# Patient Record
Sex: MALE | Race: WHITE | NOT HISPANIC OR LATINO | Employment: OTHER | URBAN - METROPOLITAN AREA
[De-identification: names, ages, dates, MRNs, and addresses within clinical notes are randomized per-mention and may not be internally consistent; named-entity substitution may affect disease eponyms.]

---

## 2018-04-09 LAB
ABSOL LYMPHOCYTES (HISTORICAL): 1.3 K/UL (ref 0.5–4)
ALBUMIN SERPL BCP-MCNC: 4 G/DL (ref 3–5.2)
ALP SERPL-CCNC: 100 U/L (ref 43–122)
ALT SERPL W P-5'-P-CCNC: 26 U/L (ref 9–52)
ANION GAP SERPL CALCULATED.3IONS-SCNC: 10 MMOL/L (ref 5–14)
AST SERPL W P-5'-P-CCNC: 28 U/L (ref 17–59)
BASOPHILS # BLD AUTO: 0 K/UL (ref 0–0.1)
BASOPHILS # BLD AUTO: 1 % (ref 0–1)
BILIRUB SERPL-MCNC: 1 MG/DL
BUN SERPL-MCNC: 21 MG/DL (ref 5–25)
CALCIUM SERPL-MCNC: 9.1 MG/DL (ref 8.4–10.2)
CHLORIDE SERPL-SCNC: 108 MEQ/L (ref 97–108)
CO2 SERPL-SCNC: 24 MMOL/L (ref 22–30)
CREATINE, SERUM (HISTORICAL): 0.96 MG/DL (ref 0.7–1.5)
DEPRECATED RDW RBC AUTO: 13.5 %
EGFR (HISTORICAL): >60 ML/MIN/1.73 M2
EOSINOPHIL # BLD AUTO: 0.1 K/UL (ref 0–0.4)
EOSINOPHIL NFR BLD AUTO: 1 % (ref 0–6)
GLUCOSE SERPL-MCNC: 99 MG/DL (ref 70–99)
HCT VFR BLD AUTO: 41.4 % (ref 41–53)
HGB BLD-MCNC: 14 G/DL (ref 13.5–17.5)
LYMPHOCYTES NFR BLD AUTO: 15 % (ref 25–45)
MAGNESIUM SERPL-MCNC: 1.9 MG/DL (ref 1.6–2.3)
MCH RBC QN AUTO: 31.9 PG (ref 26–34)
MCHC RBC AUTO-ENTMCNC: 33.8 % (ref 31–36)
MCV RBC AUTO: 95 FL (ref 80–100)
MONOCYTES # BLD AUTO: 0.5 K/UL (ref 0.2–0.9)
MONOCYTES NFR BLD AUTO: 6 % (ref 1–10)
NEUTROPHILS ABS COUNT (HISTORICAL): 6.7 K/UL (ref 1.8–7.8)
NEUTS SEG NFR BLD AUTO: 77 % (ref 45–65)
PLATELET # BLD AUTO: 212 K/MCL (ref 150–450)
POTASSIUM SERPL-SCNC: 4.3 MEQ/L (ref 3.6–5)
RBC # BLD AUTO: 4.38 M/MCL (ref 4.5–5.9)
SODIUM SERPL-SCNC: 142 MEQ/L (ref 137–147)
TOTAL PROTEIN (HISTORICAL): 6.5 G/DL (ref 5.9–8.4)
WBC # BLD AUTO: 8.8 K/MCL (ref 4.5–11)

## 2018-04-30 LAB
ABSOL LYMPHOCYTES (HISTORICAL): 1.4 K/UL (ref 0.5–4)
ALBUMIN SERPL BCP-MCNC: 4 G/DL (ref 3–5.2)
ALP SERPL-CCNC: 91 U/L (ref 43–122)
ALT SERPL W P-5'-P-CCNC: 28 U/L (ref 9–52)
ANION GAP SERPL CALCULATED.3IONS-SCNC: 9 MMOL/L (ref 5–14)
AST SERPL W P-5'-P-CCNC: 20 U/L (ref 17–59)
BASOPHILS # BLD AUTO: 0 K/UL (ref 0–0.1)
BASOPHILS # BLD AUTO: 1 % (ref 0–1)
BILIRUB SERPL-MCNC: 0.7 MG/DL
BUN SERPL-MCNC: 16 MG/DL (ref 5–25)
CALCIUM SERPL-MCNC: 9.1 MG/DL (ref 8.4–10.2)
CHLORIDE SERPL-SCNC: 106 MEQ/L (ref 97–108)
CO2 SERPL-SCNC: 26 MMOL/L (ref 22–30)
CREATINE, SERUM (HISTORICAL): 0.87 MG/DL (ref 0.7–1.5)
DEPRECATED RDW RBC AUTO: 13.9 %
EGFR (HISTORICAL): >60 ML/MIN/1.73 M2
EOSINOPHIL # BLD AUTO: 0.1 K/UL (ref 0–0.4)
EOSINOPHIL NFR BLD AUTO: 2 % (ref 0–6)
GLUCOSE SERPL-MCNC: 148 MG/DL (ref 70–99)
HCT VFR BLD AUTO: 40.6 % (ref 41–53)
HGB BLD-MCNC: 13.7 G/DL (ref 13.5–17.5)
LYMPHOCYTES NFR BLD AUTO: 19 % (ref 25–45)
MAGNESIUM SERPL-MCNC: 1.9 MG/DL (ref 1.6–2.3)
MCH RBC QN AUTO: 31.7 PG (ref 26–34)
MCHC RBC AUTO-ENTMCNC: 33.7 % (ref 31–36)
MCV RBC AUTO: 94 FL (ref 80–100)
MONOCYTES # BLD AUTO: 0.3 K/UL (ref 0.2–0.9)
MONOCYTES NFR BLD AUTO: 4 % (ref 1–10)
NEUTROPHILS ABS COUNT (HISTORICAL): 5.8 K/UL (ref 1.8–7.8)
NEUTS SEG NFR BLD AUTO: 74 % (ref 45–65)
PLATELET # BLD AUTO: 214 K/MCL (ref 150–450)
POTASSIUM SERPL-SCNC: 4.1 MEQ/L (ref 3.6–5)
RBC # BLD AUTO: 4.31 M/MCL (ref 4.5–5.9)
SODIUM SERPL-SCNC: 141 MEQ/L (ref 137–147)
TOTAL PROTEIN (HISTORICAL): 6.5 G/DL (ref 5.9–8.4)
WBC # BLD AUTO: 7.8 K/MCL (ref 4.5–11)

## 2018-05-21 LAB
ABSOL LYMPHOCYTES (HISTORICAL): 1.3 K/UL (ref 0.5–4)
ALBUMIN SERPL BCP-MCNC: 3.8 G/DL (ref 3–5.2)
ALP SERPL-CCNC: 88 U/L (ref 43–122)
ALT SERPL W P-5'-P-CCNC: 20 U/L (ref 9–52)
ANION GAP SERPL CALCULATED.3IONS-SCNC: 10 MMOL/L (ref 5–14)
AST SERPL W P-5'-P-CCNC: 18 U/L (ref 17–59)
BASOPHILS # BLD AUTO: 0 K/UL (ref 0–0.1)
BASOPHILS # BLD AUTO: 1 % (ref 0–1)
BILIRUB SERPL-MCNC: 0.4 MG/DL
BUN SERPL-MCNC: 18 MG/DL (ref 5–25)
CALCIUM SERPL-MCNC: 9.3 MG/DL (ref 8.4–10.2)
CHLORIDE SERPL-SCNC: 109 MEQ/L (ref 97–108)
CO2 SERPL-SCNC: 23 MMOL/L (ref 22–30)
CREATINE, SERUM (HISTORICAL): 0.85 MG/DL (ref 0.7–1.5)
DEPRECATED RDW RBC AUTO: 14 %
EGFR (HISTORICAL): >60 ML/MIN/1.73 M2
EOSINOPHIL # BLD AUTO: 0.1 K/UL (ref 0–0.4)
EOSINOPHIL NFR BLD AUTO: 2 % (ref 0–6)
GLUCOSE SERPL-MCNC: 106 MG/DL (ref 70–99)
HCT VFR BLD AUTO: 41.1 % (ref 41–53)
HGB BLD-MCNC: 13.8 G/DL (ref 13.5–17.5)
LYMPHOCYTES NFR BLD AUTO: 18 % (ref 25–45)
MAGNESIUM SERPL-MCNC: 1.9 MG/DL (ref 1.6–2.3)
MCH RBC QN AUTO: 32.3 PG (ref 26–34)
MCHC RBC AUTO-ENTMCNC: 33.6 % (ref 31–36)
MCV RBC AUTO: 96 FL (ref 80–100)
MONOCYTES # BLD AUTO: 0.4 K/UL (ref 0.2–0.9)
MONOCYTES NFR BLD AUTO: 5 % (ref 1–10)
NEUTROPHILS ABS COUNT (HISTORICAL): 5.6 K/UL (ref 1.8–7.8)
NEUTS SEG NFR BLD AUTO: 74 % (ref 45–65)
PLATELET # BLD AUTO: 205 K/MCL (ref 150–450)
POTASSIUM SERPL-SCNC: 4.3 MEQ/L (ref 3.6–5)
RBC # BLD AUTO: 4.28 M/MCL (ref 4.5–5.9)
SODIUM SERPL-SCNC: 141 MEQ/L (ref 137–147)
TOTAL PROTEIN (HISTORICAL): 6.2 G/DL (ref 5.9–8.4)
WBC # BLD AUTO: 7.6 K/MCL (ref 4.5–11)

## 2018-06-11 LAB
ABSOL LYMPHOCYTES (HISTORICAL): 1.3 K/UL (ref 0.5–4)
ALBUMIN SERPL BCP-MCNC: 3.7 G/DL (ref 3–5.2)
ALP SERPL-CCNC: 114 U/L (ref 43–122)
ALT SERPL W P-5'-P-CCNC: 17 U/L (ref 9–52)
ANION GAP SERPL CALCULATED.3IONS-SCNC: 11 MMOL/L (ref 5–14)
AST SERPL W P-5'-P-CCNC: 18 U/L (ref 17–59)
BASOPHILS # BLD AUTO: 0 K/UL (ref 0–0.1)
BASOPHILS # BLD AUTO: 1 % (ref 0–1)
BILIRUB SERPL-MCNC: 0.9 MG/DL
BUN SERPL-MCNC: 16 MG/DL (ref 5–25)
CALCIUM SERPL-MCNC: 9.1 MG/DL (ref 8.4–10.2)
CHLORIDE SERPL-SCNC: 105 MEQ/L (ref 97–108)
CO2 SERPL-SCNC: 23 MMOL/L (ref 22–30)
CREATINE, SERUM (HISTORICAL): 0.87 MG/DL (ref 0.7–1.5)
DEPRECATED RDW RBC AUTO: 13.8 %
EGFR (HISTORICAL): >60 ML/MIN/1.73 M2
EOSINOPHIL # BLD AUTO: 0.2 K/UL (ref 0–0.4)
EOSINOPHIL NFR BLD AUTO: 2 % (ref 0–6)
GLUCOSE SERPL-MCNC: 163 MG/DL (ref 70–99)
HCT VFR BLD AUTO: 39.8 % (ref 41–53)
HGB BLD-MCNC: 13.6 G/DL (ref 13.5–17.5)
LYMPHOCYTES NFR BLD AUTO: 14 % (ref 25–45)
MAGNESIUM SERPL-MCNC: 2 MG/DL (ref 1.6–2.3)
MCH RBC QN AUTO: 32.5 PG (ref 26–34)
MCHC RBC AUTO-ENTMCNC: 34.1 % (ref 31–36)
MCV RBC AUTO: 95 FL (ref 80–100)
MONOCYTES # BLD AUTO: 0.4 K/UL (ref 0.2–0.9)
MONOCYTES NFR BLD AUTO: 4 % (ref 1–10)
NEUTROPHILS ABS COUNT (HISTORICAL): 7.6 K/UL (ref 1.8–7.8)
NEUTS SEG NFR BLD AUTO: 79 % (ref 45–65)
PLATELET # BLD AUTO: 336 K/MCL (ref 150–450)
POTASSIUM SERPL-SCNC: 4.5 MEQ/L (ref 3.6–5)
RBC # BLD AUTO: 4.17 M/MCL (ref 4.5–5.9)
SODIUM SERPL-SCNC: 140 MEQ/L (ref 137–147)
TOTAL PROTEIN (HISTORICAL): 6.8 G/DL (ref 5.9–8.4)
WBC # BLD AUTO: 9.6 K/MCL (ref 4.5–11)

## 2018-07-09 ENCOUNTER — OFFICE VISIT (OUTPATIENT)
Dept: SURGERY | Facility: CLINIC | Age: 68
End: 2018-07-09
Payer: MEDICARE

## 2018-07-09 VITALS
WEIGHT: 162 LBS | DIASTOLIC BLOOD PRESSURE: 78 MMHG | HEART RATE: 92 BPM | SYSTOLIC BLOOD PRESSURE: 118 MMHG | BODY MASS INDEX: 23.19 KG/M2 | HEIGHT: 70 IN | TEMPERATURE: 97.5 F | RESPIRATION RATE: 18 BRPM

## 2018-07-09 DIAGNOSIS — K40.20 NON-RECURRENT BILATERAL INGUINAL HERNIA WITHOUT OBSTRUCTION OR GANGRENE: Primary | ICD-10-CM

## 2018-07-09 DIAGNOSIS — Z12.11 SCREENING FOR COLON CANCER: ICD-10-CM

## 2018-07-09 PROCEDURE — 99203 OFFICE O/P NEW LOW 30 MIN: CPT | Performed by: SURGERY

## 2018-07-09 RX ORDER — DEXAMETHASONE 4 MG/1
TABLET ORAL
Refills: 11 | COMMUNITY
Start: 2018-04-26 | End: 2018-07-25 | Stop reason: CLARIF

## 2018-07-09 RX ORDER — PANTOPRAZOLE SODIUM 40 MG/1
40 TABLET, DELAYED RELEASE ORAL
Refills: 1 | COMMUNITY
Start: 2018-05-16 | End: 2019-01-24 | Stop reason: ALTCHOICE

## 2018-07-09 RX ORDER — PANTOPRAZOLE SODIUM 40 MG/1
TABLET, DELAYED RELEASE ORAL EVERY 24 HOURS
COMMUNITY
Start: 2015-05-28 | End: 2018-07-09 | Stop reason: SDUPTHER

## 2018-07-09 RX ORDER — FOLIC ACID 1 MG/1
1 TABLET ORAL DAILY
COMMUNITY
Start: 2015-05-27 | End: 2020-01-24

## 2018-07-09 RX ORDER — MULTIVIT-MIN/IRON/FOLIC ACID/K 18-600-40
500 CAPSULE ORAL DAILY
COMMUNITY
Start: 2015-04-27

## 2018-07-09 NOTE — LETTER
July 9, 2018     [facility] [address]    Patient: Jeffrey Friedman   YOB: 1950   Date of Visit: 7/9/2018       Dear Dr Eligio More:    Thank you for referring Jerry Grubbs to me for evaluation  Below are my notes for this consultation  If you have questions, please do not hesitate to call me  I look forward to following your patient along with you  Sincerely,        Everardo Dawkins MD        CC: No Recipients  Holden Woods PA-C  7/9/2018 11:17 AM  Sign at close encounter  Assessment/Plan:   Jeffrey Friedman is a 79 y o male who is here for: Bilateral Inguinal Hernia    Patient with bilateral bulges of his groin with intermittent pain for several years  Patient with lung cancer and renal cell cancer who underwent chemotherapy and radiation    Patient's last colonoscopy was greater than 10 years ago    Plan: laparoscopic robotic assisted repair of Bilateral inguinal hernias, easily reducible  Schedule colonoscopy    Preoperative Clearance: None and Medical      - Patient has been instructed to avoid herbs or non-directed vitamins the week prior to surgery to ensure no drug interactions with perioperative surgical and anesthetic medications  - Patient should continue beta-blocker medication up through and including the day of surgery but hold any other hypertensive medications, including diuretics, unless instructed by PCP or anesthesia  - Patient should continue his statin medication up through and including the day of surgery  - Patient has been instructed to avoid aspirin containing medications or non-steroidal anti-inflammatory drugs for SEVEN days preceding surgery    ______________________________________________________  CC:  Chief Complaint   Patient presents with    Hernia    Patient Education     Given to patient     HPI:  Jeffrey Friedman is a 79 y o male who was referred for evaluation of Hernia and Patient Education (Given to patient)    Currently complaining of initial     bilateral inguinal hernia worse with lifting, standing,   no nausea and no vomiting,     Duration of pain: Intermittent  and over 1 year    regular bowel movement  Denies abdominal pain, change in bowel habits or blood in stool    Prior abdominal surgery: None    Father with history of colon cancer      ROS:  General ROS: negative  negative for - chills, fatigue, fever or night sweats, weight loss  Respiratory ROS: no cough, shortness of breath, or wheezing  Cardiovascular ROS: no chest pain or dyspnea on exertion  Genito-Urinary ROS: no dysuria, trouble voiding, or hematuria  Musculoskeletal ROS: negative for - gait disturbance, joint pain or muscle pain  Neurological ROS: no TIA or stroke symptoms  GI ROS: see HPI  Skin ROS: no new rashes or lesions   Lymphatic ROS: no new adenopathy noted by pt  GYN ROS: see HPI, no new GYN history or bleeding noted  Psy ROS: no new mental or behavioral disturbances         There is no problem list on file for this patient  Allergies:  Patient has no known allergies        Current Outpatient Prescriptions:     Ascorbic Acid (VITAMIN C) 500 MG CAPS, every 24 hours, Disp: , Rfl:     folic acid (FOLVITE) 1 mg tablet, every 24 hours, Disp: , Rfl:     Multiple Vitamins-Minerals (MULTIVITAMIN ADULT PO), every 24 hours, Disp: , Rfl:     pantoprazole (PROTONIX) 40 mg tablet, Take 40 mg by mouth daily before breakfast Take 30 minutes before, Disp: , Rfl: 1    POLYETHYLENE GLYCOL 3350 PO, every 24 hours, Disp: , Rfl:     dexamethasone (DECADRON) 4 mg tablet, TAKE 1 TABLET AS DIRECTED TWICE A DAY THE DAY BEFORE, DAY OF AND DAY AFTER CHEMOTHERAPY, Disp: , Rfl: 11    Past Medical History:   Diagnosis Date    Dysphagia 02/16/2017    Hemip[facility] [facility] 04/27/2015    Hydrocele 11/30/2010    Portacath in place     Primary malignant neoplasm of lung (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([facility] ) 05/26/2015       History reviewed  No pertinent surgical history  Family History   Problem Relation Age of Onset    Cancer Mother     Cancer Father     Colon cancer Father     Cancer Brother         reports that he has been smoking  He has never used smokeless tobacco  He reports that he drinks alcohol  He reports that he does not use drugs  PHYSICAL EXAM  General Appearance:    Alert, cooperative, no distress   Head:    Normocephalic without obvious abnormality   Eyes:    PERRL, conjunctiva/corneas clear, EOM's intact        Neck:   Supple, no adenopathy, no JVD   Back:     Symmetric, no spinal or CVA tenderness   Lungs:     Clear to auscultation bilaterally, no wheezing or rhonchi   Heart:    Regular abdomen is soft without significant tenderness, masses, organomegaly or guarding rate and rhythm, S1 and S2 normal, no murmur   Abdomen: Bowel sounds are normal     bilateral inguinal hernia   Extremities:   Extremities normal  No clubbing, cyanosis or edema   Psych:   Normal Affect, AOx3  Neurologic:  Skin:   CNII-XII intact  Strength symmetric, speech intact    Warm, dry, intact, no visible rashes or lesions             Informed consent for procedure was personally discussed, reviewed, and signed by Dr Dany Yanes  Discussion by Dr Dany Yanes was carried out regarding risks, benefits, and alternatives with the patient  Risks include but are not limited to:  bleeding, infection, and delayed wound healing or an open wound, pulmonary embolus, leaks from bowel or bile ducts or other viscus, transfusions, death  Discussed in further detail the more common complications and their rates of occurrence   was used if necessary  Patient expressed understanding of the issues discussed and wished/consented to proceed  All questions were answered by Dr Dany Yanes  Discussion performed between patient and the provider signing below       Signature:   Keren Smith MD    Date: 7/9/2018 Time: 10:07 AM     Some portions of this record may have been generated with voice recognition software  There may be translation, syntax,  or grammatical errors  Occasional wrong word or "sound-a-like" substitutions may have occurred due to the inherent limitations of the voice recognition software  Read the chart carefully and recognize, using context, where substitutions may have occurred  If you have any questions, please contact the dictating provider for clarification or correction, as needed  This encounter has been coded by a non-certified coder

## 2018-07-09 NOTE — PROGRESS NOTES
Assessment/Plan:   Tiffanie Gurrola is a 79 y o male who is here for: Bilateral Inguinal Hernia    Patient with bilateral bulges of his groin with intermittent pain for several years  Patient with lung cancer and renal cell cancer who underwent chemotherapy and radiation    Patient's last colonoscopy was greater than 10 years ago    Plan: laparoscopic robotic assisted repair of Bilateral inguinal hernias, easily reducible  Schedule colonoscopy    Preoperative Clearance: None and Medical      - Patient has been instructed to avoid herbs or non-directed vitamins the week prior to surgery to ensure no drug interactions with perioperative surgical and anesthetic medications  - Patient should continue beta-blocker medication up through and including the day of surgery but hold any other hypertensive medications, including diuretics, unless instructed by PCP or anesthesia  - Patient should continue his statin medication up through and including the day of surgery  - Patient has been instructed to avoid aspirin containing medications or non-steroidal anti-inflammatory drugs for SEVEN days preceding surgery  ______________________________________________________  CC:  Chief Complaint   Patient presents with    Hernia    Patient Education     Given to patient     HPI:  Tiffanie Gurrola is a 79 y o male who was referred for evaluation of Hernia and Patient Education (Given to patient)    Currently complaining of initial     bilateral inguinal hernia worse with lifting, standing,   no nausea and no vomiting,     Duration of pain: Intermittent  and over 1 year    regular bowel movement     Denies abdominal pain, change in bowel habits or blood in stool    Prior abdominal surgery: None    Father with history of colon cancer      ROS:  General ROS: negative  negative for - chills, fatigue, fever or night sweats, weight loss  Respiratory ROS: no cough, shortness of breath, or wheezing  Cardiovascular ROS: no chest pain or dyspnea on exertion  Genito-Urinary ROS: no dysuria, trouble voiding, or hematuria  Musculoskeletal ROS: negative for - gait disturbance, joint pain or muscle pain  Neurological ROS: no TIA or stroke symptoms  GI ROS: see HPI  Skin ROS: no new rashes or lesions   Lymphatic ROS: no new adenopathy noted by pt  GYN ROS: see HPI, no new GYN history or bleeding noted  Psy ROS: no new mental or behavioral disturbances         There is no problem list on file for this patient  Allergies:  Patient has no known allergies  Current Outpatient Prescriptions:     Ascorbic Acid (VITAMIN C) 500 MG CAPS, every 24 hours, Disp: , Rfl:     folic acid (FOLVITE) 1 mg tablet, every 24 hours, Disp: , Rfl:     Multiple Vitamins-Minerals (MULTIVITAMIN ADULT PO), every 24 hours, Disp: , Rfl:     pantoprazole (PROTONIX) 40 mg tablet, Take 40 mg by mouth daily before breakfast Take 30 minutes before, Disp: , Rfl: 1    POLYETHYLENE GLYCOL 3350 PO, every 24 hours, Disp: , Rfl:     dexamethasone (DECADRON) 4 mg tablet, TAKE 1 TABLET AS DIRECTED TWICE A DAY THE DAY BEFORE, DAY OF AND DAY AFTER CHEMOTHERAPY, Disp: , Rfl: 11    Past Medical History:   Diagnosis Date    Dysphagia 02/16/2017    [facility]aresis ([facility] ) 04/27/2015    Hydrocele 11/30/2010    Portacath in place     Primary malignant neoplasm of lung ([facility] ) 05/26/2015    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([facility] ) 05/26/2015       History reviewed  No pertinent surgical history  Family History   Problem Relation Age of Onset    Cancer Mother     Cancer Father     Colon cancer Father     Cancer Brother         reports that he has been smoking  He has never used smokeless tobacco  He reports that he drinks alcohol  He reports that he does not use drugs      PHYSICAL EXAM  General Appearance:    Alert, cooperative, no distress   Head:    Normocephalic without obvious abnormality   Eyes:    PERRL, conjunctiva/corneas clear, EOM's intact Neck:   Supple, no adenopathy, no JVD   Back:     Symmetric, no spinal or CVA tenderness   Lungs:     Clear to auscultation bilaterally, no wheezing or rhonchi   Heart:    Regular abdomen is soft without significant tenderness, masses, organomegaly or guarding rate and rhythm, S1 and S2 normal, no murmur   Abdomen: Bowel sounds are normal     bilateral inguinal hernia   Extremities:   Extremities normal  No clubbing, cyanosis or edema   Psych:   Normal Affect, AOx3  Neurologic:  Skin:   CNII-XII intact  Strength symmetric, speech intact    Warm, dry, intact, no visible rashes or lesions             Informed consent for procedure was personally discussed, reviewed, and signed by Dr Emma Martinez  Discussion by Dr Emma Martinez was carried out regarding risks, benefits, and alternatives with the patient  Risks include but are not limited to:  bleeding, infection, and delayed wound healing or an open wound, pulmonary embolus, leaks from bowel or bile ducts or other viscus, transfusions, death  Discussed in further detail the more common complications and their rates of occurrence   was used if necessary  Patient expressed understanding of the issues discussed and wished/consented to proceed  All questions were answered by Dr Emma Martinez  Discussion performed between patient and the provider signing below  Signature:   Ann Manrique MD    Date: 7/9/2018 Time: 10:07 AM     Some portions of this record may have been generated with voice recognition software  There may be translation, syntax,  or grammatical errors  Occasional wrong word or "sound-a-like" substitutions may have occurred due to the inherent limitations of the voice recognition software  Read the chart carefully and recognize, using context, where substitutions may have occurred  If you have any questions, please contact the dictating provider for clarification or correction, as needed   This encounter has been coded by a non-certified

## 2018-07-11 ENCOUNTER — DOCUMENTATION (OUTPATIENT)
Dept: FAMILY MEDICINE CLINIC | Facility: CLINIC | Age: 68
End: 2018-07-11

## 2018-07-11 ENCOUNTER — TELEPHONE (OUTPATIENT)
Dept: FAMILY MEDICINE CLINIC | Facility: CLINIC | Age: 68
End: 2018-07-11

## 2018-07-11 ENCOUNTER — TELEPHONE (OUTPATIENT)
Dept: SURGERY | Facility: CLINIC | Age: 68
End: 2018-07-11

## 2018-07-11 PROBLEM — K40.20 BILATERAL INGUINAL HERNIA WITHOUT OBSTRUCTION OR GANGRENE: Status: ACTIVE | Noted: 2018-07-11

## 2018-07-11 PROBLEM — Z80.0 FAMILY HISTORY OF COLON CANCER: Status: ACTIVE | Noted: 2018-07-11

## 2018-07-11 NOTE — TELEPHONE ENCOUNTER
Patient is schedule for surgery (Bilateral Inguinal Hernia Repairs) on 08/08/2018  Dr Suzi Cole is requesting for medical clearance before proceeding with surgery  Will Dr Gela Cook clear patient from last office visit or does the patient need to make an appointment?   Thank you

## 2018-07-12 NOTE — TELEPHONE ENCOUNTER
Pt is scheduled for 07/25/18  He is having bloodwork 07/20/18  Pt scheduled for surgery 08/08/18 at [address] [place] [address]

## 2018-07-13 ENCOUNTER — TELEPHONE (OUTPATIENT)
Dept: FAMILY MEDICINE CLINIC | Facility: CLINIC | Age: 68
End: 2018-07-13

## 2018-07-13 NOTE — TELEPHONE ENCOUNTER
----- Message from Dennie Pound sent at 7/12/2018 10:59 AM EDT -----  Regarding: Hernia Approval  Contact: [phone]  I am requesting a call, I received a call regarding approval for my hernia procedure and I am unsure what that means?

## 2018-07-19 ENCOUNTER — ANESTHESIA EVENT (OUTPATIENT)
Dept: PERIOP | Facility: HOSPITAL | Age: 68
End: 2018-07-19
Payer: MEDICARE

## 2018-07-20 NOTE — ANESTHESIA PREPROCEDURE EVALUATION
Review of Systems/Medical History  Patient summary reviewed  Chart reviewed      Cardiovascular   Pulmonary  Smoker (1/2 PPD) cigarette smoker  , Tobacco cessation counseling given ,   Comment: Lung Cancer  Spinal cord and brain mets     GI/Hepatic  Dysphagia (Hx of),     Comment: Patient states dysphagia has resolved     Negative  ROS        Endo/Other     GYN       Hematology  Negative hematology ROS     Comment: Last chemoTx 5/2018 Musculoskeletal  Negative musculoskeletal ROS        Neurology    Motor deficit , left hand/finger weakness and left lower extremity weakness,   Comment: Radiation for BT Psychology   Negative psychology ROS              Physical Exam    Airway    Mallampati score: II  TM Distance: >3 FB  Neck ROM: full     Dental   lower dentures and upper dentures,     Cardiovascular  Rhythm: regular, Rate: normal, Cardiovascular exam normal    Pulmonary  Decreased breath sounds,     Other Findings        Anesthesia Plan  ASA Score- 4     Anesthesia Type- general with ASA Monitors  Additional Monitors:   Airway Plan: ETT  Plan Factors- Patient instructed to abstain from smoking on day of procedure       Induction- intravenous  Postoperative Plan- Plan for postoperative opioid use  Informed Consent- Anesthetic plan and risks discussed with patient

## 2018-07-20 NOTE — PRE-PROCEDURE INSTRUCTIONS
Pre-Surgery Instructions:   Medication Instructions    Ascorbic Acid (VITAMIN C) 500 MG CAPS Patient was instructed by Physician and understands   folic acid (FOLVITE) 1 mg tablet Patient was instructed by Physician and understands   Multiple Vitamins-Minerals (MULTIVITAMIN ADULT PO) Patient was instructed by Physician and understands   pantoprazole (PROTONIX) 40 mg tablet Patient was instructed by Physician and understands  Pt instructed by Dr Eve Bernstein to take pantoprazole with a sip of water the morning of surgery  Pt given/reviewed [facility] preop instructions and chlorhexadine soap   Pt to hold asa/NSAIDS/vitamins/herbal supplements one week before surgery or as per Dr Jennyfer Zambrano

## 2018-07-25 ENCOUNTER — CONSULT (OUTPATIENT)
Dept: FAMILY MEDICINE CLINIC | Facility: CLINIC | Age: 68
End: 2018-07-25
Payer: MEDICARE

## 2018-07-25 VITALS
HEART RATE: 80 BPM | RESPIRATION RATE: 18 BRPM | WEIGHT: 163.2 LBS | BODY MASS INDEX: 22.85 KG/M2 | HEIGHT: 71 IN | SYSTOLIC BLOOD PRESSURE: 110 MMHG | DIASTOLIC BLOOD PRESSURE: 68 MMHG

## 2018-07-25 DIAGNOSIS — C79.31 SECONDARY MALIGNANT NEOPLASM OF BRAIN AND SPINAL CORD (HCC): ICD-10-CM

## 2018-07-25 DIAGNOSIS — C34.90 PRIMARY MALIGNANT NEOPLASM OF LUNG, UNSPECIFIED LATERALITY (HCC): ICD-10-CM

## 2018-07-25 DIAGNOSIS — Z80.0 FAMILY HISTORY OF COLON CANCER: Primary | ICD-10-CM

## 2018-07-25 DIAGNOSIS — C79.49 SECONDARY MALIGNANT NEOPLASM OF BRAIN AND SPINAL CORD (HCC): ICD-10-CM

## 2018-07-25 PROCEDURE — 99214 OFFICE O/P EST MOD 30 MIN: CPT | Performed by: FAMILY MEDICINE

## 2018-07-25 NOTE — PROGRESS NOTES
[facility] PRACTICE PRE-OPERATIVE EVALUATION  [facility] PHYSICIAN [facility] - [facility]  [facility]    NAME: Racheal Christensen  AGE: 79 y o  SEX: male  : 1950     DATE: 2018    Family Practice Pre-Operative Evaluation      Chief Complaint: Pre-operative Evaluation     Surgery: Bilat hernia repair: groin   Anticipated Date of Surgery: Aug 8, 2018     History of Present Illness:     Patient has no prior history of bleeding issues or blood clots  Chronic conditions, medications and allergies were reviewed  There is no currently active infectious process  Assessment of Cardiac Risk:  · No unstable or severe angina or MI in the last 6 weeks or history of stent placement in the last year   · No decompensated heart failure (e g  New onset heart failure, NYHA functional class IV heart failure, or worsening existing heart failure) in past 3 mos  · No severe heart valve disease including aortic stenosis or symptomatic mitral stenosis     Exercise Capacity:  · Able to walk 4 blocks without symptoms  · Able to walk 2 flights without symptoms    NO Prior Anesthesia Reactions       HAS HAD 3 DAYS OF HIGH DOSE STEROIDS EVERY 3 WKS FOR 3 YRS DURING CHEMO PROGRAM; LAST DOSE : MID   WILL DISCUSS WITH ONCOLOGIST IF WE NEED TASHIA-OPERATIVE COVERAGE   P A T  If done reviewed  Review of Systems:     Review of Systems   Constitutional: Negative for activity change and appetite change  HENT: Negative for trouble swallowing  Eyes: Negative for visual disturbance  Respiratory: Negative for cough and shortness of breath  Cardiovascular: Negative for chest pain, palpitations and leg swelling  Gastrointestinal: Negative for abdominal pain and blood in stool  Endocrine: Negative for polyuria  Genitourinary: Negative for difficulty urinating and hematuria  Skin: Negative for rash  Neurological: Negative for dizziness     Psychiatric/Behavioral: Negative for behavioral problems         Current Problem List:     Patient Active Problem List   Diagnosis    Family history of colon cancer    Bilateral inguinal hernia without obstruction or gangrene    Primary malignant neoplasm of lung ([facility] [facility]    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)       Allergies:     No Known Allergies    Current Medications:       Current Outpatient Prescriptions:     Ascorbic Acid (VITAMIN C) 500 MG CAPS, daily  , Disp: , Rfl:     folic acid (FOLVITE) 1 mg tablet, 1 mg daily  , Disp: , Rfl:     Multiple Vitamins-Minerals (MULTIVITAMIN ADULT PO), daily  , Disp: , Rfl:     pantoprazole (PROTONIX) 40 mg tablet, Take 40 mg by mouth daily before breakfast Take 30 minutes before, Disp: , Rfl: 1    Past Medical History:       Past Medical History:   Diagnosis Date    Balance problems     and" coordination issues/best to walk slow"    Dysphagia 02/16/2017    "not right now but in the past"    Environmental and seasonal allergies     Exercise involving walking     "approx 1 mile 3x/week"    Eye injury     right,  "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"    Full dentures     Hemiparesis ([facility] ) 04/27/2015    left weakness,"due to tumor on brain"    History of brain tumor     "had radiation for it"    History of cancer chemotherapy     last tx  5/22/18    History of GI bleed 2015    History of kidney cancer     left    History of kidney stones     History of radiation therapy     History of transfusion     5 units of blood 2015    Hydrocele 11/30/2010    Inguinal hernia, bilateral     Joint stiffness of multiple sites     Portacath in place     right chest    Primary malignant neoplasm of lung ([facility] [address] ) 05/26/2015    Risk for falls     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 05/26/2015    Skin cancer     Wears glasses         Past Surgical History:   Procedure Laterality Date    COLONOSCOPY      ESOPHAGOGASTRODUODENOSCOPY      HYDROCELE EXCISION / REPAIR Left     KIDNEY STONE SURGERY      PORTACATH PLACEMENT      SKIN BIOPSY      SKIN CANCER EXCISION      17 surgeries, basal cell    TONSILLECTOMY          Family History   Problem Relation Age of Onset    Cancer Mother     Cancer Father     Colon cancer Father     Cancer Brother         Social History     Social History    Marital status:      Spouse name: N/A    Number of children: N/A    Years of education: N/A     Occupational History    Not on file  Social History Main Topics    Smoking status: Current Every Day Smoker     Packs/day: 0 50     Years: 48 00     Types: Cigarettes    Smokeless tobacco: Current User    Alcohol use Yes      Comment: "3x per year"    Drug use: No    Sexual activity: Not on file     Other Topics Concern    Not on file     Social History Narrative    No narrative on file        Physical Exam:     /68 (BP Location: Left arm, Patient Position: Sitting, Cuff Size: Adult)   Pulse 80   Resp 18   Ht 5' 10 87" (1 8 m)   Wt 74 kg (163 lb 3 2 oz)   BMI 22 85 kg/m²     Physical Exam   Constitutional: He appears well-developed and well-nourished  HENT:   Head: Normocephalic and atraumatic  Eyes: Conjunctivae are normal    Neck: Neck supple  No thyromegaly present  Cardiovascular: Normal rate, regular rhythm, normal heart sounds and intact distal pulses  No murmur heard  Pulmonary/Chest: Effort normal and breath sounds normal  No respiratory distress  Musculoskeletal: He exhibits no edema  Lymphadenopathy:     He has no cervical adenopathy  Skin: Skin is warm and dry  Psychiatric: He has a normal mood and affect  His behavior is normal      HAD CMP/CBC DONE IN June AND ALL OK     Operative site has been examined and clear of skin infection and inflammation  Assessment & Recommendations:     Patient is cleared for surgery as detailed above       Surgical Procedure risk category: LOW    Patient specific operative risk categegory: MODERATE

## 2018-07-25 NOTE — PATIENT INSTRUCTIONS
IF YOU HAVE NEVER HAD A TDAP SHOT (TETANUS/DIPHTHERIA/PERTUSSIS)  TALK TO YOUR PHARMACIST ABOUT GETTING ONE : GOOD FOR 10 YRS:ESPECIALLY IMPORTATN IF YOU HAVE YOUNG CHILDREN OR GRANDCHILDREN    GET A YEARLY FLU SHOT IN THE FALL : IF OVER 65 GET "HIGH DOSE"     ASK PHARMACIST ABOUT "SHINGRIX" THE NEW SHINGLES VACCINE IF OVER AGE 50

## 2018-08-03 ENCOUNTER — HOSPITAL ENCOUNTER (OUTPATIENT)
Dept: INFUSION CENTER | Facility: HOSPITAL | Age: 68
Discharge: HOME | End: 2018-08-03
Payer: MEDICARE

## 2018-08-03 PROCEDURE — 96523 IRRIG DRUG DELIVERY DEVICE: CPT

## 2018-08-03 RX ADMIN — Medication 300 UNITS: at 11:29

## 2018-08-03 NOTE — PROGRESS NOTES
Port flushed per protocol  Patient states no labs needed today    AVS provided and confirmed next appt

## 2018-08-06 ENCOUNTER — ANESTHESIA EVENT (OUTPATIENT)
Dept: GASTROENTEROLOGY | Facility: HOSPITAL | Age: 68
End: 2018-08-06
Payer: MEDICARE

## 2018-08-07 ENCOUNTER — ANESTHESIA (OUTPATIENT)
Dept: GASTROENTEROLOGY | Facility: HOSPITAL | Age: 68
End: 2018-08-07
Payer: MEDICARE

## 2018-08-07 ENCOUNTER — HOSPITAL ENCOUNTER (OUTPATIENT)
Facility: HOSPITAL | Age: 68
Setting detail: OUTPATIENT SURGERY
Discharge: HOME | End: 2018-08-07
Attending: SURGERY | Admitting: SURGERY
Payer: MEDICARE

## 2018-08-07 VITALS
SYSTOLIC BLOOD PRESSURE: 112 MMHG | HEART RATE: 79 BPM | WEIGHT: 163.14 LBS | DIASTOLIC BLOOD PRESSURE: 63 MMHG | OXYGEN SATURATION: 100 % | TEMPERATURE: 98.2 F | HEIGHT: 70 IN | BODY MASS INDEX: 23.36 KG/M2 | RESPIRATION RATE: 18 BRPM

## 2018-08-07 DIAGNOSIS — Z80.0 FAMILY HISTORY OF COLON CANCER: ICD-10-CM

## 2018-08-07 PROCEDURE — 45385 COLONOSCOPY W/LESION REMOVAL: CPT | Performed by: SURGERY

## 2018-08-07 PROCEDURE — 45384 COLONOSCOPY W/LESION REMOVAL: CPT | Performed by: SURGERY

## 2018-08-07 PROCEDURE — 88305 TISSUE EXAM BY PATHOLOGIST: CPT | Performed by: PATHOLOGY

## 2018-08-07 RX ORDER — PROPOFOL 10 MG/ML
INJECTION, EMULSION INTRAVENOUS AS NEEDED
Status: DISCONTINUED | OUTPATIENT
Start: 2018-08-07 | End: 2018-08-07 | Stop reason: SURG

## 2018-08-07 RX ORDER — SODIUM CHLORIDE 9 MG/ML
100 INJECTION, SOLUTION INTRAVENOUS CONTINUOUS
Status: DISCONTINUED | OUTPATIENT
Start: 2018-08-07 | End: 2018-08-07 | Stop reason: HOSPADM

## 2018-08-07 RX ADMIN — PROPOFOL 50 MG: 10 INJECTION, EMULSION INTRAVENOUS at 10:38

## 2018-08-07 RX ADMIN — PROPOFOL 50 MG: 10 INJECTION, EMULSION INTRAVENOUS at 10:46

## 2018-08-07 RX ADMIN — PROPOFOL 50 MG: 10 INJECTION, EMULSION INTRAVENOUS at 10:50

## 2018-08-07 RX ADMIN — SODIUM CHLORIDE 100 ML/HR: 0.9 INJECTION, SOLUTION INTRAVENOUS at 10:06

## 2018-08-07 RX ADMIN — PROPOFOL 100 MG: 10 INJECTION, EMULSION INTRAVENOUS at 10:26

## 2018-08-07 RX ADMIN — PROPOFOL 50 MG: 10 INJECTION, EMULSION INTRAVENOUS at 10:35

## 2018-08-07 RX ADMIN — PROPOFOL 50 MG: 10 INJECTION, EMULSION INTRAVENOUS at 10:54

## 2018-08-07 RX ADMIN — PROPOFOL 50 MG: 10 INJECTION, EMULSION INTRAVENOUS at 10:42

## 2018-08-07 NOTE — OP NOTE
COLONOSCOPY  Postoperative Note  PATIENT NAME: Javi Hernandez  : 1950  MRN: [id number]  [facility] RO[facility] 01    Surgery Date: 2018      Pre operative diagnosis:   Family history of colon cancer [Z80 0]    Operative Indications:  Due for Colonoscopy    Previous Colonoscopy and  Location of polyps if any:   none          Consent:  The risks, benefits, and alternatives to the surgery were discussed with the patient and with the family prior to surgery if available, personally by Dr Jessee Calderon  If the consent was obtained by the physician assistant or other representative, the consent was reviewed once again personally by the operating physician  Common complications particular for this procedure as well as unusual complications were discussed, including but not limited to:  bleeding, wound infection, prolonged wound healing, open wounds, reoperation, leak from the bowel or viscus, leak from the bile duct or injury to adjacent or other organs or blood vessels in the abdomen, and possible surgery or reoperation  A  was used if necessary  The patient expressed understanding of the issues discussed and wished and consented to the procedure to proceed  All questions were answered  Dr Jessee Calderon personally discussed the informed consent with this patient  Post-Operative Diagnosis and Findings:     Diverticulosis and Hemorrhoids     Multiple polyp(s) seen in Right Colon, Left Colon, Sigmoid Colon and Rectum      Procedure:    Procedure(s):  COLONOSCOPY with Hot snare polypectomy and Hot forcep polypectomy      Surgeon(s) and Role:     * Reji Foley MD - Primary    I was present for the entire procedure       Specimens:  ID Type Source Tests Collected by Time Destination   1 : 4mm  polyp right colon Tissue Large Intestine, Right/Ascending Colon TISSUE EXAM Reji Foley MD 2018 1034    2 : 1 cm Hepatic fx polyp Tissue Colon TISSUE EXAM Reji Foley MD 2018 1039    3 : 1 cm polyp @45 CM Tissue Large Intestine, Left/Descending Colon TISSUE EXAM Ayse Garner MD 2018 1046    4 : 7mm polyp @ 35CM Tissue Large Intestine, Sigmoid Colon TISSUE EXAM Ayse Garner MD 2018 1053    5 : 8mm polyp @ 20cm Tissue Large Intestine, Sigmoid Colon TISSUE EXAM Ayse Garner MD 2018 1054    6 : 6mm polyp, 4mm polyp @15cm Tissue Colon TISSUE EXAM Ayse Garner MD 2018 1056    7 : 3mm polyp, 6mm polyp Rectal polyps Tissue Colon TISSUE EXAM Ayse Garner MD 2018 1058    8 : 7mm anal rectal polyp Tissue Colon TISSUE EXAM Ayse Garner MD 2018 1059        Estimated Blood Loss:   Minimal    Anesthesia Type:   Choice     Procedure: The patient was seen in the Holding Room  The risks, benefits, complications, treatment options, and expected outcomes were discussed with the patient  The possibilities of reaction to medication, pulmonary aspiration, perforation of viscus, bleeding, recurrent infection, the need for additional procedures, failure to diagnose a condition, missed polyps, a complication requiring transfusion or operation were discussed with the patient  The patient concurred with the proposed plan, giving informed consent  The patient was taken to Endoscopy Suite, identified as Natalie Jama  Staff confirmed patient name, , and procedure  A Time Out was held and the above information confirmed  A visual and digital exam was carried out of the anus and anal canal   Findings were normal unless specified below  The colonoscope was passed per anus and traversed to the cecum  The cecum was clearly visualized in the right lower quadrant by light reflex and palpation  The scope was withdrawn  There were mucosal lesion(s) and/or polyp(s) seen in the colon  These polyps were located at: ascending colon, hepatic flexure, descending colon, sigmoid colon and rectum  The polyp(s) were addressed using polypectomy technique described above       There were diverticula scattered throughout the sigmoid colon and grade 1 and 2 hemorrhoids  A photograph was taken  The scope was retroflexed  There were no anorectal lesions other than the hemorrhoids  The scope was removed  The patient tolerated the procedure well  Withdrawal time was greater than 10 minutes  Prep was good  at a 4/5  Follow-up endoscopy: Follow-up colonoscopy timing is difficult to predict without pathology and is not an exact science  Patients are told to follow up earlier than recommended if they have any symptoms or GI changes  However it is required that we predict possible endoscopy follow-up at the time of this procedure  Follow-up endoscopy is estimated to be recommended in:   <3 years for:  >10 polyps that clinically appear tubular adenomas      Some portions of this record may have been generated with voice recognition software  There may be translation, syntax,  or grammatical errors  Occasional wrong word or "sound-a-like" substitutions may have occurred due to the inherent limitations of the voice recognition software  Read the chart carefully and recognize, using context, where substations may have occurred  If you have any questions, please contact the dictating provider for clarification or correction, as needed         Complications: None    Condition: Stable to PACU    SIGNATURE: Suzie Alejandro MD   DATE: August 7, 2018   TIME: 11:02 AM

## 2018-08-07 NOTE — PROGRESS NOTES
D/C instructions given and pt verbalizes understanding   OOB to ambulate, gait steady denies dizziness

## 2018-08-07 NOTE — H&P (VIEW-ONLY)
Assessment/Plan:   Koko Valle is a 79 y o male who is here for: Bilateral Inguinal Hernia    Patient with bilateral bulges of his groin with intermittent pain for several years  Patient with lung cancer and renal cell cancer who underwent chemotherapy and radiation    Patient's last colonoscopy was greater than 10 years ago    Plan: laparoscopic robotic assisted repair of Bilateral inguinal hernias, easily reducible  Schedule colonoscopy    Preoperative Clearance: None and Medical      - Patient has been instructed to avoid herbs or non-directed vitamins the week prior to surgery to ensure no drug interactions with perioperative surgical and anesthetic medications  - Patient should continue beta-blocker medication up through and including the day of surgery but hold any other hypertensive medications, including diuretics, unless instructed by PCP or anesthesia  - Patient should continue his statin medication up through and including the day of surgery  - Patient has been instructed to avoid aspirin containing medications or non-steroidal anti-inflammatory drugs for SEVEN days preceding surgery  ______________________________________________________  CC:  Chief Complaint   Patient presents with    Hernia    Patient Education     Given to patient     HPI:  Koko Valle is a 79 y o male who was referred for evaluation of Hernia and Patient Education (Given to patient)    Currently complaining of initial     bilateral inguinal hernia worse with lifting, standing,   no nausea and no vomiting,     Duration of pain: Intermittent  and over 1 year    regular bowel movement     Denies abdominal pain, change in bowel habits or blood in stool    Prior abdominal surgery: None    Father with history of colon cancer      ROS:  General ROS: negative  negative for - chills, fatigue, fever or night sweats, weight loss  Respiratory ROS: no cough, shortness of breath, or wheezing  Cardiovascular ROS: no chest pain or dyspnea on exertion  Genito-Urinary ROS: no dysuria, trouble voiding, or hematuria  Musculoskeletal ROS: negative for - gait disturbance, joint pain or muscle pain  Neurological ROS: no TIA or stroke symptoms  GI ROS: see HPI  Skin ROS: no new rashes or lesions   Lymphatic ROS: no new adenopathy noted by pt  GYN ROS: see HPI, no new GYN history or bleeding noted  Psy ROS: no new mental or behavioral disturbances         There is no problem list on file for this patient  Allergies:  Patient has no known allergies  Current Outpatient Prescriptions:     Ascorbic Acid (VITAMIN C) 500 MG CAPS, every 24 hours, Disp: , Rfl:     folic acid (FOLVITE) 1 mg tablet, every 24 hours, Disp: , Rfl:     Multiple Vitamins-Minerals (MULTIVITAMIN ADULT PO), every 24 hours, Disp: , Rfl:     pantoprazole (PROTONIX) 40 mg tablet, Take 40 mg by mouth daily before breakfast Take 30 minutes before, Disp: , Rfl: 1    POLYETHYLENE GLYCOL 3350 PO, every 24 hours, Disp: , Rfl:     dexamethasone (DECADRON) 4 mg tablet, TAKE 1 TABLET AS DIRECTED TWICE A DAY THE DAY BEFORE, DAY OF AND DAY AFTER CHEMOTHERAPY, Disp: , Rfl: 11    Past Medical History:   Diagnosis Date    Dysphagia 02/16/2017    Hemiparesis ([facility] ) 04/27/2015    Hydrocele 11/30/2010    Portacath in place     Primary malignant neoplasm of lung ([facility] ) 05/26/2015    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([facility] ) 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/2015       History reviewed  No pertinent surgical history  Family History   Problem Relation Age of Onset    Cancer Mother     Cancer Father     Colon cancer Father     Cancer Brother         reports that he has been smoking  He has never used smokeless tobacco  He reports that he drinks alcohol  He reports that he does not use drugs      PHYSICAL EXAM  General Appearance:    Alert, cooperative, no distress   Head:    Normocephalic without obvious abnormality   Eyes:    PERRL, conjunctiva/corneas clear, EOM's intact Neck:   Supple, no adenopathy, no JVD   Back:     Symmetric, no spinal or CVA tenderness   Lungs:     Clear to auscultation bilaterally, no wheezing or rhonchi   Heart:    Regular abdomen is soft without significant tenderness, masses, organomegaly or guarding rate and rhythm, S1 and S2 normal, no murmur   Abdomen: Bowel sounds are normal     bilateral inguinal hernia   Extremities:   Extremities normal  No clubbing, cyanosis or edema   Psych:   Normal Affect, AOx3  Neurologic:  Skin:   CNII-XII intact  Strength symmetric, speech intact    Warm, dry, intact, no visible rashes or lesions             Informed consent for procedure was personally discussed, reviewed, and signed by Dr Peace Rodriguez  Discussion by Dr Peace Rodriguez was carried out regarding risks, benefits, and alternatives with the patient  Risks include but are not limited to:  bleeding, infection, and delayed wound healing or an open wound, pulmonary embolus, leaks from bowel or bile ducts or other viscus, transfusions, death  Discussed in further detail the more common complications and their rates of occurrence   was used if necessary  Patient expressed understanding of the issues discussed and wished/consented to proceed  All questions were answered by Dr Peace Rodriguez  Discussion performed between patient and the provider signing below  Signature:   Madina Guallpa MD    Date: 7/9/2018 Time: 10:07 AM     Some portions of this record may have been generated with voice recognition software  There may be translation, syntax,  or grammatical errors  Occasional wrong word or "sound-a-like" substitutions may have occurred due to the inherent limitations of the voice recognition software  Read the chart carefully and recognize, using context, where substitutions may have occurred  If you have any questions, please contact the dictating provider for clarification or correction, as needed   This encounter has been coded by a non-certified

## 2018-08-07 NOTE — DISCHARGE INSTRUCTIONS
Elisa Cedillo  Endoscopy Post-Operative Instructions  Dr Ty Freed MD, FACS    Procedure: Colonoscopy    Findings:  Diverticulosis, Hemorrhoids and Colon Polyp(s)    Follow-Up: You will need a repeat Endoscopy in (generally)2- 3 years  For close follow  up due to number of polyps    Will await final pathology report for final determination of number of years until your follow up endoscopy, if you had polyps on this exam   Different types of polyps require different lengths of follow up surveillance  Please call our office or your primary doctor's office if you have any questions, once the report is returned  You should have an endoscopy sooner than recommended if you have any symptoms of bleeding or change in stools or other concerns  You will receive a call from our office with your results, in addition to the the preliminary results you received today  You will usually receive a follow-up letter from our office in 1-2 weeks  Call the office if you do not hear from us  You are welcome to also schedule an office visit if desired to discuss the results further  It is your responsibility to contact our office for results in 1- 2 weeks if you do not hear from us  If a follow up endoscopy is needed, you are responsible for arranging that follow up appointment at the appropriate time  The office may or may not issue a reminder at that future time  Please take responsibility for your own follow up healthcare  Diet: Eat a light snack first, and then resume your previous diet  Discharge Medications:  See after visit summary for reconciled discharge medications provided to patient and family        Current Facility-Administered Medications:     sodium chloride 0 9 % infusion, 100 mL/hr, Intravenous, Continuous, Rony Barba, DO, Last Rate: 100 mL/hr at 08/07/18 1006, 100 mL/hr at 08/07/18 1006  Do not take aspirin or blood thinning medications for 2 days following procedure  Tylenol is okay  You may have been given a new medication  Please take this (usually an anti-ulcer -type medication) and see your primary care doctor for refills and follow up medications if needed       After the test: Notify physician for arm swelling or pain at the intravenous site  You may have some abdominal discomfort following the procedure  Belching or passing flatus will help relieve it  Following upper endoscopy, you may experience a temporary sore throat  Saline gargles or lozenges can be taken to relieve sore throat Following lower endoscopy, minor rectal bleeding is not uncommon      Activity: Do not drive a car, operate machinery, or sign legal documents for 24 hours after your procedure  Normal activity may be resumed on the day following the procedure      Call the office at [phone] for any of the following: Severe abdominal pain, significant rectal bleeding, chills, or fever above 100°, new onset of persistent cough or persistent vomiting      [address], [address] [facility]  Phone: [phone]                        Colorectal Polyps   WHAT YOU NEED TO KNOW:   Colorectal polyps are small growths of tissue in the lining of the colon and rectum  Most polyps are hyperplastic polyps and are usually benign (noncancerous)  Certain types of polyps, called adenomatous polyps, may turn into cancer  DISCHARGE INSTRUCTIONS:   Follow up with your healthcare provider or gastroenterologist as directed: You may need to return for more tests, such as another colonoscopy  Write down your questions so you remember to ask them during your visits  Reduce your risk for colorectal polyps:   · Eat a variety of healthy foods:  Healthy foods include fruit, vegetables, whole-grain breads, low-fat dairy products, beans, lean meat, and fish  Ask if you need to be on a special diet      · Maintain a healthy weight:  Ask your healthcare provider if you need to lose weight and how much you need to lose  Ask for help with a weight loss program     · Exercise:  Begin to exercise slowly and do more as you get stronger  Talk with your healthcare provider before you start an exercise program      · Limit alcohol:  Your risk for polyps increases the more you drink  · Do not smoke: If you smoke, it is never too late to quit  Ask for information about how to stop  For support and more information:   · Abram Arreola ([facility])  [address] , [place] [address]  Phone: [phone]  Web Address: www digestive  niddk nih gov  Contact your healthcare provider or gastroenterologist if:   · You have a fever  · You have chills, a cough, or feel weak and achy  · You have abdominal pain that does not go away or gets worse after you take medicine  · Your abdomen is swollen  · You are losing weight without trying  · You have questions or concerns about your condition or care  Seek care immediately or call 911 if:   · You have sudden shortness of breath  · You have a fast heart rate, fast breathing, or are too dizzy to stand up  · You have severe abdominal pain  · You see blood in your bowel movement  © 2017 [address] Information is for End User's use only and may not be sold, redistributed or otherwise used for commercial purposes  All illustrations and images included in CareNotes® are the copyrighted property of A D A M , Inc  or [employer]  The above information is an  only  It is not intended as medical advice for individual conditions or treatments  Talk to your doctor, nurse or pharmacist before following any medical regimen to see if it is safe and effective for you  Colonoscopy   WHAT YOU NEED TO KNOW:   A colonoscopy is a procedure to examine the inside of your colon (intestine) with a scope   Polyps or tissue growths may have been removed during your colonoscopy  It is normal to feel bloated and to have some abdominal discomfort  You should be passing gas  If you have hemorrhoids or you had polyps removed, you may have a small amount of bleeding  DISCHARGE INSTRUCTIONS:   Seek care immediately if:   · You have a large amount of bright red blood in your bowel movements  · Your abdomen is hard and firm and you have severe pain  · You have sudden trouble breathing  Contact your healthcare provider if:   · You develop a rash or hives  · You have a fever within 24 hours of your procedure       · You have not had a bowel movement for 3 days after your procedure  · You have questions or concerns about your condition or care  Activity:   · Do not lift, strain, or run  for 3 days after your procedure  · Rest after your procedure  You have been given medicine to relax you  Do not  drive or make important decisions until the day after your procedure  Return to your normal activity as directed  · Relieve gas and discomfort from bloating  by lying on your right side with a heating pad on your abdomen  You may need to take short walks to help the gas move out  Eat small meals until bloating is relieved  If you had polyps removed: For 7 days after your procedure:  · Do not  take aspirin  · Do not  go on long car rides  Follow up with your healthcare provider as directed:  Write down your questions so you remember to ask them during your visits  © 2017 [address] is for End User's use only and may not be sold, redistributed or otherwise used for commercial purposes  All illustrations and images included in CareNotes® are the copyrighted property of A D A M , Inc  or [employer]  The above information is an  only  It is not intended as medical advice for individual conditions or treatments   Talk to your doctor, nurse or pharmacist before following any medical regimen to see if it is safe and effective for you  Diverticulosis   WHAT YOU NEED TO KNOW:   Diverticulosis is a condition that causes small pockets called diverticula to form in your intestine  These pockets make it difficult for bowel movements to pass through your digestive system  DISCHARGE INSTRUCTIONS:   Seek care immediately if:   · You have severe pain on the left side of your lower abdomen  · Your bowel movements are bright or dark red  Contact your healthcare provider if:   · You have a fever and chills  · You feel dizzy or lightheaded  · You have nausea, or you are vomiting  · You have a change in your bowel movements  · You have questions or concerns about your condition or care  Medicines:   · Medicines  to soften your bowel movements may be given  You may also need medicines to treat symptoms such as bloating and pain  · Take your medicine as directed  Contact your healthcare provider if you think your medicine is not helping or if you have side effects  Tell him or her if you are allergic to any medicine  Keep a list of the medicines, vitamins, and herbs you take  Include the amounts, and when and why you take them  Bring the list or the pill bottles to follow-up visits  Carry your medicine list with you in case of an emergency  Self-care: The goal of treatment is to manage any symptoms you have and prevent other problems such as diverticulitis  Diverticulitis is swelling or infection of the diverticula  Your healthcare provider may recommend any of the following:  · Eat a variety of high-fiber foods  High-fiber foods help you have regular bowel movements  High-fiber foods include cooked beans, fruits, vegetables, and some cereals  Most adults need 25 to 35 grams of fiber each day  Your healthcare provider may recommend that you have more  Ask your healthcare provider how much fiber you need  Increase fiber slowly   You may have abdominal discomfort, bloating, and gas if you add fiber to your diet too quickly  You may need to take a fiber supplement if you are not getting enough fiber from food  · Drink liquids as directed  You may need to drink 2 to 3 liters (8 to 12 cups) of liquids every day  Ask your healthcare provider how much liquid to drink each day and which liquids are best for you  · Apply heat  on your abdomen for 20 to 30 minutes every 2 hours for as many days as directed  Heat helps decrease pain and muscle spasms  Help prevent diverticulitis or other symptoms: The following may help decrease your risk for diverticulitis or symptoms, such as bleeding  Talk to your provider about these or other things you can do to prevent problems that may occur with diverticulosis  · Exercise regularly  Ask your healthcare provider about the best exercise plan for you  Exercise can help you have regular bowel movements  Get 30 minutes of exercise on most days of the week  · Maintain a healthy weight  Ask your healthcare provider how much you should weigh  Ask him or her to help you create a weight loss plan if you are overweight  · Do not smoke  Nicotine and other chemicals in cigarettes increase your risk for diverticulitis  Ask your healthcare provider for information if you currently smoke and need help to quit  E-cigarettes or smokeless tobacco still contain nicotine  Talk to your healthcare provider before you use these products  · Ask your healthcare provider if it is safe to take NSAIDs  NSAIDs may increase your risk of diverticulitis  Follow up with your healthcare provider as directed:  Write down your questions so you remember to ask them during your visits  © 2017 [address] Information is for End User's use only and may not be sold, redistributed or otherwise used for commercial purposes  All illustrations and images included in CareNotes® are the copyrighted property of [employer]  or Josh Collins    The above information is an  only  It is not intended as medical advice for individual conditions or treatments  Talk to your doctor, nurse or pharmacist before following any medical regimen to see if it is safe and effective for you  Diverticulosis Diet   WHAT YOU NEED TO KNOW:   What is a diverticulosis diet? A diverticulosis diet includes high-fiber foods  High-fiber foods help you have regular bowel movements  Extra fiber may decrease your risk of forming new diverticula (small pockets) in your intestine  A high-fiber diet may also help prevent diverticulitis  Diverticulitis is a painful condition that occurs when diverticula become inflamed or infected  You do not need to avoid nuts, seeds, corn, or popcorn while you are on a diverticulosis diet  How much fiber do I need? You may need 25 to 35 grams of fiber each day  Ask your dietitian or healthcare provider how much fiber you should have  Increase your intake of fiber slowly  When you eat more fiber, you may have gas and feel bloated  You may need to take a fiber supplement if you do not get enough fiber from food  Drink plenty of liquids as you increase the fiber in your diet  Your dietitian or healthcare provider may recommend 8 eight-ounce cups or more each day  Ask which liquids are best for you  Which foods are high in fiber?    · Foods with at least 4 grams of fiber per serving:      ¨ ? to ½ cup of high-fiber cereal (check the nutrition label on the box)    ¨ ½ cup of blackberries or raspberries    ¨ 4 dried prunes    ¨ 1 cooked artichoke    ¨ ½ cup of cooked legumes, such as lentils, or red, kidney, and vela beans    · Foods with 1 to 3 grams of fiber per serving:      ¨ 1 slice of whole-wheat, pumpernickel, or rye bread    ¨ 4 whole-wheat crackers    ¨ ½ cup of cereal with 1 to 3 grams of fiber per serving (check the nutrition label on the box)    ¨ 1 piece of fruit, such as an apple, banana, pear, kiwi, or orange    ¨ 3 dates    ¨ ½ cup of canned apricots, fruit cocktail, peaches, or pears    ¨ ½ cup of raw or cooked vegetables, such as carrots, cauliflower, cabbage, spinach, squash, or corn  When should I contact my healthcare provider? · You have questions about a high-fiber diet  · You have a change in your bowel movements  · You have an upset stomach  · You have a fever  · You have pain in your lower abdomen on the left side  · You have questions about your condition or care  CARE AGREEMENT:   You have the right to help plan your care  Learn about your health condition and how it may be treated  Discuss treatment options with your caregivers to decide what care you want to receive  You always have the right to refuse treatment  The above information is an  only  It is not intended as medical advice for individual conditions or treatments  Talk to your doctor, nurse or pharmacist before following any medical regimen to see if it is safe and effective for you  © 2017 [address] Information is for End User's use only and may not be sold, redistributed or otherwise used for commercial purposes  All illustrations and images included in CareNotes® are the copyrighted property of [employer]  or [employer]  Hemorrhoids   WHAT YOU NEED TO KNOW:   Hemorrhoids are swollen blood vessels inside your rectum (internal hemorrhoids) or on your anus (external hemorrhoids)  Sometimes a hemorrhoid may prolapse  This means it extends out of your anus  DISCHARGE INSTRUCTIONS:   Seek care immediately if:   · You have severe pain in your rectum or around your anus  · You have severe pain in your abdomen and you are vomiting  · You have bleeding from your anus that soaks through your underwear  Contact your healthcare provider if:   · You have frequent and painful bowel movements      · Your hemorrhoid looks or feels more swollen than usual      · You do not have a bowel movement for 2 days or more  · You see or feel tissue coming through your anus  · You have questions or concerns about your condition or care  Medicines: You may  need any of the following:  · Medicine  may be given to decrease pain, swelling, and itching  The medicine may come as a pad, cream, or ointment  · Stool softeners  help treat or prevent constipation  · NSAIDs , such as ibuprofen, help decrease swelling, pain, and fever  NSAIDs can cause stomach bleeding or kidney problems in certain people  If you take blood thinner medicine, always ask your healthcare provider if NSAIDs are safe for you  Always read the medicine label and follow directions  · Take your medicine as directed  Contact your healthcare provider if you think your medicine is not helping or if you have side effects  Tell him or her if you are allergic to any medicine  Keep a list of the medicines, vitamins, and herbs you take  Include the amounts, and when and why you take them  Bring the list or the pill bottles to follow-up visits  Carry your medicine list with you in case of an emergency  Manage your symptoms:   · Apply ice on your anus for 15 to 20 minutes every hour or as directed  Use an ice pack, or put crushed ice in a plastic bag  Cover it with a towel before you apply it to your anus  Ice helps prevent tissue damage and decreases swelling and pain  · Take a sitz bath  Fill a bathtub with 4 to 6 inches of warm water  You may also use a sitz bath pan that fits inside a toilet bowl  Sit in the sitz bath for 15 minutes  Do this 3 times a day, and after each bowel movement  The warm water can help decrease pain and swelling  · Keep your anal area clean  Gently wash the area with warm water daily  Soap may irritate the area  After a bowel movement, wipe with moist towelettes or wet toilet paper  Dry toilet paper can irritate the area  Prevent hemorrhoids:   · Do not strain to have a bowel movement  Do not sit on the toilet too long  These actions can increase pressure on the tissues in your rectum and anus  · Drink plenty of liquids  Liquids can help prevent constipation  Ask how much liquid to drink each day and which liquids are best for you  · Eat a variety of high-fiber foods  Examples include fruits, vegetables, and whole grains  Ask your healthcare provider how much fiber you need each day  You may need to take a fiber supplement  · Exercise as directed  Exercise, such as walking, may make it easier to have a bowel movement  Ask your healthcare provider to help you create an exercise plan  · Do not have anal sex  Anal sex can weaken the skin around your rectum and anus  · Avoid heavy lifting  This can cause straining and increase your risk for another hemorrhoid  Follow up with your healthcare provider as directed:  Write down your questions so you remember to ask them during your visits  © 2017 [facility] Information is for End User's use only and may not be sold, redistributed or otherwise used for commercial purposes  All illustrations and images included in CareNotes® are the copyrighted property of [employer]  or Josh [employer]  The above information is an  only  It is not intended as medical advice for individual conditions or treatments  Talk to your doctor, nurse or pharmacist before following any medical regimen to see if it is safe and effective for you

## 2018-08-07 NOTE — ANESTHESIA PREPROCEDURE EVALUATION
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     GI/Hepatic    GI bleeding , history of, Bowel prep       Kidney stones, Genitourinary malignancy renal cancer,        Endo/Other  Negative endo/other ROS      GYN       Hematology  Negative hematology ROS      Musculoskeletal  Negative musculoskeletal ROS        Neurology    Motor deficit , Left hemiplegia,    Psychology   Negative psychology ROS              Physical Exam    Airway    Mallampati score: II  TM Distance: >3 FB  Neck ROM: full     Dental   lower dentures and upper dentures,     Cardiovascular  Comment: Negative ROS, Rhythm: regular, Rate: normal, Cardiovascular exam normal    Pulmonary  Decreased breath sounds,     Other Findings        Anesthesia Plan  ASA Score- 3     Anesthesia Type- IV sedation with anesthesia with ASA Monitors  Additional Monitors:   Airway Plan:         Plan Factors-Patient not instructed to abstain from smoking on day of procedure  Patient smoked on day of surgery  Induction- intravenous  Postoperative Plan-     Informed Consent- Anesthetic plan and risks discussed with patient  I personally reviewed this patient with the CRNA  Discussed and agreed on the Anesthesia Plan with the CRNA  Leslie Cantrell

## 2018-08-07 NOTE — DISCHARGE SUMMARY
Discharge Summary - Natalie Jama 79 y o  male MRN: [id number]    Unit/Bed#: [facility] [place] Encounter: [id number]      Pre-Operative Diagnosis: Pre-Op Diagnosis Codes:     * Family history of colon cancer [Z80 0]    Post-Operative Diagnosis: Post-Op Diagnosis Codes:     * Family history of colon cancer [Z80 0]     * Adenomatous polyps [D36 9]     * Diverticulosis [K57 90]    Procedures Performed:  Procedure(s):  COLONOSCOPY    Surgeon: Ayse Garner MD    See H & P for full details of admission and Operative Note for full details of operations performed  Patient was seen and examined prior to discharge  Provisions for Follow-Up Care:  See After Visit Summary for information related to follow-up care and home orders  Disposition: Home, in stable condition  Planned Readmission: No    Discharge Medications:  See after visit summary for reconciled discharge medications provided to patient and family  Post Operative instructions: Reviewed with patient and/or family  Some portions of this record may have been generated with voice recognition software  There may be translation, syntax,  or grammatical errors  Occasional wrong word or "sound-a-like" substitutions may have occurred due to the inherent limitations of the voice recognition software  Read the chart carefully and recognize, using context, where substitutions may have occurred  If you have any questions, please contact the dictating provider for clarification or correction, as needed  This encounter has been coded by non certified Coder      Signature:   Ayse Garner MD  Date: 8/7/2018 Time: 11:01 AM

## 2018-08-08 ENCOUNTER — TELEPHONE (OUTPATIENT)
Dept: SURGERY | Facility: CLINIC | Age: 68
End: 2018-08-08

## 2018-08-08 ENCOUNTER — HOSPITAL ENCOUNTER (OUTPATIENT)
Facility: HOSPITAL | Age: 68
Setting detail: OUTPATIENT SURGERY
Discharge: HOME | End: 2018-08-08
Attending: SURGERY | Admitting: SURGERY
Payer: MEDICARE

## 2018-08-08 ENCOUNTER — ANESTHESIA (OUTPATIENT)
Dept: PERIOP | Facility: HOSPITAL | Age: 68
End: 2018-08-08
Payer: MEDICARE

## 2018-08-08 VITALS
TEMPERATURE: 98.8 F | DIASTOLIC BLOOD PRESSURE: 71 MMHG | OXYGEN SATURATION: 94 % | HEIGHT: 70 IN | RESPIRATION RATE: 16 BRPM | BODY MASS INDEX: 23.48 KG/M2 | SYSTOLIC BLOOD PRESSURE: 115 MMHG | WEIGHT: 164 LBS | HEART RATE: 77 BPM

## 2018-08-08 DIAGNOSIS — K40.20 NON-RECURRENT BILATERAL INGUINAL HERNIA WITHOUT OBSTRUCTION OR GANGRENE: Primary | ICD-10-CM

## 2018-08-08 PROCEDURE — 49650 LAP ING HERNIA REPAIR INIT: CPT | Performed by: SURGERY

## 2018-08-08 PROCEDURE — 49650 LAP ING HERNIA REPAIR INIT: CPT | Performed by: PHYSICIAN ASSISTANT

## 2018-08-08 PROCEDURE — C1781 MESH (IMPLANTABLE): HCPCS | Performed by: SURGERY

## 2018-08-08 DEVICE — LAPAROSCOPIC SELF-FIXATING MESH, LEFT AND RIGHT ANATOMICAL
Type: IMPLANTABLE DEVICE | Site: INGUINAL | Status: FUNCTIONAL
Brand: PROGRIP

## 2018-08-08 RX ORDER — MORPHINE SULFATE 10 MG/ML
2 INJECTION, SOLUTION INTRAMUSCULAR; INTRAVENOUS EVERY 2 HOUR PRN
Status: DISCONTINUED | OUTPATIENT
Start: 2018-08-08 | End: 2018-08-08 | Stop reason: HOSPADM

## 2018-08-08 RX ORDER — HYDROCODONE BITARTRATE AND ACETAMINOPHEN 5; 325 MG/1; MG/1
1 TABLET ORAL EVERY 4 HOURS PRN
Status: DISCONTINUED | OUTPATIENT
Start: 2018-08-08 | End: 2018-08-08 | Stop reason: HOSPADM

## 2018-08-08 RX ORDER — LIDOCAINE HYDROCHLORIDE 10 MG/ML
INJECTION, SOLUTION INFILTRATION; PERINEURAL AS NEEDED
Status: DISCONTINUED | OUTPATIENT
Start: 2018-08-08 | End: 2018-08-08 | Stop reason: SURG

## 2018-08-08 RX ORDER — VECURONIUM BROMIDE 1 MG/ML
INJECTION, POWDER, LYOPHILIZED, FOR SOLUTION INTRAVENOUS AS NEEDED
Status: DISCONTINUED | OUTPATIENT
Start: 2018-08-08 | End: 2018-08-08 | Stop reason: SURG

## 2018-08-08 RX ORDER — SODIUM CHLORIDE 9 MG/ML
125 INJECTION, SOLUTION INTRAVENOUS CONTINUOUS
Status: DISCONTINUED | OUTPATIENT
Start: 2018-08-08 | End: 2018-08-08 | Stop reason: HOSPADM

## 2018-08-08 RX ORDER — ONDANSETRON 2 MG/ML
4 INJECTION INTRAMUSCULAR; INTRAVENOUS ONCE AS NEEDED
Status: DISCONTINUED | OUTPATIENT
Start: 2018-08-08 | End: 2018-08-08 | Stop reason: HOSPADM

## 2018-08-08 RX ORDER — ACETAMINOPHEN 325 MG/1
650 TABLET ORAL EVERY 6 HOURS PRN
Status: DISCONTINUED | OUTPATIENT
Start: 2018-08-08 | End: 2018-08-08 | Stop reason: HOSPADM

## 2018-08-08 RX ORDER — HYDROCODONE BITARTRATE AND ACETAMINOPHEN 5; 325 MG/1; MG/1
1-2 TABLET ORAL EVERY 4 HOURS PRN
Qty: 30 TABLET | Refills: 0 | Status: SHIPPED | OUTPATIENT
Start: 2018-08-08 | End: 2019-01-25

## 2018-08-08 RX ORDER — ONDANSETRON 2 MG/ML
4 INJECTION INTRAMUSCULAR; INTRAVENOUS EVERY 4 HOURS PRN
Status: DISCONTINUED | OUTPATIENT
Start: 2018-08-08 | End: 2018-08-08 | Stop reason: HOSPADM

## 2018-08-08 RX ORDER — GLYCOPYRROLATE 0.2 MG/ML
INJECTION INTRAMUSCULAR; INTRAVENOUS AS NEEDED
Status: DISCONTINUED | OUTPATIENT
Start: 2018-08-08 | End: 2018-08-08 | Stop reason: SURG

## 2018-08-08 RX ORDER — ONDANSETRON 2 MG/ML
INJECTION INTRAMUSCULAR; INTRAVENOUS AS NEEDED
Status: DISCONTINUED | OUTPATIENT
Start: 2018-08-08 | End: 2018-08-08 | Stop reason: SURG

## 2018-08-08 RX ORDER — PROPOFOL 10 MG/ML
INJECTION, EMULSION INTRAVENOUS AS NEEDED
Status: DISCONTINUED | OUTPATIENT
Start: 2018-08-08 | End: 2018-08-08 | Stop reason: SURG

## 2018-08-08 RX ORDER — FENTANYL CITRATE/PF 50 MCG/ML
50 SYRINGE (ML) INJECTION
Status: DISCONTINUED | OUTPATIENT
Start: 2018-08-08 | End: 2018-08-08 | Stop reason: HOSPADM

## 2018-08-08 RX ORDER — BUPIVACAINE HYDROCHLORIDE AND EPINEPHRINE 2.5; 5 MG/ML; UG/ML
INJECTION, SOLUTION EPIDURAL; INFILTRATION; INTRACAUDAL; PERINEURAL AS NEEDED
Status: DISCONTINUED | OUTPATIENT
Start: 2018-08-08 | End: 2018-08-08 | Stop reason: HOSPADM

## 2018-08-08 RX ORDER — HYDROMORPHONE HYDROCHLORIDE 2 MG/ML
INJECTION, SOLUTION INTRAMUSCULAR; INTRAVENOUS; SUBCUTANEOUS AS NEEDED
Status: DISCONTINUED | OUTPATIENT
Start: 2018-08-08 | End: 2018-08-08 | Stop reason: SURG

## 2018-08-08 RX ORDER — ONDANSETRON 4 MG/1
4 TABLET, ORALLY DISINTEGRATING ORAL EVERY 4 HOURS PRN
Status: DISCONTINUED | OUTPATIENT
Start: 2018-08-08 | End: 2018-08-08 | Stop reason: HOSPADM

## 2018-08-08 RX ORDER — MIDAZOLAM HYDROCHLORIDE 1 MG/ML
INJECTION INTRAMUSCULAR; INTRAVENOUS AS NEEDED
Status: DISCONTINUED | OUTPATIENT
Start: 2018-08-08 | End: 2018-08-08 | Stop reason: SURG

## 2018-08-08 RX ORDER — DEXTROSE AND SODIUM CHLORIDE 5; .45 G/100ML; G/100ML
80 INJECTION, SOLUTION INTRAVENOUS CONTINUOUS
Status: DISCONTINUED | OUTPATIENT
Start: 2018-08-08 | End: 2018-08-08 | Stop reason: HOSPADM

## 2018-08-08 RX ORDER — FENTANYL CITRATE 50 UG/ML
INJECTION, SOLUTION INTRAMUSCULAR; INTRAVENOUS AS NEEDED
Status: DISCONTINUED | OUTPATIENT
Start: 2018-08-08 | End: 2018-08-08 | Stop reason: SURG

## 2018-08-08 RX ADMIN — VECURONIUM BROMIDE 3 MG: 1 INJECTION, POWDER, LYOPHILIZED, FOR SOLUTION INTRAVENOUS at 11:52

## 2018-08-08 RX ADMIN — LIDOCAINE HYDROCHLORIDE 60 MG: 10 INJECTION, SOLUTION INFILTRATION; PERINEURAL at 10:29

## 2018-08-08 RX ADMIN — GLYCOPYRROLATE 0.4 MG: 0.2 INJECTION, SOLUTION INTRAMUSCULAR; INTRAVENOUS at 13:47

## 2018-08-08 RX ADMIN — NEOSTIGMINE METHYLSULFATE 3 MG: 1 INJECTION, SOLUTION INTRAMUSCULAR; INTRAVENOUS; SUBCUTANEOUS at 13:47

## 2018-08-08 RX ADMIN — PROPOFOL 150 MG: 10 INJECTION, EMULSION INTRAVENOUS at 10:29

## 2018-08-08 RX ADMIN — SODIUM CHLORIDE 125 ML/HR: 0.9 INJECTION, SOLUTION INTRAVENOUS at 08:53

## 2018-08-08 RX ADMIN — CEFAZOLIN SODIUM 1000 MG: 1 SOLUTION INTRAVENOUS at 10:45

## 2018-08-08 RX ADMIN — VECURONIUM BROMIDE 7 MG: 1 INJECTION, POWDER, LYOPHILIZED, FOR SOLUTION INTRAVENOUS at 10:29

## 2018-08-08 RX ADMIN — FENTANYL CITRATE 50 MCG: 50 INJECTION, SOLUTION INTRAMUSCULAR; INTRAVENOUS at 10:29

## 2018-08-08 RX ADMIN — HYDROCODONE BITARTRATE AND ACETAMINOPHEN 1 TABLET: 5; 325 TABLET ORAL at 17:17

## 2018-08-08 RX ADMIN — MIDAZOLAM 2 MG: 1 INJECTION INTRAMUSCULAR; INTRAVENOUS at 10:23

## 2018-08-08 RX ADMIN — HYDROMORPHONE HYDROCHLORIDE 0.5 MG: 2 INJECTION, SOLUTION INTRAMUSCULAR; INTRAVENOUS; SUBCUTANEOUS at 13:34

## 2018-08-08 RX ADMIN — FENTANYL CITRATE 50 MCG: 50 INJECTION, SOLUTION INTRAMUSCULAR; INTRAVENOUS at 12:08

## 2018-08-08 RX ADMIN — VECURONIUM BROMIDE 3 MG: 1 INJECTION, POWDER, LYOPHILIZED, FOR SOLUTION INTRAVENOUS at 12:50

## 2018-08-08 RX ADMIN — FENTANYL CITRATE 50 MCG: 50 INJECTION, SOLUTION INTRAMUSCULAR; INTRAVENOUS at 13:31

## 2018-08-08 RX ADMIN — DEXAMETHASONE SODIUM PHOSPHATE 4 MG: 10 INJECTION INTRAMUSCULAR; INTRAVENOUS at 10:29

## 2018-08-08 RX ADMIN — VECURONIUM BROMIDE 3 MG: 1 INJECTION, POWDER, LYOPHILIZED, FOR SOLUTION INTRAVENOUS at 10:56

## 2018-08-08 RX ADMIN — HYDROMORPHONE HYDROCHLORIDE 0.5 MG: 2 INJECTION, SOLUTION INTRAMUSCULAR; INTRAVENOUS; SUBCUTANEOUS at 10:55

## 2018-08-08 RX ADMIN — FENTANYL CITRATE 50 MCG: 50 INJECTION, SOLUTION INTRAMUSCULAR; INTRAVENOUS at 10:49

## 2018-08-08 RX ADMIN — SODIUM CHLORIDE: 0.9 INJECTION, SOLUTION INTRAVENOUS at 10:51

## 2018-08-08 RX ADMIN — SODIUM CHLORIDE: 0.9 INJECTION, SOLUTION INTRAVENOUS at 13:34

## 2018-08-08 RX ADMIN — ONDANSETRON HYDROCHLORIDE 4 MG: 2 INJECTION, SOLUTION INTRAVENOUS at 10:29

## 2018-08-08 NOTE — OP NOTE
REPAIR HERNIA INGUINAL LAPAROSCOPIC W/ ROBOTICS W/ MESH  Postoperative Note  PATIENT NAME: Ankur Marks  : 1950  MRN: [id number]  AL OR ROOM 06    Surgery Date: 2018    Pre operative diagnosis:   Bilateral inguinal hernia without obstruction or gangrene, recurrence not specified [K40 20]    Operative Indications:  Symptomatic bilateral Inguinal Hernia    Consent:  The risks, benefits, and alternatives to the surgery were discussed with the patient and with the family prior to surgery if present, personally by Dr Reji Lopez  If the consent was obtained by the physician assistant or other representative, the consent was reviewed once again personally by the operating physician  Common complications particular for this procedure as well as unusual complications were discussed, including but not limited to:  bleeding, wound infection, prolonged wound healing, open wounds, reoperation, leak from the bowel or viscus, leak from the bile duct or injury to adjacent or other organs or blood vessels in the abdomen  If the surgery was laparoscopic, it was discussed that possible open surgery could also occur during that same surgery and is always an option in laparoscopic surgery and/or possible reoperation  A  was used if necessary  The patient expressed understanding of the issues discussed and wished and consented to the procedure to proceed  All questions were answered  Dr Reji Lopez personally discussed the informed consent with this patient  Operative Findings:  Bilateral very large defects  Direct and indirect on left,    Indirect only on right, both very large defects       Post Op diagnosis: and findings  Post-Op Diagnosis Codes:     * Bilateral inguinal hernia without obstruction or gangrene, recurrence not specified [K40 20]    Procedure:   Procedure(s):  REPAIR HERNIA INGUINAL LAPAROSCOPIC W/ ROBOTICS W/ MESH        Surgeon: Aurea Salcedo MD      I was present for the entire procedure      Surgeon(s) and Role:     * Sulaiman Garcia MD - Primary     * Michele Esparza PA-C - Assisting    Assistants: Angel Garcia, no qualified resident available  PA was medically necessary for the surgical safety of the case, including suturing, retraction, hemostasis  Procedure Details   The patient was seen again in the Holding Room  The risks, benefits, complications, treatment options, and expected outcomes were discussed with the patient  The possibilities of reaction to medication, pulmonary aspiration, perforation of viscus, bleeding, postoperative short or long term nerve entrapment causing pain,recurrent infection, the need for additional procedures, and development of a complication requiring transfusion or further operation were discussed with the patient and/or family  There was concurrence with the proposed plan, and informed consent was obtained  The site of surgery was properly noted/marked  The patient was taken to the Operating Room, identified as Joceline Diaz and the procedure verified as hernia repair  A Time Out was held and the above information confirmed  The patient was prepped and draped in a sterile fashion  A timeout was again performed  Local anesthesia was used in the incision  An supra umbilical incision was made  Dissection carried out to the fascia which was grasped with Kocher's and elevated  The abdomen was entered under direct vision  Two additional 8 mm robotic trocars were placed lateral to rectus muscle approximately at the level of the umbilicus  At this point the patient was placed into Trendelenburg position and noted to have bilateral inguinal hernia   The robotic arms were docked and the robot docked  The camera inserted  The peritoneum was incised from the medial umbilical fold out laterally past the internal ring on the left  Using blunt dissectors, the superior flap was dissected   Next, the direct space was mobilized by exposing Brian's ligament all the way along its length to the pubic tubercle  If a direct hernia defect was seen this was dissected and reduced  If there was indirect hernia sac this was carefully mobilized off the cord structures with care to avoid injury to the gonadal vessels or spermatic cord  Extensive dissection was carried out as these were 2 very large defects the patient had a large indirect and a direct defect  This took at least 3 times longer than the average dissection  The remainder of the inferior flap was created  At this point Pro  mesh was selected and placed into the preperitoneal space  The mesh was secured inferiorly at Brian's  Medially at the pubic tubercle, and laterally at the anterior abdominal wall  Of note no clips were placed   The peritoneum was closed using  a running Bard suture        The peritoneum was incised from the medial umbilical fold out laterally past the internal ring on the right  Using dissectors, the superior flap was dissected  Next the direct space was mobilized by exposing Brian's ligament all the way along its length to the pubic tubercle  If a direct hernia defect was seen this was dissected and reduced  If there was indirect hernia sac this was carefully mobilized off the cord structures with care to avoid injury to the gonadal vessels or spermatic cord  The remainder of the inferior flap was created  Again this was a lengthy T dissection carried at least twice as long as usual dissection due to the extent of the defect  At this point Pro  mesh was selected and placed into the preperitoneal space  The mesh was secured inferiorly at Brian's  Medially at the pubic tubercle, and laterally at the anterior abdominal wall  Of note no clips were placedt  The peritoneum was closed using the running Bard suture  The umbilical trocor site was closed with an additional 0-PDS    The pneumoperitoneum was released, after undocking the robot and opening the ports   The ports were then all removed  The wounds were closed in multiple layers using 3-0 Vicryl sutures and the skin closed using a 4-0 Monocryl subcuticular stitch  The wound was dressed  The patient was anatomically correct at the end of the procedure  The patient tolerated the procedure in good condition  Instrument, sponge, and needle counts were correct prior to closure and at the conclusion of the case  The case took approximately 3 times longer than the usual bilateral inguinal hernia repair due to the large defects that had been chronically inflamed  Signature:   Reji Foley MD  Date: 8/8/2018 Time: 2:08 PM         This text is generated with voice recognition software  There may be translation, syntax,  or grammatical errors  If you have any questions, please contact the dictating provider

## 2018-08-08 NOTE — DISCHARGE INSTRUCTIONS
[facility] Instructions  Dr Estella Shook MD, FACS    1  General: You will feel pulling sensations around the wound or funny aches and pains around the incisions  This is normal  Even minor surgery is a change in your body and this is your bodys way of reaction to it  If you have had abdominal surgery, it may help to support the incision with a small pillow or blanket for comfort when moving or coughing  2  Wound care: Make sure to remove the bandage in about 24 hours, unless instructed otherwise  You usually don't have to redress the wound after 24-48 hours, unless for comfort  Keep the incision clean and dry  Let air get to it  If this Steri-Strips fall off, just keep the wound clean  3  Water: You may shower over the wound, unless there are drain tubes left in place  Do not bathe or use a pool or hot tub until cleared by the physician  You may shower right over the staples or Steri-Strips and packing dry when you are done  4  Activity: You may go up and down stairs, walk as much as you are comfortable, but walk at least 3 times each day  If you have had abdominal surgery, do not lift anything heavier than 15 pounds for at least 2-4 weeks, unless cleared by the doctor  5  Diet: You may resume a regular diet  If you had a same-day surgery or overnight stay surgery, you may wish to eat lightly for a few days: soups, crackers, and sandwiches  You may resume a regular diet when ready  6  Medications: Resume all of your previous medications, unless told otherwise by the doctor  Avoid aspirin or ibuprofen (Advil, Motrin, etc ) products for 2-3 days after the date of surgery  You may, at that time, began to take them again  Tylenol is always fine, unless you are taking any narcotic pain medication containing Tylenol (such as Percocet, Darvocet, Vicodin, or anything containing acetaminophen)  Do not take Tylenol if you're taking these medications   You do not need to take the narcotic pain medications unless you are having significant pain and discomfort  7  Driving: You will need someone to drive you home on the day of surgery  Do not drive or make any important decisions while on narcotic pain medication or 24 hours and after anesthesia or sedation for surgery  Generally, you may drive when your off all narcotic pain medications  8  Upset Stomach: You may take Maalox, Tums, or similar items for an upset stomach  If your narcotic pain medication causes an upset stomach, do not take it on an empty stomach  Try taking it with at least some crackers or toast      9  Constipation: Patients often experienced constipation after surgery  You may take over-the-counter medication for this, such as Metamucil, Senokot, Dulcolax, milk of magnesia, etc  You may take a suppository unless you have had anorectal surgery such as a procedure on your hemorrhoids  If you experience significant nausea or vomiting after abdominal surgery, call the office before trying any of these medications  10  Call the office: If you are experiencing any of the following, fevers above 101 5°, significant nausea or vomiting, if the wound develops drainage and/or is excessive redness around the wound, or if you have significant diarrhea or other worsening symptoms  11  Pain: You may be given a prescription for pain  This will be given to the hospital, the day of surgery  12  Sexual Activity: You may resume sexual activity when you feel ready and comfortable and your incision is sealed and healed without apparent infection risk  13  Urination: If you haven't urinated in 6 hours, go directly to the ER for evaluation for urinary retention       [address]  [place], [address]   Phone: [phone]

## 2018-08-08 NOTE — ANESTHESIA POSTPROCEDURE EVALUATION
Post-Op Assessment Note      CV Status:  Stable    Mental Status:  Alert and awake    Hydration Status:  Euvolemic    PONV Controlled:  Controlled    Airway Patency:  Patent    Post Op Vitals Reviewed:  Yes              BP      Temp      Pulse 76 (08/08/18 1505)   Resp 12 (08/08/18 1505)    SpO2 97 % (08/08/18 1505)

## 2018-08-08 NOTE — TELEPHONE ENCOUNTER
S/P colono = 8/7/18    Call deferred due to patient is scheduled for [facility] today, 8/8/18    Will contact patient tomorrow

## 2018-08-08 NOTE — H&P
I reexamine the patient there is no change in his examination for bilateral inguinal hernia, left greater than right  He has had no previous surgery although he has had lung cancer and renal cell cancer he has only had chemo and cryoablation and radiation therapy  Informed consent for procedure was personally discussed, reviewed, and signed by Dr Yvonne Garnica  Discussion by Dr Yvonne Garnica was carried out regarding risks, benefits, and alternatives with the patient  Risks include but are not limited to:  bleeding, infection, and delayed wound healing or an open wound, pulmonary embolus, leaks from bowel or bile ducts or other viscus, transfusions, death  Discussed in further detail the more common complications and their rates of occurrence   was used if necessary  Patient expressed understanding of the issues discussed and wished/consented to proceed  All questions were answered by Dr Yvonne Garnica  Discussion performed between patient and the provider signing below  Signature:   Johnny Crane MD  Date: 8/8/2018 Time: 10:08 AM             Assessment/Plan:   Justin Allison is a 79 y o male who is here for: Bilateral Inguinal Hernia     Patient with bilateral bulges of his groin with intermittent pain for several years  He is highly symptomatic from the left side      Patient with lung cancer and renal cell cancer who underwent chemotherapy and radiation     Patient's last colonoscopy was greater than 10 years ago     Plan: laparoscopic robotic assisted repair of Bilateral inguinal hernias, easily reducible  Schedule colonoscopy     Preoperative Clearance: None and Medical        - Patient has been instructed to avoid herbs or non-directed vitamins the week prior to surgery to ensure no drug interactions with perioperative surgical and anesthetic medications    - Patient should continue beta-blocker medication up through and including the day of surgery but hold any other hypertensive medications, including diuretics, unless instructed by PCP or anesthesia  - Patient should continue his statin medication up through and including the day of surgery  - Patient has been instructed to avoid aspirin containing medications or non-steroidal anti-inflammatory drugs for SEVEN days preceding surgery  ______________________________________________________  CC:       Chief Complaint   Patient presents with    Hernia    Patient Education       Given to patient      HPI:  Ruby Goyal is a 79 y o male who was referred for evaluation of Hernia and Patient Education (Given to patient)          Currently complaining of initial      bilateral inguinal hernia worse with lifting, standing,   no nausea and no vomiting,      Duration of pain: Intermittent  and over 1 year     regular bowel movement  Denies abdominal pain, change in bowel habits or blood in stool     Prior abdominal surgery: None     Father with history of colon cancer        ROS:  General ROS: negative  negative for - chills, fatigue, fever or night sweats, weight loss  Respiratory ROS: no cough, shortness of breath, or wheezing  Cardiovascular ROS: no chest pain or dyspnea on exertion  Genito-Urinary ROS: no dysuria, trouble voiding, or hematuria  Musculoskeletal ROS: negative for - gait disturbance, joint pain or muscle pain  Neurological ROS: no TIA or stroke symptoms  GI ROS: see HPI  Skin ROS: no new rashes or lesions   Lymphatic ROS: no new adenopathy noted by pt     GYN ROS: see HPI, no new GYN history or bleeding noted  Psy ROS: no new mental or behavioral disturbances            There is no problem list on file for this patient         Allergies:  Patient has no known allergies         Current Outpatient Prescriptions:     Ascorbic Acid (VITAMIN C) 500 MG CAPS, every 24 hours, Disp: , Rfl:     folic acid (FOLVITE) 1 mg tablet, every 24 hours, Disp: , Rfl:     Multiple Vitamins-Minerals (MULTIVITAMIN ADULT PO), every 24 hours, Disp: , Rfl:     pantoprazole (PROTONIX) 40 mg tablet, Take 40 mg by mouth daily before breakfast Take 30 minutes before, Disp: , Rfl: 1    POLYETHYLENE GLYCOL 3350 PO, every 24 hours, Disp: , Rfl:     dexamethasone (DECADRON) 4 mg tablet, TAKE 1 TABLET AS DIRECTED TWICE A DAY THE DAY BEFORE, DAY OF AND DAY AFTER CHEMOTHERAPY, Disp: , Rfl: 11     Medical History        Past Medical History:   Diagnosis Date    Dysphagia 02/16/2017    Hemiparesis ([place] [address] ) 04/27/2015    Hydrocele 11/30/2010    Portacath in place      Primary malignant neoplasm of lung ([place] [address] ) 05/26/2015    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            Surgical History   History reviewed  No pertinent surgical history               Family History   Problem Relation Age of Onset    Cancer Mother      Cancer Father      Colon cancer Father      Cancer Brother            reports that he has been smoking  He has never used smokeless tobacco  He reports that he drinks alcohol  He reports that he does not use drugs      PHYSICAL EXAM  General Appearance:    Alert, cooperative, no distress   Head:    Normocephalic without obvious abnormality   Eyes:    PERRL, conjunctiva/corneas clear, EOM's intact        Neck:   Supple, no adenopathy, no JVD   Back:     Symmetric, no spinal or CVA tenderness   Lungs:     Clear to auscultation bilaterally, no wheezing or rhonchi   Heart:    Regular abdomen is soft without significant tenderness, masses, organomegaly or guarding rate and rhythm, S1 and S2 normal, no murmur   Abdomen: Bowel sounds are normal     bilateral inguinal hernia   Extremities:   Extremities normal  No clubbing, cyanosis or edema   Psych:   Normal Affect, AOx3  Neurologic:  Skin:   CNII-XII intact  Strength symmetric, speech intact    Warm, dry, intact, no visible rashes or lesions                 Informed consent for procedure was personally discussed, reviewed, and signed by Dr All Bell by Dr Honey Davenport was carried out regarding risks, benefits, and alternatives with the patient  Risks include but are not limited to:  bleeding, infection, and delayed wound healing or an open wound, pulmonary embolus, leaks from bowel or bile ducts or other viscus, transfusions, death  Discussed in further detail the more common complications and their rates of occurrence   was used if necessary  Patient expressed understanding of the issues discussed and wished/consented to proceed  All questions were answered by Dr Honey Davenport      Discussion performed between patient and the provider signing below

## 2018-08-08 NOTE — DISCHARGE SUMMARY
Discharge Summary - Amish Almonte 79 y o  male MRN: [id number]    Unit/Bed#: OR [place] Encounter: [id number]      Pre-Operative Diagnosis: Pre-Op Diagnosis Codes:     * Bilateral inguinal hernia without obstruction or gangrene, recurrence not specified [K40 20]    Post-Operative Diagnosis: Post-Op Diagnosis Codes:     * Bilateral inguinal hernia without obstruction or gangrene, recurrence not specified [K40 20]    Procedures Performed:  Procedure(s):  REPAIR HERNIA INGUINAL LAPAROSCOPIC W/ ROBOTICS W/ MESH    Surgeon: Yvette Pettit MD    See H & P for full details of admission and Operative Note for full details of operations performed  Patient was seen and examined prior to discharge  Provisions for Follow-Up Care:  See After Visit Summary for information related to follow-up care and home orders  Disposition: Home, in stable condition  Planned Readmission: No    Discharge Medications:  See after visit summary for reconciled discharge medications provided to patient and family  Post Operative instructions: Reviewed with patient and/or family  Some portions of this record may have been generated with voice recognition software  There may be translation, syntax,  or grammatical errors  Occasional wrong word or "sound-a-like" substitutions may have occurred due to the inherent limitations of the voice recognition software  Read the chart carefully and recognize, using context, where substitutions may have occurred  If you have any questions, please contact the dictating provider for clarification or correction, as needed  This encounter has been coded by non certified Coder      Signature:   Yvette ePttit MD  Date: 8/8/2018 Time: 2:10 PM

## 2018-08-09 NOTE — PROGRESS NOTES
Please call patient with results  Patient has at least 1 or more tubular adenomas on this path report  These are benign lesions but may have some predisposition to growth and development into malignancy  In general, 3, 5 or 7 year follow-up recommended  Please see my notes for details

## 2018-08-09 NOTE — TELEPHONE ENCOUNTER
S/P Colono = 8/7/18    S/P Bi-Lateral Inguinal Hernia Repair lap w/robotics = 8/8/18    Called and left message for patient to return call to office  POPV scheduled for 8/21/18      Path pending for colono    No path for Ing Hernia

## 2018-08-13 NOTE — TELEPHONE ENCOUNTER
Called and spoke to patient  Pathology from colono has been finalized and verified  Being addressed in result note  Patient states that he feeling better than he was  He is ambulating, eating normal diet  He did have a slight period of constipation, which he has taken care of  He states no F/V/N/C  Confirmed POPV for 8/21/18  No path from hernia repair  Patient will call the office with any questions/concerns/changes

## 2018-08-20 RX ORDER — LORAZEPAM 0.5 MG/1
0.5 TABLET ORAL
COMMUNITY
End: 2018-11-14

## 2018-08-20 RX ORDER — PANTOPRAZOLE SODIUM 40 MG/1
40 TABLET, DELAYED RELEASE ORAL
COMMUNITY
End: 2018-11-14

## 2018-08-20 RX ORDER — DEXAMETHASONE 4 MG/1
4 TABLET ORAL
COMMUNITY
End: 2018-11-14

## 2018-08-21 ENCOUNTER — OFFICE VISIT (OUTPATIENT)
Dept: SURGERY | Facility: CLINIC | Age: 68
End: 2018-08-21

## 2018-08-21 VITALS
HEIGHT: 70 IN | DIASTOLIC BLOOD PRESSURE: 70 MMHG | BODY MASS INDEX: 22.62 KG/M2 | SYSTOLIC BLOOD PRESSURE: 118 MMHG | HEART RATE: 93 BPM | TEMPERATURE: 97.8 F | WEIGHT: 158 LBS | RESPIRATION RATE: 12 BRPM

## 2018-08-21 DIAGNOSIS — K40.20 NON-RECURRENT BILATERAL INGUINAL HERNIA WITHOUT OBSTRUCTION OR GANGRENE: Primary | ICD-10-CM

## 2018-08-21 PROCEDURE — 99024 POSTOP FOLLOW-UP VISIT: CPT | Performed by: PHYSICIAN ASSISTANT

## 2018-08-21 NOTE — PROGRESS NOTES
Miles Flores 1950 male MRN: [id number]     Follow-up Visit      SUBJECTIVE    CC: Post-op (S/P bilateral inguinal hernia repair 8/8)      HPI:  Miles Flores is a 79 y o  male who presented for a follow-up  He had bilateral inguinal hernia repair, robot assisted with mesh on 8/8/18  He denies pain, fever, chills, n/v   Pt had a colonoscopy done day before surgery and had multiple polyps removed, pathology results consistent with adenomatous polyps in some of them  HPI    Review of Systems   Constitutional: Negative for activity change, appetite change, chills and fever  Respiratory: Negative for cough and shortness of breath  Cardiovascular: Negative for chest pain and palpitations  Gastrointestinal: Negative for abdominal distention, abdominal pain, blood in stool, constipation, diarrhea, nausea and vomiting  Genitourinary: Negative for difficulty urinating  Musculoskeletal: Negative for arthralgias         Historical Information     The patient history was reviewed as follows:    Past Medical History:   Diagnosis Date    Balance problems     and" coordination issues/best to walk slow"    Dysphagia 02/16/2017    "not right now but in the past"    Environmental and seasonal allergies     Exercise involving walking     "approx 1 mile 3x/week"    Eye injury     right,  "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"    Full dentures     Hemiparesis ([facility] [address] ) 04/27/2015    left weakness,"due to tumor on brain"    History of brain tumor     "had radiation for it"    History of cancer chemotherapy     last tx  5/22/18    History of GI bleed 2015    History of kidney cancer     left    History of kidney stones     History of radiation therapy     History of transfusion     5 units of blood 2015    Hydrocele 11/30/2010    Inguinal hernia, bilateral     Joint stiffness of multiple sites     Paresthesia 05/01/2015    Portacath in place     right chest    Primary malignant neoplasm of lung ([facility] [address] ) 05/26/2015    Renal carcinoma, left ([facility] [address] ) 10/17/2016    Risk for falls     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([facility] [address] ) 05/26/2015    Skin cancer     Syncopal episodes 09/01/2015    2    Wears glasses      Past Surgical History:   Procedure Laterality Date    COLONOSCOPY      DENTAL SURGERY  2011    DENTAL EXTACTIONS     ESOPHAGOGASTRODUODENOSCOPY      HYDROCELE EXCISION / REPAIR Left 01/01/2011    KIDNEY STONE SURGERY      KIDNEY SURGERY      cancer of kidney/cryo of cancer    PORTACATH PLACEMENT      DC COLONOSCOPY FLX DX W/COLLJ SPEC WHEN PFRMD N/A 8/7/2018    Procedure: COLONOSCOPY with polypectomy ;  Surgeon: Quinton Ricardo MD;  Location: [facility]; Service: General    DC LAP, RECURRENT INCISIONAL HERNIA REPAIR,INCARCERATED Bilateral 8/8/2018    Procedure: REPAIR HERNIA INGUINAL LAPAROSCOPIC W/ ROBOTICS W/ MESH;  Surgeon: Quinton Ricardo MD;  Location: [facility];  Service: General    SKIN BIOPSY      SKIN CANCER EXCISION      17 surgeries, basal cell    TONSILLECTOMY       Family History   Problem Relation Age of Onset    Cancer Mother         EYE    Cancer Father     Colon cancer Father     Cancer Brother       Social History   History   Alcohol Use    Yes     Comment: "3x per year"     History   Drug Use No     History   Smoking Status    Current Every Day Smoker    Packs/day: 0 50    Years: 48 00    Types: Cigarettes   Smokeless Tobacco    Never Used       Medications:   Meds/Allergies     Current Outpatient Prescriptions:     Ascorbic Acid (VITAMIN C) 500 MG CAPS, daily  , Disp: , Rfl:     folic acid (FOLVITE) 1 mg tablet, 1 mg daily  , Disp: , Rfl:     pantoprazole (PROTONIX) 40 mg tablet, Take 40 mg by mouth daily before breakfast Take 30 minutes before, Disp: , Rfl: 1    Cyanocobalamin 1000 MCG/ML KIT, Inject as directed every 30 (thirty) days   Unknown dose, Disp: , Rfl:     dexamethasone (DECADRON) 4 mg tablet, Take 4 mg by mouth, Disp: , Rfl:    HYDROcodone-acetaminophen (NORCO) 5-325 mg per tablet, Take 1-2 tablets by mouth every 4 (four) hours as needed for pain for up to 30 doses Max Daily Amount: 12 tablets (Patient not taking: Reported on 8/21/2018 ), Disp: 30 tablet, Rfl: 0    LORazepam (ATIVAN) 0 5 mg tablet, Take 0 5 mg by mouth, Disp: , Rfl:     Multiple Vitamins-Minerals (MULTIVITAMIN ADULT PO), daily  , Disp: , Rfl:     pantoprazole (PROTONIX) 40 mg tablet, Take 40 mg by mouth, Disp: , Rfl:   No Known Allergies    OBJECTIVE    Vitals:   Vitals:    08/21/18 0914   BP: 118/70   BP Location: Left arm   Patient Position: Sitting   Cuff Size: Standard   Pulse: 93   Resp: 12   Temp: 97 8 °F (36 6 °C)   TempSrc: Tympanic   Weight: 71 7 kg (158 lb)   Height: 5' 10" (1 778 m)     Wt Readings from Last 3 Encounters:   08/21/18 71 7 kg (158 lb)   08/08/18 74 4 kg (164 lb)   08/07/18 74 kg (163 lb 2 3 oz)     Body mass index is 22 67 kg/m²  Temp Readings from Last 3 Encounters:   08/21/18 97 8 °F (36 6 °C) (Tympanic)   08/08/18 98 8 °F (37 1 °C)   08/07/18 98 2 °F (36 8 °C) (Temporal)     BP Readings from Last 3 Encounters:   08/21/18 118/70   08/08/18 115/71   08/07/18 112/63     Pulse Readings from Last 3 Encounters:   08/21/18 93   08/08/18 77   08/07/18 79     No LMP for male patient  Physical Exam:    Physical Exam   Constitutional: He is oriented to person, place, and time  He appears well-developed and well-nourished  HENT:   Head: Normocephalic and atraumatic  Eyes: Conjunctivae are normal    Neck: Normal range of motion  Neck supple  Cardiovascular: Normal rate, regular rhythm, normal heart sounds and intact distal pulses  Pulmonary/Chest: Effort normal and breath sounds normal  No respiratory distress  He has no wheezes  He has no rales  Abdominal: Soft  Bowel sounds are normal  He exhibits no distension  There is no tenderness  There is no rebound  Musculoskeletal: Normal range of motion     Neurological: He is alert and oriented to person, place, and time  Skin: Skin is warm and dry  Surgical incision wounds on upper abdomen healing well  Non tender  Psychiatric: He has a normal mood and affect  Vitals reviewed  Labs: I have personally reviewed all pertinent results  Admission on 08/07/2018, Discharged on 08/07/2018   Component Date Value Ref Range Status    Case Report 08/07/2018    Final                    Value:Surgical Pathology Report                         Case: [id number]                                   Authorizing Provider:  Karen Mejias MD        Collected:           08/07/2018 1034              Ordering Location:     [facility]        Received:            08/07/2018 [facility]                                                          Pathologist:           Leonel Mendosa MD                                                                Specimens:   A) - Large Intestine, Right/Ascending Colon, 4mm  polyp right colon                                 B) - Colon, 1 cm Hepatic fx polyp                                                                   C) - Large Intestine, Left/Descending Colon, 1 cm polyp @45 CM                                      D) - Large Intestine, Sigmoid Colon, 7mm polyp @ 35CM                                               E) - Large Intestine, Sigmoid Colon, 8mm polyp @ 20cm                                                                         F) - Colon, 6mm polyp, 4mm polyp @15cm                                                              G) - Colon, 3mm polyp, 6mm polyp Rectal polyps                                                      H) - Colon, 7mm anal rectal polyp                                                          Final Diagnosis 08/07/2018    Final                    Value: This result contains rich text formatting which cannot be displayed here   Note 08/07/2018    Final                    Value: This result contains rich text formatting which cannot be displayed here   Additional Information 08/07/2018    Final                    Value: This result contains rich text formatting which cannot be displayed here  Corina Pichardo Description 08/07/2018    Final                    Value: This result contains rich text formatting which cannot be displayed here      Clinical Information 08/07/2018    Final                    Value:Family history of colon cancer   Orders Only on 06/11/2018   Component Date Value Ref Range Status    WBC 06/11/2018 9 6  4 5 - 11 0 K/MCL Final    RBC 06/11/2018 4 17* 4 50 - 5 90 M/MCL Final    Hemoglobin 06/11/2018 13 6  13 5 - 17 5 G/DL Final    Hematocrit 06/11/2018 39 8* 41 0 - 53 0 % Final    MCV 06/11/2018 95  80 - 100 FL Final    MCH 06/11/2018 32 5  26 0 - 34 0 PG Final    MCHC 06/11/2018 34 1  31 0 - 36 0 % Final    RDW 06/11/2018 13 8  <15 3 % Final    Platelets 27/83/6758 336  150 - 450 K/MCL Final    Neutrophils Relative 06/11/2018 79* 45 - 65 % Final    NEUTROPHILS ABS COUNT 06/11/2018 7 6  1 8 - 7 8 K/uL Final    ABSOL LYMPHOCYTES 06/11/2018 1 3  0 5 - 4 0 K/uL Final    Monocytes Absolute 06/11/2018 0 4  0 2 - 0 9 K/uL Final    Eosinophils Absolute 06/11/2018 0 2  0 0 - 0 4 K/uL Final    Basophils Absolute 06/11/2018 0 0  0 0 - 0 1 K/uL Final    Lymphocytes Relative 06/11/2018 14* 25 - 45 % Final    Monocytes Relative 06/11/2018 4  1 - 10 % Final    Eosinophils Relative 06/11/2018 2  0 - 6 % Final    Basophils Relative 06/11/2018 1  0 - 1 % Final    Glucose 06/11/2018 163* 70 - 99 MG/DL Final    BUN 06/11/2018 16  5 - 25 MG/DL Final    CREATINE, SERUM (HISTORICAL) 06/11/2018 0 87  0 70 - 1 50 mg/dL Final    Sodium 06/11/2018 140  137 - 147 MEQ/L Final    Potassium 06/11/2018 4 5  3 6 - 5 0 MEQ/L Final    Chloride 06/11/2018 105  97 - 108 MEQ/L Final    CO2 06/11/2018 23  22 - 30 MMOL/L Final    Total Bilirubin 06/11/2018 0 9  <1 3 mg/dL Final    Calcium 06/11/2018 9 1  8 4 - 10 2 MG/DL Final    Total Protein 06/11/2018 6 8  5 9 - 8 4 G/DL Final    Albumin 06/11/2018 3 7  3 0 - 5 2 G/DL Final    Alkaline Phosphatase 06/11/2018 114  43 - 122 U/L Final    AST 06/11/2018 18  17 - 59 U/L Final    ALT 06/11/2018 17  9 - 52 U/L Final    Anion Gap 06/11/2018 11  5 - 14 MMOL/L Final    EGFR (HISTORICAL) 06/11/2018 >60  >60 mL/min/1 73 m2 Final    Comment:    Normal function or mild GFR                  reduction (if clinically at risk)   >60  Moderate GFR reduction              30-60  Severe GFR reduction                15-29  Renal failure                         <15     For  Americans, multiply the calculated GFR by 1 21      Magnesium 06/11/2018 2 0  1 6 - 2 3 mg/dL Final       Admission on 08/07/2018, Discharged on 08/07/2018   Component Date Value Ref Range Status    Case Report 08/07/2018    Final                    Value:Surgical Pathology Report                         Case: [id number]                                   Authorizing Provider:  Ann Manrique MD        Collected:           08/07/2018 1034              Ordering Location:     [facility]        Received:            08/07/2018 [address] [facility]                                                          Pathologist:           Cody Becker MD                                                                Specimens:   A) - Large Intestine, Right/Ascending Colon, 4mm  polyp right colon                                 B) - Colon, 1 cm Hepatic fx polyp                                                                   C) - Large Intestine, Left/Descending Colon, 1 cm polyp @45 CM                                      D) - Large Intestine, Sigmoid Colon, 7mm polyp @ 35CM                                               E) - Large Intestine, Sigmoid Colon, 8mm polyp @ 20cm                                                                         F) - Colon, 6mm polyp, 4mm polyp @15cm                                                              G) - Colon, 3mm polyp, 6mm polyp Rectal polyps                                                      H) - Colon, 7mm anal rectal polyp                                                          Final Diagnosis 08/07/2018    Final                    Value: This result contains rich text formatting which cannot be displayed here   Note 08/07/2018    Final                    Value: This result contains rich text formatting which cannot be displayed here   Additional Information 08/07/2018    Final                    Value: This result contains rich text formatting which cannot be displayed here  [address] Description 08/07/2018    Final                    Value: This result contains rich text formatting which cannot be displayed here   Clinical Information 08/07/2018    Final                    Value:Family history of colon cancer       Imaging:  I have personally reviewed all pertinent results  Assessment/Plan    Bilateral inguinal hernia, laparoscopic, robot assisted     -wound healing well  -Advised to increase physical activity as tolerated  -Discussed colonoscopy results and pt to schedule follow colonoscopy every 3 years        - PCP: Irving Caro MD  - Follow-up appointments:     Future Appointments  Date Time Provider Kadeem Enedina   9/25/2018 9:00  [facility] 1 [address] [place] [address]   12/6/2018 9:00 AM [facility] INF CHAIR 1 [facility] [address]   12/12/2018 9:20 AM Lexie Whelan MD [facility] PG [facility]   1/25/2019 9:30 AM Irvnig Caro MD [facility] PG Andie Pastrana MD, PGY-1  [facility]   8/21/2018

## 2018-08-21 NOTE — LETTER
August 21, 2018     [facility] [address]    Patient: Javi Hernandez   YOB: 1950   Date of Visit: 8/21/2018       Dear Dr Camelia Minor:    Thank you for referring Gisselle Torres to me for evaluation  Below are my notes for this consultation  If you have questions, please do not hesitate to call me  I look forward to following your patient along with you  Sincerely,        Ulysses Pardon, PA-C        CC: No Recipients  Charanjit Grewal MD  8/21/2018  9:58 AM  Attested  Javi Hernandez 1950 male MRN: [id number]     Follow-up Visit      SUBJECTIVE    CC: Post-op (S/P bilateral inguinal hernia repair 8/8)      HPI:  Javi Hernandez is a 79 y o  male who presented for a follow-up  He had bilateral inguinal hernia repair, robot assisted with mesh on 8/8/18  He denies pain, fever, chills, n/v   Pt had a colonoscopy done day before surgery and had multiple polyps removed, pathology results consistent with adenomatous polyps in some of them  HPI    Review of Systems   Constitutional: Negative for activity change, appetite change, chills and fever  Respiratory: Negative for cough and shortness of breath  Cardiovascular: Negative for chest pain and palpitations  Gastrointestinal: Negative for abdominal distention, abdominal pain, blood in stool, constipation, diarrhea, nausea and vomiting  Genitourinary: Negative for difficulty urinating  Musculoskeletal: Negative for arthralgias         Historical Information     The patient history was reviewed as follows:    Past Medical History:   Diagnosis Date    Balance problems     and" coordination issues/best to walk slow"    Dysphagia 02/16/2017    "not right now but in the past"    Environmental and seasonal allergies     Exercise involving walking     "approx 1 mile 3x/week"    Eye injury     right,  "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"    Full dentures     Hemiparesis ([facility] [address] ) 04/27/2015    left weakness,"due to tumor on brain"    History of brain tumor     "had radiation for it"    History of cancer chemotherapy     last tx  5/22/18    History of GI bleed 2015    History of kidney cancer     left    History of kidney stones     History of radiation therapy     History of transfusion     5 units of blood 2015    Hydrocele 11/30/2010    Inguinal hernia, bilateral     Joint stiffness of multiple sites     Paresthesia 05/01/2015    Portacath in place     right chest    [facility] 05/26/2015    Renal carcinoma, left [facility] 10/17/2016    Risk for falls     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([facility] ) 05/26/2015    Skin cancer     Syncopal episodes 09/01/2015    2    Wears glasses      Past Surgical History:   Procedure Laterality Date    COLONOSCOPY      DENTAL SURGERY  2011    DENTAL EXTACTIONS     ESOPHAGOGASTRODUODENOSCOPY      HYDROCELE EXCISION / REPAIR Left 01/01/2011    KIDNEY STONE SURGERY      KIDNEY SURGERY      cancer of kidney/cryo of cancer    PORTACATH PLACEMENT      AL COLONOSCOPY FLX DX W/COLLJ SPEC WHEN PFRMD N/A 8/7/2018    Procedure: COLONOSCOPY with polypectomy ;  Surgeon: Arna Simmonds, MD;  Location: [facility];   Service: General    AL LAP, RECURRENT INCISIONAL HERNIA REPAIR,INCARCERATED Bilateral 8/8/2018    Procedure: REPAIR HERNIA INGUINAL LAPAROSCOPIC W/ ROBOTICS W/ MESH;  Surgeon: Arna Simmonds, MD;  Location: [facility];  Service: General    SKIN BIOPSY      SKIN CANCER EXCISION      17 surgeries, basal cell    TONSILLECTOMY       Family History   Problem Relation Age of Onset    Cancer Mother         EYE    Cancer Father     Colon cancer Father     Cancer Brother       Social History   History   Alcohol Use    Yes     Comment: "3x per year"     History   Drug Use No     History   Smoking Status    Current Every Day Smoker    Packs/day: 0 50    Years: 48 00    Types: Cigarettes   Smokeless Tobacco  Never Used       Medications:   Meds/Allergies     Current Outpatient Prescriptions:     Ascorbic Acid (VITAMIN C) 500 MG CAPS, daily  , Disp: , Rfl:     folic acid (FOLVITE) 1 mg tablet, 1 mg daily  , Disp: , Rfl:     pantoprazole (PROTONIX) 40 mg tablet, Take 40 mg by mouth daily before breakfast Take 30 minutes before, Disp: , Rfl: 1    Cyanocobalamin 1000 MCG/ML KIT, Inject as directed every 30 (thirty) days  Unknown dose, Disp: , Rfl:     dexamethasone (DECADRON) 4 mg tablet, Take 4 mg by mouth, Disp: , Rfl:     HYDROcodone-acetaminophen (NORCO) 5-325 mg per tablet, Take 1-2 tablets by mouth every 4 (four) hours as needed for pain for up to 30 doses Max Daily Amount: 12 tablets (Patient not taking: Reported on 8/21/2018 ), Disp: 30 tablet, Rfl: 0    LORazepam (ATIVAN) 0 5 mg tablet, Take 0 5 mg by mouth, Disp: , Rfl:     Multiple Vitamins-Minerals (MULTIVITAMIN ADULT PO), daily  , Disp: , Rfl:     pantoprazole (PROTONIX) 40 mg tablet, Take 40 mg by mouth, Disp: , Rfl:   No Known Allergies    OBJECTIVE    Vitals:   Vitals:    08/21/18 0914   BP: 118/70   BP Location: Left arm   Patient Position: Sitting   Cuff Size: Standard   Pulse: 93   Resp: 12   Temp: 97 8 °F (36 6 °C)   TempSrc: Tympanic   Weight: 71 7 kg (158 lb)   Height: 5' 10" (1 778 m)     Wt Readings from Last 3 Encounters:   08/21/18 71 7 kg (158 lb)   08/08/18 74 4 kg (164 lb)   08/07/18 74 kg (163 lb 2 3 oz)     Body mass index is 22 67 kg/m²  Temp Readings from Last 3 Encounters:   08/21/18 97 8 °F (36 6 °C) (Tympanic)   08/08/18 98 8 °F (37 1 °C)   08/07/18 98 2 °F (36 8 °C) (Temporal)     BP Readings from Last 3 Encounters:   08/21/18 118/70   08/08/18 115/71   08/07/18 112/63     Pulse Readings from Last 3 Encounters:   08/21/18 93   08/08/18 77   08/07/18 79     No LMP for male patient  Physical Exam:    Physical Exam   Constitutional: He is oriented to person, place, and time  He appears well-developed and well-nourished  HENT:   Head: Normocephalic and atraumatic  Eyes: Conjunctivae are normal    Neck: Normal range of motion  Neck supple  Cardiovascular: Normal rate, regular rhythm, normal heart sounds and intact distal pulses  Pulmonary/Chest: Effort normal and breath sounds normal  No respiratory distress  He has no wheezes  He has no rales  Abdominal: Soft  Bowel sounds are normal  He exhibits no distension  There is no tenderness  There is no rebound  Musculoskeletal: Normal range of motion  Neurological: He is alert and oriented to person, place, and time  Skin: Skin is warm and dry  Surgical incision wounds on upper abdomen healing well  Non tender  Psychiatric: He has a normal mood and affect  Vitals reviewed  Labs: I have personally reviewed all pertinent results     Admission on 08/07/2018, Discharged on 08/07/2018   Component Date Value Ref Range Status    Case Report 08/07/2018    Final                    Value:Surgical Pathology Report                         Case: [id number]                                   Authorizing Provider:  Eri Bishop MD        Collected:           08/07/2018 1034              Ordering Location:     [facility]        Received:            08/07/2018 [facility]                                                          Pathologist:           Rosa Willett MD                                                                Specimens:   A) - Large Intestine, Right/Ascending Colon, 4mm  polyp right colon                                 B) - Colon, 1 cm Hepatic fx polyp                                                                   C) - Large Intestine, Left/Descending Colon, 1 cm polyp @45 CM                                      D) - Large Intestine, Sigmoid Colon, 7mm polyp @ 35CM                                               E) - Large Intestine, Sigmoid Colon, 8mm polyp @ 20cm F) - Colon, 6mm polyp, 4mm polyp @15cm                                                              G) - Colon, 3mm polyp, 6mm polyp Rectal polyps                                                      H) - Colon, 7mm anal rectal polyp                                                          Final Diagnosis 08/07/2018    Final                    Value: This result contains rich text formatting which cannot be displayed here   Note 08/07/2018    Final                    Value: This result contains rich text formatting which cannot be displayed here   Additional Information 08/07/2018    Final                    Value: This result contains rich text formatting which cannot be displayed here  Alexandria Valenzuela Description 08/07/2018    Final                    Value: This result contains rich text formatting which cannot be displayed here      Clinical Information 08/07/2018    Final                    Value:Family history of colon cancer   Orders Only on 06/11/2018   Component Date Value Ref Range Status    WBC 06/11/2018 9 6  4 5 - 11 0 K/MCL Final    RBC 06/11/2018 4 17* 4 50 - 5 90 M/MCL Final    Hemoglobin 06/11/2018 13 6  13 5 - 17 5 G/DL Final    Hematocrit 06/11/2018 39 8* 41 0 - 53 0 % Final    MCV 06/11/2018 95  80 - 100 FL Final    MCH 06/11/2018 32 5  26 0 - 34 0 PG Final    MCHC 06/11/2018 34 1  31 0 - 36 0 % Final    RDW 06/11/2018 13 8  <15 3 % Final    Platelets 41/86/4361 336  150 - 450 K/MCL Final    Neutrophils Relative 06/11/2018 79* 45 - 65 % Final    NEUTROPHILS ABS COUNT 06/11/2018 7 6  1 8 - 7 8 K/uL Final    ABSOL LYMPHOCYTES 06/11/2018 1 3  0 5 - 4 0 K/uL Final    Monocytes Absolute 06/11/2018 0 4  0 2 - 0 9 K/uL Final    Eosinophils Absolute 06/11/2018 0 2  0 0 - 0 4 K/uL Final    Basophils Absolute 06/11/2018 0 0  0 0 - 0 1 K/uL Final    Lymphocytes Relative 06/11/2018 14* 25 - 45 % Final    Monocytes Relative 06/11/2018 4  1 - 10 % Final    Eosinophils Relative 06/11/2018 2  0 - 6 % Final    Basophils Relative 06/11/2018 1  0 - 1 % Final    Glucose 06/11/2018 163* 70 - 99 MG/DL Final    BUN 06/11/2018 16  5 - 25 MG/DL Final    CREATINE, SERUM (HISTORICAL) 06/11/2018 0 87  0 70 - 1 50 mg/dL Final    Sodium 06/11/2018 140  137 - 147 MEQ/L Final    Potassium 06/11/2018 4 5  3 6 - 5 0 MEQ/L Final    Chloride 06/11/2018 105  97 - 108 MEQ/L Final    CO2 06/11/2018 23  22 - 30 MMOL/L Final    Total Bilirubin 06/11/2018 0 9  <1 3 mg/dL Final    Calcium 06/11/2018 9 1  8 4 - 10 2 MG/DL Final    Total Protein 06/11/2018 6 8  5 9 - 8 4 G/DL Final    Albumin 06/11/2018 3 7  3 0 - 5 2 G/DL Final    Alkaline Phosphatase 06/11/2018 114  43 - 122 U/L Final    AST 06/11/2018 18  17 - 59 U/L Final    ALT 06/11/2018 17  9 - 52 U/L Final    Anion Gap 06/11/2018 11  5 - 14 MMOL/L Final    EGFR (HISTORICAL) 06/11/2018 >60  >60 mL/min/1 73 m2 Final    Comment:    Normal function or mild GFR                  reduction (if clinically at risk)   >60  Moderate GFR reduction              30-60  Severe GFR reduction                15-29  Renal failure                         <15     For  Americans, multiply the calculated GFR by 1 21      Magnesium 06/11/2018 2 0  1 6 - 2 3 mg/dL Final       Admission on 08/07/2018, Discharged on 08/07/2018   Component Date Value Ref Range Status    Case Report 08/07/2018    Final                    Value:Surgical Pathology Report                         Case: [id number]                                   Authorizing Provider:  Victor Hugo Benton MD        Collected:           08/07/2018 1034              Ordering Location:     [facility]        Received:            08/07/2018 1216                                     [place] [facility]                                                          Pathologist:           Ming Dewey MD                                                                Specimens:   A) - Large Intestine, Right/Ascending Colon, 4mm  polyp right colon                                 B) - Colon, 1 cm Hepatic fx polyp                                                                   C) - Large Intestine, Left/Descending Colon, 1 cm polyp @45 CM                                      D) - Large Intestine, Sigmoid Colon, 7mm polyp @ 35CM                                               E) - Large Intestine, Sigmoid Colon, 8mm polyp @ 20cm                                                                         F) - Colon, 6mm polyp, 4mm polyp @15cm                                                              G) - Colon, 3mm polyp, 6mm polyp Rectal polyps                                                      H) - Colon, 7mm anal rectal polyp                                                          Final Diagnosis 08/07/2018    Final                    Value: This result contains rich text formatting which cannot be displayed here   Note 08/07/2018    Final                    Value: This result contains rich text formatting which cannot be displayed here   Additional Information 08/07/2018    Final                    Value: This result contains rich text formatting which cannot be displayed here  Alexander Ao Description 08/07/2018    Final                    Value: This result contains rich text formatting which cannot be displayed here   Clinical Information 08/07/2018    Final                    Value:Family history of colon cancer       Imaging:  I have personally reviewed all pertinent results  Assessment/Plan    Bilateral inguinal hernia, laparoscopic, robot assisted     -wound healing well  -Advised to increase physical activity as tolerated  -Discussed colonoscopy results and pt to schedule follow colonoscopy every 3 years        - PCP: Milana Stoddard MD  - Follow-up appointments:     Future Appointments  Date Time Provider Kadeem Mcconnell   9/25/2018 9:00 AM [facility] INF CHAIR 1 [address] [place] [address] 12/6/2018 9:00 AM 31 Alex[facility] Gray INF CHAIR 1 31 Alexia Gray Infusion [address]   12/12/2018 9:20 AM Mima Mendoza MD [facility] PG [facility]   1/25/2019 9:30 AM López Montes MD [facility] PG Lorena Garcia MD, PGY-1  [facility]   8/21/2018    Attestation signed by Reji Davis PA-C at 8/21/2018  9:58 AM:  Patient seen, examined and interviewed with resident    71-year-old male status post laparoscopic robotic assisted bilateral inguinal hernia repairs on 08/08/2018  Patient is doing well without complaints    Denies nausea, vomiting and having regular bowel movements    On physical exam  Abdomen:  Soft, nontender, nondistended, positive bowel sounds  Incision:  Clean, dry, intact    Patient had colonoscopy prior to hernia repair with multiple polyps    Patient to follow up in 3 years for surveillance colonoscopy    Postoperative restrictions and activity advancement discussed  Patient to follow up in 3 years or sooner if change in condition

## 2018-09-24 RX ORDER — SODIUM CHLORIDE 9 MG/ML
20 INJECTION, SOLUTION INTRAVENOUS CONTINUOUS
Status: DISCONTINUED | OUTPATIENT
Start: 2018-09-25 | End: 2018-09-29 | Stop reason: HOSPADM

## 2018-09-25 ENCOUNTER — HOSPITAL ENCOUNTER (OUTPATIENT)
Dept: INFUSION CENTER | Facility: HOSPITAL | Age: 68
Discharge: HOME | End: 2018-09-25
Payer: MEDICARE

## 2018-09-25 PROCEDURE — 96523 IRRIG DRUG DELIVERY DEVICE: CPT

## 2018-09-25 RX ADMIN — Medication 300 UNITS: at 09:14

## 2018-09-25 NOTE — PROGRESS NOTES
Admitted to infusion center today for routine port flush  Tolerated procedure well  Excellent blood return noted  Pt reports that he has been having urinary problems since his hernia surgery last month  Encouraged to seek medical attention  States that he was referred to a new urologist since his has since retired however he hasnt set up a future appt  "i think its just my stones acting up again"  Reviewed symptoms of infection to be on alert to  Pt agreed that he should be setting up an appt  Also set pt up with next port flush prior to discharge  AVS provided

## 2018-10-08 ENCOUNTER — TELEPHONE (OUTPATIENT)
Dept: UROLOGY | Facility: MEDICAL CENTER | Age: 68
End: 2018-10-08

## 2018-10-08 NOTE — TELEPHONE ENCOUNTER
New patient for incontinence  Reason for appointment/Complaint/Diagnosis : incontinence    Insurance: Medicare A/B    History of Cancer? yes                       If yes, what kind? Kidney/renal, lung, brain    Previous urologist?     Gladys                  Records requested/where? [address]    Outside testing/where? no    Location Preference for office visit? [facility] new patient paperwork via email

## 2018-10-16 DIAGNOSIS — C79.49 SECONDARY MALIGNANT NEOPLASM OF BRAIN AND SPINAL CORD (HCC): ICD-10-CM

## 2018-10-16 DIAGNOSIS — C79.31 SECONDARY MALIGNANT NEOPLASM OF BRAIN AND SPINAL CORD (HCC): ICD-10-CM

## 2018-10-16 DIAGNOSIS — C34.12 MALIGNANT NEOPLASM OF BRONCHUS OF UPPER LOBE, LEFT (HCC): Primary | ICD-10-CM

## 2018-10-16 DIAGNOSIS — C34.12 PRIMARY CANCER OF LEFT UPPER LOBE OF LUNG (HCC): Primary | ICD-10-CM

## 2018-10-16 DIAGNOSIS — C64.2 MALIGNANT NEOPLASM OF LEFT KIDNEY, EXCEPT RENAL PELVIS (HCC): ICD-10-CM

## 2018-10-22 ENCOUNTER — OFFICE VISIT (OUTPATIENT)
Dept: UROLOGY | Facility: MEDICAL CENTER | Age: 68
End: 2018-10-22
Payer: MEDICARE

## 2018-10-22 VITALS
DIASTOLIC BLOOD PRESSURE: 70 MMHG | BODY MASS INDEX: 22.33 KG/M2 | WEIGHT: 156 LBS | HEIGHT: 70 IN | SYSTOLIC BLOOD PRESSURE: 116 MMHG

## 2018-10-22 DIAGNOSIS — N40.1 BPH WITH URINARY OBSTRUCTION: ICD-10-CM

## 2018-10-22 DIAGNOSIS — N21.0 BLADDER STONES: Primary | ICD-10-CM

## 2018-10-22 DIAGNOSIS — N13.8 BPH WITH URINARY OBSTRUCTION: ICD-10-CM

## 2018-10-22 DIAGNOSIS — N39.41 URGE INCONTINENCE: ICD-10-CM

## 2018-10-22 LAB
SL AMB  POCT GLUCOSE, UA: ABNORMAL
SL AMB LEUKOCYTE ESTERASE,UA: ABNORMAL
SL AMB POCT BILIRUBIN,UA: ABNORMAL
SL AMB POCT BLOOD,UA: ABNORMAL
SL AMB POCT CLARITY,UA: CLEAR
SL AMB POCT COLOR,UA: YELLOW
SL AMB POCT KETONES,UA: ABNORMAL
SL AMB POCT NITRITE,UA: POSITIVE
SL AMB POCT PH,UA: 5.5
SL AMB POCT SPECIFIC GRAVITY,UA: ABNORMAL
SL AMB POCT URINE PROTEIN: ABNORMAL
SL AMB POCT UROBILINOGEN: 0.2

## 2018-10-22 PROCEDURE — 52000 CYSTOURETHROSCOPY: CPT | Performed by: UROLOGY

## 2018-10-22 PROCEDURE — 1123F ACP DISCUSS/DSCN MKR DOCD: CPT | Performed by: UROLOGY

## 2018-10-22 PROCEDURE — 99205 OFFICE O/P NEW HI 60 MIN: CPT | Performed by: UROLOGY

## 2018-10-22 PROCEDURE — 81003 URINALYSIS AUTO W/O SCOPE: CPT | Performed by: UROLOGY

## 2018-10-22 PROCEDURE — 87086 URINE CULTURE/COLONY COUNT: CPT | Performed by: UROLOGY

## 2018-10-22 NOTE — LETTER
October 22, 2018     [facility] [address]    Patient: Yashira Squires   YOB: 1950   Date of Visit: 10/22/2018       Dear Dr Perry Charlton:    Thank you for referring Chiara Mike to me for evaluation  Below are my notes for this consultation  If you have questions, please do not hesitate to call me  I look forward to following your patient along with you  Sincerely,        Jerrod Daniels MD        CC: No Recipients  Jerrod Daniels MD  10/22/2018  9:30 AM  Sign at close encounter  Assessment/Plan:  1  Unclear why much more irritation since the surgery, but bladder stones are likely the cause of his urgency incontinence  2   BPH also, but he did not really complain much of symptoms before this episode  3   Options discussed, I recommend laser bladder stone  The stones are not huge, and I do not anticipate any increased risk of bladder perforation  I have told him potential complications, need for catheter for couple days afterwards  He would rather not do the prostate right now  4   Check PSA    5  Procedure increased risk due to could due to comorbidities, lung cancer, chemotherapy treatment, polypharmacy, steroids, GERD medicines, prior smoking history  No problem-specific Assessment & Plan notes found for this encounter  Diagnoses and all orders for this visit:    Bladder stones  -     POCT urine dip auto non-scope    BPH with urinary obstruction  -     PSA Total, Diagnostic; Future    Urge incontinence          Subjective:      Patient ID: Yashira Squires is a 79 y o  male  1  New onset urge incont, since hernia surgery one month ago  Wears diapers    2  Bladder stones, for many years  Two years ago he underwent procedure at [facility], laser treatment of bladder stone  However patient describes a bladder perforation, the operation was stop, he wore catheter for six weeks afterwards    Never had the stones we worked on     3   BPH, some slowing of flow, but he has not really been bothered by symptoms until the past month or so     4   Under chemo for lung cancer, other medical issues going on  The following portions of the patient's history were reviewed and updated as appropriate: allergies, current medications, past family history, past medical history, past social history, past surgical history and problem list     Review of Systems   Respiratory: Positive for shortness of breath  All other systems reviewed and are negative  Objective:      /70   Ht 5' 10" (1 778 m)   Wt 70 8 kg (156 lb)   BMI 22 38 kg/m²           Physical Exam   Constitutional: He is oriented to person, place, and time  He appears well-developed and well-nourished  No distress  HENT:   Head: Normocephalic and atraumatic  Eyes: Conjunctivae are normal    Cardiovascular: Normal rate and regular rhythm  Pulmonary/Chest: Breath sounds normal  No respiratory distress  He has no wheezes  Abdominal: Soft  Bowel sounds are normal  He exhibits no distension and no mass  There is no tenderness  Genitourinary:   Genitourinary Comments: Penis and testes normal    Prostate left-sided midline irregular, nodular but may be in rectal wall   Neurological: He is alert and oriented to person, place, and time  Skin: Skin is warm and dry  He is not diaphoretic  Cystoscopy  Date/Time: 10/22/2018 9:26 AM  Performed by: Kindra Hidalgo  Authorized by: Kindra Hidalgo     Procedure details: cystoscopy    Additional Procedure Details: The patient was carefully  positioned supine on the examining table  Sterile preparation was performed on the urethra  Xylocaine jelly was instilled and left  Indwelling for the procedure  The 13 Kisw[place]ili flexible cystoscope was passed with the following findings:      Urethra: no strictures    Prostate:  TLH, 20 gram resectable    Bladder:  Moderate trabeculations, no tumors, two stones about 12  and 20 mm Residual urine:  100 mL    Patient tolerated the procedure well and was escorted from the examining table

## 2018-10-22 NOTE — PROGRESS NOTES
Assessment/Plan:  1  Unclear why much more irritation since the surgery, but bladder stones are likely the cause of his urgency incontinence  2   BPH also, but he did not really complain much of symptoms before this episode  3   Options discussed, I recommend laser bladder stone  The stones are not huge, and I do not anticipate any increased risk of bladder perforation  I have told him potential complications, need for catheter for couple days afterwards  He would rather not do the prostate right now  4   Check PSA    5  Procedure increased risk due to could due to comorbidities, lung cancer, chemotherapy treatment, polypharmacy, steroids, GERD medicines, prior smoking history  No problem-specific Assessment & Plan notes found for this encounter  Diagnoses and all orders for this visit:    Bladder stones  -     POCT urine dip auto non-scope    BPH with urinary obstruction  -     PSA Total, Diagnostic; Future    Urge incontinence          Subjective:      Patient ID: Shea Mercado is a 79 y o  male  1  New onset urge incont, since hernia surgery one month ago  Wears diapers    2  Bladder stones, for many years  Two years ago he underwent procedure at [facility], laser treatment of bladder stone  However patient describes a bladder perforation, the operation was stop, he wore catheter for six weeks afterwards  Never had the stones we worked on     3   BPH, some slowing of flow, but he has not really been bothered by symptoms until the past month or so     4   Under chemo for lung cancer, other medical issues going on  The following portions of the patient's history were reviewed and updated as appropriate: allergies, current medications, past family history, past medical history, past social history, past surgical history and problem list     Review of Systems   Respiratory: Positive for shortness of breath  All other systems reviewed and are negative  Objective:      /70   Ht 5' 10" (1 778 m)   Wt 70 8 kg (156 lb)   BMI 22 38 kg/m²          Physical Exam   Constitutional: He is oriented to person, place, and time  He appears well-developed and well-nourished  No distress  HENT:   Head: Normocephalic and atraumatic  Eyes: Conjunctivae are normal    Cardiovascular: Normal rate and regular rhythm  Pulmonary/Chest: Breath sounds normal  No respiratory distress  He has no wheezes  Abdominal: Soft  Bowel sounds are normal  He exhibits no distension and no mass  There is no tenderness  Genitourinary:   Genitourinary Comments: Penis and testes normal    Prostate left-sided midline irregular, nodular but may be in rectal wall   Neurological: He is alert and oriented to person, place, and time  Skin: Skin is warm and dry  He is not diaphoretic  Cystoscopy  Date/Time: 10/22/2018 9:26 AM  Performed by: Sommer Ayala  Authorized by: Sommer Ayala     Procedure details: cystoscopy    Additional Procedure Details: The patient was carefully  positioned supine on the examining table  Sterile preparation was performed on the urethra  Xylocaine jelly was instilled and left  Indwelling for the procedure  The 13 [place]wana flexible cystoscope was passed with the following findings:      Urethra: no strictures    Prostate:  TLH, 20 gram resectable    Bladder: Moderate trabeculations, no tumors, two stones about 12  and 20 mm     Residual urine:  100 mL    Patient tolerated the procedure well and was escorted from the examining table

## 2018-10-23 LAB — BACTERIA UR CULT: NORMAL

## 2018-11-05 RX ORDER — SODIUM CHLORIDE 9 MG/ML
20 INJECTION, SOLUTION INTRAVENOUS CONTINUOUS
Status: DISCONTINUED | OUTPATIENT
Start: 2018-11-06 | End: 2018-11-10 | Stop reason: HOSPADM

## 2018-11-06 ENCOUNTER — APPOINTMENT (OUTPATIENT)
Dept: LAB | Facility: HOSPITAL | Age: 68
End: 2018-11-06
Payer: MEDICARE

## 2018-11-06 ENCOUNTER — HOSPITAL ENCOUNTER (OUTPATIENT)
Dept: INFUSION CENTER | Facility: HOSPITAL | Age: 68
Discharge: HOME | End: 2018-11-06
Payer: MEDICARE

## 2018-11-06 ENCOUNTER — TRANSCRIBE ORDERS (OUTPATIENT)
Dept: ADMINISTRATIVE | Facility: HOSPITAL | Age: 68
End: 2018-11-06

## 2018-11-06 DIAGNOSIS — N13.8 BPH WITH URINARY OBSTRUCTION: ICD-10-CM

## 2018-11-06 DIAGNOSIS — N40.1 BPH WITH URINARY OBSTRUCTION: ICD-10-CM

## 2018-11-06 LAB — PSA SERPL-MCNC: 2.6 NG/ML (ref 0–4)

## 2018-11-06 PROCEDURE — 84153 ASSAY OF PSA TOTAL: CPT

## 2018-11-06 RX ADMIN — Medication 300 UNITS: at 09:41

## 2018-11-06 NOTE — PROGRESS NOTES
Patient here for port flush and central labs from Dr Roro Irizarry  Port flushed per protocol and central labs drawn per order  Patient confirmed next appts and AVS provided

## 2018-11-06 NOTE — PLAN OF CARE
Problem: Potential for Falls  Goal: Patient will remain free of falls  INTERVENTIONS:  - Assess patient frequently for physical needs  -  Identify cognitive and physical deficits and behaviors that affect risk of falls    -  [place] fall precautions as indicated by assessment   - Educate patient/family on patient safety including physical limitations  - Instruct patient to call for assistance with activity based on assessment  - Modify environment to reduce risk of injury  - Consider OT/PT consult to assist with strengthening/mobility   Outcome: Progressing

## 2018-11-07 ENCOUNTER — TELEPHONE (OUTPATIENT)
Dept: UROLOGY | Facility: MEDICAL CENTER | Age: 68
End: 2018-11-07

## 2018-11-07 NOTE — TELEPHONE ENCOUNTER
----- Message from Bull Larose MD sent at 11/7/2018  8:25 AM EST -----  Normal PSA @ 2 6  ----- Message -----  From: Lab, Background User  Sent: 11/6/2018   4:41 PM  To:  Bull Larose MD

## 2018-11-14 NOTE — PRE-PROCEDURE INSTRUCTIONS
Pre-Surgery Instructions:   Medication Instructions    Ascorbic Acid (VITAMIN C) 500 MG CAPS Instructed patient per Anesthesia Guidelines   folic acid (FOLVITE) 1 mg tablet Instructed patient per Anesthesia Guidelines   Multiple Vitamins-Minerals (MULTIVITAMIN ADULT PO) Instructed patient per Anesthesia Guidelines   pantoprazole (PROTONIX) 40 mg tablet Instructed patient per Anesthesia Guidelines  Instructed to take Pantoprazole the morning of surgery with sip of water per anesthesia guidelines  Stop vitamins and supplements, aspirin and NSAIDs 1 week before surgery  Instructed re chlorhexidine showers per hospital policy

## 2018-11-26 ENCOUNTER — ANESTHESIA EVENT (OUTPATIENT)
Dept: PERIOP | Facility: HOSPITAL | Age: 68
End: 2018-11-26
Payer: MEDICARE

## 2018-11-26 NOTE — H&P
HISTORY AND PHYSICAL  ? ? Patient Name: Alex Herbert  Patient MRN: [id number]  Attending Provider: Olivia Amaro MD  Service: Urology  Chief Complaint    Bladder stones      HPI   Alex Herbert is a 79 y o  male with  recent cystoscopy showing two stones in the bladder, about 12 in 20 mm  irritative symptoms with bladder discomfort burning urgency  Culture negative  Also mild BPH which he says does not bother him that much  I plan  cysto, laser bladder stones  Patient knows there is any increased risk of retention afterwards  Also, he tells methat a procedure was done at [facility] several years ago  At that time they tried to laser the stones, but a bladder perforation occurred, and he required a De La Cruz for six weeks afterwards  I told him there is a remote chance of bladder injury from this procedure going forward, but it is not prohibitive  risk  The patient Understands and consents  Potential risks and complications discussed, and informed consent was given by the patient  Medications  Meds/Allergies     No current facility-administered medications for this encounter  Cannot display prior to admission medications because the patient has not been admitted in this contact  No current facility-administered medications for this encounter       Current Outpatient Prescriptions:     Ascorbic Acid (VITAMIN C) 500 MG CAPS, daily  , Disp: , Rfl:     folic acid (FOLVITE) 1 mg tablet, 1 mg daily  , Disp: , Rfl:     Multiple Vitamins-Minerals (MULTIVITAMIN ADULT PO), Take 1 tablet by mouth daily  , Disp: , Rfl:     pantoprazole (PROTONIX) 40 mg tablet, Take 40 mg by mouth daily before breakfast Take 30 minutes before, Disp: , Rfl: 1    HYDROcodone-acetaminophen (NORCO) 5-325 mg per tablet, Take 1-2 tablets by mouth every 4 (four) hours as needed for pain for up to 30 doses Max Daily Amount: 12 tablets (Patient not taking: Reported on 8/21/2018 ), Disp: 30 tablet, Rfl: 0  Review of Systems  10 point review of systems negative except as noted in HPI  Allergies  Allergies   Allergen Reactions    No Known Allergies      PMH  Past Medical History:   Diagnosis Date    Balance problems     and" coordination issues/best to walk slow"    Bladder stones     Dysphagia 02/16/2017    "not right now but in the past"    Environmental and seasonal allergies     Exercise involving walking     "approx 1 mile 3x/week"    Eye injury     right,  "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"    Full dentures     Hemiparesis ([facility] [address] ) 04/27/2015    left weakness,"due to tumor on brain"    History of brain tumor     "had radiation for it"    History of cancer chemotherapy     last tx  5/22/18    History of GI bleed 2015    History of kidney cancer     left    History of kidney stones     History of radiation therapy     History of transfusion     5 units of blood 2015    Hydrocele 11/30/2010    Inguinal hernia, bilateral     Joint stiffness of multiple sites     Paresthesia 05/01/2015    Portacath in place     right chest    Primary malignant neoplasm of lung ([facility] [address] ) 05/26/2015    Renal carcinoma, left ([facility] [address] ) 10/17/2016    Risk for falls     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([facility] [address] ) 05/26/2015    Skin cancer     Syncopal episodes 09/01/2015    2    Wears glasses      Past surgical history  Past Surgical History:   Procedure Laterality Date    BLADDER STONE REMOVAL      COLONOSCOPY      DENTAL SURGERY  2011    DENTAL EXTACTIONS     ESOPHAGOGASTRODUODENOSCOPY      HYDROCELE EXCISION / REPAIR Left 01/01/2011    KIDNEY STONE SURGERY      KIDNEY SURGERY      cancer of kidney/cryo of cancer    PORTACATH PLACEMENT      AL COLONOSCOPY FLX DX W/COLLJ SPEC WHEN PFRMD N/A 8/7/2018    Procedure: COLONOSCOPY with polypectomy ;  Surgeon: Rainer Becker MD;  Location: [facility];   Service: General    AL LAP, RECURRENT INCISIONAL HERNIA REPAIR,INCARCERATED Bilateral 8/8/2018    Procedure: REPAIR HERNIA INGUINAL LAPAROSCOPIC W/ ROBOTICS W/ MESH;  Surgeon: Dick Belcher MD;  Location: [facility];  Service: General    SKIN BIOPSY      SKIN CANCER EXCISION      17 surgeries, basal cell    TONSILLECTOMY       Social history  Social History   Substance Use Topics    Smoking status: Current Every Day Smoker     Packs/day: 0 50     Years: 48 00     Types: Cigarettes    Smokeless tobacco: Never Used    Alcohol use Yes      Comment: "3x per year"     ? Physical Exam  General appearance: alert and oriented, in no acute distress  Head: Normocephalic, without obvious abnormality, atraumatic  Eyes: conjunctivae/corneas clear  PERRL, EOM's intact  Fundi benign  Neck: no adenopathy, no carotid bruit, no JVD, supple, symmetrical, trachea midline and thyroid not enlarged, symmetric, no tenderness/mass/nodules  Back: symmetric, no curvature  ROM normal  No CVA tenderness    Lungs: clear to auscultation bilaterally  Chest wall: no tenderness  Heart: regular rate and rhythm, S1, S2 normal, no murmur, click, rub or gallop  Abdomen: soft, non-tender; bowel sounds normal; no masses,  no organomegaly  Male genitalia: normal  Extremities: extremities normal, warm and well-perfused; no cyanosis, clubbing, or edema  Lymph nodes: Cervical, supraclavicular, and axillary nodes normal   Neurologic: Grossly normal  Mickie Brannon MD

## 2018-11-27 ENCOUNTER — HOSPITAL ENCOUNTER (OUTPATIENT)
Facility: HOSPITAL | Age: 68
Setting detail: OUTPATIENT SURGERY
Discharge: HOME | End: 2018-11-27
Attending: UROLOGY | Admitting: UROLOGY
Payer: MEDICARE

## 2018-11-27 ENCOUNTER — ANESTHESIA (OUTPATIENT)
Dept: PERIOP | Facility: HOSPITAL | Age: 68
End: 2018-11-27
Payer: MEDICARE

## 2018-11-27 ENCOUNTER — TELEPHONE (OUTPATIENT)
Dept: UROLOGY | Facility: MEDICAL CENTER | Age: 68
End: 2018-11-27

## 2018-11-27 VITALS
BODY MASS INDEX: 22.91 KG/M2 | HEART RATE: 82 BPM | DIASTOLIC BLOOD PRESSURE: 62 MMHG | RESPIRATION RATE: 16 BRPM | TEMPERATURE: 97.6 F | SYSTOLIC BLOOD PRESSURE: 117 MMHG | HEIGHT: 70 IN | OXYGEN SATURATION: 95 % | WEIGHT: 160.05 LBS

## 2018-11-27 DIAGNOSIS — N21.0 BLADDER STONES: ICD-10-CM

## 2018-11-27 PROCEDURE — 52317 REMOVE BLADDER STONE: CPT | Performed by: UROLOGY

## 2018-11-27 PROCEDURE — 52204 CYSTOSCOPY W/BIOPSY(S): CPT | Performed by: UROLOGY

## 2018-11-27 PROCEDURE — 88305 TISSUE EXAM BY PATHOLOGIST: CPT | Performed by: PATHOLOGY

## 2018-11-27 RX ORDER — HYDROCODONE BITARTRATE AND ACETAMINOPHEN 5; 325 MG/1; MG/1
1 TABLET ORAL EVERY 4 HOURS PRN
Qty: 9 TABLET | Refills: 0 | Status: SHIPPED | OUTPATIENT
Start: 2018-11-27 | End: 2018-12-02

## 2018-11-27 RX ORDER — FENTANYL CITRATE 50 UG/ML
INJECTION, SOLUTION INTRAMUSCULAR; INTRAVENOUS AS NEEDED
Status: DISCONTINUED | OUTPATIENT
Start: 2018-11-27 | End: 2018-11-27 | Stop reason: SURG

## 2018-11-27 RX ORDER — OXYCODONE HYDROCHLORIDE AND ACETAMINOPHEN 5; 325 MG/1; MG/1
2 TABLET ORAL EVERY 4 HOURS PRN
Status: DISCONTINUED | OUTPATIENT
Start: 2018-11-27 | End: 2018-11-27 | Stop reason: HOSPADM

## 2018-11-27 RX ORDER — ONDANSETRON 2 MG/ML
4 INJECTION INTRAMUSCULAR; INTRAVENOUS ONCE AS NEEDED
Status: DISCONTINUED | OUTPATIENT
Start: 2018-11-27 | End: 2018-11-27 | Stop reason: HOSPADM

## 2018-11-27 RX ORDER — CEPHALEXIN 500 MG/1
500 CAPSULE ORAL EVERY 12 HOURS SCHEDULED
Qty: 10 CAPSULE | Refills: 0 | Status: SHIPPED | OUTPATIENT
Start: 2018-11-27 | End: 2018-12-02

## 2018-11-27 RX ORDER — ALBUTEROL SULFATE 2.5 MG/3ML
2.5 SOLUTION RESPIRATORY (INHALATION) ONCE AS NEEDED
Status: DISCONTINUED | OUTPATIENT
Start: 2018-11-27 | End: 2018-11-27 | Stop reason: HOSPADM

## 2018-11-27 RX ORDER — FENTANYL CITRATE/PF 50 MCG/ML
25 SYRINGE (ML) INJECTION
Status: DISCONTINUED | OUTPATIENT
Start: 2018-11-27 | End: 2018-11-27 | Stop reason: HOSPADM

## 2018-11-27 RX ORDER — SODIUM CHLORIDE 9 MG/ML
125 INJECTION, SOLUTION INTRAVENOUS CONTINUOUS
Status: DISCONTINUED | OUTPATIENT
Start: 2018-11-27 | End: 2018-11-27 | Stop reason: HOSPADM

## 2018-11-27 RX ORDER — EPHEDRINE SULFATE 50 MG/ML
INJECTION, SOLUTION INTRAVENOUS AS NEEDED
Status: DISCONTINUED | OUTPATIENT
Start: 2018-11-27 | End: 2018-11-27 | Stop reason: SURG

## 2018-11-27 RX ORDER — CEFAZOLIN SODIUM 2 G/50ML
2000 SOLUTION INTRAVENOUS ONCE
Status: COMPLETED | OUTPATIENT
Start: 2018-11-27 | End: 2018-11-27

## 2018-11-27 RX ORDER — MIDAZOLAM HYDROCHLORIDE 1 MG/ML
INJECTION INTRAMUSCULAR; INTRAVENOUS AS NEEDED
Status: DISCONTINUED | OUTPATIENT
Start: 2018-11-27 | End: 2018-11-27 | Stop reason: SURG

## 2018-11-27 RX ORDER — OXYCODONE HYDROCHLORIDE AND ACETAMINOPHEN 5; 325 MG/1; MG/1
1 TABLET ORAL EVERY 4 HOURS PRN
Status: DISCONTINUED | OUTPATIENT
Start: 2018-11-27 | End: 2018-11-27 | Stop reason: HOSPADM

## 2018-11-27 RX ORDER — LIDOCAINE HYDROCHLORIDE 10 MG/ML
INJECTION, SOLUTION INFILTRATION; PERINEURAL AS NEEDED
Status: DISCONTINUED | OUTPATIENT
Start: 2018-11-27 | End: 2018-11-27 | Stop reason: SURG

## 2018-11-27 RX ORDER — ONDANSETRON 2 MG/ML
INJECTION INTRAMUSCULAR; INTRAVENOUS AS NEEDED
Status: DISCONTINUED | OUTPATIENT
Start: 2018-11-27 | End: 2018-11-27 | Stop reason: SURG

## 2018-11-27 RX ORDER — MAGNESIUM HYDROXIDE 1200 MG/15ML
LIQUID ORAL AS NEEDED
Status: DISCONTINUED | OUTPATIENT
Start: 2018-11-27 | End: 2018-11-27 | Stop reason: HOSPADM

## 2018-11-27 RX ORDER — PROPOFOL 10 MG/ML
INJECTION, EMULSION INTRAVENOUS AS NEEDED
Status: DISCONTINUED | OUTPATIENT
Start: 2018-11-27 | End: 2018-11-27 | Stop reason: SURG

## 2018-11-27 RX ADMIN — EPHEDRINE SULFATE 5 MG: 50 INJECTION, SOLUTION INTRAMUSCULAR; INTRAVENOUS; SUBCUTANEOUS at 08:01

## 2018-11-27 RX ADMIN — CEFAZOLIN SODIUM 2000 MG: 2 SOLUTION INTRAVENOUS at 07:32

## 2018-11-27 RX ADMIN — SODIUM CHLORIDE: 0.9 INJECTION, SOLUTION INTRAVENOUS at 08:02

## 2018-11-27 RX ADMIN — EPHEDRINE SULFATE 10 MG: 50 INJECTION, SOLUTION INTRAMUSCULAR; INTRAVENOUS; SUBCUTANEOUS at 08:06

## 2018-11-27 RX ADMIN — ONDANSETRON HYDROCHLORIDE 4 MG: 2 INJECTION, SOLUTION INTRAVENOUS at 07:45

## 2018-11-27 RX ADMIN — SODIUM CHLORIDE 125 ML/HR: 0.9 INJECTION, SOLUTION INTRAVENOUS at 06:14

## 2018-11-27 RX ADMIN — FENTANYL CITRATE 25 MCG: 50 INJECTION, SOLUTION INTRAMUSCULAR; INTRAVENOUS at 07:45

## 2018-11-27 RX ADMIN — FENTANYL CITRATE 75 MCG: 50 INJECTION, SOLUTION INTRAMUSCULAR; INTRAVENOUS at 07:51

## 2018-11-27 RX ADMIN — MIDAZOLAM 2 MG: 1 INJECTION INTRAMUSCULAR; INTRAVENOUS at 07:27

## 2018-11-27 RX ADMIN — DEXAMETHASONE SODIUM PHOSPHATE 4 MG: 10 INJECTION INTRAMUSCULAR; INTRAVENOUS at 07:45

## 2018-11-27 RX ADMIN — PROPOFOL 100 MG: 10 INJECTION, EMULSION INTRAVENOUS at 07:35

## 2018-11-27 RX ADMIN — PROPOFOL 100 MG: 10 INJECTION, EMULSION INTRAVENOUS at 07:36

## 2018-11-27 RX ADMIN — LIDOCAINE HYDROCHLORIDE 100 MG: 10 INJECTION, SOLUTION INFILTRATION; PERINEURAL at 07:35

## 2018-11-27 NOTE — OP NOTE
OPERATIVE REPORT  PATIENT NAME: Yeimi Evangelista    :  1950  MRN: [id number]  Pt Location: [facility] 08    SURGERY DATE: 2018    Surgeon(s) and Role:     * Hang Ross MD - Primary    Preop Diagnosis:  Bladder stones [N21 0]    Post-Op Diagnosis Codes: * Bladder stones [N21 0]    Procedure(s) (LRB):  CYSTOSCOPY, CYSTOLITHOLOPAXY HOLMIUM LASER,BLADDE BIOPSY (N/A)    Specimen(s):  ID Type Source Tests Collected by Time Destination   1 : bladder biopsy left posterior wall-stone stuck in bladder mucosa Tissue Urinary Bladder TISSUE EXAM Hang Ross MD 2018 0759    2 : LESION LEFT BLADDER TRIGONE Tissue Urinary Bladder TISSUE EXAM Hang Ross MD 2018 0612        Estimated Blood Loss:   Minimal    Drains:  Urethral Catheter Latex 22 Fr  (Active)   Site Assessment Clean;Skin intact 2018  8:23 AM   Collection Container Standard drainage bag 2018  8:23 AM   Securement Method Securing device (Describe) 2018  8:23 AM   Number of days: 0       Anesthesia Type:   General    Operative Indications:  Bladder stones [N21 0]      Operative Findings:  Two stones in bladder, each about 1 cm  The stone on the left posterior wall the bladder was adherent to the bladder mucosa, and actually was imbedded in the tissue  Inflammatory changes at the bladder mucosa where the stone was imbedded were biopsied  Complications:   None    Procedure and Technique:  After the patient was placed under adequate general anesthesia, he was prepped and draped using Betadine solution in lithotomy position  The 25 Trin[place] scope was passed without difficulty in the urethra which had no strictures  The prostate had minimal hyperplasia, mildly vascular mucosa  The bladder had mild muscular trabeculations  There was one stone free floating in the bladder, and a second stone on the left posterior wall that was attached to the mucosa of the bladder    There was also a  third area where the mucosa was irregular and scarred, and there was evidence of stone in the muscle the bladder wall, just below the mucosa it of the bladder  These changes are thought to be due from the procedure he had several years ago at another hospital   At that time bladder stones were laserrd, but a bladder perforation occurred, and he had a De La Cruz catheter for six weeks  Using the holmium laser on various settings, stone was fragmented into small several small particles  All particles were small enough to irrigate out  When I got to the part of the stone adjacent to the bladder mucosa, I saw the stone was actually stuck in the bladder wall  I did not laser any further, as I did not want to perforate the bladder  The mucosa was quite irregular, I took several small sample biopsies to ensure no tumor, although it does appear inflammatory  Cautery is used for hemostasis  After ensuring all fragments of stone were gone, no significant bleeding, scope was removed, De La Cruz catheter inserted, patient taken recovery in good condition     I was present for the entire procedure    Patient Disposition:  PACU     SIGNATURE: Jerrod Daniels MD  DATE: November 27, 2018  TIME: 9:20 AM

## 2018-11-27 NOTE — ANESTHESIA POSTPROCEDURE EVALUATION
Post-Op Assessment Note      CV Status:  Stable    Mental Status:  Alert and awake    Hydration Status:  Euvolemic    PONV Controlled:  Controlled    Airway Patency:  Patent    Post Op Vitals Reviewed: Yes          Staff: Anesthesiologist           /62 (11/27/18 0908)    Temp      Pulse 77 (11/27/18 0908)   Resp 16 (11/27/18 0908)    SpO2 94 % (11/27/18 0908)

## 2018-11-27 NOTE — DISCHARGE INSTRUCTIONS
ALL YOUR  PREVIOUS MEDS ARE THE SAME  NEW MEDS: hydrocodone if needed for pain  Cephalexin antibiotic twice per day for 5 days  EXPECT SOME BLOOD IN URINE, BURNING, FREQUENT URINATION

## 2018-11-27 NOTE — TELEPHONE ENCOUNTER
Dr Kiara Wilson called to schedule post op campbell pull  Does not need to be in the am  Scheduled for 11/29

## 2018-11-27 NOTE — DISCHARGE SUMMARY
Discharge Summary - Sdira Lundberg 79 y o  male MRN: [id number]    Admission Date: 11/27/2018     Admitting Diagnosis: Bladder stones [N21 0]    Procedures Performed:  Cysto, bladder biopsy, bladder stone laser fragmentation    Patient underwent planned outpt surgery and recovered without complication  Discharged in good condition  Medications were prescribed  Pt knows to call the office for followup in three days

## 2018-11-27 NOTE — ANESTHESIA PREPROCEDURE EVALUATION
Review of Systems/Medical History  Patient summary reviewed    No history of anesthetic complications     Cardiovascular  Negative cardio ROS Exercise tolerance (METS): >4,     Pulmonary  Smoker cigarette smoker  ,   Comment: Lung ca     GI/Hepatic    GI bleeding (requiring blood transfusion) , history of,          Comment: Hx kidney carcinoma  Endo/Other  Negative endo/other ROS      GYN       Hematology  Negative hematology ROS      Musculoskeletal  Negative musculoskeletal ROS Back pain (3 crushed vertebrae due to injury as a kid) , lumbar pain,        Neurology      Comment: Brain tumor s/p radiation, left sided weakness (mainly coordination) Psychology   Negative psychology ROS              Physical Exam    Airway    Mallampati score: I  TM Distance: >3 FB  Neck ROM: full     Dental       Cardiovascular  Comment: Negative ROS, Rhythm: regular, Rate: normal,     Pulmonary  Breath sounds clear to auscultation,     Other Findings  edentulous      Anesthesia Plan  ASA Score- 3     Anesthesia Type- general with ASA Monitors  Additional Monitors:   Airway Plan:         Plan Factors-Patient not instructed to abstain from smoking on day of procedure  Patient smoked on day of surgery  Induction- intravenous  Postoperative Plan- Plan for postoperative opioid use  Informed Consent- Anesthetic plan and risks discussed with patient

## 2018-11-27 NOTE — DISCHARGE SUMMARY
Discharge Summary - Anum Maher 79 y o  male MRN: [id number]    Admission Date: 11/27/2018     Admitting Diagnosis: Bladder stones [N21 0]    Procedures Performed: cysto, bladder biopsy, bladder stone laser  Patient underwent planned outpt surgery and recovered without complication  Discharged in good condition  Medications were prescribed  Pt knows to call the office for followup in 2 days

## 2018-11-28 ENCOUNTER — CLINICAL SUPPORT (OUTPATIENT)
Dept: UROLOGY | Facility: MEDICAL CENTER | Age: 68
End: 2018-11-28
Payer: MEDICARE

## 2018-11-28 VITALS
TEMPERATURE: 98 F | WEIGHT: 160 LBS | HEIGHT: 70 IN | SYSTOLIC BLOOD PRESSURE: 120 MMHG | BODY MASS INDEX: 22.9 KG/M2 | DIASTOLIC BLOOD PRESSURE: 80 MMHG

## 2018-11-28 DIAGNOSIS — N21.0 BLADDER STONES: Primary | ICD-10-CM

## 2018-11-28 PROCEDURE — 99211 OFF/OP EST MAY X REQ PHY/QHP: CPT

## 2018-11-28 NOTE — PROGRESS NOTES
Patient returns for De La Cruz removal  De La Cruz removed without difficulty, patient tolerated procedure well  Urine draining light red urine without clots  VSWNL  Continue qs fluid intake

## 2018-11-28 NOTE — TELEPHONE ENCOUNTER
Spoke to pt and he stated that his urine is going around his catheter  Per Dr Chloe Xavier pt can have catheter removed today

## 2018-11-28 NOTE — TELEPHONE ENCOUNTER
Patient called had some question about his campbell  He said it keeps getting backed up   He can be reached at [phone]

## 2018-12-05 RX ORDER — HEPARIN SODIUM (PORCINE) LOCK FLUSH IV SOLN 100 UNIT/ML 100 UNIT/ML
300 SOLUTION INTRAVENOUS AS NEEDED
Status: DISPENSED | OUTPATIENT
Start: 2018-12-06 | End: 2018-12-06

## 2018-12-05 RX ORDER — SODIUM CHLORIDE 9 MG/ML
20 INJECTION, SOLUTION INTRAVENOUS ONCE
Status: DISCONTINUED | OUTPATIENT
Start: 2018-12-06 | End: 2018-12-10 | Stop reason: HOSPADM

## 2018-12-06 ENCOUNTER — HOSPITAL ENCOUNTER (OUTPATIENT)
Dept: INFUSION CENTER | Facility: HOSPITAL | Age: 68
Discharge: HOME | End: 2018-12-06
Payer: MEDICARE

## 2018-12-06 DIAGNOSIS — C34.90 PRIMARY MALIGNANT NEOPLASM OF LUNG, UNSPECIFIED LATERALITY (HCC): Primary | ICD-10-CM

## 2018-12-06 DIAGNOSIS — C34.90 PRIMARY MALIGNANT NEOPLASM OF LUNG, UNSPECIFIED LATERALITY (HCC): ICD-10-CM

## 2018-12-06 PROBLEM — C64.2 RENAL CELL CARCINOMA OF LEFT KIDNEY (HCC): Status: ACTIVE | Noted: 2018-12-06

## 2018-12-06 LAB
ALBUMIN SERPL BCP-MCNC: 4.1 G/DL (ref 3–5.2)
ALP SERPL-CCNC: 87 U/L (ref 43–122)
ALT SERPL W P-5'-P-CCNC: 25 U/L (ref 9–52)
ANION GAP SERPL CALCULATED.3IONS-SCNC: 8 MMOL/L (ref 5–14)
AST SERPL W P-5'-P-CCNC: 23 U/L (ref 17–59)
BASOPHILS # BLD AUTO: 0.1 THOUSANDS/ΜL (ref 0–0.1)
BASOPHILS NFR BLD AUTO: 1 % (ref 0–1)
BILIRUB SERPL-MCNC: 0.6 MG/DL
BUN SERPL-MCNC: 14 MG/DL (ref 5–25)
CALCIUM SERPL-MCNC: 9.1 MG/DL (ref 8.4–10.2)
CHLORIDE SERPL-SCNC: 105 MMOL/L (ref 97–108)
CO2 SERPL-SCNC: 28 MMOL/L (ref 22–30)
CREAT SERPL-MCNC: 0.81 MG/DL (ref 0.7–1.5)
EOSINOPHIL # BLD AUTO: 0.2 THOUSAND/ΜL (ref 0–0.4)
EOSINOPHIL NFR BLD AUTO: 2 % (ref 0–6)
ERYTHROCYTE [DISTWIDTH] IN BLOOD BY AUTOMATED COUNT: 14.2 %
GFR SERPL CREATININE-BSD FRML MDRD: 93 ML/MIN/1.73SQ M
GLUCOSE SERPL-MCNC: 152 MG/DL (ref 70–99)
HCT VFR BLD AUTO: 46 % (ref 41–53)
HGB BLD-MCNC: 15 G/DL (ref 13.5–17.5)
LYMPHOCYTES # BLD AUTO: 2.2 THOUSANDS/ΜL (ref 0.5–4)
LYMPHOCYTES NFR BLD AUTO: 20 % (ref 25–45)
MCH RBC QN AUTO: 30.1 PG (ref 26–34)
MCHC RBC AUTO-ENTMCNC: 32.6 G/DL (ref 31–36)
MCV RBC AUTO: 93 FL (ref 80–100)
MONOCYTES # BLD AUTO: 0.4 THOUSAND/ΜL (ref 0.2–0.9)
MONOCYTES NFR BLD AUTO: 3 % (ref 1–10)
NEUTROPHILS # BLD AUTO: 8.2 THOUSANDS/ΜL (ref 1.8–7.8)
NEUTS SEG NFR BLD AUTO: 74 % (ref 45–65)
PLATELET # BLD AUTO: 201 THOUSANDS/UL (ref 150–450)
PMV BLD AUTO: 8.4 FL (ref 8.9–12.7)
POTASSIUM SERPL-SCNC: 4.2 MMOL/L (ref 3.6–5)
PROT SERPL-MCNC: 7 G/DL (ref 5.9–8.4)
RBC # BLD AUTO: 4.98 MILLION/UL (ref 4.5–5.9)
SODIUM SERPL-SCNC: 141 MMOL/L (ref 137–147)
WBC # BLD AUTO: 11 THOUSAND/UL (ref 4.5–11)

## 2018-12-06 PROCEDURE — 85025 COMPLETE CBC W/AUTO DIFF WBC: CPT

## 2018-12-06 PROCEDURE — 80053 COMPREHEN METABOLIC PANEL: CPT

## 2018-12-06 RX ADMIN — Medication 300 UNITS: at 09:47

## 2018-12-06 NOTE — PROGRESS NOTES
Pt here for central labs  Port flushed per protocol and central labs drawn per order  Confirmed patient appt with Dr Calvin Dwyer this month    Declined AVS

## 2018-12-07 ENCOUNTER — HOSPITAL ENCOUNTER (OUTPATIENT)
Dept: CT IMAGING | Facility: HOSPITAL | Age: 68
Discharge: HOME | End: 2018-12-07
Attending: INTERNAL MEDICINE
Payer: MEDICARE

## 2018-12-07 DIAGNOSIS — C64.2 MALIGNANT NEOPLASM OF LEFT KIDNEY, EXCEPT RENAL PELVIS (HCC): ICD-10-CM

## 2018-12-07 DIAGNOSIS — C79.49 SECONDARY MALIGNANT NEOPLASM OF BRAIN AND SPINAL CORD (HCC): ICD-10-CM

## 2018-12-07 DIAGNOSIS — C34.12 MALIGNANT NEOPLASM OF BRONCHUS OF UPPER LOBE, LEFT (HCC): ICD-10-CM

## 2018-12-07 DIAGNOSIS — C79.31 SECONDARY MALIGNANT NEOPLASM OF BRAIN AND SPINAL CORD (HCC): ICD-10-CM

## 2018-12-07 PROCEDURE — 74177 CT ABD & PELVIS W/CONTRAST: CPT

## 2018-12-07 PROCEDURE — 71260 CT THORAX DX C+: CPT

## 2018-12-07 RX ADMIN — IOHEXOL 100 ML: 350 INJECTION, SOLUTION INTRAVENOUS at 09:06

## 2018-12-12 ENCOUNTER — OFFICE VISIT (OUTPATIENT)
Dept: HEMATOLOGY ONCOLOGY | Facility: CLINIC | Age: 68
End: 2018-12-12
Payer: MEDICARE

## 2018-12-12 VITALS
TEMPERATURE: 97.6 F | HEART RATE: 98 BPM | WEIGHT: 158 LBS | OXYGEN SATURATION: 94 % | RESPIRATION RATE: 18 BRPM | SYSTOLIC BLOOD PRESSURE: 118 MMHG | HEIGHT: 70 IN | BODY MASS INDEX: 22.62 KG/M2 | DIASTOLIC BLOOD PRESSURE: 70 MMHG

## 2018-12-12 DIAGNOSIS — C34.12 PRIMARY MALIGNANT NEOPLASM OF BRONCHUS OF LEFT UPPER LOBE (HCC): Primary | ICD-10-CM

## 2018-12-12 PROCEDURE — 99214 OFFICE O/P EST MOD 30 MIN: CPT | Performed by: INTERNAL MEDICINE

## 2018-12-12 NOTE — PROGRESS NOTES
Hematology/Oncology Outpatient Follow-up  Vanessa Roberson 79 y o  male 1950 [id number]    Date:  12/12/2018        Assessment and Plan:  1  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)  The patient according to the CT scan does not seem to have any hint of recurrence of his non-small cell lung cancer at least at this point in time  The linear scarring in the left lung apex seems to be stable and most likely is due to old scar  The patient will be continued under close surveillance on every 6 months basis with a CT scan of the chest abdomen pelvis  He was educated about the warning signs for recurrence of his non-small cell lung cancer  He did have brain metastatic lesion in the past which was treated with surgery and radiation as stated below  I did advise him to get in touch with us with any hint of neurological symptoms which could require an MRI or imaging studies of the brain  The patient was educated about the biopsies taken from the urinary bladder and asked him to follow up with his urologist for further workup, treatment or close surveillance of the early noninvasive low-grade papillary urothelial carcinoma seen in the left trigone area of the urinary bladder   - CBC and differential; Future  - Comprehensive metabolic panel; Future  - CT chest abdomen pelvis w wo contrast; Future        HPI:  The patient came in today for a follow-up visit  He had a CT scan of the chest abdomen pelvis on the 7th of December which showed unchanged appearance of linear scarring with a focal area of associated nodularity in the lateral left lung at apex  Otherwise he has no hint of recurrence of metastatic disease in the chest abdomen or pelvis  He seems to have bladder diverticula with multiple bladder calcifications and some thickening of the wall of the bladder in the left lower urinary bladder area    He did have a cystoscopy on the 27th November 2018 were 2 biopsies were taken from the left posterior wall area the 1st 1 showed benign urothelium with underlying stroma calcification the 2nd biopsy from the left trigone area showed early noninvasive low-grade papillary urothelial carcinoma with inverted growth  The patient continues to complain about some dysuria  He did lose about 11 lb since last visit  He continues to have some difficulties with walking without falling or stumbling  His CBC and CMP are both within normal range on the 6th of December  Oncology History    Patient has a history of tobacco use for more than 40 years and history of 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 which was diagnosed in May of 2015  At that time the patient was found to have multiple brain lesions status post whole brain radiation  He then received 6 cycles of palliative chemotherapy with Alimta and carboplatin  After 5 cycles of treatment he had significant GI bleeding which required ICU hospitalization  After completion of palliative chemotherapy he was maintained on Alimta since October 2015  A brain MRI January 2016 showed multiple bilateral hemorrhagic lesions felt not to be due to malignant process  PET scan January 2016 showed a 1 6 cm metabolically active left lung apical lesion  The patient received radiation treatment palliatively under the care of Dr Ai Black to the left apical lung lesion and completed it in April of 2016  He was then restarted on maintenance Alimta  He had an MRI of the abdomen on June 23, 2016 which showed a 3 cm cystic mass with a solid component on the posterior medial aspect of the left kidney, compatible with malignant process  A core biopsy was taking from the left kidney July 12, 2016 which was 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  The patient was then treated with cryotherapy of the left kidney lesion in September of 2016      Patient had a PET-CT scan on March 3, 2017 which was compared to the prior PET scan from November 2016  At this time there is no evidence of residual or recurrent neoplastic disease anywhere in his body  Patient had a CT scan of the chest abdomen and pelvis on August 24, 2017 which showed a 1 3 cm left upper lobe mass versus scarring with adjacent radiation fibrosis  Patient had another MRI of the brain January 8, 2018 which was compared to the previous MRI of the brain in January 2016  This revealed multiple areas of artifact in the supra and infratentorial spaces consistent with hemorrhagic metastasis  The largest lesion was in the frontal lobe and is significantly smaller with only a tiny enhancing component remaining  No new intra-axial lesions were reported  Patient had another CT scan of the chest abdomen and pelvis for close monitoring on June 5th 2018 that was compared with prior study from January 2018  He continues to have a 1 3 cm left lung apex pulmonary nodule with surrounding fibrosis which is stable in appearance  He also has a stable appearance of the treated medial left renal cortex lesion  There is no evidence of metastatic disease within the chest abdomen or pelvis  After lengthy discussion June 2018 patient stated that he is not interested in continuing his maintenance Alimta treatment beyond today's treatment since his CT scan showed stable disease for a long time he has been on Alimta for 2 years  He was told that he seems to be in complete remission but there is a chance that the cancer may reoccur  Patient was interested in the watch and wait type of approach instead of continuing active maintenance Alimta treatment          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 ([address] )    5/2015 Initial Diagnosis     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)  Stage IV         5/5/2015 - 5/20/2015 Radiation     3150 cGy to large right frontoparietal mass; 3000 cGy to other tumor volumes in brainstem, and bilateral cerebral [facility]  Radiation oncologist:  Dr Belem Hernandez         3/17/2016 - 4/12/2016 Radiation     Left upper lobe lesion, 4675 cGy  Radiation oncologist: Dr Belem Hernandez          Chemotherapy     1  Alimta and carboplatin:   05/20/2015 through 08/12/2015 (5 cycles total)  2  Maintenance Alimta:   10/21/2015 through 06/12/2018 (43 total Treatment doses)           Renal cell carcinoma of left kidney ([address] )    7/2016 Initial Diagnosis     Renal cell carcinoma of left kidney (HCC)            Interval history:    ROS: Review of Systems   Constitutional: Positive for fatigue  Negative for appetite change, diaphoresis and fever  HENT: Negative for congestion, dental problem, facial swelling, hearing loss, tinnitus and trouble swallowing  Eyes: Negative for visual disturbance  Respiratory: Positive for cough  Negative for chest tightness, shortness of breath and wheezing  Cardiovascular: Negative for chest pain and leg swelling  Gastrointestinal: Negative for abdominal distention, abdominal pain, blood in stool, constipation, diarrhea, nausea and vomiting  Genitourinary: Positive for dysuria  Negative for hematuria and urgency  Musculoskeletal: Negative for arthralgias, myalgias and neck pain  Skin: Positive for rash  Negative for color change, pallor and wound  Easy bruising   Neurological: Negative for dizziness, weakness, numbness and headaches  Hematological: Negative for adenopathy  Psychiatric/Behavioral: Negative for agitation, behavioral problems, confusion, hallucinations, self-injury and sleep disturbance  The patient is not nervous/anxious and is not hyperactive          Past Medical History:   Diagnosis Date    Balance problems     and" coordination issues/best to walk slow"    Bladder stones     Dysphagia 02/16/2017    "not right now but in the past"    Environmental and seasonal allergies     Exercise involving walking     "approx 1 mile 3x/week"    Eye injury     right, "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"    Full dentures     Hemiparesis [facility] ) 04/27/2015    left weakness,"due to tumor on brain"    History of brain tumor     "had radiation for it"    History of cancer chemotherapy     last tx  5/22/18    History of GI bleed 2015    History of kidney cancer     left    History of kidney stones     History of radiation therapy     History of transfusion     5 units of blood 2015    Hydrocele 11/30/2010    Inguinal hernia, bilateral     Joint stiffness of multiple sites     Paresthesia 05/01/2015    Portacath in place     right chest    [facility] 05/26/2015    Renal carcinoma, left 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([facility] ) 05/26/2015    Skin cancer     Syncopal episodes 09/01/2015    2    Wears glasses        Past Surgical History:   Procedure Laterality Date    BLADDER STONE REMOVAL      COLONOSCOPY      DENTAL SURGERY  2011    DENTAL EXTACTIONS     ESOPHAGOGASTRODUODENOSCOPY      HYDROCELE EXCISION / REPAIR Left 01/01/2011    KIDNEY STONE SURGERY      KIDNEY SURGERY      cancer of kidney/cryo of cancer    PORTACATH PLACEMENT      ME COLONOSCOPY FLX DX W/COLLJ SPEC WHEN PFRMD N/A 8/7/2018    Procedure: COLONOSCOPY with polypectomy ;  Surgeon: Bree Ren MD;  Location: [facility];   Service: General    ME LAP, RECURRENT INCISIONAL HERNIA REPAIR,INCARCERATED Bilateral 8/8/2018    Procedure: REPAIR HERNIA INGUINAL LAPAROSCOPIC W/ ROBOTICS W/ MESH;  Surgeon: Bree Ren MD;  Location: [facility];  Service: General    ME REMOVE BLADDER STONE,<2 5 CM N/A 11/27/2018    Procedure: Kyara Diamond HOLMIUM LASER,BLADDE BIOPSY;  Surgeon: Gab Ferrera MD;  Location: [facility];  Service: Urology    SKIN BIOPSY      SKIN CANCER EXCISION      17 surgeries, basal cell    TONSILLECTOMY         Social History     Social History    Marital status:  Spouse name: N/A    Number of children: N/A    Years of education: N/A     Social History Main Topics    Smoking status: Current Every Day Smoker     Packs/day: 0 50     Years: 48 00     Types: Cigarettes    Smokeless tobacco: Never Used    Alcohol use Yes      Comment: "3x per year"    Drug use: No    Sexual activity: Not Asked     Other Topics Concern    None     Social History Narrative    None       Family History   Problem Relation Age of Onset    Cancer Mother         EYE    Cancer Father     Colon cancer Father     Cancer Brother        No Known Allergies      Current Outpatient Prescriptions:     Ascorbic Acid (VITAMIN C) 500 MG CAPS, Take 500 mg by mouth daily  , Disp: , Rfl:     folic acid (FOLVITE) 1 mg tablet, Take 1 mg by mouth daily  , Disp: , Rfl:     Multiple Vitamins-Minerals (MULTIVITAMIN ADULT PO), Take 1 tablet by mouth daily  , Disp: , Rfl:     pantoprazole (PROTONIX) 40 mg tablet, Take 40 mg by mouth daily before breakfast Take 30 minutes before, Disp: , Rfl: 1    HYDROcodone-acetaminophen (NORCO) 5-325 mg per tablet, Take 1-2 tablets by mouth every 4 (four) hours as needed for pain for up to 30 doses Max Daily Amount: 12 tablets (Patient not taking: Reported on 8/21/2018 ), Disp: 30 tablet, Rfl: 0      Physical Exam:  /70 (BP Location: Left arm, Patient Position: Sitting, Cuff Size: Adult)   Pulse 98   Temp 97 6 °F (36 4 °C) (Tympanic)   Resp 18   Ht 5' 10" (1 778 m)   Wt 71 7 kg (158 lb)   SpO2 94%   BMI 22 67 kg/m²     Physical Exam   Constitutional: He is oriented to person, place, and time  He appears well-developed and well-nourished  HENT:   Head: Normocephalic and atraumatic  Nose: Nose normal    Mouth/Throat: Oropharynx is clear and moist    Eyes: Pupils are equal, round, and reactive to light  Conjunctivae and EOM are normal  Right eye exhibits no discharge  Left eye exhibits no discharge  No scleral icterus  Neck: Normal range of motion   Neck supple  No tracheal deviation present  No thyromegaly present  Cardiovascular: Normal rate, regular rhythm and normal heart sounds  Exam reveals no friction rub  No murmur heard  Pulmonary/Chest: Effort normal and breath sounds normal  No respiratory distress  He has no wheezes  He has no rales  He exhibits no tenderness  Abdominal: Soft  Bowel sounds are normal  He exhibits no distension and no mass  There is no hepatosplenomegaly, splenomegaly or hepatomegaly  There is no tenderness  There is no rebound and no guarding  Musculoskeletal: Normal range of motion  He exhibits no edema, tenderness or deformity  Lymphadenopathy:     He has no cervical adenopathy  Neurological: He is alert and oriented to person, place, and time  He has normal reflexes  No cranial nerve deficit  Coordination normal    Skin: Skin is warm and dry  No rash noted  No erythema  No pallor  Psychiatric: He has a normal mood and affect  His behavior is normal  Judgment and thought content normal          Labs:  Lab Results   Component Value Date    WBC 11 00 12/06/2018    HGB 15 0 12/06/2018    HCT 46 0 12/06/2018    MCV 93 12/06/2018     12/06/2018     Lab Results   Component Value Date     06/11/2018    K 4 2 12/06/2018     12/06/2018    CO2 28 12/06/2018    ANIONGAP 11 06/11/2018    BUN 14 12/06/2018    CREATININE 0 81 12/06/2018    GLUCOSE 163 (H) 06/11/2018    CALCIUM 9 1 12/06/2018    AST 23 12/06/2018    ALT 25 12/06/2018    ALKPHOS 87 12/06/2018    PROT 6 8 06/11/2018    BILITOT 0 9 06/11/2018    EGFR 93 12/06/2018     No results found for: TSH    Patient voiced understanding and agreement in the above discussion  Aware to contact our office with questions/symptoms in the interim

## 2018-12-27 ENCOUNTER — OFFICE VISIT (OUTPATIENT)
Dept: UROLOGY | Facility: MEDICAL CENTER | Age: 68
End: 2018-12-27
Payer: MEDICARE

## 2018-12-27 VITALS
BODY MASS INDEX: 22.9 KG/M2 | HEIGHT: 70 IN | SYSTOLIC BLOOD PRESSURE: 116 MMHG | DIASTOLIC BLOOD PRESSURE: 70 MMHG | WEIGHT: 160 LBS

## 2018-12-27 DIAGNOSIS — N21.0 BLADDER CALCULI: Primary | ICD-10-CM

## 2018-12-27 LAB
SL AMB  POCT GLUCOSE, UA: NORMAL
SL AMB LEUKOCYTE ESTERASE,UA: NORMAL
SL AMB POCT BILIRUBIN,UA: NORMAL
SL AMB POCT BLOOD,UA: NORMAL
SL AMB POCT CLARITY,UA: CLEAR
SL AMB POCT COLOR,UA: YELLOW
SL AMB POCT KETONES,UA: NORMAL
SL AMB POCT NITRITE,UA: NORMAL
SL AMB POCT PH,UA: 7
SL AMB POCT SPECIFIC GRAVITY,UA: 1.02
SL AMB POCT URINE PROTEIN: NORMAL
SL AMB POCT UROBILINOGEN: 0.2

## 2018-12-27 PROCEDURE — 99213 OFFICE O/P EST LOW 20 MIN: CPT | Performed by: UROLOGY

## 2018-12-27 PROCEDURE — 81003 URINALYSIS AUTO W/O SCOPE: CPT | Performed by: UROLOGY

## 2018-12-27 NOTE — LETTER
December 27, 2018     [facility] [id number]    Patient: Talia Carias   YOB: 1950   Date of Visit: 12/27/2018       Dear Dr Salomón Ortiz:    Thank you for referring Ynes Almonte to me for evaluation  Below are my notes for this consultation  If you have questions, please do not hesitate to call me  I look forward to following your patient along with you  Sincerely,        Curt Barron MD        CC: MD Curt Torres MD  12/27/2018  3:27 PM  Sign at close encounter  Assessment/Plan:  1  Doing much better after healing process  2  The stones were imbedded in the wall, possibly related to bladder perforation some years ago at another hospital     3  The thickening of bladder wall is in the same location that where I was taking stones out of the wall, I think that explains the thickening  4  We will do cystoscopy at six month intervals to look at this small area of bladder tumor, certainly will biopsy if indicated  No problem-specific Assessment & Plan notes found for this encounter  Diagnoses and all orders for this visit:    Bladder calculi  -     POCT urine dip auto non-scope          Subjective:      Patient ID: Talia Carias is a 79 y o  male  Follow-up for bladder stone surgery one month ago done cystoscopically  At that time I found stones embedded in the wall the bladder, lasered anything that was sticking into the lumen of the bladder  There was some inflammatory tissue at the base one of the stones which were biopsied  Second opinion came back saying consistent with early low-grade superficial bladder cancer  CT scan just done by Oncology showed thickening of the bladder wall, but that is in the exact same spot as I was taking stones out of the wall  Voiding much better since her procedure, no more burning urgency frequency, feels like he empties well          The following portions of the patient's history were reviewed and updated as appropriate: allergies, current medications, past family history, past medical history, past social history, past surgical history and problem list     Review of Systems   All other systems reviewed and are negative  Objective:      /70   Ht 5' 10" (1 778 m)   Wt 72 6 kg (160 lb)   BMI 22 96 kg/m²           Physical Exam   Constitutional: He is oriented to person, place, and time  He appears well-developed and well-nourished  No distress  HENT:   Head: Normocephalic and atraumatic  Eyes: Conjunctivae are normal    Cardiovascular: Normal rate and regular rhythm  Pulmonary/Chest: Effort normal and breath sounds normal  No respiratory distress  He has no wheezes  Abdominal: Soft  Bowel sounds are normal  He exhibits no distension and no mass  There is no tenderness  Neurological: He is alert and oriented to person, place, and time  Skin: Skin is warm and dry  He is not diaphoretic  CT scan reviewed and films reviewed

## 2018-12-27 NOTE — PROGRESS NOTES
Assessment/Plan:  1  Doing much better after healing process  2  The stones were imbedded in the wall, possibly related to bladder perforation some years ago at another hospital     3  The thickening of bladder wall is in the same location that where I was taking stones out of the wall, I think that explains the thickening  4  We will do cystoscopy at six month intervals to look at this small area of bladder tumor, certainly will biopsy if indicated  No problem-specific Assessment & Plan notes found for this encounter  Diagnoses and all orders for this visit:    Bladder calculi  -     POCT urine dip auto non-scope          Subjective:      Patient ID: Elaine Yadav is a 79 y o  male  Follow-up for bladder stone surgery one month ago done cystoscopically  At that time I found stones embedded in the wall the bladder, lasered anything that was sticking into the lumen of the bladder  There was some inflammatory tissue at the base one of the stones which were biopsied  Second opinion came back saying consistent with early low-grade superficial bladder cancer  CT scan just done by Oncology showed thickening of the bladder wall, but that is in the exact same spot as I was taking stones out of the wall  Voiding much better since her procedure, no more burning urgency frequency, feels like he empties well  The following portions of the patient's history were reviewed and updated as appropriate: allergies, current medications, past family history, past medical history, past social history, past surgical history and problem list     Review of Systems   All other systems reviewed and are negative  Objective:      /70   Ht 5' 10" (1 778 m)   Wt 72 6 kg (160 lb)   BMI 22 96 kg/m²          Physical Exam   Constitutional: He is oriented to person, place, and time  He appears well-developed and well-nourished  No distress  HENT:   Head: Normocephalic and atraumatic     Eyes: Conjunctivae are normal    Cardiovascular: Normal rate and regular rhythm  Pulmonary/Chest: Effort normal and breath sounds normal  No respiratory distress  He has no wheezes  Abdominal: Soft  Bowel sounds are normal  He exhibits no distension and no mass  There is no tenderness  Neurological: He is alert and oriented to person, place, and time  Skin: Skin is warm and dry  He is not diaphoretic  CT scan reviewed and films reviewed

## 2019-01-24 ENCOUNTER — HOSPITAL ENCOUNTER (OUTPATIENT)
Dept: INFUSION CENTER | Facility: HOSPITAL | Age: 69
Discharge: HOME | End: 2019-01-24
Payer: MEDICARE

## 2019-01-24 PROCEDURE — 96523 IRRIG DRUG DELIVERY DEVICE: CPT

## 2019-01-24 NOTE — PROGRESS NOTES
Pt here for port flush  Patient denies blood work at this time  Port flushed per protocol  Next appt confirmed and AVS provided

## 2019-01-25 ENCOUNTER — OFFICE VISIT (OUTPATIENT)
Dept: FAMILY MEDICINE CLINIC | Facility: CLINIC | Age: 69
End: 2019-01-25
Payer: MEDICARE

## 2019-01-25 VITALS
BODY MASS INDEX: 22.89 KG/M2 | RESPIRATION RATE: 18 BRPM | HEART RATE: 64 BPM | HEIGHT: 70 IN | DIASTOLIC BLOOD PRESSURE: 72 MMHG | SYSTOLIC BLOOD PRESSURE: 110 MMHG | WEIGHT: 159.9 LBS | TEMPERATURE: 98 F

## 2019-01-25 DIAGNOSIS — N21.0 BLADDER CALCULI: ICD-10-CM

## 2019-01-25 DIAGNOSIS — Z80.0 FAMILY HISTORY OF COLON CANCER: ICD-10-CM

## 2019-01-25 DIAGNOSIS — C34.12 PRIMARY MALIGNANT NEOPLASM OF BRONCHUS OF LEFT UPPER LOBE (HCC): Primary | ICD-10-CM

## 2019-01-25 DIAGNOSIS — N40.1 BPH WITH URINARY OBSTRUCTION: ICD-10-CM

## 2019-01-25 DIAGNOSIS — C64.2 RENAL CELL CARCINOMA OF LEFT KIDNEY (HCC): ICD-10-CM

## 2019-01-25 DIAGNOSIS — N13.8 BPH WITH URINARY OBSTRUCTION: ICD-10-CM

## 2019-01-25 PROCEDURE — G0009 ADMIN PNEUMOCOCCAL VACCINE: HCPCS

## 2019-01-25 PROCEDURE — 99214 OFFICE O/P EST MOD 30 MIN: CPT | Performed by: FAMILY MEDICINE

## 2019-01-25 PROCEDURE — 90670 PCV13 VACCINE IM: CPT

## 2019-01-25 NOTE — PROGRESS NOTES
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 ([address] )  CT 12/18 : CÉSAR        Diagnoses and all orders for this visit:    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 ([address] )    Bladder calculi    BPH with urinary obstruction    Family history of colon cancer    Renal cell carcinoma of left kidney ([address] )          Subjective:      Patient ID: Tevin Akins is a 79 y o  male  PATIENT RETURNS FOR FOLLOW-UP OF CHRONIC MEDICAL CONDITIONS  NO HOSPITAL STAYS OR EMERGENCY VISITS RECENTLY  MEDS WERE REVIEWED AND NO SIDE EFFECTS  NO NEW ISSUES  UNLESS NOTED BELOW  NO NEW MEDICAL PROVIDER REPORTED  Colon UTD       The following portions of the patient's history were reviewed and updated as appropriate: allergies, current medications, past family history, past medical history, past social history, past surgical history and problem list     Review of Systems   Constitutional: Negative for activity change and appetite change  HENT: Negative for trouble swallowing  Eyes: Negative for visual disturbance  Respiratory: Negative for cough and shortness of breath  Cardiovascular: Negative for chest pain, palpitations and leg swelling  Gastrointestinal: Negative for abdominal pain and blood in stool  Endocrine: Negative for polyuria  Genitourinary: Negative for difficulty urinating and hematuria  Skin: Negative for rash  Neurological: Negative for dizziness  Psychiatric/Behavioral: Negative for behavioral problems  Objective:  Vitals:    01/25/19 0921   BP: 110/72   BP Location: Left arm   Patient Position: Sitting   Cuff Size: Standard   Pulse: 64   Resp: 18   Temp: 98 °F (36 7 °C)   TempSrc: Temporal   Weight: 72 5 kg (159 lb 14 4 oz)   Height: 5' 10" (1 778 m)      Physical Exam   Constitutional: He appears well-developed and well-nourished  HENT:   Head: Normocephalic and atraumatic  Eyes: Conjunctivae are normal    Neck: Neck supple   No thyromegaly present  Cardiovascular: Normal rate, regular rhythm, normal heart sounds and intact distal pulses  No murmur heard  Pulmonary/Chest: Effort normal and breath sounds normal  No respiratory distress  Musculoskeletal: He exhibits no edema  Lymphadenopathy:     He has no cervical adenopathy  Skin: Skin is warm and dry  Psychiatric: He has a normal mood and affect   His behavior is normal

## 2019-01-25 NOTE — PATIENT INSTRUCTIONS
Consider formal physical therapy in the future to possibly improve the left-sided symptoms  If you thought a mild relaxer tablet to help quit smoking would help let me know  CURRENT [employer] TIPS ON EXERCISE:    IF YOU HAVE CONDITIONS THAT PREVENT AEROBIC EXERCISE:    WALK 30 MINUTES AT LEAST 5 DAYS PER WEEK; MAY BREAK IT UP INTO 10-  15 MINUTE SESSIONS   GET SOME RESISTANCE EXERCISES USING THE MAJOR MUSCLE GROUPS 2-3 TIMES PER WEEK     IF YOU ARE PHYSICALLY ABLE:    TRY TO GET 10,000 STEPS 3 TIMES PER WEEK  PLUS  GO "ONLINE" TO CHECK [employer]ART RATE FOR YOUR AGE AND DO AEROBIC EXERCISES (JOG/STATIONARY BIKE/ELLIPTICAL) FOR 20-30 MINUTES 2-3 TIMES PER WEEK  WE RECOMMEND GETTING THE 2- DOSE SHINGLES VACCINE ([address]) FROM YOUR PHARMACY  CHECK WITH THEM ON THE COST  YOU SHOULD HAVE THIS IF OVER AGE 48, EVEN IF YOU HAVE HAD THE ORIGINAL VACCINE (ZOSTRIX)       IF YOU HAVE NEVER HAD A TDAP SHOT (TETANUS/DIPHTHERIA/PERTUSSIS)  TALK TO YOUR PHARMACIST ABOUT GETTING ONE : GOOD FOR 10 YRS:ESPECIALLY IMPORTATN IF YOU HAVE YOUNG CHILDREN OR GRANDCHILDREN    GET A YEARLY FLU SHOT IN THE FALL : IF OVER 65 GET "HIGH DOSE"     ASK PHARMACIST ABOUT "SHINGRIX" THE NEW SHINGLES VACCINE IF OVER AGE 50

## 2019-03-06 RX ORDER — SODIUM CHLORIDE 9 MG/ML
20 INJECTION, SOLUTION INTRAVENOUS ONCE
Status: DISCONTINUED | OUTPATIENT
Start: 2019-03-07 | End: 2019-03-11 | Stop reason: HOSPADM

## 2019-03-07 ENCOUNTER — HOSPITAL ENCOUNTER (OUTPATIENT)
Dept: INFUSION CENTER | Facility: HOSPITAL | Age: 69
Discharge: HOME | End: 2019-03-07
Payer: MEDICARE

## 2019-03-07 PROCEDURE — 96523 IRRIG DRUG DELIVERY DEVICE: CPT

## 2019-03-07 NOTE — PROGRESS NOTES
Pt admitted to infusion department   today for port flush  Port accessed with ease  Excellent blood return noted  Port de-accessed per routine   Discharged ambulatory with avs

## 2019-04-24 RX ORDER — SODIUM CHLORIDE 9 MG/ML
20 INJECTION, SOLUTION INTRAVENOUS ONCE
Status: DISCONTINUED | OUTPATIENT
Start: 2019-04-25 | End: 2019-04-29 | Stop reason: HOSPADM

## 2019-04-25 ENCOUNTER — HOSPITAL ENCOUNTER (OUTPATIENT)
Dept: INFUSION CENTER | Facility: HOSPITAL | Age: 69
Discharge: HOME | End: 2019-04-25
Payer: MEDICARE

## 2019-04-25 PROCEDURE — 96523 IRRIG DRUG DELIVERY DEVICE: CPT

## 2019-04-25 NOTE — PROGRESS NOTES
Pt admitted to infusion department today for routine port flush  Port accessed with excellent blood return  flushed and de-accessed per routine  Discharged ambulatory aware of next appointment

## 2019-05-29 ENCOUNTER — PROCEDURE VISIT (OUTPATIENT)
Dept: UROLOGY | Facility: MEDICAL CENTER | Age: 69
End: 2019-05-29
Payer: MEDICARE

## 2019-05-29 VITALS
WEIGHT: 160 LBS | HEIGHT: 70 IN | SYSTOLIC BLOOD PRESSURE: 112 MMHG | HEART RATE: 105 BPM | DIASTOLIC BLOOD PRESSURE: 72 MMHG | BODY MASS INDEX: 22.9 KG/M2

## 2019-05-29 DIAGNOSIS — N40.1 BPH WITH URINARY OBSTRUCTION: ICD-10-CM

## 2019-05-29 DIAGNOSIS — Z85.51 HISTORY OF BLADDER CANCER: Primary | ICD-10-CM

## 2019-05-29 DIAGNOSIS — N21.0 BLADDER STONE: ICD-10-CM

## 2019-05-29 DIAGNOSIS — N13.8 BPH WITH URINARY OBSTRUCTION: ICD-10-CM

## 2019-05-29 PROCEDURE — 99213 OFFICE O/P EST LOW 20 MIN: CPT | Performed by: UROLOGY

## 2019-05-29 PROCEDURE — 52000 CYSTOURETHROSCOPY: CPT | Performed by: UROLOGY

## 2019-05-29 RX ORDER — CETIRIZINE HYDROCHLORIDE 10 MG/1
10 TABLET ORAL DAILY
COMMUNITY
End: 2019-07-26 | Stop reason: ALTCHOICE

## 2019-06-06 ENCOUNTER — HOSPITAL ENCOUNTER (OUTPATIENT)
Dept: INFUSION CENTER | Facility: HOSPITAL | Age: 69
Discharge: HOME | End: 2019-06-06
Payer: MEDICARE

## 2019-06-06 DIAGNOSIS — C34.12 PRIMARY MALIGNANT NEOPLASM OF BRONCHUS OF LEFT UPPER LOBE (HCC): Primary | ICD-10-CM

## 2019-06-06 LAB
ALBUMIN SERPL BCP-MCNC: 4 G/DL (ref 3–5.2)
ALP SERPL-CCNC: 90 U/L (ref 43–122)
ALT SERPL W P-5'-P-CCNC: 39 U/L (ref 9–52)
ANION GAP SERPL CALCULATED.3IONS-SCNC: 8 MMOL/L (ref 5–14)
AST SERPL W P-5'-P-CCNC: 29 U/L (ref 17–59)
BASOPHILS # BLD AUTO: 0.1 THOUSANDS/ΜL (ref 0–0.1)
BASOPHILS NFR BLD AUTO: 1 % (ref 0–1)
BILIRUB SERPL-MCNC: 0.9 MG/DL
BUN SERPL-MCNC: 21 MG/DL (ref 5–25)
CALCIUM SERPL-MCNC: 8.9 MG/DL (ref 8.4–10.2)
CHLORIDE SERPL-SCNC: 107 MMOL/L (ref 97–108)
CO2 SERPL-SCNC: 24 MMOL/L (ref 22–30)
CREAT SERPL-MCNC: 0.85 MG/DL (ref 0.7–1.5)
EOSINOPHIL # BLD AUTO: 0.2 THOUSAND/ΜL (ref 0–0.4)
EOSINOPHIL NFR BLD AUTO: 2 % (ref 0–6)
ERYTHROCYTE [DISTWIDTH] IN BLOOD BY AUTOMATED COUNT: 12.9 %
GFR SERPL CREATININE-BSD FRML MDRD: 90 ML/MIN/1.73SQ M
GLUCOSE SERPL-MCNC: 118 MG/DL (ref 70–99)
HCT VFR BLD AUTO: 45.5 % (ref 41–53)
HGB BLD-MCNC: 15.2 G/DL (ref 13.5–17.5)
LYMPHOCYTES # BLD AUTO: 2.4 THOUSANDS/ΜL (ref 0.5–4)
LYMPHOCYTES NFR BLD AUTO: 23 % (ref 25–45)
MCH RBC QN AUTO: 31.6 PG (ref 26–34)
MCHC RBC AUTO-ENTMCNC: 33.4 G/DL (ref 31–36)
MCV RBC AUTO: 95 FL (ref 80–100)
MONOCYTES # BLD AUTO: 0.5 THOUSAND/ΜL (ref 0.2–0.9)
MONOCYTES NFR BLD AUTO: 5 % (ref 1–10)
NEUTROPHILS # BLD AUTO: 7.3 THOUSANDS/ΜL (ref 1.8–7.8)
NEUTS SEG NFR BLD AUTO: 69 % (ref 45–65)
PLATELET # BLD AUTO: 194 THOUSANDS/UL (ref 150–450)
PMV BLD AUTO: 8.6 FL (ref 8.9–12.7)
POTASSIUM SERPL-SCNC: 4.2 MMOL/L (ref 3.6–5)
PROT SERPL-MCNC: 6.7 G/DL (ref 5.9–8.4)
RBC # BLD AUTO: 4.82 MILLION/UL (ref 4.5–5.9)
SODIUM SERPL-SCNC: 139 MMOL/L (ref 137–147)
WBC # BLD AUTO: 10.6 THOUSAND/UL (ref 4.5–11)

## 2019-06-06 PROCEDURE — 85025 COMPLETE CBC W/AUTO DIFF WBC: CPT | Performed by: INTERNAL MEDICINE

## 2019-06-06 PROCEDURE — 80053 COMPREHEN METABOLIC PANEL: CPT | Performed by: INTERNAL MEDICINE

## 2019-06-06 NOTE — PROGRESS NOTES
Patient here for [facility] flush/central line lab draws  AVS reviewed with and provided to patient

## 2019-06-12 ENCOUNTER — HOSPITAL ENCOUNTER (OUTPATIENT)
Dept: CT IMAGING | Facility: HOSPITAL | Age: 69
Discharge: HOME | End: 2019-06-12
Attending: INTERNAL MEDICINE
Payer: MEDICARE

## 2019-06-12 DIAGNOSIS — C34.12 PRIMARY MALIGNANT NEOPLASM OF BRONCHUS OF LEFT UPPER LOBE (HCC): ICD-10-CM

## 2019-06-12 PROCEDURE — 71260 CT THORAX DX C+: CPT

## 2019-06-12 PROCEDURE — 74177 CT ABD & PELVIS W/CONTRAST: CPT

## 2019-06-12 RX ADMIN — IOHEXOL 100 ML: 350 INJECTION, SOLUTION INTRAVENOUS at 09:29

## 2019-06-19 ENCOUNTER — OFFICE VISIT (OUTPATIENT)
Dept: HEMATOLOGY ONCOLOGY | Facility: CLINIC | Age: 69
End: 2019-06-19
Payer: MEDICARE

## 2019-06-19 VITALS
HEIGHT: 70 IN | RESPIRATION RATE: 16 BRPM | OXYGEN SATURATION: 91 % | SYSTOLIC BLOOD PRESSURE: 100 MMHG | WEIGHT: 157.4 LBS | TEMPERATURE: 98 F | DIASTOLIC BLOOD PRESSURE: 70 MMHG | HEART RATE: 91 BPM | BODY MASS INDEX: 22.54 KG/M2

## 2019-06-19 DIAGNOSIS — C34.12 PRIMARY MALIGNANT NEOPLASM OF BRONCHUS OF LEFT UPPER LOBE (HCC): Primary | ICD-10-CM

## 2019-06-19 PROCEDURE — 99214 OFFICE O/P EST MOD 30 MIN: CPT | Performed by: INTERNAL MEDICINE

## 2019-07-26 ENCOUNTER — OFFICE VISIT (OUTPATIENT)
Dept: FAMILY MEDICINE CLINIC | Facility: CLINIC | Age: 69
End: 2019-07-26
Payer: MEDICARE

## 2019-07-26 VITALS
HEART RATE: 99 BPM | OXYGEN SATURATION: 99 % | HEIGHT: 70 IN | TEMPERATURE: 98.2 F | WEIGHT: 157.6 LBS | BODY MASS INDEX: 22.56 KG/M2

## 2019-07-26 DIAGNOSIS — C64.2 RENAL CELL CARCINOMA OF LEFT KIDNEY (HCC): ICD-10-CM

## 2019-07-26 DIAGNOSIS — C34.12 PRIMARY MALIGNANT NEOPLASM OF BRONCHUS OF LEFT UPPER LOBE (HCC): Primary | ICD-10-CM

## 2019-07-26 DIAGNOSIS — Z00.00 MEDICARE ANNUAL WELLNESS VISIT, INITIAL: Chronic | ICD-10-CM

## 2019-07-26 PROBLEM — Z85.51 HISTORY OF BLADDER CANCER: Status: RESOLVED | Noted: 2019-05-29 | Resolved: 2019-07-26

## 2019-07-26 PROCEDURE — G0438 PPPS, INITIAL VISIT: HCPCS | Performed by: FAMILY MEDICINE

## 2019-07-26 PROCEDURE — 1123F ACP DISCUSS/DSCN MKR DOCD: CPT | Performed by: FAMILY MEDICINE

## 2019-07-26 PROCEDURE — 99214 OFFICE O/P EST MOD 30 MIN: CPT | Performed by: FAMILY MEDICINE

## 2019-07-26 NOTE — PATIENT INSTRUCTIONS
Eye check up soon;     CURRENT [employer] TIPS ON EXERCISE:    IF YOU HAVE CONDITIONS THAT PREVENT AEROBIC EXERCISE:    WALK 30 MINUTES AT LEAST 5 DAYS PER WEEK; MAY BREAK IT UP INTO 10-  15 MINUTE SESSIONS   GET SOME RESISTANCE EXERCISES USING THE MAJOR MUSCLE GROUPS 2-3 TIMES PER WEEK     IF YOU ARE PHYSICALLY ABLE:    TRY TO GET 10,000 STEPS 3 TIMES PER WEEK  PLUS  GO "ONLINE" TO CHECK T[employer] HEART RATE FOR YOUR AGE AND DO AEROBIC EXERCISES (JOG/STATIONARY BIKE/ELLIPTICAL) FOR 20-30 MINUTES 2-3 TIMES PER WEEK  MEASURES THAT HELP PREVENT FALLS:    DRINK PLENTY OF FLUIDS : 2-3 QTS PER DAY : URINE SHOULD BE LIGHT COLOR  GET REGULAR EYE CHECK UP  USE NIGHT LIGHTS  ELIMINATE ALL THROW RUGS  DO SOME WALKING EVERY DAY AND BALANCE EXERCISES  USE NON SLIP FOOT WEARBATH MATS AND GRAB BARS HELP  WHEN RISING: ALWAYS WAIT FOR 15-20 SECONDS BEFORE INITIATING WALK  CONSIDER DOING [facility] OR [facility]     IF YOU HAVE NEVER HAD A TDAP SHOT (TETANUS/DIPHTHERIA/PERTUSSIS)  TALK TO YOUR PHARMACIST ABOUT GETTING ONE : GOOD FOR 10 YRS:ESPECIALLY IMPORTATN IF YOU HAVE YOUNG CHILDREN OR GRANDCHILDREN    GET A YEARLY FLU SHOT IN THE FALL : IF OVER 65 GET "HIGH DOSE"     ASK PHARMACIST ABOUT "[address]" THE NEW SHINGLES VACCINE IF OVER AGE 50        Obesity   AMBULATORY CARE:   Obesity  is when your body mass index (BMI) is greater than 30  Your healthcare provider will use your height and weight to measure your BMI  The risks of obesity include  many health problems, such as injuries or physical disability  You may need tests to check for the following:  · Diabetes     · High blood pressure or high cholesterol     · Heart disease     · Gallbladder or liver disease     · Cancer of the colon, breast, prostate, liver, or kidney     · Sleep apnea     · Arthritis or gout  Seek care immediately if:   · You have a severe headache, confusion, or difficulty speaking  · You have weakness on one side of your body       · You have chest pain, sweating, or shortness of breath  Contact your healthcare provider if:   · You have symptoms of gallbladder or liver disease, such as pain in your upper abdomen  · You have knee or hip pain and discomfort while walking  · You have symptoms of diabetes, such as intense hunger and thirst, and frequent urination  · You have symptoms of sleep apnea, such as snoring or daytime sleepiness  · You have questions or concerns about your condition or care  Treatment for obesity  focuses on helping you lose weight to improve your health  Even a small decrease in BMI can reduce the risk for many health problems  Your healthcare provider will help you set a weight-loss goal   · Lifestyle changes  are the first step in treating obesity  These include making healthy food choices and getting regular physical activity  Your healthcare provider may suggest a weight-loss program that involves coaching, education, and therapy  · Medicine  may help you lose weight when it is used with a healthy diet and physical activity  · Surgery  can help you lose weight if you are very obese and have other health problems  There are several types of weight-loss surgery  Ask your healthcare provider for more information  Be successful losing weight:   · Set small, realistic goals  An example of a small goal is to walk for 20 minutes 5 days a week  Anther goal is to lose 5% of your body weight  · Tell friends, family members, and coworkers about your goals  and ask for their support  Ask a friend to lose weight with you, or join a weight-loss support group  · Identify foods or triggers that may cause you to overeat , and find ways to avoid them  Remove tempting high-calorie foods from your home and workplace  Place a bowl of fresh fruit on your kitchen counter  If stress causes you to eat, then find other ways to cope with stress  · Keep a diary to track what you eat and drink    Also write down how many minutes of physical activity you do each day  Weigh yourself once a week and record it in your diary  Eating changes: You will need to eat 500 to 1,000 fewer calories each day than you currently eat to lose 1 to 2 pounds a week  The following changes will help you cut calories:  · Eat smaller portions  Use small plates, no larger than 9 inches in diameter  Fill your plate half full of fruits and vegetables  Measure your food using measuring cups until you know what a serving size looks like  · Eat 3 meals and 1 or 2 snacks each day  Plan your meals in advance  [place] and eat at home most of the time  Eat slowly  · Eat fruits and vegetables at every meal   They are low in calories and high in fiber, which makes you feel full  Do not add butter, margarine, or cream sauce to vegetables  Use herbs to season steamed vegetables  · Eat less fat and fewer fried foods  Eat more baked or grilled chicken and fish  These protein sources are lower in calories and fat than red meat  Limit fast food  Dress your salads with olive oil and vinegar instead of bottled dressing  · Limit the amount of sugar you eat  Do not drink sugary beverages  Limit alcohol  Activity changes:  Physical activity is good for your body in many ways  It helps you burn calories and build strong muscles  It decreases stress and depression, and improves your mood  It can also help you sleep better  Talk to your healthcare provider before you begin an exercise program   · Exercise for at least 30 minutes 5 days a week  Start slowly  Set aside time each day for physical activity that you enjoy and that is convenient for you  It is best to do both weight training and an activity that increases your heart rate, such as walking, bicycling, or swimming  · Find ways to be more active  Do yard work and housecleaning  Walk up the stairs instead of using elevators   Spend your leisure time going to events that require walking, such as outdoor festivals or fairs  This extra physical activity can help you lose weight and keep it off  Follow up with your healthcare provider as directed: You may need to meet with a dietitian  Write down your questions so you remember to ask them during your visits  © 2017 [address] Information is for End User's use only and may not be sold, redistributed or otherwise used for commercial purposes  All illustrations and images included in CareNotes® are the copyrighted property of A D A M , Inc  or [employer]  The above information is an  only  It is not intended as medical advice for individual conditions or treatments  Talk to your doctor, nurse or pharmacist before following any medical regimen to see if it is safe and effective for you  Urinary Incontinence   WHAT YOU NEED TO KNOW:   What is urinary incontinence? Urinary incontinence (UI) is when you lose control of your bladder  What causes UI? UI occurs because your bladder cannot store or empty urine properly  The following are the most common types of UI:  · Stress incontinence  is when you leak urine due to increased bladder pressure  This may happen when you cough, sneeze, or exercise  · Urge incontinence  is when you feel the need to urinate right away and leak urine accidentally  · Mixed incontinence  is when you have both stress and urge UI  What are the signs and symptoms of UI?   · You feel like your bladder does not empty completely when you urinate  · You urinate often and need to urinate immediately  · You leak urine when you sleep, or you wake up with the urge to urinate  · You leak urine when you cough, sneeze, exercise, or laugh  How is UI diagnosed? Your healthcare provider will ask how often you leak urine and whether you have stress or urge symptoms  Tell him which medicines you take, how often you urinate, and how much liquid you drink each day   You may need any of the following tests:  · Urine tests  may show infection or kidney function  · A pelvic exam  may be done to check for blockages  A pelvic exam will also show if your bladder, uterus, or other organs have moved out of place  · An x-ray, ultrasound, or CT  may show problems with parts of your urinary system  You may be given contrast liquid to help your organs show up better in the pictures  Tell the healthcare provider if you have ever had an allergic reaction to contrast liquid  Do not enter the MRI room with anything metal  Metal can cause serious injury  Tell the healthcare provider if you have any metal in or on your body  · A bladder scan  will show how much urine is left in your bladder after you urinate  You will be asked to urinate and then healthcare providers will use a small ultrasound machine to check the urine left in your bladder  · Cystometry  is used to check the function of your urinary system  Your healthcare provider checks the pressure in your bladder while filling it with fluid  Your bladder pressure may also be tested when your bladder is full and while you urinate  How is UI treated? · Medicines  can help strengthen your bladder control  · Electrical stimulation  is used to send a small amount of electrical energy to your pelvic floor muscles  This helps control your bladder function  Electrodes may be placed outside your body or in your rectum  For women, the electrodes may be placed in the vagina  · A bulking agent  may be injected into the wall of your urethra to make it thicker  This helps keep your urethra closed and decreases urine leakage  · Devices  such as a clamp, pessary, or tampon may help stop urine leaks  Ask your healthcare provider for more information about these and other devices  · Surgery  may be needed if other treatments do not work  Several types of surgery can help improve your bladder control   Ask your healthcare provider for more information about the surgery you may need  How can I manage my symptoms? · Do pelvic muscle exercises often  Your pelvic muscles help you stop urinating  Squeeze these muscles tight for 5 seconds, then relax for 5 seconds  Gradually work up to squeezing for 10 seconds  Do 3 sets of 15 repetitions a day, or as directed  This will help strengthen your pelvic muscles and improve bladder control  · A catheter  may be used to help empty your bladder  A catheter is a tiny, plastic tube that is put into your bladder to drain your urine  Your healthcare provider may tell you to use a catheter to prevent your bladder from getting too full and leaking urine  · Keep a UI record  Write down how often you leak urine and how much you leak  Make a note of what you were doing when you leaked urine  · Train your bladder  Go to the bathroom at set times, such as every 2 hours, even if you do not feel the urge to go  You can also try to hold your urine when you feel the urge to go  For example, hold your urine for 5 minutes when you feel the urge to go  As that becomes easier, hold your urine for 10 minutes  · Drink liquids as directed  Ask your healthcare provider how much liquid to drink each day and which liquids are best for you  You may need to limit the amount of liquid you drink to help control your urine leakage  Limit or do not have drinks that contain caffeine or alcohol  Do not drink any liquid right before you go to bed  · Prevent constipation  Eat a variety of high-fiber foods  Good examples are high-fiber cereals, beans, vegetables, and whole-grain breads  Prune juice may help make your bowel movement softer  Walking is the best way to trigger your intestines to have a bowel movement  · Exercise regularly and maintain a healthy weight  Ask your healthcare provider how much you should weigh and about the best exercise plan for you   Weight loss and exercise will decrease pressure on your bladder and help you control your leakage  Ask him to help you create a weight loss plan if you are overweight  When should I seek immediate care? · You have severe pain  · You are confused or cannot think clearly  When should I contact my healthcare provider? · You have a fever  · You see blood in your urine  · You have pain when you urinate  · You have new or worse pain, even after treatment  · Your mouth feels dry or you have vision changes  · Your urine is cloudy or smells bad  · You have questions or concerns about your condition or care  CARE AGREEMENT:   You have the right to help plan your care  Learn about your health condition and how it may be treated  Discuss treatment options with your caregivers to decide what care you want to receive  You always have the right to refuse treatment  The above information is an  only  It is not intended as medical advice for individual conditions or treatments  Talk to your doctor, nurse or pharmacist before following any medical regimen to see if it is safe and effective for you  © 2017 [address] Information is for End User's use only and may not be sold, redistributed or otherwise used for commercial purposes  All illustrations and images included in CareNotes® are the copyrighted property of [employer]  or [employer]  Cigarette Smoking and Your Health   AMBULATORY CARE:   Risks to your health if you smoke:  Nicotine and other chemicals found in tobacco damage every cell in your body  Even if you are a light smoker, you have an increased risk for cancer, heart disease, and lung disease  If you are pregnant or have diabetes, smoking increases your risk for complications  Benefits to your health if you stop smoking:   · You decrease respiratory symptoms such as coughing, wheezing, and shortness of breath       · You reduce your risk for cancers of the lung, mouth, throat, kidney, bladder, pancreas, stomach, and cervix  If you already have cancer, you increase the benefits of chemotherapy  You also reduce your risk for cancer returning or a second cancer from developing  · You reduce your risk for heart disease, blood clots, heart attack, and stroke  · You reduce your risk for lung infections, and diseases such as pneumonia, asthma, chronic bronchitis, and emphysema  · Your circulation improves  More oxygen can be delivered to your body  If you have diabetes, you lower your risk for complications, such as kidney, artery, and eye diseases  You also lower your risk for nerve damage  Nerve damage can lead to amputations, poor vision, and blindness  · You improve your body's ability to heal and to fight infections  Benefits to the health of others if you stop smoking:  Tobacco is harmful to nonsmokers who breathe in your secondhand smoke  The following are ways the health of others around you may improve when you stop smoking:  · You lower the risks for lung cancer and heart disease in nonsmoking adults  · If you are pregnant, you lower the risk for miscarriage, early delivery, low birth weight, and stillbirth  You also lower your baby's risk for SIDS, obesity, developmental delay, and neurobehavioral problems, such as ADHD  · If you have children, you lower their risk for ear infections, colds, pneumonia, bronchitis, and asthma  For more information and support to stop smoking:   · Smokefree  gov  Phone: 2- 407 - 667-6022  Web Address: www Tame  Follow up with your healthcare provider as directed:  Write down your questions so you remember to ask them during your visits  © 2017 [address] Information is for End User's use only and may not be sold, redistributed or otherwise used for commercial purposes  All illustrations and images included in CareNotes® are the copyrighted property of [employer]  or Josh [employer]    The above information is an  only  It is not intended as medical advice for individual conditions or treatments  Talk to your doctor, nurse or pharmacist before following any medical regimen to see if it is safe and effective for you  Fall Prevention   WHAT YOU NEED TO KNOW:   What is fall prevention? Fall prevention includes ways to make your home and other areas safer  It also includes ways you can move more carefully to prevent a fall  What increases my risk for falls? · Lack of vitamin D    · Not getting enough sleep each night    · Trouble walking or keeping your balance, or foot problems    · Health conditions that cause changes in your blood pressure, vision, or muscle strength and coordination    · Medicines that make you dizzy, weak, or sleepy    · Problems seeing clearly    · Shoes that have high heels or are not supportive    · Tripping hazards, such as items left on the floor, no handrails on the stairs, or broken steps  How can I help protect myself from falls? · Stand or sit up slowly  This may help you keep your balance and prevent falls  If you need to get up during the night, sit up first  Be sure you are fully awake before you stand  Turn on the light before you start walking  Go slowly in case you are still sleepy  Make sure you will not trip over any pets sleeping in the bedroom  · Use assistive devices as directed  Your healthcare provider may suggest that you use a cane or walker to help you keep your balance  You may need to have grab bars put in your bathroom near the toilet or in the shower  · Wear shoes that fit well and have soles that   Wear shoes both inside and outside  Use slippers with good   Do not wear shoes with high heels  · Wear a personal alarm  This is a device that allows you to call 911 if you fall and need help  Ask your healthcare provider for more information  · Stay active  Exercise can help strengthen your muscles and improve your balance   Your healthcare provider may recommend water aerobics or walking  He or she may also recommend physical therapy to improve your coordination  Never start an exercise program without talking to your healthcare provider first      · Manage medical conditions  Keep all appointments with your healthcare providers  Visit your eye doctor as directed  How can I make my home safer? · Add items to prevent falls in the bathroom  Put nonslip strips on your bath or shower floor to prevent you from slipping  Use a bath mat if you do not have carpet in the bathroom  This will prevent you from falling when you step out of the bath or shower  Use a shower seat so you do not need to stand while you shower  Sit on the toilet or a chair in your bathroom to dry yourself and put on clothing  This will prevent you from losing your balance from drying or dressing yourself while you are standing  · Keep paths clear  Remove books, shoes, and other objects from walkways and stairs  Place cords for telephones and lamps out of the way so that you do not need to walk over them  Tape them down if you cannot move them  Remove small rugs  If you cannot remove a rug, secure it with double-sided tape  This will prevent you from tripping  · Install bright lights in your home  Use night lights to help light paths to the bathroom or kitchen  Always turn on the light before you start walking  · Keep items you use often on shelves within reach  Do not use a step stool to help you reach an item  · Paint or place reflective tape on the edges of your stairs  This will help you see the stairs better  Call 911 or have someone else call if:   · You have fallen and are unconscious  · You have fallen and cannot move part of your body  Contact your healthcare provider if:   · You have fallen and have pain or a headache  · You have questions or concerns about your condition or care    CARE AGREEMENT:   You have the right to help plan your care  Learn about your health condition and how it may be treated  Discuss treatment options with your caregivers to decide what care you want to receive  You always have the right to refuse treatment  The above information is an  only  It is not intended as medical advice for individual conditions or treatments  Talk to your doctor, nurse or pharmacist before following any medical regimen to see if it is safe and effective for you  © 2017 [address] Information is for End User's use only and may not be sold, redistributed or otherwise used for commercial purposes  All illustrations and images included in CareNotes® are the copyrighted property of [employer] M , Inc  or [employer]  Advance Directives   WHAT YOU NEED TO KNOW:   What are advance directives? Advance directives are legal documents that state your wishes and plans for medical care  These plans are made ahead of time in case you lose your ability to make decisions for yourself  Advance directives can apply to any medical decision, such as the treatments you want, and if you want to donate organs  What are the types of advance directives? There are many types of advance directives, and each state has rules about how to use them  You may choose a combination of any of the following:  · Living will: This is a written record of the treatment you want  You can also choose which treatments you do not want, which to limit, and which to stop at a certain time  This includes surgery, medicine, IV fluid, and tube feedings  · Durable power of  for healthcare [facility]): This is a written record that states who you want to make healthcare choices for you when you are unable to make them for yourself  This person, called a proxy, is usually a family member or a friend  You may choose more than 1 proxy      · Do not resuscitate (DNR) order:  A DNR order is used in case your heart stops beating or you stop breathing  It is a request not to have certain forms of treatment, such as CPR  A DNR order may be included in other types of advance directives  · Medical directive: This covers the care that you want if you are in a coma, near death, or unable to make decisions for yourself  You can list the treatments you want for each condition  Treatment may include pain medicine, surgery, blood transfusions, dialysis, IV or tube feedings, and a ventilator (breathing machine)  · Values history: This document has questions about your views, beliefs, and how you feel and think about life  This information can help others choose the care that you would choose  Why are advance directives important? An advance directive helps you control your care  Although spoken wishes may be used, it is better to have your wishes written down  Spoken wishes can be misunderstood, or not followed  Treatments may be given even if you do not want them  An advance directive may make it easier for your family to make difficult choices about your care  How do I decide what to put in my advance directives? · Make informed decisions:  Make sure you fully understand treatments or care you may receive  Think about the benefits and problems your decisions could cause for you or your family  Talk to healthcare providers if you have concerns or questions before you write down your wishes  You may also want to talk with your Taoism or , or a   Check your state laws to make sure that what you put in your advance directive is legal      · Sign all forms:  Sign and date your advance directive when you have finished  You may also need 2 witnesses to sign the forms  Witnesses cannot be your doctor or his staff, your spouse, heirs or beneficiaries, people you owe money to, or your chosen proxy  Talk to your family, proxy, and healthcare providers about your advance directive   Give each person a copy, and keep one for yourself in a place you can get to easily  Do not keep it hidden or locked away  · Review and revise your plans: You can revise your advance directive at any time, as long as you are able to make decisions  Review your plan every year, and when there are changes in your life, or your health  When you make changes, let your family, proxy, and healthcare providers know  Give each a new copy  Where can I find more information? · [employer]  [address] , [place] [address]  Phone: 0- 961 - [phone]  Phone: 1- 130 - [phone]  Web Address: http://www  aafp org  · [address] [facility])  [address] [address] [facility], [address]  Phone: 5- [phone]  Phone: [phone]  Web Address: Zhen carroll  CARE AGREEMENT:   You have the right to help plan your care  To help with this plan, you must learn about your health condition and treatment options  You must also learn about advance directives and how they are used  Work with your healthcare providers to decide what care will be used to treat you  You always have the right to refuse treatment  The above information is an  only  It is not intended as medical advice for individual conditions or treatments  Talk to your doctor, nurse or pharmacist before following any medical regimen to see if it is safe and effective for you  © 2017 [address] [employer] Information is for End User's use only and may not be sold, redistributed or otherwise used for commercial purposes  All illustrations and images included in CareNotes® are the copyrighted property of [employer]  or [employer]

## 2019-07-26 NOTE — PROGRESS NOTES
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([facility] [facility] )    Bladder stone    Urge incontinence    BPH with urinary obstruction    Renal cell carcinoma of left kidney ([facility] )    History of bladder cancer      Past Medical and Surgical History:     Past Medical History:   Diagnosis Date    Balance problems     and" coordination issues/best to walk slow"    Bladder stones     Dysphagia 02/16/2017    "not right now but in the past"    Environmental and seasonal allergies     Exercise involving walking     "approx 1 mile 3x/week"   [address] injury     right,  "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"    Full dentures     [facility] 04/27/2015    left weakness,"due to tumor on brain"    History of brain tumor     "had radiation for it"    History of cancer chemotherapy     last tx  5/22/18    History of GI bleed 2015    History of kidney cancer     left    History of kidney stones     History of radiation therapy     History of transfusion     5 units of blood 2015    Hydrocele 11/30/2010    Inguinal hernia, bilateral     Joint stiffness of multiple sites     Paresthesia 05/01/2015    Portacath in place     right chest    Primary malignant neoplasm of lung [facility] ) 05/26/2015    Renal carcinoma, left [facility] ) 10/17/2016    Risk for falls     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([facility] ) 05/26/2015    Skin cancer     Syncopal episodes 09/01/2015    2    Wears glasses      Past Surgical History:   Procedure Laterality Date    BLADDER STONE REMOVAL      COLONOSCOPY     formerly [facility] DENTAL SURGERY  2011    DENTAL EXTACTIONS     ESOPHAGOGASTRODUODENOSCOPY      HYDROCELE EXCISION / REPAIR Left 01/01/2011    KIDNEY STONE SURGERY      KIDNEY SURGERY      cancer of kidney/cryo of cancer    PORTACATH PLACEMENT      TN COLONOSCOPY FLX DX W/COLLJ SPEC WHEN PFRMD N/A 8/7/2018    Procedure: COLONOSCOPY with polypectomy ;  Surgeon: Nico Muhammad MD;  [facility]; Service: General    TN LAP, RECURRENT INCISIONAL HERNIA REPAIR,INCARCERATED Bilateral 8/8/2018    Procedure: REPAIR HERNIA INGUINAL LAPAROSCOPIC W/ ROBOTICS W/ MESH;  Surgeon: Nico Muhammad MD;  [facility];  Service: General    TN REMOVE BLADDER STONE,<2 5 CM N/A 11/27/2018    Procedure: Eleni Seashore LASER,BLADDE BIOPSY;  Surgeon: Tabby Avalos MD;  [facility];  Service: Urology    SKIN BIOPSY      SKIN CANCER EXCISION      17 surgeries, basal cell    TONSILLECTOMY        Family History:     Family History   Problem Relation Age of Onset    Cancer Mother         EYE    Cancer Father     Colon cancer Father     Cancer Brother       Social History:     Social History     Tobacco Use   Smoking Status Current Every Day Smoker    Packs/day: 0 50    Years: 48 00    Pack years: 24 00    Types: Cigarettes   Smokeless Tobacco Current User     Social History     Substance and Sexual Activity   Alcohol Use Yes    Comment: "3x per year"     Social History     Substance and Sexual Activity   Drug Use No      Medications and Allergies:     Current Outpatient Medications   Medication Sig Dispense Refill    Ascorbic Acid (VITAMIN C) 500 MG CAPS Take 500 mg by mouth daily        folic acid (FOLVITE) 1 mg tablet Take 1 mg by mouth daily        Multiple Vitamins-Minerals (MULTIVITAMIN ADULT PO) Take 1 tablet by mouth daily         No current facility-administered medications for this visit        No Known Allergies   Immunizations:     Immunization History Administered Date(s) Administered    INFLUENZA 11/03/2017, 10/09/2018    Influenza Split High Dose Preservative Free IM 10/09/2018    Pneumococcal Conjugate 13-Valent 01/25/2019      Medicare Screening Tests and Risk Assessments:     Joce Carty is here for his Initial Wellness visit  Last Medicare Wellness visit information reviewed, patient interviewed and updates made to the record today  Health Risk Assessment:  Patient rates overall health as good  Patient feels that their physical health rating is Slightly better  Eyesight was rated as Same  Hearing was rated as Same  Patient feels that their emotional and mental health rating is Same  Pain experienced by patient in the last 7 days has been Some  Patient states that he has experienced no weight loss or gain in last 6 months  Emotional/Mental Health:  Patient has not been feeling nervous/anxious  PHQ-9 Depression Screening:    Frequency of the following problems over the past two weeks:      1  Little interest or pleasure in doing things: 0 - not at all      2  Feeling down, depressed, or hopeless: 0 - not at all  PHQ-2 Score: 0          Broken Bones/Falls: Fall Risk Assessment:    In the past year, patient has experienced: No history of falling in past year          Bladder/Bowel:  Patient has not leaked urine accidently in the last six months  Patient reports no loss of bowel control  Immunizations:  Patient has not had a flu vaccination within the last year  Patient has not received a shingles shot  Patient has not received tetanus/diphtheria shot  Home Safety:  Patient does not have trouble with stairs inside or outside of their home  Patient currently reports that there are no safety hazards present in home, working smoke alarms, working carbon monoxide detectors        Preventative Screenings:   no glaucoma eye exam completed    Nutrition:  Current diet: Regular with servings of the following:    Medications:  Patient is currently taking over-the-counter supplements  Patient is able to manage medications  Lifestyle Choices:  Patient reports current tobacco use  Patient reports alcohol use  Patient drives a vehicle  Patient wears seat belt  Current level of exercise of physical activity described by patient as: 1 -2 times a week 1 mile walk   Activities of Daily Living:  Can get out of bed by his or her self, able to dress self, able to make own meals, able to do own shopping, able to bathe self, can do own laundry/housekeeping, can manage own money, pay bills and track expenses    Previous Hospitalizations:  No hospitalization or ED visit in past 12 months        Advanced Directives:  Patient has decided on a power of   Patient has spoken to designated power of   Patient has completed advanced directive

## 2019-07-26 NOTE — PROGRESS NOTES
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 [facility])    Renal cell carcinoma of left kidney ([facility] [address] )    Medicare annual wellness visit, initial          Subjective:      Patient ID: Edmundo Hernández is a 81 y o  male  PATIENT RETURNS FOR FOLLOW-UP OF CHRONIC MEDICAL CONDITIONS  NO HOSPITAL STAYS OR EMERGENCY VISITS RECENTLY  MEDS WERE REVIEWED AND NO SIDE EFFECTS  NO NEW ISSUES  UNLESS NOTED BELOW  NO NEW MEDICAL PROVIDER REPORTED  THE CHRONIC DISEASES LISTED ABOVE ARE STABLE AND UNCHANGED/ THE PLAN OF CARE FOR THOSE WILL REMAIN UNCHANGED UNLESS NOTED BELOW       AWV:initial          The following portions of the patient's history were reviewed and updated as appropriate: allergies, current medications, past family history, past medical history, past social history, past surgical history and problem list     Review of Systems   Constitutional: Negative for activity change and appetite change  HENT: Negative for trouble swallowing  Eyes: Negative for visual disturbance  Respiratory: Negative for cough and shortness of breath  Cardiovascular: Negative for chest pain, palpitations and leg swelling  Gastrointestinal: Negative for abdominal pain and blood in stool  Endocrine: Negative for polyuria  Genitourinary: Negative for difficulty urinating and hematuria  Skin: Negative for rash  Neurological: Negative for dizziness  Psychiatric/Behavioral: Negative for behavioral problems  Objective:  Vitals:    07/26/19 0956   Pulse: 99   Temp: 98 2 °F (36 8 °C)   TempSrc: Temporal   SpO2: 99%   Weight: 71 5 kg (157 lb 9 6 oz)   Height: 5' 10" (1 778 m)      Physical Exam   Constitutional: He appears well-developed and well-nourished  HENT:   Head: Normocephalic and atraumatic  Eyes: Conjunctivae are normal    Neck: Neck supple   No thyromegaly present  Cardiovascular: Normal rate, regular rhythm, normal heart sounds and intact distal pulses  No murmur heard  Pulmonary/Chest: Effort normal and breath sounds normal  No respiratory distress  Musculoskeletal: He exhibits no edema  Lymphadenopathy:     He has no cervical adenopathy  Skin: Skin is warm and dry  Psychiatric: He has a normal mood and affect  His behavior is normal          Patient's chronic problems that were reviewed today are stable  Meds reviewed and no changes made  Appropriate labs and imaging were ordered  Preventive measures appropriate for age and sex were reviewed with patient  Immunizations were updated as appropriate      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 ([facility] [facility] ) - Primary       Genitourinary    Renal cell carcinoma of left kidney ([facility] [address] )       Other    Medicare annual wellness visit, initial (Chronic)         History of Present Illness:     Patient presents for Medicare Annual Wellness visit    Patient Care Team:  Ronal Pedersen MD as PCP - General (Family Medicine)  Corina Eisenmenger, MD as Endoscopist     Problem List:     Patient Active Problem List   Diagnosis    Family history of colon cancer    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 ([address] )    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([address] )    Bladder stone    Urge incontinence    BPH with urinary obstruction    Renal cell carcinoma of left kidney ([facility] [facility] )    Medicare annual wellness visit, initial      Past Medical and Surgical History:     Past Medical History:   Diagnosis Date    Balance problems     and" coordination issues/best to walk slow"    Bladder stones     Dysphagia 02/16/2017    "not right now but in the past"    Environmental and seasonal allergies     Exercise involving walking     "approx 1 mile 3x/week"    Eye injury     right,  "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"  Full dentures     [facility] 04/27/2015    left weakness,"due to tumor on brain"    History of brain tumor     "had radiation for it"    History of cancer chemotherapy     last tx  5/22/18    History of GI bleed 2015    History of kidney cancer     left    History of kidney stones     History of radiation therapy     History of transfusion     5 units of blood 2015    Hydrocele 11/30/2010    Inguinal hernia, bilateral     Joint stiffness of multiple sites     Paresthesia 05/01/2015    Portacath in place     right chest    Primary malignant [facility]oplasm of lung ([facility] ) 05/26/2015    Renal carcinoma, left ([place] [address] ) 10/17/2016    Risk for falls     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([place] [address] ) 05/26/2015    Skin cancer     Syncopal episodes 09/01/2015    2    Wears glasses      Past Surgical History:   Procedure Laterality Date    BLADDER STONE REMOVAL      COLONOSCOPY      DENTAL SURGERY  2011    DENTAL EXTACTIONS     ESOPHAGOGASTRODUODENOSCOPY      HYDROCELE EXCISION / REPAIR Left 01/01/2011    KIDNEY STONE SURGERY      KIDNEY SURGERY      cancer of kidney/cryo of cancer    PORTACATH PLACEMENT      [place] COLONOSCOPY FLX DX W/COLLJ SPEC WHEN PFRMD N/A 8/7/2018    Procedure: COLONOSCOPY with polypectomy ;  Surgeon: Arna Simmonds, MD;  [facility];   Service: General    [place] LAP, RECURRENT INCISIONAL HERNIA REPAIR,INCARCERATED Bilateral 8/8/2018    Procedure: REPAIR HERNIA INGUINAL LAPAROSCOPIC W/ ROBOTICS W/ MESH;  Surgeon: Arna Simmonds, MD;  [facility];  Service: General    OK REMOVE BLADDER STONE,<2 5 CM N/A 11/27/2018    Procedure: Antoinette Muller HOLMIUM LASER,BLADDE BIOPSY;  Surgeon: Esther Izquierdo MD;  [facility];  Service: Urology    SKIN BIOPSY      SKIN CANCER EXCISION      17 surgeries, basal cell    TONSILLECTOMY        Family History:     Family History   Problem Relation Age of Onset    Cancer Mother         EYE    Cancer Father     Colon cancer Father     Cancer Brother       Social History:     Social History     Tobacco Use   Smoking Status Current Every Day Smoker    Packs/day: 0 50    Years: 48 00    Pack years: 24 00    Types: Cigarettes   Smokeless Tobacco Current User     Social History     Substance and Sexual Activity   Alcohol Use Yes    Comment: "3x per year"     Social History     Substance and Sexual Activity   Drug Use No      Medications and Allergies:     Current Outpatient Medications   Medication Sig Dispense Refill    Ascorbic Acid (VITAMIN C) 500 MG CAPS Take 500 mg by mouth daily        folic acid (FOLVITE) 1 mg tablet Take 1 mg by mouth daily        Multiple Vitamins-Minerals (MULTIVITAMIN ADULT PO) Take 1 tablet by mouth daily         No current facility-administered medications for this visit  No Known Allergies   Immunizations:     Immunization History   Administered Date(s) Administered    INFLUENZA 11/03/2017, 10/09/2018    Influenza Split High Dose Preservative Free IM 10/09/2018    Pneumococcal Conjugate 13-Valent 01/25/2019      Medicare Screening Tests and Risk Assessments:         Preventative Screening/Counseling:      Cardiovascular:      General: Screening Current          Diabetes:      General: Screening Current          Colorectal Cancer:      General: Screening Current          Prostate Cancer:      General: Screening Current          Osteoporosis:      General: Screening Not Indicated          AAA:      General: Screening Current      Comments: CT scan this yr OK         Glaucoma:      General: Risks and Benefits Discussed      Comments: Advised to get ey ck soon        HIV:      General: Patient Declines          Hepatitis C:      General: Screening Current        Advanced Directives:   Patient has living will for healthcare, has durable POA for healthcare, patient has an advanced directive       Immunizations:      Shingrix: Risks & Benefits Discussed

## 2019-10-10 ENCOUNTER — HOSPITAL ENCOUNTER (OUTPATIENT)
Dept: INFUSION CENTER | Facility: HOSPITAL | Age: 69
Discharge: HOME | End: 2019-10-10
Payer: MEDICARE

## 2019-10-10 DIAGNOSIS — C34.12 PRIMARY MALIGNANT NEOPLASM OF BRONCHUS OF LEFT UPPER LOBE (HCC): Primary | ICD-10-CM

## 2019-10-10 PROCEDURE — 96523 IRRIG DRUG DELIVERY DEVICE: CPT

## 2019-10-10 NOTE — PROGRESS NOTES
Pt tolerated routine port flush without issue  Good blood return noted   Discharged ambulatory deferring avs

## 2019-10-10 NOTE — PLAN OF CARE
Problem: Knowledge Deficit  Goal: Patient/family/caregiver demonstrates understanding of disease process, treatment plan, medications, and discharge instructions  Description  Complete learning assessment and assess knowledge base    Interventions:  - Provide teaching at level of understanding  - Provide teaching via preferred learning methods  10/10/2019 0910 by Marcelle Perdomo RN  Outcome: Progressing  10/10/2019 0910 by Marcelle Perdomo RN  Outcome: Progressing

## 2019-12-04 ENCOUNTER — PROCEDURE VISIT (OUTPATIENT)
Dept: UROLOGY | Facility: MEDICAL CENTER | Age: 69
End: 2019-12-04
Payer: MEDICARE

## 2019-12-04 VITALS
DIASTOLIC BLOOD PRESSURE: 80 MMHG | BODY MASS INDEX: 22.48 KG/M2 | HEART RATE: 91 BPM | HEIGHT: 70 IN | SYSTOLIC BLOOD PRESSURE: 124 MMHG | WEIGHT: 157 LBS

## 2019-12-04 DIAGNOSIS — N40.1 BPH WITH URINARY OBSTRUCTION: ICD-10-CM

## 2019-12-04 DIAGNOSIS — Z85.51 HISTORY OF BLADDER CANCER: Primary | ICD-10-CM

## 2019-12-04 DIAGNOSIS — N13.8 BPH WITH URINARY OBSTRUCTION: ICD-10-CM

## 2019-12-04 DIAGNOSIS — N21.0 BLADDER STONE: ICD-10-CM

## 2019-12-04 PROCEDURE — 52000 CYSTOURETHROSCOPY: CPT | Performed by: UROLOGY

## 2019-12-04 NOTE — PROGRESS NOTES
HISTORY:    Follow-up for bladder stone surgery November 2018 done cystoscopically  At that time I found stones embedded in the wall the bladder, lasered anything that was sticking into the lumen of the bladder  There was some inflammatory tissue at the base one of the stones which were biopsied  Second opinion came back saying consistent with early low-grade superficial bladder cancer  Moderate BPH symptoms, slightly worse compared to last visit, slower flow, frequency, but rare nocturia           ASSESSMENT / PLAN:    1  bladder stone no change  2  BPH, patient tolerates symptoms okay  PSA 2 6,  1 year ago  3  Follow-up one year cysto    The following portions of the patient's history were reviewed and updated as appropriate: allergies, current medications, past family history, past medical history, past social history, past surgical history and problem list     Review of Systems   All other systems reviewed and are negative  Objective:     Physical Exam   Constitutional: He appears well-developed and well-nourished  Genitourinary:   Genitourinary Comments: Penis testes normal         Cystoscopy  Date/Time: 12/4/2019 1:29 PM  Performed by: Mikki Rivas MD  Authorized by: Mikki Rivas MD     Procedure details: cystoscopy    Additional Procedure Details:      Patient presents for cystoscopy  I have discussed the reasons for doing the test, and the potential risks and complications  Patient expressed understanding, and signed informed consent document  The patient was carefully  positioned supine on the examining table  Sterile preparation was performed on the urethra  Xylocaine jelly was instilled and left  Indwelling for the procedure    The 13 Portuguese flexible cystoscope was passed with the following findings:      Urethra:  No strictures    Prostate:  Large T LH, 40 g plus resectable    Bladder:  Severe trabeculation stone about 1 cm, adherent to left posterior wall as before, no change     Residual urine:  80 mL    Patient tolerated the procedure well and was escorted from the examining table  0   Lab Value Date/Time    PSA 2 6 11/06/2018 1130   ]  BUN   Date Value Ref Range Status   06/06/2019 21 5 - 25 mg/dL Final   06/11/2018 16 5 - 25 MG/DL Final     Creatinine   Date Value Ref Range Status   06/06/2019 0 85 0 70 - 1 50 mg/dL Final     Comment:     Standardized to IDMS reference method     No components found for: CBC      Patient Active Problem List   Diagnosis    Family history of colon cancer    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 [facility]    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([address] )    Bladder stone    Urge incontinence    BPH with urinary obstruction    Renal cell carcinoma of left kidney ([facility]    History of bladder cancer    Medicare annual wellness visit, initial        Diagnoses and all orders for this visit:    History of bladder cancer    BPH with urinary obstruction    Bladder stone           Patient ID: Katherine Sampson is a 81 y o  male        Current Outpatient Medications:     Ascorbic Acid (VITAMIN C) 500 MG CAPS, Take 500 mg by mouth daily  , Disp: , Rfl:     folic acid (FOLVITE) 1 mg tablet, Take 1 mg by mouth daily  , Disp: , Rfl:     Multiple Vitamins-Minerals (MULTIVITAMIN ADULT PO), Take 1 tablet by mouth daily  , Disp: , Rfl:     Past Medical History:   Diagnosis Date    Balance problems     and" coordination issues/best to walk slow"    Bladder stones     Dysphagia 02/16/2017    "not right now but in the past"    Environmental and seasonal allergies     Exercise involving walking     "approx 1 mile 3x/week"    Eye injury     right,  "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"    Full dentures     Hemiparesis ([facility] ) 04/27/2015    left weakness,"due to tumor on brain"    History of brain tumor     "had radiation for it"    History of cancer chemotherapy     last tx  5/22/18    History of GI bleed 2015    History of kidney cancer     left    History of kidney stones     History of radiation therapy     History of transfusion     5 units of blood 2015    Hydrocele 11/30/2010    Inguinal hernia, bilateral     Joint stiffness of multiple sites     Paresthesia 05/01/2015    Portacath in place     right chest    Primary malignant neoplasm [facility] lung [facility] [address] ) 05/26/2015    Renal carcinoma, left ([facility] ) 10/17/2016    Risk for falls     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 05/26/2015    Skin cancer     Syncopal episodes 09/01/2015    2    Wears glasses        Past Surgical History:   Procedure Laterality Date    BLADDER STONE REMOVAL      COLONOSCOPY      DENTAL SURGERY  2011    DENTAL EXTACTIONS     ESOPHAGOGASTRODUODENOSCOPY      HYDROCELE EXCISION / REPAIR Left 01/01/2011    KIDNEY STONE SURGERY      KIDNEY SURGERY      cancer of kidney/cryo of cancer    PORTACATH PLACEMENT      MA COLONOSCOPY FLX DX W/COLLJ SPEC WHEN PFRMD N/A 8/7/2018    Procedure: COLONOSCOPY with polypectomy ;  Surgeon: Zachery Wolff MD;  [facility];   Service: General    MA LAP, RECURRENT INCISIONAL HERNIA REPAIR,INCARCERATED Bilateral 8/8/2018    Procedure: REPAIR HERNIA INGUINAL LAPAROSCOPIC W/ ROBOTICS W/ MESH;  Surgeon: Zachery Wolff MD;  [facility];  Service: General    MA REMOVE BLADDER STONE,<2 5 CM N/A 11/27/2018    Procedure: Mario Slice LASER,BLADDE BIOPSY;  Surgeon: Terra Perdue MD;  [facility];  Service: Urology    SKIN BIOPSY      SKIN CANCER EXCISION      17 surgeries, basal cell    TONSILLECTOMY         Social History

## 2019-12-04 NOTE — LETTER
December 4, 2019     Layne Pringle, [address] [place] [address]    Patient: Steph Akins   YOB: 1950   Date of Visit: 12/4/2019       Dear Dr Sim Koyanagi:    Thank you for referring Sherry Ng to me for evaluation  Below are my notes for this consultation  If you have questions, please do not hesitate to call me  I look forward to following your patient along with you  Sincerely,        Alicja Milian MD        CC: No Recipients  Alicja Milian MD  12/4/2019  1:30 PM  Sign at close encounter     HISTORY:    Follow-up for bladder stone surgery November 2018 done cystoscopically  At that time I found stones embedded in the wall the bladder, lasered anything that was sticking into the lumen of the bladder  There was some inflammatory tissue at the base one of the stones which were biopsied  Second opinion came back saying consistent with early low-grade superficial bladder cancer  Moderate BPH symptoms, slightly worse compared to last visit, slower flow, frequency, but rare nocturia           ASSESSMENT / PLAN:    1  bladder stone no change  2  BPH, patient tolerates symptoms okay  PSA 2 6,  1 year ago  3  Follow-up one year cysto    The following portions of the patient's history were reviewed and updated as appropriate: allergies, current medications, past family history, past medical history, past social history, past surgical history and problem list     Review of Systems   All other systems reviewed and are negative  Objective:     Physical Exam   Constitutional: He appears well-developed and well-nourished  Genitourinary:   Genitourinary Comments: Penis testes normal         Cystoscopy  Date/Time: 12/4/2019 1:29 PM  Performed by: Alicja Milian MD  Authorized by: Alicja Milian MD     Procedure details: cystoscopy    Additional Procedure Details:      Patient presents for cystoscopy    I have discussed the reasons for doing the test, and the potential risks and complications  Patient expressed understanding, and signed informed consent document  The patient was carefully  positioned supine on the examining table  Sterile preparation was performed on the urethra  Xylocaine jelly was instilled and left  Indwelling for the procedure  The 13 Albanian flexible cystoscope was passed with the following findings:      Urethra:  No strictures    Prostate:  Large T LH, 40 g plus resectable    Bladder:  Severe trabeculation stone about 1 cm, adherent to left posterior wall as before, no change     Residual urine:  80 mL    Patient tolerated the procedure well and was escorted from the examining table  0   Lab Value Date/Time    PSA 2 6 11/06/2018 1130   ]  BUN   Date Value Ref Range Status   06/06/2019 21 5 - 25 mg/dL Final   06/11/2018 16 5 - 25 MG/DL Final     Creatinine   Date Value Ref Range Status   06/06/2019 0 85 0 70 - 1 50 mg/dL Final     Comment:     Standardized to IDMS reference method     No components found for: CBC      Patient Active Problem List   Diagnosis    Family history of colon cancer    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 [facility]    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([facility]    Bladder stone    Urge incontinence    BPH with urinary obstruction    Renal cell carcinoma of left kidney ([facility] )    History of bladder cancer    Medicare annual wellness visit, initial        Diagnoses and all orders for this visit:    History of bladder cancer    BPH with urinary obstruction    Bladder stone           Patient ID: Mahnaz García is a 81 y o  male        Current Outpatient Medications:     Ascorbic Acid (VITAMIN C) 500 MG CAPS, Take 500 mg by mouth daily  , Disp: , Rfl:     folic acid (FOLVITE) 1 mg tablet, Take 1 mg by mouth daily  , Disp: , Rfl:     Multiple Vitamins-Minerals (MULTIVITAMIN ADULT PO), Take 1 tablet by mouth daily  , Disp: , Rfl:     Past Medical History:   Diagnosis Date    Balance problems     and" coordination issues/best to walk slow"    Bladder stones     Dysphagia 02/16/2017    "not right now but in the past"    Environmental and seasonal allergies     Exercise involving walking     "approx 1 mile 3x/week"    Eye injury     right,  "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"    Full dentures     Hemiparesis [facility] [address] ) 04/27/2015    left weakness,"due to tumor on brain"    History of brain tumor     "had radiation for it"    History of cancer chemotherapy     last tx  5/22/18    History of GI bleed 2015    History of kidney cancer     left    History of kidney stones     History of radiation therapy     History of transfusion     5 units of blood 2015    Hydrocele 11/30/2010    Inguinal hernia, bilateral     Joint stiffness of multiple sites     Paresthesia 05/01/2015    Portacath in place     right chest    Primary malignant [facility]oplasm of lung ([facility] ) 05/26/2015    Renal carcinoma, left ([facility] ) 10/17/2016    Risk for falls     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([facility] ) 05/26/2015    Skin cancer     Syncopal episodes 09/01/2015    2    Wears glasses        Past Surgical History:   Procedure Laterality Date    BLADDER STONE REMOVAL      COLONOSCOPY      DENTAL SURGERY  2011    DENTAL EXTACTIONS     ESOPHAGOGASTRODUODENOSCOPY      HYDROCELE EXCISION / REPAIR Left 01/01/2011    KIDNEY STONE SURGERY      KIDNEY SURGERY      cancer of kidney/cryo of cancer    PORTACATH PLACEMENT      [place] COLONOSCOPY FLX DX W/COLLJ SPEC WHEN PFRMD N/A 8/7/2018    Procedure: COLONOSCOPY with polypectomy ;  Surgeon: Jay Groves MD;  [facility];   Service: General    KY LAP, RECURRENT INCISIONAL HERNIA REPAIR,INCARCERATED Bilateral 8/8/2018    Procedure: REPAIR HERNIA INGUINAL LAPAROSCOPIC W/ ROBOTICS W/ MESH;  Surgeon: Jay Groves MD;  [facility];  Service: General    KY REMOVE BLADDER STONE,<2 5 CM N/A 11/27/2018    Procedure: Hector Merlos HOLMIUM LASER,BLADDE BIOPSY;  Surgeon: Get Dominguez MD;  [facility];  Service: Urology    SKIN BIOPSY      SKIN CANCER EXCISION      17 surgeries, basal cell    TONSILLECTOMY         Social History

## 2019-12-09 ENCOUNTER — HOSPITAL ENCOUNTER (OUTPATIENT)
Dept: INFUSION CENTER | Facility: HOSPITAL | Age: 69
Discharge: HOME | End: 2019-12-09
Payer: MEDICARE

## 2019-12-09 DIAGNOSIS — C34.12 PRIMARY MALIGNANT NEOPLASM OF BRONCHUS OF LEFT UPPER LOBE (HCC): Primary | ICD-10-CM

## 2019-12-09 LAB
ALBUMIN SERPL BCP-MCNC: 4 G/DL (ref 3–5.2)
ALP SERPL-CCNC: 90 U/L (ref 43–122)
ALT SERPL W P-5'-P-CCNC: 48 U/L (ref 9–52)
ANION GAP SERPL CALCULATED.3IONS-SCNC: 11 MMOL/L (ref 5–14)
AST SERPL W P-5'-P-CCNC: 31 U/L (ref 17–59)
BASOPHILS # BLD AUTO: 0.1 THOUSANDS/ΜL (ref 0–0.1)
BASOPHILS NFR BLD AUTO: 1 % (ref 0–1)
BILIRUB SERPL-MCNC: 0.9 MG/DL
BUN SERPL-MCNC: 17 MG/DL (ref 5–25)
CALCIUM SERPL-MCNC: 9.1 MG/DL (ref 8.4–10.2)
CHLORIDE SERPL-SCNC: 105 MMOL/L (ref 97–108)
CO2 SERPL-SCNC: 22 MMOL/L (ref 22–30)
CREAT SERPL-MCNC: 0.82 MG/DL (ref 0.7–1.5)
EOSINOPHIL # BLD AUTO: 0.2 THOUSAND/ΜL (ref 0–0.4)
EOSINOPHIL NFR BLD AUTO: 2 % (ref 0–6)
ERYTHROCYTE [DISTWIDTH] IN BLOOD BY AUTOMATED COUNT: 13 %
GFR SERPL CREATININE-BSD FRML MDRD: 92 ML/MIN/1.73SQ M
GLUCOSE SERPL-MCNC: 156 MG/DL (ref 70–99)
HCT VFR BLD AUTO: 44.8 % (ref 41–53)
HGB BLD-MCNC: 15.1 G/DL (ref 13.5–17.5)
LYMPHOCYTES # BLD AUTO: 2.5 THOUSANDS/ΜL (ref 0.5–4)
LYMPHOCYTES NFR BLD AUTO: 26 % (ref 25–45)
MAGNESIUM SERPL-MCNC: 1.8 MG/DL (ref 1.6–2.3)
MCH RBC QN AUTO: 31.9 PG (ref 26–34)
MCHC RBC AUTO-ENTMCNC: 33.8 G/DL (ref 31–36)
MCV RBC AUTO: 94 FL (ref 80–100)
MONOCYTES # BLD AUTO: 0.3 THOUSAND/ΜL (ref 0.2–0.9)
MONOCYTES NFR BLD AUTO: 4 % (ref 1–10)
NEUTROPHILS # BLD AUTO: 6.6 THOUSANDS/ΜL (ref 1.8–7.8)
NEUTS SEG NFR BLD AUTO: 68 % (ref 45–65)
PLATELET # BLD AUTO: 211 THOUSANDS/UL (ref 150–450)
PMV BLD AUTO: 9 FL (ref 8.9–12.7)
POTASSIUM SERPL-SCNC: 4.2 MMOL/L (ref 3.6–5)
PROT SERPL-MCNC: 7 G/DL (ref 5.9–8.4)
RBC # BLD AUTO: 4.76 MILLION/UL (ref 4.5–5.9)
SODIUM SERPL-SCNC: 138 MMOL/L (ref 137–147)
WBC # BLD AUTO: 9.7 THOUSAND/UL (ref 4.5–11)

## 2019-12-09 PROCEDURE — 83735 ASSAY OF MAGNESIUM: CPT

## 2019-12-09 PROCEDURE — 80053 COMPREHEN METABOLIC PANEL: CPT

## 2019-12-09 PROCEDURE — 85025 COMPLETE CBC W/AUTO DIFF WBC: CPT

## 2019-12-09 NOTE — PROGRESS NOTES
Pt admitted for routine central labs and port flush  Excellent blood return noted from port  Flushed and deaccessed per routine  Next appt scheduled for 1/27    Left ambulatory refusing AVS

## 2019-12-12 ENCOUNTER — TRANSCRIBE ORDERS (OUTPATIENT)
Dept: ADMINISTRATIVE | Facility: HOSPITAL | Age: 69
End: 2019-12-12

## 2019-12-12 ENCOUNTER — APPOINTMENT (OUTPATIENT)
Dept: LAB | Facility: HOSPITAL | Age: 69
End: 2019-12-12
Attending: INTERNAL MEDICINE
Payer: MEDICARE

## 2019-12-12 ENCOUNTER — HOSPITAL ENCOUNTER (OUTPATIENT)
Dept: CT IMAGING | Facility: HOSPITAL | Age: 69
Discharge: HOME | End: 2019-12-12
Attending: INTERNAL MEDICINE
Payer: MEDICARE

## 2019-12-12 DIAGNOSIS — C34.12 PRIMARY MALIGNANT NEOPLASM OF BRONCHUS OF LEFT UPPER LOBE (HCC): ICD-10-CM

## 2019-12-12 LAB
ALBUMIN SERPL BCP-MCNC: 3.8 G/DL (ref 3–5.2)
ALP SERPL-CCNC: 75 U/L (ref 43–122)
ALT SERPL W P-5'-P-CCNC: 40 U/L (ref 9–52)
ANION GAP SERPL CALCULATED.3IONS-SCNC: 11 MMOL/L (ref 5–14)
AST SERPL W P-5'-P-CCNC: 24 U/L (ref 17–59)
BASOPHILS # BLD AUTO: 0.1 THOUSANDS/ΜL (ref 0–0.1)
BASOPHILS NFR BLD AUTO: 1 % (ref 0–1)
BILIRUB SERPL-MCNC: 0.5 MG/DL
BUN SERPL-MCNC: 13 MG/DL (ref 5–25)
CALCIUM SERPL-MCNC: 8.8 MG/DL (ref 8.4–10.2)
CHLORIDE SERPL-SCNC: 102 MMOL/L (ref 97–108)
CO2 SERPL-SCNC: 25 MMOL/L (ref 22–30)
CREAT SERPL-MCNC: 0.84 MG/DL (ref 0.7–1.5)
EOSINOPHIL # BLD AUTO: 0.2 THOUSAND/ΜL (ref 0–0.4)
EOSINOPHIL NFR BLD AUTO: 2 % (ref 0–6)
ERYTHROCYTE [DISTWIDTH] IN BLOOD BY AUTOMATED COUNT: 13 %
GFR SERPL CREATININE-BSD FRML MDRD: 91 ML/MIN/1.73SQ M
GLUCOSE P FAST SERPL-MCNC: 82 MG/DL (ref 70–99)
HCT VFR BLD AUTO: 43.5 % (ref 41–53)
HGB BLD-MCNC: 14.7 G/DL (ref 13.5–17.5)
LYMPHOCYTES # BLD AUTO: 2.3 THOUSANDS/ΜL (ref 0.5–4)
LYMPHOCYTES NFR BLD AUTO: 24 % (ref 25–45)
MCH RBC QN AUTO: 32.3 PG (ref 26–34)
MCHC RBC AUTO-ENTMCNC: 33.8 G/DL (ref 31–36)
MCV RBC AUTO: 95 FL (ref 80–100)
MONOCYTES # BLD AUTO: 0.5 THOUSAND/ΜL (ref 0.2–0.9)
MONOCYTES NFR BLD AUTO: 5 % (ref 1–10)
NEUTROPHILS # BLD AUTO: 6.8 THOUSANDS/ΜL (ref 1.8–7.8)
NEUTS SEG NFR BLD AUTO: 69 % (ref 45–65)
PLATELET # BLD AUTO: 211 THOUSANDS/UL (ref 150–450)
PMV BLD AUTO: 8.7 FL (ref 8.9–12.7)
POTASSIUM SERPL-SCNC: 3.9 MMOL/L (ref 3.6–5)
PROT SERPL-MCNC: 6.5 G/DL (ref 5.9–8.4)
RBC # BLD AUTO: 4.56 MILLION/UL (ref 4.5–5.9)
SODIUM SERPL-SCNC: 138 MMOL/L (ref 137–147)
WBC # BLD AUTO: 9.9 THOUSAND/UL (ref 4.5–11)

## 2019-12-12 PROCEDURE — 80053 COMPREHEN METABOLIC PANEL: CPT

## 2019-12-12 PROCEDURE — 85025 COMPLETE CBC W/AUTO DIFF WBC: CPT

## 2019-12-12 PROCEDURE — 36415 COLL VENOUS BLD VENIPUNCTURE: CPT

## 2019-12-12 PROCEDURE — 71260 CT THORAX DX C+: CPT

## 2019-12-12 PROCEDURE — 74177 CT ABD & PELVIS W/CONTRAST: CPT

## 2019-12-12 RX ADMIN — IOHEXOL 100 ML: 350 INJECTION, SOLUTION INTRAVENOUS at 09:25

## 2019-12-16 ENCOUNTER — TELEPHONE (OUTPATIENT)
Dept: HEMATOLOGY ONCOLOGY | Facility: CLINIC | Age: 69
End: 2019-12-16

## 2019-12-17 ENCOUNTER — TELEPHONE (OUTPATIENT)
Dept: HEMATOLOGY ONCOLOGY | Facility: CLINIC | Age: 69
End: 2019-12-17

## 2019-12-17 NOTE — TELEPHONE ENCOUNTER
I called radiology and spoke with Kay Tapia to have the patients CT read for his appointment on 12/19/20

## 2019-12-19 ENCOUNTER — OFFICE VISIT (OUTPATIENT)
Dept: HEMATOLOGY ONCOLOGY | Facility: CLINIC | Age: 69
End: 2019-12-19
Payer: MEDICARE

## 2019-12-19 VITALS
BODY MASS INDEX: 22.9 KG/M2 | OXYGEN SATURATION: 100 % | DIASTOLIC BLOOD PRESSURE: 78 MMHG | HEART RATE: 95 BPM | SYSTOLIC BLOOD PRESSURE: 118 MMHG | HEIGHT: 70 IN | TEMPERATURE: 97.2 F | RESPIRATION RATE: 16 BRPM | WEIGHT: 160 LBS

## 2019-12-19 DIAGNOSIS — C79.31 SECONDARY MALIGNANT NEOPLASM OF BRAIN AND SPINAL CORD (HCC): ICD-10-CM

## 2019-12-19 DIAGNOSIS — C79.49 SECONDARY MALIGNANT NEOPLASM OF BRAIN AND SPINAL CORD (HCC): ICD-10-CM

## 2019-12-19 DIAGNOSIS — K86.2 PANCREATIC CYST: ICD-10-CM

## 2019-12-19 DIAGNOSIS — C64.2 MALIGNANT NEOPLASM OF LEFT KIDNEY, EXCEPT RENAL PELVIS (HCC): ICD-10-CM

## 2019-12-19 DIAGNOSIS — C34.12 PRIMARY MALIGNANT NEOPLASM OF BRONCHUS OF LEFT UPPER LOBE (HCC): Primary | ICD-10-CM

## 2019-12-19 PROCEDURE — 99214 OFFICE O/P EST MOD 30 MIN: CPT | Performed by: NURSE PRACTITIONER

## 2019-12-19 NOTE — PROGRESS NOTES
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 [facility])  Patient does not seem to have any evidence of recurrent disease based off of today's examination  We will continue to monitor him closely according to the NCCN guidelines  He will continue to get his port flushed every 6-8 weeks  We also did discuss pursuing healthier lifestyles after cancer treatment including: following up with PCP on a regular basis for health maintenance/immunizations, limiting alcohol consumption, consuming a well-balanced diet with lots of fruits/vegetables/whole grains and limiting greasy/fatty and sugary foods, exercising on a regular basis and smoking cessation  We did review some smoking cessation techniques such as goal setting, weaning self off nicotine by limiting amount of cigarettes per day, replacing cravings with healthy snacks, and medication methods including patches gum and Chantix; he was highly encouraged to consider     - CBC and differential; Future  - Comprehensive metabolic panel; Future    2  Pancreatic cyst  Patient was noted to have a new 10 mm cystic lesion in the pancreatic head on his most recent CT scan  We will order follow-up MRI/MRCP of the abdomen as recommended by the Radiology team   He will be back in about a month to review the findings which will be acted on accordingly     - MRI abdomen w wo contrast and mrcp; Future    3  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([facility] [address] )  s/p radiation 2015  Patient denies any neurological symptoms  4  Malignant neoplasm of left kidney, except renal pelvis (HCC)  No evidence of recurrence based off of patient's most recent imaging study  He continues to follow up with interventional radiology at [facility] Dr Chivo Landeros status post cryoablation  HPI:  Patient presents today for a follow-up visit    He reports fatigue but states he has been extremely busy and just returned from a trip from [place] 12/9/19  The patient was just seen by his dermatologist and had some additional basal cell skin cancer lesions of removed from his right arm; continues close monitoring with his dermatologist on every six-month basis  He believes that he may be coming down with a cold as he slightly congested but otherwise is doing well denies any additional respiratory symptoms, neurological symptoms or GI complaints  The patient admits that he continues to smoke cigarettes on a regular basis approximately half a pack per day  His most recent laboratory studies from 12/12/2019 showed essentially normal CBC CMP  He had CT scan of the chest abdomen and pelvis with contrast done on 12/12/2019 which was read as follows:    IMPRESSION:  1 Stable linear scarring with focal area of associated nodularity at the left lung apex likely reflecting postoperative scarring/treatment related change  This is without significant change from December 7, 2018 exam   Continued attention is recommended   to this area on follow-up to ensure stability  2   Stable 2 mm left upper lobe pulmonary nodule  Based on current Fleischner Society 2017 Guidelines on incidental pulmonary nodule, patients with a known malignancy are at increased risk of metastasis and should receive followup CT at intervals   appropriate for the type of cancer and its risk of pulmonary metastases  3   Stable emphysematous changes  4   No evidence of metastatic disease in the abdomen or pelvis  5   Stable bilateral renal cysts  6   Stable coarse calcifications in the bladder with stable mild posterior bladder wall thickening  7   10 mm cystic lesion in the pancreatic head  Recommend further characterization this finding with MRCP/MRI pancreas      Oncology History    Patient has a history of tobacco use for more than 40 years and history of 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 which was diagnosed in May of 2015  At that time the patient was found to have multiple brain lesions status post whole brain radiation  He then received 6 cycles of palliative chemotherapy with Alimta and carboplatin  After 5 cycles of treatment he had significant GI bleeding which required ICU hospitalization  After completion of palliative chemotherapy he was maintained on Alimta since October 2015  A brain MRI January 2016 showed multiple bilateral hemorrhagic lesions felt not to be due to malignant process  PET scan January 2016 showed a 1 6 cm metabolically active left lung apical lesion  The patient received radiation treatment palliatively under the care of Dr Kelsi Polanco to the left apical lung lesion and completed it in April of 2016  He was then restarted on maintenance Alimta  He had an MRI of the abdomen on June 23, 2016 which showed a 3 cm cystic mass with a solid component on the posterior medial aspect of the left kidney, compatible with malignant process  A core biopsy was taking from the left kidney July 12, 2016 which was 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  The patient was then treated with cryotherapy of the left kidney lesion in September of 2016  Patient had a PET-CT scan on March 3, 2017 which was compared to the prior PET scan from November 2016  At this time there is no evidence of residual or recurrent neoplastic disease anywhere in his body  Patient had a CT scan of the chest abdomen and pelvis on August 24, 2017 which showed a 1 3 cm left upper lobe mass versus scarring with adjacent radiation fibrosis  Patient had another MRI of the brain January 8, 2018 which was compared to the previous MRI of the brain in January 2016  This revealed multiple areas of artifact in the supra and infratentorial spaces consistent with hemorrhagic metastasis    The largest lesion was in the frontal lobe and is significantly smaller with only a tiny enhancing component remaining  No new intra-axial lesions were reported  Patient had another CT scan of the chest abdomen and pelvis for close monitoring on June 5th 2018 that was compared with prior study from January 2018  He continues to have a 1 3 cm left lung apex pulmonary nodule with surrounding fibrosis which is stable in appearance  He also has a stable appearance of the treated medial left renal cortex lesion  There is no evidence of metastatic disease within the chest abdomen or pelvis  After lengthy discussion June 2018 patient stated that he is not interested in continuing his maintenance Alimta treatment beyond today's treatment since his CT scan showed stable disease for a long time he has been on Alimta for 2 years  He was told that he seems to be in complete remission but there is a chance that the cancer may reoccur  Patient was interested in the watch and wait type of approach instead of continuing active maintenance Alimta treatment  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 ([facility] [address] )    5/2015 Initial Diagnosis     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)  Stage IV      5/5/2015 - 5/20/2015 Radiation     3150 cGy to large right frontoparietal mass; 3000 cGy to other tumor volumes in brainstem, and bilateral cerebral hemispheres  Radiation oncologist:  Dr Tricia Mclaughlin      3/17/2016 - 4/12/2016 Radiation     Left upper lobe lesion, 4675 cGy  Radiation oncologist: Dr Tricia Mclaughlin       Chemotherapy     1  Alimta and carboplatin:   05/20/2015 through 08/12/2015 (5 cycles total)  2     Maintenance Alimta:   10/21/2015 through 06/12/2018 (43 total Treatment doses)        Renal cell carcinoma of left kidney ([facility] [address] )    7/2016 Initial Diagnosis     Renal cell carcinoma of left kidney [facility])  S/p cryoablation of the left kidney lesion in September of 2016 @ [facility]-Dr Lee Uriarte           Interval history:    ROS: Review of Systems   Constitutional: Positive for fever  Negative for activity change, appetite change, chills, fatigue and unexpected weight change  HENT: Positive for congestion  Negative for mouth sores, nosebleeds, sore throat and trouble swallowing  Eyes: Negative  Respiratory: Negative for cough, chest tightness and shortness of breath  Cardiovascular: Negative for chest pain, palpitations and leg swelling  Gastrointestinal: Negative for abdominal distention, abdominal pain, blood in stool, constipation, diarrhea, nausea and vomiting  Genitourinary: Negative for difficulty urinating, dysuria, frequency, hematuria and urgency  Musculoskeletal: Positive for myalgias (1/10)  Negative for arthralgias, back pain, gait problem and joint swelling  Skin: Positive for color change (Hands reddened scaly from cold weather/working on cars)  Negative for pallor and rash  Neurological: Negative for dizziness, weakness, light-headedness, numbness and headaches  Hematological: Negative for adenopathy  Occasional bruising to the upper extremities from trauma working on cars   Psychiatric/Behavioral: Negative for dysphoric mood and sleep disturbance  The patient is not nervous/anxious          Past Medical History:   Diagnosis Date    Balance problems     and" coordination issues/best to walk slow"    Bladder stones     Dysphagia 02/16/2017    "not right now but in the past"    Environmental and seasonal allergies     Exercise involving walking     "approx 1 mile 3x/week"    Eye injury     right,  "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"    Full dentures     Hemiparesis ([facility] [address] ) 04/27/2015    left weakness,"due to tumor on brain"    History of brain tumor     "had radiation for it"    History of cancer chemotherapy     last tx  5/22/18    History of GI bleed 2015    History of kidney cancer     left    History of kidney stones     History of radiation therapy     History of transfusion     5 units of blood 2015  Hydrocele 11/30/2010    Inguinal hernia, bilateral     Joint stiffness of multiple sites     Paresthesia 05/01/2015    Portacath in place     right chest    Primary malignant neoplasm of lung ([facility] [address] ) 05/26/2015    Renal carcinoma, left ([facility] ) 10/17/2016    Risk for falls     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([facility] ) 05/26/2015    Skin cancer     Syncopal episodes 09/01/2015    2    Wears glasses        Past Surgical History:   Procedure Laterality Date    BLADDER STONE REMOVAL      COLONOSCOPY      DENTAL SURGERY  2011    DENTAL EXTACTIONS     ESOPHAGOGASTRODUODENOSCOPY      HYDROCELE EXCISION / REPAIR Left 01/01/2011    KIDNEY STONE SURGERY      KIDNEY SURGERY      cancer of kidney/cryo of cancer    PORTACATH PLACEMENT      MA COLONOSCOPY FLX DX W/COLLJ SPEC WHEN PFRMD N/A 8/7/2018    Procedure: COLONOSCOPY with polypectomy ;  Surgeon: Mandeep Vázquez MD;  [facility];   Service: General    MA LAP, RECURRENT INCISIONAL HERNIA REPAIR,INCARCERATED Bilateral 8/8/2018    Procedure: REPAIR HERNIA INGUINAL LAPAROSCOPIC W/ ROBOTICS W/ MESH;  Surgeon: Mandeep Vázquez MD;  [facility];  Service: General    MA REMOVE BLADDER STONE,<2 5 CM N/A 11/27/2018    Procedure: Gwendel [place] HOLMIUM LASER,BLADDE BIOPSY;  Surgeon: Devin Pichardo MD;  [facility];  Service: Urology    SKIN BIOPSY      SKIN CANCER EXCISION      17 surgeries, basal cell    TONSILLECTOMY         Social History     Socioeconomic History    Marital status:      Spouse name: None    Number of children: None    Years of education: None    Highest education level: None   Occupational History    None   Social Needs    Financial resource strain: None    Food insecurity:     Worry: None     Inability: None    Transportation needs:     Medical: None     Non-medical: None   Tobacco Use    Smoking status: Current Every Day Smoker     Packs/day: 0 50     Years: 48 00 Pack years: 24 00     Types: Cigarettes    Smokeless tobacco: Current User   Substance and Sexual Activity    Alcohol use: Yes     Comment: "3x per year"    Drug use: No    Sexual activity: None   Lifestyle    Physical activity:     Days per week: None     Minutes per session: None    Stress: None   Relationships    Social connections:     Talks on phone: None     Gets together: None     Attends Voodoo service: None     Active member of club or organization: None     Attends meetings of clubs or organizations: None     Relationship status: None    Intimate partner violence:     Fear of current or ex partner: None     Emotionally abused: None     Physically abused: None     Forced sexual activity: None   Other Topics Concern    None   Social History Narrative    None       Family History   Problem Relation Age of Onset    Cancer Mother         EYE    Cancer Father     Colon cancer Father     Cancer Brother        No Known Allergies      Current Outpatient Medications:     Ascorbic Acid (VITAMIN C) 500 MG CAPS, Take 500 mg by mouth daily  , Disp: , Rfl:     folic acid (FOLVITE) 1 mg tablet, Take 1 mg by mouth daily  , Disp: , Rfl:     Multiple Vitamins-Minerals (MULTIVITAMIN ADULT PO), Take 1 tablet by mouth daily  , Disp: , Rfl:       Physical Exam:  /78 (BP Location: Left arm, Patient Position: Sitting, Cuff Size: Adult)   Pulse 95   Temp (!) 97 2 °F (36 2 °C) (Tympanic)   Resp 16   Ht 5' 10" (1 778 m)   Wt 72 6 kg (160 lb)   SpO2 100%   BMI 22 96 kg/m²     Physical Exam   Constitutional: He is oriented to person, place, and time  He appears well-developed and well-nourished  No distress  HENT:   Head: Normocephalic and atraumatic  Mouth/Throat: Oropharynx is clear and moist  No oropharyngeal exudate  Eyes: Pupils are equal, round, and reactive to light  Conjunctivae are normal  No scleral icterus  Neck: Normal range of motion  Neck supple  No thyromegaly present  Cardiovascular: Normal rate, regular rhythm, normal heart sounds and intact distal pulses  No murmur heard  Pulmonary/Chest: Effort normal and breath sounds normal  No respiratory distress  Abdominal: Soft  Bowel sounds are normal  He exhibits no distension  There is no hepatosplenomegaly  There is no tenderness  Musculoskeletal: Normal range of motion  He exhibits no edema  Lymphadenopathy:     He has no cervical adenopathy  He has no axillary adenopathy  Neurological: He is alert and oriented to person, place, and time  Skin: Skin is warm and dry  No rash noted  He is not diaphoretic  No erythema  No pallor  Psychiatric: His behavior is normal  Judgment and thought content normal  His affect is blunt  Vitals reviewed  Labs:  Lab Results   Component Value Date    WBC 9 90 12/12/2019    HGB 14 7 12/12/2019    HCT 43 5 12/12/2019    MCV 95 12/12/2019     12/12/2019     Lab Results   Component Value Date     06/11/2018    K 3 9 12/12/2019     12/12/2019    CO2 25 12/12/2019    ANIONGAP 11 06/11/2018    BUN 13 12/12/2019    CREATININE 0 84 12/12/2019    GLUCOSE 163 (H) 06/11/2018    GLUF 82 12/12/2019    CALCIUM 8 8 12/12/2019    AST 24 12/12/2019    ALT 40 12/12/2019    ALKPHOS 75 12/12/2019    PROT 6 8 06/11/2018    BILITOT 0 9 06/11/2018    EGFR 91 12/12/2019         Patient voiced understanding and agreement in the above discussion  Aware to contact our office with questions/symptoms in the interim  This note has been generated by voice recognition software system  Therefore, there may be spelling, grammar, and or syntax errors  Please contact if questions arise

## 2020-01-14 ENCOUNTER — HOSPITAL ENCOUNTER (OUTPATIENT)
Dept: MRI IMAGING | Facility: HOSPITAL | Age: 70
Discharge: HOME | End: 2020-01-14
Payer: MEDICARE

## 2020-01-14 ENCOUNTER — APPOINTMENT (OUTPATIENT)
Dept: LAB | Facility: HOSPITAL | Age: 70
End: 2020-01-14
Attending: INTERNAL MEDICINE
Payer: MEDICARE

## 2020-01-14 DIAGNOSIS — C34.12 PRIMARY MALIGNANT NEOPLASM OF BRONCHUS OF LEFT UPPER LOBE (HCC): ICD-10-CM

## 2020-01-14 DIAGNOSIS — C34.12 PRIMARY MALIGNANT NEOPLASM OF BRONCHUS OF LEFT UPPER LOBE (HCC): Primary | ICD-10-CM

## 2020-01-14 DIAGNOSIS — K86.2 PANCREATIC CYST: ICD-10-CM

## 2020-01-14 LAB
ALBUMIN SERPL BCP-MCNC: 3.8 G/DL (ref 3.5–5)
ALP SERPL-CCNC: 122 U/L (ref 46–116)
ALT SERPL W P-5'-P-CCNC: 44 U/L (ref 12–78)
ANION GAP SERPL CALCULATED.3IONS-SCNC: 8 MMOL/L (ref 4–13)
AST SERPL W P-5'-P-CCNC: 22 U/L (ref 5–45)
BASOPHILS # BLD AUTO: 0.06 THOUSANDS/ΜL (ref 0–0.1)
BASOPHILS NFR BLD AUTO: 1 % (ref 0–1)
BILIRUB SERPL-MCNC: 0.61 MG/DL (ref 0.2–1)
BUN SERPL-MCNC: 15 MG/DL (ref 5–25)
CALCIUM SERPL-MCNC: 9.4 MG/DL (ref 8.3–10.1)
CHLORIDE SERPL-SCNC: 103 MMOL/L (ref 100–108)
CO2 SERPL-SCNC: 29 MMOL/L (ref 21–32)
CREAT SERPL-MCNC: 0.86 MG/DL (ref 0.6–1.3)
EOSINOPHIL # BLD AUTO: 0.16 THOUSAND/ΜL (ref 0–0.61)
EOSINOPHIL NFR BLD AUTO: 1 % (ref 0–6)
ERYTHROCYTE [DISTWIDTH] IN BLOOD BY AUTOMATED COUNT: 12.5 % (ref 11.6–15.1)
GFR SERPL CREATININE-BSD FRML MDRD: 90 ML/MIN/1.73SQ M
GLUCOSE SERPL-MCNC: 68 MG/DL (ref 65–140)
HCT VFR BLD AUTO: 49.4 % (ref 36.5–49.3)
HGB BLD-MCNC: 15.9 G/DL (ref 12–17)
IMM GRANULOCYTES # BLD AUTO: 0.06 THOUSAND/UL (ref 0–0.2)
IMM GRANULOCYTES NFR BLD AUTO: 1 % (ref 0–2)
LYMPHOCYTES # BLD AUTO: 2.96 THOUSANDS/ΜL (ref 0.6–4.47)
LYMPHOCYTES NFR BLD AUTO: 25 % (ref 14–44)
MCH RBC QN AUTO: 31.6 PG (ref 26.8–34.3)
MCHC RBC AUTO-ENTMCNC: 32.2 G/DL (ref 31.4–37.4)
MCV RBC AUTO: 98 FL (ref 82–98)
MONOCYTES # BLD AUTO: 0.66 THOUSAND/ΜL (ref 0.17–1.22)
MONOCYTES NFR BLD AUTO: 6 % (ref 4–12)
NEUTROPHILS # BLD AUTO: 8.13 THOUSANDS/ΜL (ref 1.85–7.62)
NEUTS SEG NFR BLD AUTO: 66 % (ref 43–75)
NRBC BLD AUTO-RTO: 0 /100 WBCS
PLATELET # BLD AUTO: 214 THOUSANDS/UL (ref 149–390)
PMV BLD AUTO: 9.9 FL (ref 8.9–12.7)
POTASSIUM SERPL-SCNC: 4 MMOL/L (ref 3.5–5.3)
PROT SERPL-MCNC: 7.3 G/DL (ref 6.4–8.2)
RBC # BLD AUTO: 5.03 MILLION/UL (ref 3.88–5.62)
SODIUM SERPL-SCNC: 140 MMOL/L (ref 136–145)
WBC # BLD AUTO: 12.03 THOUSAND/UL (ref 4.31–10.16)

## 2020-01-14 PROCEDURE — 80053 COMPREHEN METABOLIC PANEL: CPT

## 2020-01-14 PROCEDURE — 85025 COMPLETE CBC W/AUTO DIFF WBC: CPT

## 2020-01-14 PROCEDURE — 36415 COLL VENOUS BLD VENIPUNCTURE: CPT

## 2020-01-14 PROCEDURE — 74183 MRI ABD W/O CNTR FLWD CNTR: CPT

## 2020-01-14 PROCEDURE — A9585 GADOBUTROL INJECTION: HCPCS | Performed by: NURSE PRACTITIONER

## 2020-01-14 RX ADMIN — GADOBUTROL 7 ML: 604.72 INJECTION INTRAVENOUS at 14:07

## 2020-01-20 ENCOUNTER — OFFICE VISIT (OUTPATIENT)
Dept: HEMATOLOGY ONCOLOGY | Facility: CLINIC | Age: 70
End: 2020-01-20
Payer: MEDICARE

## 2020-01-20 ENCOUNTER — DOCUMENTATION (OUTPATIENT)
Dept: FAMILY MEDICINE CLINIC | Facility: CLINIC | Age: 70
End: 2020-01-20

## 2020-01-20 VITALS
HEART RATE: 95 BPM | BODY MASS INDEX: 22.71 KG/M2 | WEIGHT: 158.6 LBS | OXYGEN SATURATION: 97 % | RESPIRATION RATE: 18 BRPM | HEIGHT: 70 IN | DIASTOLIC BLOOD PRESSURE: 82 MMHG | SYSTOLIC BLOOD PRESSURE: 118 MMHG | TEMPERATURE: 96.2 F

## 2020-01-20 DIAGNOSIS — C64.2 RENAL CELL CARCINOMA OF LEFT KIDNEY (HCC): ICD-10-CM

## 2020-01-20 DIAGNOSIS — C79.31 SECONDARY MALIGNANT NEOPLASM OF BRAIN AND SPINAL CORD (HCC): ICD-10-CM

## 2020-01-20 DIAGNOSIS — C79.49 SECONDARY MALIGNANT NEOPLASM OF BRAIN AND SPINAL CORD (HCC): ICD-10-CM

## 2020-01-20 DIAGNOSIS — C34.12 PRIMARY MALIGNANT NEOPLASM OF BRONCHUS OF LEFT UPPER LOBE (HCC): Primary | ICD-10-CM

## 2020-01-20 PROBLEM — Z85.51 HISTORY OF BLADDER CANCER: Status: RESOLVED | Noted: 2019-05-29 | Resolved: 2020-01-20

## 2020-01-20 PROCEDURE — 99214 OFFICE O/P EST MOD 30 MIN: CPT | Performed by: INTERNAL MEDICINE

## 2020-01-20 NOTE — PROGRESS NOTES
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 [facility])  Patient does not seem to have any hint of recurrence of his metastatic lung cancer  We will continue to monitor her the changes mention on the CT scan with another CT scan in 6 months from now  I did discuss with him extensively smoking cessation which he does not seem to be interested in at this point in time  - CBC and differential; Future  - Comprehensive metabolic panel; Future  - Magnesium; Future  - CT chest abdomen pelvis w wo contrast; Future    2  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[facility])  He did have resection of the brain mass at the right frontoparietal area and radiation treatment to the brain in 2015  He denied any neurological symptoms  3  Renal cell carcinoma of left kidney (HCC)  There is no hint of recurrence or metastatic disease from the left the renal cell carcinoma which was treated with cryoablation in 2016  - Comprehensive metabolic panel; Future  - CT chest abdomen pelvis w wo contrast; Future        HPI:  The patient came today for a follow-up visit  He did complain about some GI symptoms which started couple days ago including diarrhea  He continues to smoke on a daily basis  He had a follow-up CT scan of the chest abdomen pelvis on 12/12/2019 which showed:IMPRESSION:  1 Stable linear scarring with focal area of associated nodularity at the left lung apex likely reflecting postoperative scarring/treatment related change  This is without significant change from December 7, 2018 exam   Continued attention is recommended   to this area on follow-up to ensure stability  2   Stable 2 mm left upper lobe pulmonary nodule   Based on current Fleischner Society 2017 Guidelines on incidental pulmonary nodule, patients with a known malignancy are at increased risk of metastasis and should receive followup CT at intervals   appropriate for the type of cancer and its risk of pulmonary metastases  3   Stable emphysematous changes  4   No evidence of metastatic disease in the abdomen or pelvis  5   Stable bilateral renal cysts  6   Stable coarse calcifications in the bladder with stable mild posterior bladder wall thickening  7   10 mm cystic lesion in the pancreatic head  Recommend further characterization this finding with MRCP/MRI pancreas  The pancreatic head the cystic lesion was then evaluated with an MRI of the abdomen on 01/14/2020 which showed:IMPRESSION:     The 10 mm pancreatic head cyst seen on the 12/12/2019 CT is now best seen on postcontrast series #15/59 and #14/84 with maximum diameter of 8 mm       The cryoablated exophytic biopsy-proven renal cell carcinoma is now unchanged in size measuring 24 mm in greatest diameter  His most recent blood work on 01/14/2020 showed elevated white cell count of 12 0 with high normal hemoglobin hematocrit and normal platelets  He seems to have elevated absolute neutrophil count of 8 1  His CMP is entirely normal   The patient is otherwise relatively asymptomatic  Oncology History    Patient has a history of tobacco use for more than 40 years and history of 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 which was diagnosed in May of 2015  At that time the patient was found to have multiple brain lesions status post whole brain radiation  He then received 6 cycles of palliative chemotherapy with Alimta and carboplatin  After 5 cycles of treatment he had significant GI bleeding which required ICU hospitalization  After completion of palliative chemotherapy he was maintained on Alimta since October 2015  A brain MRI January 2016 showed multiple bilateral hemorrhagic lesions felt not to be due to malignant process  PET scan January 2016 showed a 1 6 cm metabolically active left lung apical lesion  The patient received radiation treatment palliatively under the care of Dr En Childers to the left apical lung lesion and completed it in April of 2016  He was then restarted on maintenance Alimta  He had an MRI of the abdomen on June 23, 2016 which showed a 3 cm cystic mass with a solid component on the posterior medial aspect of the left kidney, compatible with malignant process  A core biopsy was taking from the left kidney July 12, 2016 which was 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  The patient was then treated with cryotherapy of the left kidney lesion in September of 2016  Patient had a PET-CT scan on March 3, 2017 which was compared to the prior PET scan from November 2016  At this time there is no evidence of residual or recurrent neoplastic disease anywhere in his body  Patient had a CT scan of the chest abdomen and pelvis on August 24, 2017 which showed a 1 3 cm left upper lobe mass versus scarring with adjacent radiation fibrosis  Patient had another MRI of the brain January 8, 2018 which was compared to the previous MRI of the brain in January 2016  This revealed multiple areas of artifact in the supra and infratentorial spaces consistent with hemorrhagic metastasis  The largest lesion was in the frontal lobe and is significantly smaller with only a tiny enhancing component remaining  No new intra-axial lesions were reported  Patient had another CT scan of the chest abdomen and pelvis for close monitoring on June 5th 2018 that was compared with prior study from January 2018  He continues to have a 1 3 cm left lung apex pulmonary nodule with surrounding fibrosis which is stable in appearance  He also has a stable appearance of the treated medial left renal cortex lesion  There is no evidence of metastatic disease within the chest abdomen or pelvis      After lengthy discussion June 2018 patient stated that he is not interested in continuing his maintenance Alimta treatment beyond today's treatment since his CT scan showed stable disease for a long time he has been on Alimta for 2 years  He was told that he seems to be in complete remission but there is a chance that the cancer may reoccur  Patient was interested in the watch and wait type of approach instead of continuing active maintenance Alimta treatment  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 ([facility] [address] )    5/2015 Initial Diagnosis     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)  Stage IV      5/5/2015 - 5/20/2015 Radiation     3150 cGy to large right frontoparietal mass; 3000 cGy to other tumor volumes in brainstem, and bilateral cerebral hemispheres  Radiation oncologist:  Dr Larry Julio      3/17/2016 - 4/12/2016 Radiation     Left upper lobe lesion, 4675 cGy  Radiation oncologist: Dr Larry Julio       Chemotherapy     1  Alimta and carboplatin:   05/20/2015 through 08/12/2015 (5 cycles total)  2  Maintenance Alimta:   10/21/2015 through 06/12/2018 (43 total Treatment doses)        Renal cell carcinoma of left kidney ([facility] [address] )    7/2016 Initial Diagnosis     Renal cell carcinoma of left kidney [facility])  S/p cryoablation of the left kidney lesion in September of 2016 @ [facility]-Dr Luis Fernandez           Interval history:    ROS: Review of Systems   Constitutional: Positive for fatigue  Negative for appetite change, diaphoresis and fever  HENT: Negative for congestion, dental problem, facial swelling, hearing loss, tinnitus and trouble swallowing  Eyes: Negative for visual disturbance  Respiratory: Positive for cough  Negative for chest tightness, shortness of breath and wheezing  Cardiovascular: Negative for chest pain and leg swelling  Gastrointestinal: Negative for abdominal distention, abdominal pain, blood in stool, constipation, diarrhea, nausea and vomiting  Genitourinary: Negative for dysuria, hematuria and urgency     Musculoskeletal: Negative for arthralgias, myalgias and neck pain  Skin: Positive for rash  Negative for color change, pallor and wound  Neurological: Positive for numbness  Negative for dizziness, weakness and headaches  Hematological: Negative for adenopathy  Psychiatric/Behavioral: Negative for agitation, behavioral problems, confusion, hallucinations, self-injury and sleep disturbance  The patient is not nervous/anxious and is not hyperactive          Past Medical History:   Diagnosis Date    Balance problems     and" coordination issues/best to walk slow"    Bladder stones     Dysphagia 02/16/2017    "not right now but in the past"    Environmental and seasonal allergies     Exercise involving walking     "approx 1 mile 3x/week"    Eye injury     right,  "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"    Full dentures     Hemiparesis ([facility] [address] ) 04/27/2015    left weakness,"due to tumor on brain"    History of brain tumor     "had radiation for it"    History of cancer chemotherapy     last tx  5/22/18    History of GI bleed 2015    History of kidney cancer     left    History of kidney stones     History of radiation therapy     History of transfusion     5 units of blood 2015    Hydrocele 11/30/2010    Inguinal hernia, bilateral     Joint stiffness of multiple sites     Paresthesia 05/01/2015    Portacath in place     right chest    Primary malignant neoplasm of lung ([facility] [address] ) 05/26/2015    Renal carcinoma, left ([facility] [address] ) 10/17/2016    Risk for falls     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([facility] [address] ) 05/26/2015    Skin cancer     Syncopal episodes 09/01/2015    2    Wears glasses        Past Surgical History:   Procedure Laterality Date    BLADDER STONE REMOVAL      COLONOSCOPY      DENTAL SURGERY  2011    DENTAL EXTACTIONS     ESOPHAGOGASTRODUODENOSCOPY      HYDROCELE EXCISION / REPAIR Left 01/01/2011    KIDNEY STONE SURGERY      KIDNEY SURGERY      cancer of kidney/cryo of cancer    PORTACATH PLACEMENT      TN COLONOSCOPY FLX DX W/COLLJ SPEC WHEN PFRMD N/A 8/7/2018    Procedure: COLONOSCOPY with polypectomy ;  Surgeon: Anshul Ponce MD;  Location: [facility];   Service: General    KS LAP, RECURRENT INCISIONAL HERNIA REPAIR,INCARCERATED Bilateral 8/8/2018    Procedure: REPAIR HERNIA INGUINAL LAPAROSCOPIC W/ ROBOTICS W/ MESH;  Surgeon: Anshul Ponce MD;  Location: [facility];  Service: General    KS REMOVE BLADDER STONE,<2 5 CM N/A 11/27/2018    Procedure: Ludy Sindy HOLMIUM LASER,BLADDE BIOPSY;  Surgeon: Simon Reynolds MD;  Location: [facility];  Service: Urology    SKIN BIOPSY      SKIN CANCER EXCISION      17 surgeries, basal cell    TONSILLECTOMY         Social History     Socioeconomic History    Marital status:      Spouse name: None    Number of children: None    Years of education: None    Highest education level: None   Occupational History    None   Social Needs    Financial resource strain: None    Food insecurity:     Worry: None     Inability: None    Transportation needs:     Medical: None     Non-medical: None   Tobacco Use    Smoking status: Current Every Day Smoker     Packs/day: 0 50     Years: 48 00     Pack years: 24 00     Types: Cigarettes    Smokeless tobacco: Current User   Substance and Sexual Activity    Alcohol use: Yes     Comment: "3x per year"    Drug use: No    Sexual activity: None   Lifestyle    Physical activity:     Days per week: None     Minutes per session: None    Stress: None   Relationships    Social connections:     Talks on phone: None     Gets together: None     Attends [employer] service: None     Active member of club or organization: None     Attends meetings of clubs or organizations: None     Relationship status: None    Intimate partner violence:     Fear of current or ex partner: None     Emotionally abused: None     Physically abused: None     Forced sexual activity: None   Other Topics Concern    None   Social History Narrative    None       Family History   Problem Relation Age of Onset   True Zamora Cancer Mother         EYE    Cancer Father     Colon cancer Father     Cancer Brother        No Known Allergies      Current Outpatient Medications:     Ascorbic Acid (VITAMIN C) 500 MG CAPS, Take 500 mg by mouth daily  , Disp: , Rfl:     folic acid (FOLVITE) 1 mg tablet, Take 1 mg by mouth daily  , Disp: , Rfl:     Multiple Vitamins-Minerals (MULTIVITAMIN ADULT PO), Take 1 tablet by mouth daily  , Disp: , Rfl:       Physical Exam:  /82 (BP Location: Left arm, Cuff Size: Adult)   Pulse 95   Temp (!) 96 2 °F (35 7 °C) (Tympanic)   Resp 18   Ht 5' 10" (1 778 m)   Wt 71 9 kg (158 lb 9 6 oz)   SpO2 97%   BMI 22 76 kg/m²     Physical Exam   Constitutional: He is oriented to person, place, and time  He appears well-developed and well-nourished  HENT:   Head: Normocephalic and atraumatic  Nose: Nose normal    Mouth/Throat: Oropharynx is clear and moist    Eyes: Pupils are equal, round, and reactive to light  Conjunctivae and EOM are normal  Right eye exhibits no discharge  Left eye exhibits no discharge  No scleral icterus  Neck: Normal range of motion  Neck supple  No tracheal deviation present  No thyromegaly present  Cardiovascular: Normal rate, regular rhythm and normal heart sounds  Exam reveals no friction rub  No murmur heard  Pulmonary/Chest: Effort normal and breath sounds normal  No respiratory distress  He has no wheezes  He has no rales  He exhibits no tenderness  Abdominal: Soft  Bowel sounds are normal  He exhibits no distension and no mass  There is no hepatosplenomegaly, splenomegaly or hepatomegaly  There is no tenderness  There is no rebound and no guarding  Musculoskeletal: Normal range of motion  He exhibits no edema, tenderness or deformity  Lymphadenopathy:     He has no cervical adenopathy  Neurological: He is alert and oriented to person, place, and time  He has normal reflexes   He displays normal reflexes  No cranial nerve deficit  Coordination normal    Skin: Skin is warm and dry  No rash noted  No erythema  No pallor  Psychiatric: He has a normal mood and affect  His behavior is normal  Judgment and thought content normal          Labs:  Lab Results   Component Value Date    WBC 12 03 (H) 01/14/2020    HGB 15 9 01/14/2020    HCT 49 4 (H) 01/14/2020    MCV 98 01/14/2020     01/14/2020     Lab Results   Component Value Date     06/11/2018    K 4 0 01/14/2020     01/14/2020    CO2 29 01/14/2020    ANIONGAP 11 06/11/2018    BUN 15 01/14/2020    CREATININE 0 86 01/14/2020    GLUCOSE 163 (H) 06/11/2018    GLUF 82 12/12/2019    CALCIUM 9 4 01/14/2020    AST 22 01/14/2020    ALT 44 01/14/2020    ALKPHOS 122 (H) 01/14/2020    PROT 6 8 06/11/2018    BILITOT 0 9 06/11/2018    EGFR 90 01/14/2020     No results found for: TSH    Patient voiced understanding and agreement in the above discussion  Aware to contact our office with questions/symptoms in the interim

## 2020-01-24 ENCOUNTER — OFFICE VISIT (OUTPATIENT)
Dept: FAMILY MEDICINE CLINIC | Facility: CLINIC | Age: 70
End: 2020-01-24
Payer: MEDICARE

## 2020-01-24 VITALS
HEIGHT: 70 IN | WEIGHT: 156.5 LBS | DIASTOLIC BLOOD PRESSURE: 70 MMHG | BODY MASS INDEX: 22.41 KG/M2 | SYSTOLIC BLOOD PRESSURE: 110 MMHG

## 2020-01-24 DIAGNOSIS — C79.49 SECONDARY MALIGNANT NEOPLASM OF BRAIN AND SPINAL CORD (HCC): ICD-10-CM

## 2020-01-24 DIAGNOSIS — N13.8 BPH WITH URINARY OBSTRUCTION: ICD-10-CM

## 2020-01-24 DIAGNOSIS — N40.1 BPH WITH URINARY OBSTRUCTION: ICD-10-CM

## 2020-01-24 DIAGNOSIS — C34.12 PRIMARY MALIGNANT NEOPLASM OF BRONCHUS OF LEFT UPPER LOBE (HCC): ICD-10-CM

## 2020-01-24 DIAGNOSIS — C79.31 SECONDARY MALIGNANT NEOPLASM OF BRAIN AND SPINAL CORD (HCC): ICD-10-CM

## 2020-01-24 DIAGNOSIS — C64.2 RENAL CELL CARCINOMA OF LEFT KIDNEY (HCC): ICD-10-CM

## 2020-01-24 DIAGNOSIS — Z11.59 SCREENING FOR VIRAL DISEASE: Primary | ICD-10-CM

## 2020-01-24 PROBLEM — N39.41 URGE INCONTINENCE: Status: RESOLVED | Noted: 2018-10-22 | Resolved: 2020-01-24

## 2020-01-24 PROCEDURE — 99214 OFFICE O/P EST MOD 30 MIN: CPT | Performed by: FAMILY MEDICINE

## 2020-01-24 NOTE — PATIENT INSTRUCTIONS
Done forget about re-doing colonoscopy toward the end of this year or early next year  Consider getting the Pneumovax 23 completed in the next several weeks  That can be done at the drugstore or here  WE RECOMMEND GETTING THE 2- DOSE SHINGLES VACCINE ([address]) FROM YOUR PHARMACY  CHECK WITH THEM ON THE COST  YOU SHOULD HAVE THIS IF OVER AGE 48, EVEN IF YOU HAVE HAD THE ORIGINAL VACCINE (ZOSTRIX)  MEASURES THAT HELP PREVENT FALLS:    DRINK PLENTY OF FLUIDS : 2-3 QTS PER DAY : URINE SHOULD BE LIGHT COLOR  GET REGULAR EYE CHECK UP  USE NIGHT LIGHTS  ELIMINATE ALL THROW RUGS  DO SOME WALKING EVERY DAY AND BALANCE EXERCISES  USE NON SLIP FOOT WEARBATH MATS AND GRAB BARS HELP  WHEN RISING: ALWAYS WAIT FOR 15-20 SECONDS BEFORE INITIATING WALK  CONSIDER DOING YOGA OR PRINCE [facility] [employer] TIPS ON EXERCISE:    IF YOU HAVE CONDITIONS THAT PREVENT AEROBIC EXERCISE:    WALK 30 MINUTES AT LEAST 5 DAYS PER WEEK; MAY BREAK IT UP INTO 10-  15 MINUTE SESSIONS   GET SOME RESISTANCE EXERCISES USING THE MAJOR MUSCLE GROUPS 2-3 TIMES PER WEEK     IF YOU ARE PHYSICALLY ABLE:    TRY TO GET 10,000 STEPS 3 TIMES PER WEEK  PLUS  GO "ONLINE" TO CHECK [employer] HEART RATE FOR YOUR AGE AND DO AEROBIC EXERCISES (JOG/STATIONARY BIKE/ELLIPTICAL) FOR 20-30 MINUTES 2-3 TIMES PER WEEK

## 2020-01-24 NOTE — PROGRESS NOTES
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([facility] [address] )    Renal cell carcinoma of left kidney (HCC)    BPH with urinary obstruction          Subjective:      Patient ID: Luis Enrique Crain is a 81 y o  male  PATIENT RETURNS FOR FOLLOW-UP OF CHRONIC MEDICAL CONDITIONS  NO HOSPITAL STAYS OR EMERGENCY VISITS RECENTLY  MEDS WERE REVIEWED AND NO SIDE EFFECTS  NO NEW ISSUES  UNLESS NOTED BELOW  NO NEW MEDICAL PROVIDER REPORTED  THE CHRONIC DISEASES LISTED ABOVE ARE STABLE AND UNCHANGED/ THE PLAN OF CARE FOR THOSE WILL REMAIN UNCHANGED UNLESS NOTED BELOW  Recent oncology appt good : CÉSAR             The following portions of the patient's history were reviewed and updated as appropriate: allergies, current medications, past family history, past medical history, past social history, past surgical history and problem list     Review of Systems   Constitutional: Negative for activity change and appetite change  HENT: Negative for trouble swallowing  Eyes: Negative for visual disturbance  Respiratory: Negative for cough and shortness of breath  Cardiovascular: Negative for chest pain, palpitations and leg swelling  Gastrointestinal: Negative for abdominal pain and blood in stool  Endocrine: Negative for polyuria  Genitourinary: Negative for difficulty urinating and hematuria  Skin: Negative for rash  Neurological: Negative for dizziness  Psychiatric/Behavioral: Negative for behavioral problems           Objective:  Vitals:    01/24/20 0804   BP: 110/70   BP Location: Left arm   Patient Position: Sitting   Cuff Size: Adult   Weight: 71 kg (156 lb 8 oz)   Height: 5' 10" (1 778 m)      Physical Exam   Constitutional: He appears well-developed and well-nourished  HENT:   Head: Normocephalic and atraumatic  Eyes: Conjunctivae are normal    Neck: Neck supple  No thyromegaly present  Cardiovascular: Normal rate, regular rhythm, normal heart sounds and intact distal pulses  No murmur heard  Pulmonary/Chest: Effort normal and breath sounds normal  No respiratory distress  Musculoskeletal: He exhibits no edema  Lymphadenopathy:     He has no cervical adenopathy  Skin: Skin is warm and dry  Psychiatric: He has a normal mood and affect  His behavior is normal          Patient's chronic problems that were reviewed today are stable  Recent hospital stays reviewed  Recent labs and imaging reviewed  Recent visits to other providers reviewed  Meds reviewed and no changes made  Appropriate labs and imaging were ordered  Preventive measures appropriate for age and sex were reviewed with patient  Immunizations were updated as appropriate

## 2020-01-27 ENCOUNTER — HOSPITAL ENCOUNTER (OUTPATIENT)
Dept: INFUSION CENTER | Facility: HOSPITAL | Age: 70
Discharge: HOME | End: 2020-01-27
Payer: MEDICARE

## 2020-01-27 DIAGNOSIS — C34.12 PRIMARY MALIGNANT NEOPLASM OF BRONCHUS OF LEFT UPPER LOBE (HCC): Primary | ICD-10-CM

## 2020-01-27 PROCEDURE — 96523 IRRIG DRUG DELIVERY DEVICE: CPT

## 2020-04-07 PROBLEM — Z45.2 ENCOUNTER FOR CENTRAL LINE CARE: Status: ACTIVE | Noted: 2020-04-07

## 2020-04-10 ENCOUNTER — HOSPITAL ENCOUNTER (OUTPATIENT)
Dept: INFUSION CENTER | Facility: HOSPITAL | Age: 70
Discharge: HOME | End: 2020-04-10
Payer: MEDICARE

## 2020-04-10 DIAGNOSIS — Z45.2 ENCOUNTER FOR CENTRAL LINE CARE: Primary | ICD-10-CM

## 2020-04-10 DIAGNOSIS — C34.12 PRIMARY MALIGNANT NEOPLASM OF BRONCHUS OF LEFT UPPER LOBE (HCC): ICD-10-CM

## 2020-04-10 PROCEDURE — 96523 IRRIG DRUG DELIVERY DEVICE: CPT

## 2020-05-29 ENCOUNTER — HOSPITAL ENCOUNTER (OUTPATIENT)
Dept: INFUSION CENTER | Facility: HOSPITAL | Age: 70
Discharge: HOME | End: 2020-05-29
Payer: MEDICARE

## 2020-05-29 DIAGNOSIS — C34.12 PRIMARY MALIGNANT NEOPLASM OF BRONCHUS OF LEFT UPPER LOBE (HCC): ICD-10-CM

## 2020-05-29 DIAGNOSIS — Z45.2 ENCOUNTER FOR CENTRAL LINE CARE: Primary | ICD-10-CM

## 2020-05-29 PROCEDURE — 96523 IRRIG DRUG DELIVERY DEVICE: CPT

## 2020-07-17 ENCOUNTER — HOSPITAL ENCOUNTER (OUTPATIENT)
Dept: INFUSION CENTER | Facility: HOSPITAL | Age: 70
Discharge: HOME | End: 2020-07-17
Payer: MEDICARE

## 2020-07-17 DIAGNOSIS — C64.2 RENAL CELL CARCINOMA OF LEFT KIDNEY (HCC): ICD-10-CM

## 2020-07-17 DIAGNOSIS — Z45.2 ENCOUNTER FOR CENTRAL LINE CARE: ICD-10-CM

## 2020-07-17 DIAGNOSIS — C34.12 PRIMARY MALIGNANT NEOPLASM OF BRONCHUS OF LEFT UPPER LOBE (HCC): Primary | ICD-10-CM

## 2020-07-17 LAB
ALBUMIN SERPL BCP-MCNC: 3.9 G/DL (ref 3–5.2)
ALP SERPL-CCNC: 105 U/L (ref 43–122)
ALT SERPL W P-5'-P-CCNC: 34 U/L (ref 9–52)
ANION GAP SERPL CALCULATED.3IONS-SCNC: 7 MMOL/L (ref 5–14)
AST SERPL W P-5'-P-CCNC: 26 U/L (ref 17–59)
BASOPHILS # BLD AUTO: 0.1 THOUSANDS/ΜL (ref 0–0.1)
BASOPHILS NFR BLD AUTO: 1 % (ref 0–1)
BILIRUB SERPL-MCNC: 1 MG/DL
BUN SERPL-MCNC: 13 MG/DL (ref 5–25)
CALCIUM SERPL-MCNC: 9.1 MG/DL (ref 8.4–10.2)
CHLORIDE SERPL-SCNC: 105 MMOL/L (ref 97–108)
CO2 SERPL-SCNC: 24 MMOL/L (ref 22–30)
CREAT SERPL-MCNC: 0.75 MG/DL (ref 0.7–1.5)
EOSINOPHIL # BLD AUTO: 0.1 THOUSAND/ΜL (ref 0–0.4)
EOSINOPHIL NFR BLD AUTO: 1 % (ref 0–6)
ERYTHROCYTE [DISTWIDTH] IN BLOOD BY AUTOMATED COUNT: 12.4 %
GFR SERPL CREATININE-BSD FRML MDRD: 94 ML/MIN/1.73SQ M
GLUCOSE SERPL-MCNC: 183 MG/DL (ref 70–99)
HCT VFR BLD AUTO: 44.2 % (ref 41–53)
HGB BLD-MCNC: 15 G/DL (ref 13.5–17.5)
LYMPHOCYTES # BLD AUTO: 2.5 THOUSANDS/ΜL (ref 0.5–4)
LYMPHOCYTES NFR BLD AUTO: 24 % (ref 25–45)
MAGNESIUM SERPL-MCNC: 2.1 MG/DL (ref 1.6–2.3)
MCH RBC QN AUTO: 31.6 PG (ref 26–34)
MCHC RBC AUTO-ENTMCNC: 34 G/DL (ref 31–36)
MCV RBC AUTO: 93 FL (ref 80–100)
MONOCYTES # BLD AUTO: 0.3 THOUSAND/ΜL (ref 0.2–0.9)
MONOCYTES NFR BLD AUTO: 3 % (ref 1–10)
NEUTROPHILS # BLD AUTO: 7.4 THOUSANDS/ΜL (ref 1.8–7.8)
NEUTS SEG NFR BLD AUTO: 71 % (ref 45–65)
PLATELET # BLD AUTO: 216 THOUSANDS/UL (ref 150–450)
PMV BLD AUTO: 8.7 FL (ref 8.9–12.7)
POTASSIUM SERPL-SCNC: 4.3 MMOL/L (ref 3.6–5)
PROT SERPL-MCNC: 6.8 G/DL (ref 5.9–8.4)
RBC # BLD AUTO: 4.75 MILLION/UL (ref 4.5–5.9)
SODIUM SERPL-SCNC: 136 MMOL/L (ref 137–147)
WBC # BLD AUTO: 10.4 THOUSAND/UL (ref 4.5–11)

## 2020-07-17 PROCEDURE — 80053 COMPREHEN METABOLIC PANEL: CPT

## 2020-07-17 PROCEDURE — 85025 COMPLETE CBC W/AUTO DIFF WBC: CPT

## 2020-07-17 PROCEDURE — 83735 ASSAY OF MAGNESIUM: CPT

## 2020-07-17 NOTE — PROGRESS NOTES
Pt here for port flush/central labs, port flushed per protocol and central labs drawn per order, made next appt and AVS provided

## 2020-07-20 ENCOUNTER — HOSPITAL ENCOUNTER (OUTPATIENT)
Dept: CT IMAGING | Facility: HOSPITAL | Age: 70
Discharge: HOME | End: 2020-07-20
Attending: INTERNAL MEDICINE
Payer: MEDICARE

## 2020-07-20 DIAGNOSIS — C34.12 PRIMARY MALIGNANT NEOPLASM OF BRONCHUS OF LEFT UPPER LOBE (HCC): ICD-10-CM

## 2020-07-20 DIAGNOSIS — C64.2 RENAL CELL CARCINOMA OF LEFT KIDNEY (HCC): ICD-10-CM

## 2020-07-20 PROCEDURE — 74177 CT ABD & PELVIS W/CONTRAST: CPT

## 2020-07-20 PROCEDURE — 71260 CT THORAX DX C+: CPT

## 2020-07-20 RX ADMIN — IOHEXOL 100 ML: 350 INJECTION, SOLUTION INTRAVENOUS at 09:29

## 2020-07-23 ENCOUNTER — TELEPHONE (OUTPATIENT)
Dept: HEMATOLOGY ONCOLOGY | Facility: CLINIC | Age: 70
End: 2020-07-23

## 2020-07-23 NOTE — TELEPHONE ENCOUNTER
Raj Rodriguez from radiology called in significant findings on the CT chest abdomen pelvis w contrast ordered by Dr Angel Schaeffer

## 2020-07-23 NOTE — TELEPHONE ENCOUNTER
Karli Doss, RN tasked and IM     7/20/20 CT C/A/P    IMPRESSION:     1  Stable left upper lobe peripheral opacity, likely chronic radiation changes  No new nodules or focal consolidations identified      2   Ill-defined subcentimeter hypoattenuating lesion within the liver, not clearly seen on prior imaging  Further evaluation with MRI is recommended  MRI performed with Eovist contrast may be useful to better delineate subtle metastasis                   The study was marked in EPIC for significant notification

## 2020-07-24 ENCOUNTER — OFFICE VISIT (OUTPATIENT)
Dept: FAMILY MEDICINE CLINIC | Facility: CLINIC | Age: 70
End: 2020-07-24
Payer: MEDICARE

## 2020-07-24 VITALS
SYSTOLIC BLOOD PRESSURE: 120 MMHG | DIASTOLIC BLOOD PRESSURE: 72 MMHG | TEMPERATURE: 98.1 F | HEIGHT: 70 IN | OXYGEN SATURATION: 98 % | WEIGHT: 153.7 LBS | HEART RATE: 94 BPM | BODY MASS INDEX: 22 KG/M2

## 2020-07-24 DIAGNOSIS — C79.49 SECONDARY MALIGNANT NEOPLASM OF BRAIN AND SPINAL CORD (HCC): ICD-10-CM

## 2020-07-24 DIAGNOSIS — N21.0 BLADDER STONE: ICD-10-CM

## 2020-07-24 DIAGNOSIS — C34.12 PRIMARY MALIGNANT NEOPLASM OF BRONCHUS OF LEFT UPPER LOBE (HCC): Primary | ICD-10-CM

## 2020-07-24 DIAGNOSIS — R73.9 ELEVATED BLOOD SUGAR: ICD-10-CM

## 2020-07-24 DIAGNOSIS — N40.1 BPH WITH URINARY OBSTRUCTION: ICD-10-CM

## 2020-07-24 DIAGNOSIS — N13.8 BPH WITH URINARY OBSTRUCTION: ICD-10-CM

## 2020-07-24 DIAGNOSIS — C79.31 SECONDARY MALIGNANT NEOPLASM OF BRAIN AND SPINAL CORD (HCC): ICD-10-CM

## 2020-07-24 PROCEDURE — 1160F RVW MEDS BY RX/DR IN RCRD: CPT | Performed by: FAMILY MEDICINE

## 2020-07-24 PROCEDURE — 99214 OFFICE O/P EST MOD 30 MIN: CPT | Performed by: FAMILY MEDICINE

## 2020-07-24 PROCEDURE — 4040F PNEUMOC VAC/ADMIN/RCVD: CPT | Performed by: FAMILY MEDICINE

## 2020-07-24 PROCEDURE — 3008F BODY MASS INDEX DOCD: CPT | Performed by: FAMILY MEDICINE

## 2020-07-24 NOTE — PROGRESS NOTES
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([facility] [address] )    Bladder stone    BPH with urinary obstruction    Elevated blood sugar          Subjective:      Patient ID: Liam Laura is a 78 y o  male  PATIENT RETURNS FOR FOLLOW-UP OF CHRONIC MEDICAL CONDITIONS  NO HOSPITAL STAYS OR EMERGENCY VISITS RECENTLY  MEDS WERE REVIEWED AND NO SIDE EFFECTS  NO NEW ISSUES  UNLESS NOTED BELOW  NO NEW MEDICAL PROVIDER REPORTED  THE CHRONIC DISEASES LISTED ABOVE ARE STABLE AND UNCHANGED/ THE PLAN OF CARE FOR THOSE WILL REMAIN UNCHANGED UNLESS NOTED BELOW  Still follows w/ Oncology       The following portions of the patient's history were reviewed and updated as appropriate: allergies, current medications, past family history, past medical history, past social history, past surgical history and problem list     Review of Systems   Constitutional: Negative for activity change and appetite change  HENT: Negative for trouble swallowing  Eyes: Negative for visual disturbance  Respiratory: Negative for cough and shortness of breath  Cardiovascular: Negative for chest pain, palpitations and leg swelling  Gastrointestinal: Negative for abdominal pain and blood in stool  Endocrine: Negative for polyuria  Genitourinary: Negative for difficulty urinating and hematuria  Skin: Negative for rash  Neurological: Negative for dizziness  Psychiatric/Behavioral: Negative for behavioral problems           Objective:  Vitals:    07/24/20 1026   BP: 120/72   BP Location: Left arm   Patient Position: Sitting   Cuff Size: Adult   Pulse: 94   Temp: 98 1 °F (36 7 °C)   TempSrc: Temporal   SpO2: 98%   Weight: 69 7 kg (153 lb 11 2 oz)   Height: 5' 10" (1 778 m)      Physical Exam   Constitutional: He appears well-developed and well-nourished  HENT:   Head: Normocephalic and atraumatic  Eyes: Conjunctivae are normal    Neck: Neck supple  No thyromegaly present  Cardiovascular: Normal rate, regular rhythm, normal heart sounds and intact distal pulses  No murmur heard  Pulmonary/Chest: Effort normal and breath sounds normal  No respiratory distress  Musculoskeletal: He exhibits no edema  Lymphadenopathy:     He has no cervical adenopathy  Skin: Skin is warm and dry  Psychiatric: He has a normal mood and affect  His behavior is normal          Patient's chronic problems that were reviewed today are stable  Recent hospital stays reviewed  Recent labs and imaging reviewed  Recent visits to other providers reviewed  Meds reviewed and no changes made  Appropriate labs and imaging were ordered  Preventive measures appropriate for age and sex were reviewed with patient  Immunizations were updated as appropriate

## 2020-07-24 NOTE — PATIENT INSTRUCTIONS
TO AVOID COVID (CORONAVIRUS) INFECTION THE FOLLOWING ARE HELPFUL:    1  Avoid areas where there are a lot of people  Examples would be small restaurants churches small grocery stores etc   Keep 6 feet between you  2  If you must go out around people use a mask  When around people use a hand  after touching surfaces  Important areas are grocery stores, gas pumps, pharmacies, [place], etc  Remember: masks protect the "other sanjay"  They are not a substitute for staying 6 feet away  3  KEEP YOUR HANDS AWAY FROM YOUR FACE  IT IS EXTREMELY DIFFICULT TO GET COVID INFECTION IF YOU DO NOT TOUCH YOUR FACE AND ARE NOT AROUND PEOPLE  3  If you wish to work in the yard or walk around the neighborhood or in a park and you are not around a lot of people it is okay to keep your mask in your pocket  Casually passing someone without a mask does not put you at risk  4  Try to avoid having people coming in and out of your home  This would include cleaning personnel delivery people unnecessary service people etc   after anyone comes into your home including family be careful to wipe all surfaces with a home   WE RECOMMEND GETTING THE 2- DOSE SHINGLES VACCINE ([address]) FROM YOUR PHARMACY  CHECK WITH THEM ON THE COST  YOU SHOULD HAVE THIS IF OVER AGE 48, EVEN IF YOU HAVE HAD THE ORIGINAL VACCINE (ZOSTRIX)

## 2020-07-31 ENCOUNTER — TELEPHONE (OUTPATIENT)
Dept: HEMATOLOGY ONCOLOGY | Facility: CLINIC | Age: 70
End: 2020-07-31

## 2020-07-31 NOTE — TELEPHONE ENCOUNTER
Patient has f/u apt on Mon 8/03 at 10:00 a m  W/Mari ROBIN at [facility]  Called pt and left message asking pt to please call the office to do covid pre screening questionnaire, [phone]

## 2020-08-03 ENCOUNTER — OFFICE VISIT (OUTPATIENT)
Dept: HEMATOLOGY ONCOLOGY | Facility: CLINIC | Age: 70
End: 2020-08-03
Payer: MEDICARE

## 2020-08-03 ENCOUNTER — TELEPHONE (OUTPATIENT)
Dept: UROLOGY | Facility: MEDICAL CENTER | Age: 70
End: 2020-08-03

## 2020-08-03 VITALS
DIASTOLIC BLOOD PRESSURE: 80 MMHG | HEIGHT: 70 IN | RESPIRATION RATE: 18 BRPM | WEIGHT: 154 LBS | TEMPERATURE: 97.7 F | SYSTOLIC BLOOD PRESSURE: 118 MMHG | HEART RATE: 94 BPM | OXYGEN SATURATION: 99 % | BODY MASS INDEX: 22.05 KG/M2

## 2020-08-03 DIAGNOSIS — C79.31 SECONDARY MALIGNANT NEOPLASM OF BRAIN AND SPINAL CORD (HCC): ICD-10-CM

## 2020-08-03 DIAGNOSIS — R30.0 DYSURIA: ICD-10-CM

## 2020-08-03 DIAGNOSIS — C79.49 SECONDARY MALIGNANT NEOPLASM OF BRAIN AND SPINAL CORD (HCC): ICD-10-CM

## 2020-08-03 DIAGNOSIS — C64.2 RENAL CELL CARCINOMA OF LEFT KIDNEY (HCC): ICD-10-CM

## 2020-08-03 DIAGNOSIS — C34.12 PRIMARY MALIGNANT NEOPLASM OF BRONCHUS OF LEFT UPPER LOBE (HCC): Primary | ICD-10-CM

## 2020-08-03 DIAGNOSIS — K76.9 LIVER LESION: ICD-10-CM

## 2020-08-03 PROCEDURE — 3008F BODY MASS INDEX DOCD: CPT | Performed by: NURSE PRACTITIONER

## 2020-08-03 PROCEDURE — 99215 OFFICE O/P EST HI 40 MIN: CPT | Performed by: NURSE PRACTITIONER

## 2020-08-03 PROCEDURE — 4040F PNEUMOC VAC/ADMIN/RCVD: CPT | Performed by: NURSE PRACTITIONER

## 2020-08-03 PROCEDURE — 1160F RVW MEDS BY RX/DR IN RCRD: CPT | Performed by: NURSE PRACTITIONER

## 2020-08-03 NOTE — PROGRESS NOTES
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)  Patient's repeat six-month follow-up CT imaging showed stability in the chest area  He does have a new ill-defined liver lesion for which follow-up MRI recommended which we will order (see below #2)  We will continue to monitor the patient closely according to the NCCN guidelines  Will continue to maintain his Port-A-Cath flushing it on every 6-8 week basis  I encouraged him to continue to pursue healthier lifestyles after cancer treatment including: following up with PCP on a regular basis for health maintenance/immunizations, getting routine screening exams when indicated, limiting alcohol, consuming a well-balanced diet with lots of fruits/vegetables/whole grains and limiting greasy/fatty and sugary foods, exercising on a regular basis and smoking cessation  He reports that he typically gets his colonoscopies on an every 2 year basis due to multiple polyps and is likely near due which he will work on the near future  He also sees a dermatologist on a regular basis due to multiple basal cell carcinoma; his most recent follow-up had to be delayed due to the COVID-19 situation     - MRI abdomen w wo contrast; Future    2  Liver lesion  CT imaging from 07/17/2020 showed new 0 9 x 0 9 cm ill-defined hypoattenuating lesion anterior to the gallbladder which was not definitively seen on prior imaging  We will pursue MRI of the abdomen with and without contrast to follow-up on the abnormal finding and rule out metastasis as recommended by the radiologist   He will be back for follow-up in about 1 month from now to review the findings of his imaging     - MRI abdomen w wo contrast; Future    3   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[facility])  Status post resection/radiation to the right frontoparietal brain metastasis 2015  He denies any neurological symptoms at this time  4  Renal cell carcinoma of left kidney ([address] )  Status post cryoablation 2016  His recent CT imaging did not reveal any obvious hint of recurrence of his renal cell carcinoma  5  Dysuria  Patient is reporting dysuria and urinary incontinence which has been ongoing since March 2020  We will check a UA C&S to rule out infectious process  Will also try to get him scheduled earlier to be seen by his urologist for further evaluation     - UA w Reflex to Microscopic w Reflex to Culture; Future  - Urine culture; Future    HPI:  Patient presents today for a follow-up visit  Continues to smoke on a daily basis with no desire to quit at this time  Admits that he has been having some issues with occasional urinary incontinence and dysuria which he started to note around March of this year  He has an established urologist but his next follow-up visit until December 2020  Otherwise he is doing well and has no new complaints  His most recent laboratory studies from 07/17/2020 showed essentially normal CBC  Nonfasting glucose 183, sodium 136 remaining metabolic panel normal   He had a six-month follow-up CT imaging of the chest abdomen and pelvis with contrast on 07/20/2020 which showed:  "IMPRESSION:  1   Stable left upper lobe peripheral opacity, likely chronic radiation changes  No new nodules or focal consolidations identified    2   Ill-defined subcentimeter hypoattenuating lesion within the liver, not clearly seen on prior imaging  Further evaluation with MRI is recommended  MRI performed with Eovist contrast may be useful to better delineate subtle metastasis "    Oncology History   Patient has a history of tobacco use for more than 40 years and history of 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 which was diagnosed in May of 2015    At that time the patient was found to have multiple brain lesions status post whole brain radiation  He then received 6 cycles of palliative chemotherapy with Alimta and carboplatin  After 5 cycles of treatment he had significant GI bleeding which required ICU hospitalization  After completion of palliative chemotherapy he was maintained on Alimta since October 2015  A brain MRI January 2016 showed multiple bilateral hemorrhagic lesions felt not to be due to malignant process  PET scan January 2016 showed a 1 6 cm metabolically active left lung apical lesion  The patient received radiation treatment palliatively under the care of Dr Kayla Owens to the left apical lung lesion and completed it in April of 2016  He was then restarted on maintenance Alimta  He had an MRI of the abdomen on June 23, 2016 which showed a 3 cm cystic mass with a solid component on the posterior medial aspect of the left kidney, compatible with malignant process  A core biopsy was taking from the left kidney July 12, 2016 which was 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  The patient was then treated with cryotherapy of the left kidney lesion in September of 2016  Patient had a PET-CT scan on March 3, 2017 which was compared to the prior PET scan from November 2016  At this time there is no evidence of residual or recurrent neoplastic disease anywhere in his body  Patient had a CT scan of the chest abdomen and pelvis on August 24, 2017 which showed a 1 3 cm left upper lobe mass versus scarring with adjacent radiation fibrosis  Patient had another MRI of the brain January 8, 2018 which was compared to the previous MRI of the brain in January 2016  This revealed multiple areas of artifact in the supra and infratentorial spaces consistent with hemorrhagic metastasis  The largest lesion was in the frontal lobe and is significantly smaller with only a tiny enhancing component remaining  No new intra-axial lesions were reported      Patient had another CT scan of the chest abdomen and pelvis for close monitoring on June 5th 2018 that was compared with prior study from January 2018  He continues to have a 1 3 cm left lung apex pulmonary nodule with surrounding fibrosis which is stable in appearance  He also has a stable appearance of the treated medial left renal cortex lesion  There is no evidence of metastatic disease within the chest abdomen or pelvis  After lengthy discussion June 2018 patient stated that he is not interested in continuing his maintenance Alimta treatment beyond today's treatment since his CT scan showed stable disease for a long time he has been on Alimta for 2 years  He was told that he seems to be in complete remission but there is a chance that the cancer may reoccur  Patient was interested in the watch and wait type of approach instead of continuing active maintenance Alimta treatment  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 ([facility] [address] )   5/2015 Initial Diagnosis    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)  Stage IV     5/5/2015 - 5/20/2015 Radiation    3150 cGy to large right frontoparietal mass; 3000 cGy to other tumor volumes in brainstem, and bilateral cerebral hemispheres  Radiation oncologist:  Dr Reji Mirza     3/17/2016 - 4/12/2016 Radiation    Left upper lobe lesion, 4675 cGy  Radiation oncologist: Dr Reji Mirza      Chemotherapy    1  Alimta and carboplatin:   05/20/2015 through 08/12/2015 (5 cycles total)  2  Maintenance Alimta:   10/21/2015 through 06/12/2018 (43 total Treatment doses)     Renal cell carcinoma of left kidney ([facility] [address] )   7/2016 Initial Diagnosis    Renal cell carcinoma of left kidney [facility])  S/p cryoablation of the left kidney lesion in September of 2016 @ [facility]-Dr Michelle Marshall           Interval history:    ROS: Review of Systems   Constitutional: Negative for activity change, appetite change, chills, fatigue, fever and unexpected weight change     HENT: Negative for congestion, mouth sores, nosebleeds, sore throat and trouble swallowing  Eyes: Negative  Respiratory: Positive for cough (mild chronic)  Negative for chest tightness and shortness of breath  Cardiovascular: Negative for chest pain, palpitations and leg swelling  Gastrointestinal: Positive for diarrhea  Negative for abdominal distention, abdominal pain, blood in stool, constipation, nausea and vomiting  Genitourinary: Positive for difficulty urinating and dysuria  Negative for frequency, hematuria and urgency  Musculoskeletal: Negative for arthralgias, back pain, gait problem, joint swelling and myalgias  Skin: Negative for color change, pallor and rash  Neurological: Negative for dizziness, weakness, light-headedness, numbness and headaches  Hematological: Negative for adenopathy  Bruises/bleeds easily (BUE only easy brusing )  Psychiatric/Behavioral: Positive for dysphoric mood  Negative for sleep disturbance  The patient is not nervous/anxious          Past Medical History:   Diagnosis Date    Balance problems     and" coordination issues/best to walk slow"    Bladder stones     Dysphagia 02/16/2017    "not right now but in the past"    Environmental and seasonal allergies     Exercise involving walking     "approx 1 mile 3x/week"    Eye injury     right,  "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"    Full dentures     Hemiparesis ([facility] [address] ) 04/27/2015    left weakness,"due to tumor on brain"    History of brain tumor     "had radiation for it"    History of cancer chemotherapy     last tx  5/22/18    History of GI bleed 2015    History of kidney cancer     left    History of kidney stones     History of radiation therapy     History of transfusion     5 units of blood 2015    Hydrocele 11/30/2010    Inguinal hernia, bilateral     Joint stiffness of multiple sites     Paresthesia 05/01/2015    Portacath in place     right chest    Primary malignant neoplasm of lung ([facility] [address] ) 05/26/2015    Renal carcinoma, left ([facility] [address] ) 10/17/2016    Risk for falls     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 05/26/2015    Skin cancer     Syncopal episodes 09/01/2015    2    Wears glasses        Past Surgical History:   Procedure Laterality Date    BLADDER STONE REMOVAL      COLONOSCOPY      DENTAL SURGERY  2011    DENTAL EXTACTIONS     ESOPHAGOGASTRODUODENOSCOPY      HYDROCELE EXCISION / REPAIR Left 01/01/2011    KIDNEY STONE SURGERY      KIDNEY SURGERY      cancer of kidney/cryo of cancer    PORTACATH PLACEMENT      IL COLONOSCOPY FLX DX W/COLLJ SPEC WHEN PFRMD N/A 8/7/2018    Procedure: COLONOSCOPY with polypectomy ;  Surgeon: Velia Garcia MD;  Location: [facility];   Service: General    IL LAP, RECURRENT INCISIONAL HERNIA REPAIR,INCARCERATED Bilateral 8/8/2018    Procedure: REPAIR HERNIA INGUINAL LAPAROSCOPIC W/ ROBOTICS W/ MESH;  Surgeon: Velia Garcia MD;  Location: [facility];  Service: General    IL REMOVE BLADDER STONE,<2 5 CM N/A 11/27/2018    Procedure: Sherrilee  HOLMIUM LASER,BLADDE BIOPSY;  Surgeon: Doyle Ramirez MD;  Location: [facility];  Service: Urology    SKIN BIOPSY      SKIN CANCER EXCISION      17 surgeries, basal cell    TONSILLECTOMY         Social History     Socioeconomic History    Marital status:      Spouse name: None    Number of children: None    Years of education: None    Highest education level: None   Occupational History    None   Social Needs    Financial resource strain: None    Food insecurity     Worry: None     Inability: None    Transportation needs     Medical: None     Non-medical: None   Tobacco Use    Smoking status: Current Every Day Smoker     Packs/day: 0 50     Years: 48 00     Pack years: 24 00     Types: Cigarettes    Smokeless tobacco: Current User   Substance and Sexual Activity    Alcohol use: Yes     Comment: "3x per year"    Drug use: No    Sexual activity: None   Lifestyle    Physical activity Days per week: None     Minutes per session: None    Stress: None   Relationships    Social connections     Talks on phone: None     Gets together: None     Attends Sabianism service: None     Active member of club or organization: None     Attends meetings of clubs or organizations: None     Relationship status: None    Intimate partner violence     Fear of current or ex partner: None     Emotionally abused: None     Physically abused: None     Forced sexual activity: None   Other Topics Concern    None   Social History Narrative    None       Family History   Problem Relation Age of Onset    Cancer Mother         EYE    Cancer Father     Colon cancer Father     Cancer Brother        No Known Allergies      Current Outpatient Medications:     Ascorbic Acid (VITAMIN C) 500 MG CAPS, Take 500 mg by mouth daily  , Disp: , Rfl:     Multiple Vitamins-Minerals (MULTIVITAMIN ADULT PO), Take 1 tablet by mouth daily  , Disp: , Rfl:       Physical Exam:  /80 (BP Location: Left arm, Patient Position: Sitting, Cuff Size: Adult)   Pulse 94   Temp 97 7 °F (36 5 °C)   Resp 18   Ht 5' 10"   Wt 69 9 kg (154 lb)   SpO2 99%   BMI 22 10 kg/m²     Physical Exam   Constitutional: He is oriented to person, place, and time  He appears well-developed  He is cooperative  No distress  HENT:   Head: Normocephalic and atraumatic  Eyes: Pupils are equal, round, and reactive to light  Conjunctivae and lids are normal  No scleral icterus  Neck: Normal range of motion  Neck supple  No thyromegaly present  Cardiovascular: Regular rhythm and normal heart sounds  Tachycardia present  No murmur heard  Pulmonary/Chest: Effort normal and breath sounds normal  No respiratory distress  Abdominal: Soft  He exhibits no distension  There is no splenomegaly or hepatomegaly  There is no abdominal tenderness  Musculoskeletal: Normal range of motion  General: No swelling     Lymphadenopathy:     He has no cervical adenopathy  Neurological: He is alert and oriented to person, place, and time  Skin: Skin is warm and dry  No rash noted  He is not diaphoretic  No erythema  Psychiatric: His behavior is normal  Judgment and thought content normal  He exhibits a depressed mood  He has a flat affect  Vitals reviewed  Labs:  Lab Results   Component Value Date    WBC 10 40 07/17/2020    HGB 15 0 07/17/2020    HCT 44 2 07/17/2020    MCV 93 07/17/2020     07/17/2020     Lab Results   Component Value Date     06/11/2018    K 4 3 07/17/2020     07/17/2020    CO2 24 07/17/2020    ANIONGAP 11 06/11/2018    BUN 13 07/17/2020    CREATININE 0 75 07/17/2020    GLUCOSE 163 (H) 06/11/2018    GLUF 82 12/12/2019    CALCIUM 9 1 07/17/2020    AST 26 07/17/2020    ALT 34 07/17/2020    ALKPHOS 105 07/17/2020    PROT 6 8 06/11/2018    BILITOT 0 9 06/11/2018    EGFR 94 07/17/2020       Patient voiced understanding and agreement in the above discussion  Aware to contact our office with questions/symptoms in the interim  This note has been generated by voice recognition software system  Therefore, there may be spelling, grammar, and or syntax errors  Please contact if questions arise

## 2020-08-03 NOTE — TELEPHONE ENCOUNTER
Call placed to patient and he did not answer  Attempted to leave voicemail on answering machine and unable to do so as phone disconnected  Will try back later

## 2020-08-03 NOTE — TELEPHONE ENCOUNTER
Patient managed by Dr Whit Garcia from Dr Wale Apple called in to inform patient is experiencing new symptoms: BPH with urinary obstruction and would like to know if Dr Rich Patrick can see the patient before 12/16/2020   Patient can be reached at [phone]

## 2020-08-04 NOTE — TELEPHONE ENCOUNTER
Call placed to patient and spoke with him   Appointment scheduled for 9/11/2020 with Dr Angie Byrne at 2pm

## 2020-08-19 ENCOUNTER — HOSPITAL ENCOUNTER (OUTPATIENT)
Dept: RADIOLOGY | Facility: HOSPITAL | Age: 70
Discharge: HOME | End: 2020-08-19
Payer: MEDICARE

## 2020-08-19 DIAGNOSIS — K76.9 LIVER LESION: ICD-10-CM

## 2020-08-19 DIAGNOSIS — C34.12 PRIMARY MALIGNANT NEOPLASM OF BRONCHUS OF LEFT UPPER LOBE (HCC): ICD-10-CM

## 2020-08-19 PROCEDURE — G1004 CDSM NDSC: HCPCS

## 2020-08-19 PROCEDURE — 74183 MRI ABD W/O CNTR FLWD CNTR: CPT

## 2020-08-19 PROCEDURE — A9585 GADOBUTROL INJECTION: HCPCS | Performed by: NURSE PRACTITIONER

## 2020-08-19 RX ADMIN — GADOBUTROL 7 ML: 604.72 INJECTION INTRAVENOUS at 21:41

## 2020-08-28 ENCOUNTER — TELEPHONE (OUTPATIENT)
Dept: HEMATOLOGY ONCOLOGY | Facility: CLINIC | Age: 70
End: 2020-08-28

## 2020-08-28 ENCOUNTER — HOSPITAL ENCOUNTER (OUTPATIENT)
Dept: INFUSION CENTER | Facility: HOSPITAL | Age: 70
Discharge: HOME | End: 2020-08-28
Payer: MEDICARE

## 2020-08-28 ENCOUNTER — APPOINTMENT (OUTPATIENT)
Dept: LAB | Facility: HOSPITAL | Age: 70
End: 2020-08-28
Payer: MEDICARE

## 2020-08-28 DIAGNOSIS — C34.12 PRIMARY MALIGNANT NEOPLASM OF BRONCHUS OF LEFT UPPER LOBE (HCC): ICD-10-CM

## 2020-08-28 DIAGNOSIS — Z45.2 ENCOUNTER FOR CENTRAL LINE CARE: Primary | ICD-10-CM

## 2020-08-28 DIAGNOSIS — N39.0 URINARY TRACT INFECTION WITHOUT HEMATURIA, SITE UNSPECIFIED: Primary | ICD-10-CM

## 2020-08-28 DIAGNOSIS — R30.0 DYSURIA: ICD-10-CM

## 2020-08-28 LAB
BACTERIA UR QL AUTO: ABNORMAL /HPF
BILIRUB UR QL STRIP: NEGATIVE
CAOX CRY URNS QL MICRO: ABNORMAL /HPF
CLARITY UR: ABNORMAL
COLOR UR: ABNORMAL
GLUCOSE UR STRIP-MCNC: NEGATIVE MG/DL
HGB UR QL STRIP.AUTO: 250
KETONES UR STRIP-MCNC: ABNORMAL MG/DL
LEUKOCYTE ESTERASE UR QL STRIP: 500
MUCOUS THREADS UR QL AUTO: ABNORMAL
NITRITE UR QL STRIP: POSITIVE
NON-SQ EPI CELLS URNS QL MICRO: ABNORMAL /HPF
PH UR STRIP.AUTO: 5 [PH]
PROT UR STRIP-MCNC: ABNORMAL MG/DL
RBC #/AREA URNS AUTO: ABNORMAL /HPF
SP GR UR STRIP.AUTO: 1.02 (ref 1–1.04)
UROBILINOGEN UA: NEGATIVE MG/DL
WBC #/AREA URNS AUTO: ABNORMAL /HPF

## 2020-08-28 PROCEDURE — 96523 IRRIG DRUG DELIVERY DEVICE: CPT

## 2020-08-28 PROCEDURE — 87086 URINE CULTURE/COLONY COUNT: CPT

## 2020-08-28 PROCEDURE — 81003 URINALYSIS AUTO W/O SCOPE: CPT

## 2020-08-28 PROCEDURE — 81001 URINALYSIS AUTO W/SCOPE: CPT

## 2020-08-28 RX ORDER — CIPROFLOXACIN 500 MG/1
500 TABLET, FILM COATED ORAL EVERY 12 HOURS SCHEDULED
Qty: 10 TABLET | Refills: 0 | Status: SHIPPED | OUTPATIENT
Start: 2020-08-28 | End: 2020-09-02

## 2020-08-28 NOTE — PROGRESS NOTES
Pt here for port flush, denies any needs for labs today, port flushed per protocol, next appt made and AVS declined, states he has [employer]

## 2020-08-28 NOTE — TELEPHONE ENCOUNTER
Called pt to do covid pre screening questionnaire for 8:40 am f/u apt on Monday 8/31/2020 w/Dr Bin Vale at [facility]  Left message asking pt to please call the office to do screening, [phone]

## 2020-08-30 LAB — BACTERIA UR CULT: NORMAL

## 2020-08-31 ENCOUNTER — OFFICE VISIT (OUTPATIENT)
Dept: HEMATOLOGY ONCOLOGY | Facility: CLINIC | Age: 70
End: 2020-08-31
Payer: MEDICARE

## 2020-08-31 VITALS
HEART RATE: 81 BPM | RESPIRATION RATE: 18 BRPM | SYSTOLIC BLOOD PRESSURE: 114 MMHG | BODY MASS INDEX: 21.64 KG/M2 | HEIGHT: 70 IN | TEMPERATURE: 97.9 F | WEIGHT: 151.2 LBS | DIASTOLIC BLOOD PRESSURE: 60 MMHG

## 2020-08-31 DIAGNOSIS — C64.2 RENAL CELL CARCINOMA OF LEFT KIDNEY (HCC): ICD-10-CM

## 2020-08-31 DIAGNOSIS — C79.49 SECONDARY MALIGNANT NEOPLASM OF BRAIN AND SPINAL CORD (HCC): ICD-10-CM

## 2020-08-31 DIAGNOSIS — N39.0 URINARY TRACT INFECTION WITHOUT HEMATURIA, SITE UNSPECIFIED: ICD-10-CM

## 2020-08-31 DIAGNOSIS — C79.31 SECONDARY MALIGNANT NEOPLASM OF BRAIN AND SPINAL CORD (HCC): ICD-10-CM

## 2020-08-31 DIAGNOSIS — C34.12 PRIMARY MALIGNANT NEOPLASM OF BRONCHUS OF LEFT UPPER LOBE (HCC): Primary | ICD-10-CM

## 2020-08-31 DIAGNOSIS — K76.9 LIVER LESION: ICD-10-CM

## 2020-08-31 PROCEDURE — 99214 OFFICE O/P EST MOD 30 MIN: CPT | Performed by: INTERNAL MEDICINE

## 2020-08-31 PROCEDURE — 1160F RVW MEDS BY RX/DR IN RCRD: CPT | Performed by: INTERNAL MEDICINE

## 2020-08-31 PROCEDURE — 3008F BODY MASS INDEX DOCD: CPT | Performed by: INTERNAL MEDICINE

## 2020-08-31 PROCEDURE — 4040F PNEUMOC VAC/ADMIN/RCVD: CPT | Performed by: INTERNAL MEDICINE

## 2020-08-31 NOTE — PROGRESS NOTES
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)  There is no hint of recurrence of his lung cancer according to the imaging studies he just had in July  We will see him again in about 6 months from now for close follow-up with a CT scan of the chest abdomen pelvis prior  He will continue to get the port flush on every 6-8 week basis  - CBC and differential; Future  - Comprehensive metabolic panel; Future  - LD,Blood; Future  - Magnesium; Future  - CT chest abdomen pelvis w wo contrast; Future    2  Liver lesion  We did discuss the MRI of the liver which did not reveal significant findings  He will get another CT scan of the chest abdomen pelvis prior to his next visit in 6 months from now  He seems to have a stable pancreatic cyst and stable left kidney status post cryoablation  - CT chest abdomen pelvis w wo contrast; Future    3  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[facility])  He did have resection of the brain mass at the right frontoparietal area and radiation treatment to the brain in 2015  He denied any neurological symptoms  4  Renal cell carcinoma of left kidney (HCC)  Stable as above  5  Urinary tract infection without hematuria, site unspecified  I did ask him to continue to take the Cipro until he completes 5 days worth  He was also asked to get in touch with the urology team if he continues to have urinary symptoms  HPI:  Patient came today for a follow-up visit  He recently had CT scan of the chest abdomen pelvis for close follow-up on 07/20/2020 which showed ill-defined subcentimeter hypoattenuating lesion within the liver  He then had an MRI of the liver on 08/19/2020 for further evaluation of the liver lesion  The MRI showed:  IMPRESSION:     1    Small focus of vague hypoenhancement on portal venous phase only without corresponding T1/T2 signal abnormality or restricted diffusion  While etiology is uncertain, this is of doubtful clinical significance, possibly a small area of variable   perfusion      2   Stable pancreatic cysts measuring up to 1 cm  Continued biannual follow-up recommended      3   Stable left renal lesion postcryoablation without evidence of recurrence  He also had a UA since he was complaining about significant dysuria on 08/28 which showed significant amount of bacteria  He is currently on Cipro 500 mg twice a day for a total of 5 days  He already feeling better after 3 days worth of Cipro  Oncology History   Patient has a history of tobacco use for more than 40 years and history of 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 which was diagnosed in May of 2015  At that time the patient was found to have multiple brain lesions status post whole brain radiation  He then received 6 cycles of palliative chemotherapy with Alimta and carboplatin  After 5 cycles of treatment he had significant GI bleeding which required ICU hospitalization  After completion of palliative chemotherapy he was maintained on Alimta since October 2015  A brain MRI January 2016 showed multiple bilateral hemorrhagic lesions felt not to be due to malignant process  PET scan January 2016 showed a 1 6 cm metabolically active left lung apical lesion  The patient received radiation treatment palliatively under the care of Dr Ai Black to the left apical lung lesion and completed it in April of 2016  He was then restarted on maintenance Alimta  He had an MRI of the abdomen on June 23, 2016 which showed a 3 cm cystic mass with a solid component on the posterior medial aspect of the left kidney, compatible with malignant process  A core biopsy was taking from the left kidney July 12, 2016 which was 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    The patient was then treated with cryotherapy of the left kidney lesion in September of 2016  Patient had a PET-CT scan on March 3, 2017 which was compared to the prior PET scan from November 2016  At this time there is no evidence of residual or recurrent neoplastic disease anywhere in his body  Patient had a CT scan of the chest abdomen and pelvis on August 24, 2017 which showed a 1 3 cm left upper lobe mass versus scarring with adjacent radiation fibrosis  Patient had another MRI of the brain January 8, 2018 which was compared to the previous MRI of the brain in January 2016  This revealed multiple areas of artifact in the supra and infratentorial spaces consistent with hemorrhagic metastasis  The largest lesion was in the frontal lobe and is significantly smaller with only a tiny enhancing component remaining  No new intra-axial lesions were reported  Patient had another CT scan of the chest abdomen and pelvis for close monitoring on June 5th 2018 that was compared with prior study from January 2018  He continues to have a 1 3 cm left lung apex pulmonary nodule with surrounding fibrosis which is stable in appearance  He also has a stable appearance of the treated medial left renal cortex lesion  There is no evidence of metastatic disease within the chest abdomen or pelvis  After lengthy discussion June 2018 patient stated that he is not interested in continuing his maintenance Alimta treatment beyond today's treatment since his CT scan showed stable disease for a long time he has been on Alimta for 2 years  He was told that he seems to be in complete remission but there is a chance that the cancer may reoccur  Patient was interested in the watch and wait type of approach instead of continuing active maintenance Alimta treatment       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 ([address] )   5/2015 Initial Diagnosis    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 ([address] )  Stage IV     5/5/2015 - 5/20/2015 Radiation    3150 cGy to large right frontoparietal mass; 3000 cGy to other tumor volumes in brainstem, and bilateral cerebral hemispheres  Radiation oncologist:  Dr Zoila Armas     3/17/2016 - 4/12/2016 Radiation    Left upper lobe lesion, 4675 cGy  Radiation oncologist: Dr Zoila Armas      Chemotherapy    1  Alimta and carboplatin:   05/20/2015 through 08/12/2015 (5 cycles total)  2  Maintenance Alimta:   10/21/2015 through 06/12/2018 (43 total Treatment doses)     Renal cell carcinoma of left kidney ([address] )   7/2016 Initial Diagnosis    Renal cell carcinoma of left kidney [facility])  S/p cryoablation of the left kidney lesion in September of 2016 @ [facility]-Dr Jose F Dyer           Interval history:    ROS: Review of Systems   Constitutional: Negative for appetite change, diaphoresis, fatigue and fever  HENT: Negative for congestion, dental problem, facial swelling, hearing loss, tinnitus and trouble swallowing  Eyes: Negative for visual disturbance  Respiratory: Negative for cough, chest tightness, shortness of breath and wheezing  Cardiovascular: Negative for chest pain and leg swelling  Gastrointestinal: Positive for diarrhea  Negative for abdominal distention, abdominal pain, blood in stool, constipation, nausea and vomiting  Genitourinary: Positive for dysuria  Negative for hematuria and urgency  Musculoskeletal: Negative for arthralgias, myalgias and neck pain  Skin: Negative  Negative for color change, pallor, rash and wound  Neurological: Negative for dizziness, weakness, numbness and headaches  Hematological: Negative for adenopathy  Psychiatric/Behavioral: Negative for agitation, behavioral problems, confusion, hallucinations, self-injury and sleep disturbance  The patient is not nervous/anxious and is not hyperactive          Past Medical History:   Diagnosis Date    Balance problems     and" coordination issues/best to walk slow"    Bladder stones     Dysphagia 02/16/2017    "not right now but in the past"    Environmental and seasonal allergies     Exercise involving walking     "approx 1 mile 3x/week"    Eye injury     right,  "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"    Full dentures     Hemip[facility]is [facility] ) 04/27/2015    left weakness,"due to tumor on brain"    History of brain tumor     "had radiation for it"    History of cancer chemotherapy     last tx  5/22/18    History of GI bleed 2015    History of kidney cancer     left    History of kidney stones     History of radiation therapy     History of transfusion     5 units of blood 2015    Hydrocele 11/30/2010    Inguinal hernia, bilateral     Joint stiffness of multiple sites     Paresthesia 05/01/2015    Portacath in place     right chest    [facility] ) 05/26/2015    Renal carcinoma, left ([facility] ) 10/17/2016    Risk for falls     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([facility] ) 05/26/2015    Skin cancer     Syncopal episodes 09/01/2015    2    Wears glasses        Past Surgical History:   Procedure Laterality Date    BLADDER STONE REMOVAL      COLONOSCOPY      DENTAL SURGERY  2011    DENTAL EXTACTIONS     ESOPHAGOGASTRODUODENOSCOPY      HYDROCELE EXCISION / REPAIR Left 01/01/2011    KIDNEY STONE SURGERY      KIDNEY SURGERY      cancer of kidney/cryo of cancer    PORTACATH PLACEMENT      MS COLONOSCOPY FLX DX W/COLLJ SPEC WHEN PFRMD N/A 8/7/2018    Procedure: COLONOSCOPY with polypectomy ;  Surgeon: Duyen Stoddard MD;  Location: [facility];   Service: General    MS LAP, RECURRENT INCISIONAL HERNIA REPAIR,INCARCERATED Bilateral 8/8/2018    Procedure: REPAIR HERNIA INGUINAL LAPAROSCOPIC W/ ROBOTICS W/ MESH;  Surgeon: Duyen Stoddard MD;  [facility];  Service: General    MS REMOVE BLADDER STONE,<2 5 CM N/A 11/27/2018    Procedure: Erickson Asher LASER,BLADDE BIOPSY;  Surgeon: Karrie Ortiz MD;  [facility];  Service: Urology    SKIN BIOPSY  SKIN CANCER EXCISION      17 surgeries, basal cell    TONSILLECTOMY         Social History     Socioeconomic History    Marital status:      Spouse name: None    Number of children: None    Years of education: None    Highest education level: None   Occupational History    None   Social Needs    Financial resource strain: None    Food insecurity     Worry: None     Inability: None    Transportation needs     Medical: None     Non-medical: None   Tobacco Use    Smoking status: Current Every Day Smoker     Packs/day: 0 50     Years: 48 00     Pack years: 24 00     Types: Cigarettes    Smokeless tobacco: Current User   Substance and Sexual Activity    Alcohol use: Yes     Comment: "3x per year"    Drug use: No    Sexual activity: None   Lifestyle    Physical activity     Days per week: None     Minutes per session: None    Stress: None   Relationships    Social connections     Talks on phone: None     Gets together: None     Attends Christianity service: None     Active member of club or organization: None     Attends meetings of clubs or organizations: None     Relationship status: None    Intimate partner violence     Fear of current or ex partner: None     Emotionally abused: None     Physically abused: None     Forced sexual activity: None   Other Topics Concern    None   Social History Narrative    None       Family History   Problem Relation Age of Onset    Cancer Mother         EYE    Cancer Father     Colon cancer Father     Cancer Brother        No Known Allergies      Current Outpatient Medications:     Ascorbic Acid (VITAMIN C) 500 MG CAPS, Take 500 mg by mouth daily  , Disp: , Rfl:     ciprofloxacin (CIPRO) 500 mg tablet, Take 1 tablet (500 mg total) by mouth every 12 (twelve) hours for 5 days, Disp: 10 tablet, Rfl: 0    Multiple Vitamins-Minerals (MULTIVITAMIN ADULT PO), Take 1 tablet by mouth daily  , Disp: , Rfl:       Physical Exam:  /60 (BP Location: Left arm, Patient Position: Sitting, Cuff Size: Adult)   Pulse 81   Temp 97 9 °F (36 6 °C) (Axillary)   Resp 18   Ht 5' 10" (1 778 m)   Wt 68 6 kg (151 lb 3 2 oz)   BMI 21 69 kg/m²     Physical Exam  Constitutional:       Appearance: He is well-developed  HENT:      Head: Normocephalic and atraumatic  Eyes:      General: No scleral icterus  Right eye: No discharge  Left eye: No discharge  Conjunctiva/sclera: Conjunctivae normal       Pupils: Pupils are equal, round, and reactive to light  Neck:      Musculoskeletal: Normal range of motion and neck supple  Thyroid: No thyromegaly  Trachea: No tracheal deviation  Cardiovascular:      Rate and Rhythm: Normal rate and regular rhythm  Heart sounds: Normal heart sounds  No murmur  No friction rub  Pulmonary:      Effort: Pulmonary effort is normal  No respiratory distress  Breath sounds: Normal breath sounds  No wheezing or rales  Chest:      Chest wall: No tenderness  Abdominal:      General: Bowel sounds are normal  There is no distension  Palpations: Abdomen is soft  There is no hepatomegaly, splenomegaly or mass  Tenderness: There is no abdominal tenderness  There is no guarding or rebound  Musculoskeletal: Normal range of motion  General: No tenderness or deformity  Lymphadenopathy:      Cervical: No cervical adenopathy  Skin:     General: Skin is warm and dry  Coloration: Skin is not pale  Findings: No erythema or rash  Neurological:      Mental Status: He is alert and oriented to person, place, and time  Cranial Nerves: No cranial nerve deficit  Coordination: Coordination normal       Deep Tendon Reflexes: Reflexes are normal and symmetric  Reflexes normal    Psychiatric:         Behavior: Behavior normal          Thought Content:  Thought content normal          Judgment: Judgment normal            Labs:  Lab Results   Component Value Date    WBC 10 40 07/17/2020    HGB 15 0 07/17/2020    HCT 44 2 07/17/2020    MCV 93 07/17/2020     07/17/2020     Lab Results   Component Value Date     06/11/2018    K 4 3 07/17/2020     07/17/2020    CO2 24 07/17/2020    ANIONGAP 11 06/11/2018    BUN 13 07/17/2020    CREATININE 0 75 07/17/2020    GLUCOSE 163 (H) 06/11/2018    GLUF 82 12/12/2019    CALCIUM 9 1 07/17/2020    AST 26 07/17/2020    ALT 34 07/17/2020    ALKPHOS 105 07/17/2020    PROT 6 8 06/11/2018    BILITOT 0 9 06/11/2018    EGFR 94 07/17/2020     No results found for: TSH    Patient voiced understanding and agreement in the above discussion  Aware to contact our office with questions/symptoms in the interim

## 2020-09-11 ENCOUNTER — OFFICE VISIT (OUTPATIENT)
Dept: UROLOGY | Facility: MEDICAL CENTER | Age: 70
End: 2020-09-11
Payer: MEDICARE

## 2020-09-11 VITALS
HEIGHT: 70 IN | TEMPERATURE: 97.9 F | BODY MASS INDEX: 21.47 KG/M2 | WEIGHT: 150 LBS | SYSTOLIC BLOOD PRESSURE: 112 MMHG | DIASTOLIC BLOOD PRESSURE: 62 MMHG

## 2020-09-11 DIAGNOSIS — N21.0 BLADDER STONE: Primary | ICD-10-CM

## 2020-09-11 DIAGNOSIS — N39.41 URGE INCONTINENCE: ICD-10-CM

## 2020-09-11 PROCEDURE — 99214 OFFICE O/P EST MOD 30 MIN: CPT | Performed by: UROLOGY

## 2020-09-14 ENCOUNTER — TELEPHONE (OUTPATIENT)
Dept: UROLOGY | Facility: MEDICAL CENTER | Age: 70
End: 2020-09-14

## 2020-09-14 NOTE — TELEPHONE ENCOUNTER
I left a voice mail message for the patient to schedule his Cystolitholopaxy with Dr Latasha Gutierrez  Suggested 10/6/2020 at [facility]

## 2020-09-15 NOTE — PROGRESS NOTES
HISTORY:    1  History of bladder stones, some years ago a stone became impacted in the wall of the bladder at another hospital during a procedure  My treatments for this stone recurrence involved lasering stone in the lumen of the bladder, down to the wall  It was not judged advisable to perform major surgery to resect that part of the bladder wall to get the full stone out  Therefore, patient has recurrence of stone material intermittently over the years  2  Increasing urgency, urge incontinence and burning  Recent culture negative, he was given Cipro which helped for a few days, and then back to his normal worsening symptoms  3  CT scan July 2020 shows large amount of stone in the bladder wall into the lumen  ASSESSMENT / PLAN:    I recommend cysto, laser bladder stone  Patient understands I may not be able to get all the stone out  He consents and we will schedule    The following portions of the patient's history were reviewed and updated as appropriate: allergies, current medications, past family history, past medical history, past social history, past surgical history and problem list     Review of Systems   All other systems reviewed and are negative  Objective:     Physical Exam  Constitutional:       General: He is not in acute distress  Appearance: He is well-developed  He is not diaphoretic  HENT:      Head: Normocephalic and atraumatic  Eyes:      General: No scleral icterus  Pulmonary:      Effort: Pulmonary effort is normal    Genitourinary:     Comments: Penis testes normal    Prostate minimally enlarged no nodules  Skin:     Coloration: Skin is not pale  Neurological:      Mental Status: He is alert and oriented to person, place, and time  Psychiatric:         Behavior: Behavior normal          Thought Content:  Thought content normal          Judgment: Judgment normal            0   Lab Value Date/Time    PSA 2 6 11/06/2018 1130   ]  BUN   Date Value Ref Range Status   07/17/2020 13 5 - 25 mg/dL Final   06/11/2018 16 5 - 25 MG/DL Final     Creatinine   Date Value Ref Range Status   07/17/2020 0 75 0 70 - 1 50 mg/dL Final     Comment:     Standardized to IDMS reference method     No components found for: CBC      Patient Active Problem List   Diagnosis    Family history of colon cancer    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 [facility] )    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([facility] )    Bladder stone    BPH with urinary obstruction    Renal cell carcinoma of left kidney ([facility] )    Medicare annual wellness visit, initial    Encounter for central line care    Elevated blood sugar        Diagnoses and all orders for this visit:    Bladder stone  -     Case request operating room: LITHOLOPAXY HOLMIUM LASER; Standing  -     Case request operating room: LITHOLOPAXY HOLMIUM LASER    Urge incontinence    Other orders  -     Diet NPO; Sips with meds; Standing  -     Place sequential compression device; Standing           Patient ID: Alex Hillman is a 78 y o  male        Current Outpatient Medications:     Ascorbic Acid (VITAMIN C) 500 MG CAPS, Take 500 mg by mouth daily  , Disp: , Rfl:     Multiple Vitamins-Minerals (MULTIVITAMIN ADULT PO), Take 1 tablet by mouth daily  , Disp: , Rfl:     Past Medical History:   Diagnosis Date    Balance problems     and" coordination issues/best to walk slow"    Bladder stones     Dysphagia 02/16/2017    "not right now but in the past"    Environmental and seasonal allergies     Exercise involving walking     "approx 1 mile 3x/week"    Eye injury     right,  "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"    Full dentures     Hemiparesis ([facility] ) 04/27/2015    left weakness,"due to tumor on brain"    History of brain tumor     "had radiation for it"    History of cancer chemotherapy     last tx  5/22/18    History of GI bleed 2015    History of kidney cancer     left    History of kidney stones     History of radiation therapy     History of transfusion     5 units of blood 2015    Hydrocele 11/30/2010    Inguinal hernia, bilateral     Joint stiffness of multiple sites     Paresthesia 05/01/2015    Portacath in place     right chest    Primary malignant neoplasm of lung [facility] ) 05/26/2015    [facility] 10/17/2016    Risk for falls     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([facility] ) 05/26/2015    Skin cancer     Syncopal episodes 09/01/2015    2    Wears glasses        Past Surgical History:   Procedure Laterality Date    BLADDER STONE REMOVAL      COLONOSCOPY      DENTAL SURGERY  2011    DENTAL EXTACTIONS     ESOPHAGOGASTRODUODENOSCOPY      HYDROCELE EXCISION / REPAIR Left 01/01/2011    KIDNEY STONE SURGERY      KIDNEY SURGERY      cancer of kidney/cryo of cancer    PORTACATH PLACEMENT      CO COLONOSCOPY FLX DX W/COLLJ SPEC WHEN PFRMD N/A 8/7/2018    Procedure: COLONOSCOPY with polypectomy ;  Surgeon: Meli Bowen MD;  [facility];   Service: General    CO LAP, RECURRENT INCISIONAL HERNIA REPAIR,INCARCERATED Bilateral 8/8/2018    Procedure: REPAIR HERNIA INGUINAL LAPAROSCOPIC W/ ROBOTICS W/ MESH;  Surgeon: Meli Bowen MD;  [facility];  Service: General    CO REMOVE BLADDER STONE,<2 5 CM N/A 11/27/2018    Procedure: Arthor Cane LASER,BLADDE BIOPSY;  Surgeon: Luis Miguel Galindo MD;  [facility];  Service: Urology    SKIN BIOPSY      SKIN CANCER EXCISION      17 surgeries, basal cell    TONSILLECTOMY         Social History

## 2020-10-19 ENCOUNTER — HOSPITAL ENCOUNTER (OUTPATIENT)
Dept: INFUSION CENTER | Facility: HOSPITAL | Age: 70
Discharge: HOME | End: 2020-10-19
Payer: MEDICARE

## 2020-10-19 DIAGNOSIS — C34.12 PRIMARY MALIGNANT NEOPLASM OF BRONCHUS OF LEFT UPPER LOBE (HCC): ICD-10-CM

## 2020-10-19 DIAGNOSIS — Z45.2 ENCOUNTER FOR CENTRAL LINE CARE: Primary | ICD-10-CM

## 2020-10-19 PROCEDURE — 96523 IRRIG DRUG DELIVERY DEVICE: CPT

## 2020-12-07 ENCOUNTER — HOSPITAL ENCOUNTER (OUTPATIENT)
Dept: INFUSION CENTER | Facility: HOSPITAL | Age: 70
Discharge: HOME | End: 2020-12-07
Payer: MEDICARE

## 2020-12-07 DIAGNOSIS — C34.12 PRIMARY MALIGNANT NEOPLASM OF BRONCHUS OF LEFT UPPER LOBE (HCC): ICD-10-CM

## 2020-12-07 DIAGNOSIS — Z45.2 ENCOUNTER FOR CENTRAL LINE CARE: Primary | ICD-10-CM

## 2020-12-07 PROCEDURE — 96523 IRRIG DRUG DELIVERY DEVICE: CPT

## 2020-12-16 ENCOUNTER — PROCEDURE VISIT (OUTPATIENT)
Dept: UROLOGY | Facility: MEDICAL CENTER | Age: 70
End: 2020-12-16
Payer: MEDICARE

## 2020-12-16 VITALS
SYSTOLIC BLOOD PRESSURE: 114 MMHG | HEIGHT: 70 IN | DIASTOLIC BLOOD PRESSURE: 68 MMHG | BODY MASS INDEX: 21.47 KG/M2 | WEIGHT: 150 LBS

## 2020-12-16 DIAGNOSIS — N21.0 BLADDER STONE: Primary | ICD-10-CM

## 2020-12-16 DIAGNOSIS — N32.89 BLADDER MASS: ICD-10-CM

## 2020-12-16 PROCEDURE — 99214 OFFICE O/P EST MOD 30 MIN: CPT | Performed by: UROLOGY

## 2020-12-16 PROCEDURE — 52000 CYSTOURETHROSCOPY: CPT | Performed by: UROLOGY

## 2020-12-17 ENCOUNTER — TELEPHONE (OUTPATIENT)
Dept: UROLOGY | Facility: MEDICAL CENTER | Age: 70
End: 2020-12-17

## 2020-12-31 ENCOUNTER — OFFICE VISIT (OUTPATIENT)
Dept: FAMILY MEDICINE CLINIC | Facility: CLINIC | Age: 70
End: 2020-12-31
Payer: MEDICARE

## 2020-12-31 VITALS
RESPIRATION RATE: 18 BRPM | BODY MASS INDEX: 22.25 KG/M2 | SYSTOLIC BLOOD PRESSURE: 138 MMHG | HEART RATE: 94 BPM | WEIGHT: 155.4 LBS | DIASTOLIC BLOOD PRESSURE: 72 MMHG | OXYGEN SATURATION: 97 % | TEMPERATURE: 98.3 F | HEIGHT: 70 IN

## 2020-12-31 DIAGNOSIS — Z45.2 ENCOUNTER FOR CENTRAL LINE CARE: ICD-10-CM

## 2020-12-31 DIAGNOSIS — Z23 ENCOUNTER FOR IMMUNIZATION: ICD-10-CM

## 2020-12-31 DIAGNOSIS — C79.49 SECONDARY MALIGNANT NEOPLASM OF BRAIN AND SPINAL CORD (HCC): Primary | ICD-10-CM

## 2020-12-31 DIAGNOSIS — Z00.00 MEDICARE ANNUAL WELLNESS VISIT, INITIAL: Chronic | ICD-10-CM

## 2020-12-31 DIAGNOSIS — C79.31 SECONDARY MALIGNANT NEOPLASM OF BRAIN AND SPINAL CORD (HCC): Primary | ICD-10-CM

## 2020-12-31 DIAGNOSIS — Z80.0 FAMILY HISTORY OF COLON CANCER: ICD-10-CM

## 2020-12-31 PROCEDURE — G0439 PPPS, SUBSEQ VISIT: HCPCS | Performed by: FAMILY MEDICINE

## 2020-12-31 PROCEDURE — 99214 OFFICE O/P EST MOD 30 MIN: CPT | Performed by: FAMILY MEDICINE

## 2020-12-31 PROCEDURE — G0009 ADMIN PNEUMOCOCCAL VACCINE: HCPCS

## 2020-12-31 PROCEDURE — 90732 PPSV23 VACC 2 YRS+ SUBQ/IM: CPT

## 2020-12-31 NOTE — PATIENT INSTRUCTIONS
Speak with your Oncology specialists in March about removing the Port-A-Cath  Consider an eye checkup next year  CURRENT [employer] TIPS ON EXERCISE:    IF YOU HAVE CONDITIONS THAT PREVENT AEROBIC EXERCISE:    WALK 30 MINUTES AT LEAST 5 DAYS PER WEEK; MAY BREAK IT UP INTO 10-  15 MINUTE SESSIONS   GET SOME RESISTANCE EXERCISES USING THE MAJOR MUSCLE GROUPS 2-3 TIMES PER WEEK     IF YOU ARE PHYSICALLY ABLE:    TRY TO GET 10,000 STEPS 3 TIMES PER WEEK  PLUS  GO "ONLINE" TO CHECK TARGET HEART RATE FOR YOUR AGE AND DO AEROBIC EXERCISES (JOG/STATIONARY BIKE/ELLIPTICAL) FOR 20-30 MINUTES 2-3 TIMES PER WEEK  TO AVOID COVID (CORONAVIRUS) INFECTION THE FOLLOWING ARE HELPFUL:    1  Avoid areas where there are a lot of people  Examples would be small restaurants churches small grocery stores etc   Keep 6 feet between you  2  If you must go out around people use a mask  When around people use a hand  after touching surfaces  Important areas are grocery stores, gas pumps, pharmacies, [place], etc  Remember: masks protect the "other sanjay"  They are not a substitute for staying 6 feet away  3  KEEP YOUR HANDS AWAY FROM YOUR FACE  IT IS EXTREMELY DIFFICULT TO GET COVID INFECTION IF YOU DO NOT TOUCH YOUR FACE AND ARE NOT AROUND PEOPLE  3  If you wish to work in the yard or walk around the neighborhood or in a park and you are not around a lot of people it is okay to keep your mask in your pocket  Casually passing someone without a mask does not put you at risk  4  Try to avoid having people coming in and out of your home  This would include cleaning personnel delivery people unnecessary service people etc   after anyone comes into your home including family be careful to wipe all surfaces with a home   I strongly recommend that you consider getting the new vaccine when available  Current information indicates that it is quite safe and extremely effective    We do not know how long immunity will last at this point  The availability of this vaccine will likely be through pharmacies or community organizations  Because of the storage requirements we will not have the vaccine in our office  Continue to watch the news or newspaper for details about vaccine administration

## 2020-12-31 NOTE — PROGRESS NOTES
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([facility] [address] )    Medicare annual wellness visit, initial    Family history of colon cancer    Encounter for central line care          Subjective:      Patient ID: Shirley Murphy is a 78 y o  male  PATIENT RETURNS FOR FOLLOW-UP OF CHRONIC MEDICAL CONDITIONS  NO HOSPITAL STAYS OR EMERGENCY VISITS RECENTLY  MEDS WERE REVIEWED AND NO SIDE EFFECTS  NO NEW ISSUES  UNLESS NOTED BELOW  NO NEW MEDICAL PROVIDER REPORTED  THE CHRONIC DISEASES LISTED ABOVE ARE STABLE AND UNCHANGED/ THE PLAN OF CARE FOR THOSE WILL REMAIN UNCHANGED UNLESS NOTED BELOW  AWV/sub              The following portions of the patient's history were reviewed and updated as appropriate: allergies, current medications, past family history, past medical history, past social history, past surgical history and problem list     Review of Systems   Constitutional: Negative for activity change, appetite change, fatigue, fever and unexpected weight change  HENT: Negative for congestion, dental problem, sneezing and trouble swallowing  Eyes: Negative for discharge and visual disturbance  Respiratory: Negative for cough, shortness of breath and wheezing  Cardiovascular: Negative for chest pain, palpitations and leg swelling  Gastrointestinal: Negative for abdominal pain, blood in stool, constipation, diarrhea, nausea and vomiting  Endocrine: Negative for polydipsia and polyuria  Genitourinary: Negative for difficulty urinating, dysuria, frequency and hematuria  Musculoskeletal: Negative for arthralgias  Skin: Negative for rash  Allergic/Immunologic: Negative for environmental allergies and food allergies  Neurological: Negative for dizziness and headaches  Hematological: Negative for adenopathy     Psychiatric/Behavioral: Negative for behavioral problems and sleep disturbance  Objective:  Vitals:    12/31/20 0937   BP: 138/72   BP Location: Left arm   Patient Position: Sitting   Cuff Size: Standard   Pulse: 94   Resp: 18   Temp: 98 3 °F (36 8 °C)   TempSrc: Temporal   SpO2: 97%   Weight: 70 5 kg (155 lb 6 4 oz)   Height: 5' 10" (1 778 m)      Physical Exam  Constitutional:       Appearance: He is well-developed  HENT:      Head: Normocephalic and atraumatic  Eyes:      Conjunctiva/sclera: Conjunctivae normal    Neck:      Musculoskeletal: Neck supple  Thyroid: No thyromegaly  Cardiovascular:      Rate and Rhythm: Normal rate and regular rhythm  Heart sounds: Normal heart sounds  No murmur  Pulmonary:      Effort: Pulmonary effort is normal  No respiratory distress  Breath sounds: Normal breath sounds  Lymphadenopathy:      Cervical: No cervical adenopathy  Skin:     General: Skin is warm and dry  Psychiatric:         Behavior: Behavior normal          Patient's chronic problems that were reviewed today are stable  Recent hospital stays reviewed  Recent labs and imaging reviewed  Recent visits to other providers reviewed  Meds reviewed and no changes made  Appropriate labs and imaging were ordered  Preventive measures appropriate for age and sex were reviewed with patient  Immunizations were updated as appropriate  Assessment and Plan:     Problem List Items Addressed This Visit        Nervous and Auditory    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([facility] [address] ) - Primary       Other    Medicare annual wellness visit, initial (Chronic)    Family history of colon cancer    Encounter for central line care           Preventive health issues were discussed with patient, and age appropriate screening tests were ordered as noted in patient's After Visit Summary    Personalized health advice and appropriate referrals for health education or preventive services given if needed, as noted in patient's After Visit Summary       History of Present Illness:     Patient presents for Medicare Annual Wellness visit    Patient Care Team:  Maddy Smiley MD as PCP - General (Family Medicine)  Cindi Koenig MD as Endoscopist     Problem List:     Patient Active Problem List   Diagnosis    Family history of colon cancer    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 ([facility] [address] )    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([facility] [address] )    Bladder stone    Urge incontinence    BPH with urinary obstruction    [facility]    Medicare annual wellness visit, initial    Encounter for central line care    Elevated blood sugar    Bladder mass      Past Medical and Surgical History:     Past Medical History:   Diagnosis Date    Balance problems     and" coordination issues/best to walk slow"    Bladder stones     Dysphagia 02/16/2017    "not right now but in the past"    Environmental and seasonal allergies     Exercise involving walking     "approx 1 mile 3x/week"    Eye injury     right,  "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"    Full dentures     Hemip[facility] 04/27/2015    left weakness,"due to tumor on brain"    History of brain tumor     "had radiation for it"    History of cancer chemotherapy     last tx  5/22/18    History of GI bleed 2015    History of kidney cancer     left    History of kidney stones     History of radiation therapy     History of transfusion     5 units of blood 2015    Hydrocele 11/30/2010    Inguinal hernia, bilateral     Joint stiffness of multiple sites     Paresthesia 05/01/2015    Portacath in place     right chest    Primary malignant neoplasm of lung ([facility] [address] ) 05/26/2015    Renal carcinoma, [facility] 10/17/2016    Risk for falls     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([facility] 05/26/2015    Skin cancer     Syncopal episodes 09/01/2015    2    Wears glasses      Past Surgical History:   Procedure Laterality Date    BLADDER STONE REMOVAL      COLONOSCOPY     Steven Harriett DENTAL SURGERY  2011    DENTAL EXTACTIONS     ESOPHAGOGASTRODUODENOSCOPY      HYDROCELE EXCISION / REPAIR Left 01/01/2011    KIDNEY STONE SURGERY      KIDNEY SURGERY      cancer of kidney/cryo of cancer    PORTACATH PLACEMENT      MS COLONOSCOPY FLX DX W/COLLJ SPEC WHEN PFRMD N/A 8/7/2018    Procedure: COLONOSCOPY with polypectomy ;  Surgeon: Jay Groves MD;  [facility];   Service: General    MS LAP, RECURRENT INCISIONAL HERNIA REPAIR,INCARCERATED Bilateral 8/8/2018    Procedure: REPAIR HERNIA INGUINAL LAPAROSCOPIC W/ ROBOTICS W/ MESH;  Surgeon: Jay Groves MD;  [facility];  Service: General    MS REMOVE BLADDER STONE,<2 5 CM N/A 11/27/2018    Procedure: Hector Merlos HOLMIUM LASER,BLADDE BIOPSY;  Surgeon: Bay Tovar MD;  [facility];  Service: Urology    SKIN BIOPSY      SKIN CANCER EXCISION      17 surgeries, basal cell    TONSILLECTOMY        Family History:     Family History   Problem Relation Age of Onset    Cancer Mother         EYE    Cancer Father     Colon cancer Father     Cancer Brother       Social History:     E-Cigarette/Vaping    E-Cigarette Use Never User      E-Cigarette/Vaping Substances    Nicotine No     THC No     CBD No     Flavoring No      Social History     Socioeconomic History    Marital status:      Spouse name: None    Number of children: None    Years of education: None    Highest education level: None   Occupational History    None   Social Needs    Financial resource strain: None    Food insecurity     Worry: None     Inability: None    Transportation needs     Medical: None     Non-medical: None   Tobacco Use    Smoking status: Current Every Day Smoker     Packs/day: 0 50     Years: 48 00     Pack years: 24 00     Types: Cigarettes    Smokeless tobacco: Current User   Substance and Sexual Activity    Alcohol use: Yes     Comment: "3x per year"  Drug use: No    Sexual activity: None   Lifestyle    Physical activity     Days per week: None     Minutes per session: None    Stress: None   Relationships    Social connections     Talks on phone: None     Gets together: None     Attends Jewish service: None     Active member of club or organization: None     Attends meetings of clubs or organizations: None     Relationship status: None    Intimate partner violence     Fear of current or ex partner: None     Emotionally abused: None     Physically abused: None     Forced sexual activity: None   Other Topics Concern    None   Social History Narrative    None      Medications and Allergies:     Current Outpatient Medications   Medication Sig Dispense Refill    Ascorbic Acid (VITAMIN C) 500 MG CAPS Take 500 mg by mouth daily        Multiple Vitamins-Minerals (MULTIVITAMIN ADULT PO) Take 1 tablet by mouth daily         No current facility-administered medications for this visit  No Known Allergies   Immunizations:     Immunization History   Administered Date(s) Administered    INFLUENZA 11/03/2017, 10/09/2018, 10/05/2020    Influenza Split High Dose Preservative Free IM 10/09/2018, 09/09/2019    Pneumococcal Conjugate 13-Valent 01/25/2019      Health Maintenance:         Topic Date Due    Hepatitis C Screening  1950    Colorectal Cancer Screening  08/07/2028         Topic Date Due    DTaP,Tdap,and Td Vaccines (1 - Tdap) 01/26/1973    Pneumococcal Vaccine: 65+ Years (2 of 2 - PPSV23) 01/25/2020      Medicare Health Risk Assessment:     /72 (BP Location: Left arm, Patient Position: Sitting, Cuff Size: Standard)   Pulse 94   Temp 98 3 °F (36 8 °C) (Temporal)   Resp 18   Ht 5' 10" (1 778 m)   Wt 70 5 kg (155 lb 6 4 oz)   SpO2 97%   BMI 22 30 kg/m²      Pawel Cedillo is here for his Subsequent Wellness visit  Last Medicare Wellness visit information reviewed, patient interviewed, no change since last AWV       Previous Hospitalizations:   Any hospitalizations or ED visits within the last 12 months?: No      Advance Care Planning:   Living will: Yes    Durable POA for healthcare:  Yes    Advanced directive: Yes      Cognitive Screening:   Provider or family/friend/caregiver concerned regarding cognition?: No    PREVENTIVE SCREENINGS      Cardiovascular Screening:    General: Screening Current      Diabetes Screening:     General: Screening Current      Colorectal Cancer Screening:     General: Screening Current      Prostate Cancer Screening:    General: Screening Current      Osteoporosis Screening:    General: Screening Not Indicated      Abdominal Aortic Aneurysm (AAA) Screening:    Risk factors include: age between 73-69 yo and tobacco use        Lung Cancer Screening:     General: Screening Current      Hepatitis C Screening:    General: Screening Not Indicated      Layne Pringle MD

## 2020-12-31 NOTE — PROGRESS NOTES
Assessment and Plan:     Problem List Items Addressed This Visit     None           Preventive health issues were discussed with patient, and age appropriate screening tests were ordered as noted in patient's After Visit Summary  Personalized health advice and appropriate referrals for health education or preventive services given if needed, as noted in patient's After Visit Summary       History of Present Illness:     Patient presents for Medicare Annual Wellness visit    Patient Care Team:  Luna Gudino MD as PCP - General (Family Medicine)  Anshul Ponce MD as Endoscopist     Problem List:     Patient Active Problem List   Diagnosis    Family history of colon cancer    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 ([address] )    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([facility] [address] )    Bladder stone    Urge incontinence    BPH with urinary obstruction    Renal cell carcinoma of left kidney ([facility] [address] )    Medicare annual wellness visit, initial    Encounter for central line care    Elevated blood sugar    Bladder mass      Past Medical and Surgical History:     Past Medical History:   Diagnosis Date    Balance problems     and" coordination issues/best to walk slow"    Bladder stones     Dysphagia 02/16/2017    "not right now but in the past"    Environmental and seasonal allergies     Exercise involving walking     "approx 1 mile 3x/week"    Eye injury     right,  "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"    Full dentures     Hemiparesis ([facility] [address] ) 04/27/2015    left weakness,"due to tumor on brain"    History of brain tumor     "had radiation for it"    History of cancer chemotherapy     last tx  5/22/18    History of GI bleed 2015    History of kidney cancer     left    History of kidney stones     History of radiation therapy     History of transfusion     5 units of blood 2015    Hydrocele 11/30/2010    Inguinal hernia, bilateral     Joint stiffness of multiple sites     Paresthesia 05/01/2015    Portacath in place     right chest    Primary malignant neoplasm [facility] ([facility] [address] ) 05/26/2015    Renal carcinoma, left ([facility] [address] ) 10/17/2016    Risk for falls     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([facility] [address] ) 05/26/2015    Skin cancer     Syncopal episodes 09/01/2015    2    Wears glasses      Past Surgical History:   Procedure Laterality Date    BLADDER STONE REMOVAL      COLONOSCOPY      DENTAL SURGERY  2011    DENTAL EXTACTIONS     ESOPHAGOGASTRODUODENOSCOPY      HYDROCELE EXCISION / REPAIR Left 01/01/2011    KIDNEY STONE SURGERY      KIDNEY SURGERY      cancer of kidney/cryo of cancer    PORTACATH PLACEMENT      [place] COLONOSCOPY FLX DX W/COLLJ SPEC WHEN PFRMD N/A 8/7/2018    Procedure: COLONOSCOPY with polypectomy ;  Surgeon: Arabella Faustin MD;  [facility];   Service: General    WI LAP, RECURRENT INCISIONAL HERNIA REPAIR,INCARCERATED Bilateral 8/8/2018    Procedure: REPAIR HERNIA INGUINAL LAPAROSCOPIC W/ ROBOTICS W/ MESH;  Surgeon: Arabella Faustin MD;  [facility];  Service: General    WI REMOVE BLADDER STONE,<2 5 CM N/A 11/27/2018    Procedure: Samira Rene HOLMIUM LASER,BLADDE BIOPSY;  Surgeon: Neftaly Giron MD;  [facility];  Service: Urology    SKIN BIOPSY      SKIN CANCER EXCISION      17 surgeries, basal cell    TONSILLECTOMY        Family History:     Family History   Problem Relation Age of Onset    Cancer Mother         EYE    Cancer Father     Colon cancer Father     Cancer Brother       Social History:     E-Cigarette/Vaping    E-Cigarette Use Never User      E-Cigarette/Vaping Substances    Nicotine No     THC No     CBD No     Flavoring No      Social History     Socioeconomic History    Marital status:      Spouse name: None    Number of children: None    Years of education: None    Highest education level: None   Occupational History    None   Social Needs    Financial resource strain: None  Food insecurity     Worry: None     Inability: None    Transportation needs     Medical: None     Non-medical: None   Tobacco Use    Smoking status: Current Every Day Smoker     Packs/day: 0 50     Years: 48 00     Pack years: 24 00     Types: Cigarettes    Smokeless tobacco: Current User   Substance and Sexual Activity    Alcohol use: Yes     Comment: "3x per year"    Drug use: No    Sexual activity: None   Lifestyle    Physical activity     Days per week: None     Minutes per session: None    Stress: None   Relationships    Social connections     Talks on phone: None     Gets together: None     Attends Buddhism service: None     Active member of club or organization: None     Attends meetings of clubs or organizations: None     Relationship status: None    Intimate partner violence     Fear of current or ex partner: None     Emotionally abused: None     Physically abused: None     Forced sexual activity: None   Other Topics Concern    None   Social History Narrative    None      Medications and Allergies:     Current Outpatient Medications   Medication Sig Dispense Refill    Ascorbic Acid (VITAMIN C) 500 MG CAPS Take 500 mg by mouth daily        Multiple Vitamins-Minerals (MULTIVITAMIN ADULT PO) Take 1 tablet by mouth daily         No current facility-administered medications for this visit        No Known Allergies   Immunizations:     Immunization History   Administered Date(s) Administered    INFLUENZA 11/03/2017, 10/09/2018, 10/05/2020    Influenza Split High Dose Preservative Free IM 10/09/2018, 09/09/2019    Pneumococcal Conjugate 13-Valent 01/25/2019      Health Maintenance:         Topic Date Due    Hepatitis C Screening  1950    Colorectal Cancer Screening  08/07/2028         Topic Date Due    DTaP,Tdap,and Td Vaccines (1 - Tdap) 01/26/1973    Pneumococcal Vaccine: 65+ Years (2 of 2 - PPSV23) 01/25/2020      Medicare Health Risk Assessment:     Temp 98 3 °F (36 8 °C) (Temporal)   Resp 18   Ht 5' 10" (1 778 m)   Wt 70 5 kg (155 lb 6 4 oz)   BMI 22 30 kg/m²          Health Risk Assessment:   Patient rates overall health as fair  Patient feels that their physical health rating is same  Eyesight was rated as same  Hearing was rated as same  Patient feels that their emotional and mental health rating is slightly worse  Pain experienced in the last 7 days has been some  Patient's pain rating has been 5/10  Patient states that he has experienced no weight loss or gain in last 6 months  Lower abdomen    Fall Risk Screening: In the past year, patient has experienced: no history of falling in past year      Home Safety:  Patient does not have trouble with stairs inside or outside of their home  Patient has working smoke alarms and has working carbon monoxide detector  Home safety hazards include: none  Nutrition:   Current diet is Regular and Limited junk food  Medications:   Patient is not currently taking any over-the-counter supplements  Patient is able to manage medications  Activities of Daily Living (ADLs)/Instrumental Activities of Daily Living (IADLs):   Walk and transfer into and out of bed and chair?: Yes  Dress and groom yourself?: Yes    Bathe or shower yourself?: Yes    Feed yourself? Yes  Do your laundry/housekeeping?: Yes  Manage your money, pay your bills and track your expenses?: Yes  Make your own meals?: Yes    Do your own shopping?: Yes    Previous Hospitalizations:   Any hospitalizations or ED visits within the last 12 months?: No    How many hospitalizations have you had in the last year?: 1-2    Advance Care Planning:   Living will: Yes    Durable POA for healthcare:  Yes    Advanced directive: Yes    Five wishes given: Yes      PREVENTIVE SCREENINGS        Diabetes Screening:     General: Screening Current      Colorectal Cancer Screening:     General: Screening Current      Abdominal Aortic Aneurysm (AAA) Screening:    Risk factors include: age between 73-69 yo and tobacco use        Lung Cancer Screening:     General: Screening Not Indicated and History Lung Cancer      Tad Ceron MD

## 2021-01-15 ENCOUNTER — VBI (OUTPATIENT)
Dept: ADMINISTRATIVE | Facility: OTHER | Age: 71
End: 2021-01-15

## 2021-01-18 ENCOUNTER — HOSPITAL ENCOUNTER (OUTPATIENT)
Dept: INFUSION CENTER | Facility: HOSPITAL | Age: 71
Discharge: HOME | End: 2021-01-18
Payer: MEDICARE

## 2021-01-18 DIAGNOSIS — C34.12 PRIMARY MALIGNANT NEOPLASM OF BRONCHUS OF LEFT UPPER LOBE (HCC): ICD-10-CM

## 2021-01-18 DIAGNOSIS — Z45.2 ENCOUNTER FOR CENTRAL LINE CARE: Primary | ICD-10-CM

## 2021-01-18 PROCEDURE — 96523 IRRIG DRUG DELIVERY DEVICE: CPT

## 2021-01-18 NOTE — PROGRESS NOTES
Pt tolerated port flush today with no complaints  Next apt  Scheduled, declined AVS  Left unit ambulatory with a steady gait

## 2021-02-02 ENCOUNTER — VBI (OUTPATIENT)
Dept: ADMINISTRATIVE | Facility: OTHER | Age: 71
End: 2021-02-02

## 2021-02-04 PROCEDURE — NC001 PR NO CHARGE: Performed by: UROLOGY

## 2021-02-04 NOTE — H&P
HISTORY AND PHYSICAL  ? ? Patient Name: Torres Davis  Patient MRN: [id number]  Attending Provider: Mikki Rivas MD  Service: Urology  Chief Complaint      Bladder tumor, bladder stones    HPI   Torres Davis is a 73 y o  male with  Recent gross hematuria  Cysto In office recently shows suspicious lesion:       Bladder suspicious soft tissue mass with some necrosis left lateral wall near trigone  Right posterior wall bladder still has stone stuck in the wall as we have seen before  I plan  TUR bladder tumor and possible laser fragmentation of bladder stone  Potential risks and complications discussed, and informed consent was given by the patient  Medications  Meds/Allergies   No current facility-administered medications for this encounter  Cannot display prior to admission medications because the patient has not been admitted in this contact  No current facility-administered medications for this encounter       Current Outpatient Medications:     Ascorbic Acid (VITAMIN C) 500 MG CAPS, Take 500 mg by mouth daily  , Disp: , Rfl:     Multiple Vitamins-Minerals (MULTIVITAMIN ADULT PO), Take 1 tablet by mouth daily  , Disp: , Rfl:   Review of Systems  10 point review of systems negative except as noted in HPI  Allergies  No Known Allergies  PMH  Past Medical History:   Diagnosis Date    Balance problems     and" coordination issues/best to walk slow"    Bladder stones     Dysphagia 02/16/2017    "not right now but in the past"    Environmental and seasonal allergies     Exercise involving walking     "approx 1 mile 3x/week"    Eye injury     right,  "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"    Full dentures     Hemiparesis ([facility] [address] ) 04/27/2015    left weakness,"due to tumor on brain"    History of brain tumor     "had radiation for it"    History of cancer chemotherapy     last tx  5/22/18    History of GI bleed 2015    History of kidney cancer     left    History of kidney stones     History of radiation therapy     History of transfusion     5 units of blood 2015    Hydrocele 11/30/2010    Inguinal hernia, bilateral     Joint stiffness of multiple sites     Paresthesia 05/01/2015    Portacath in place     right chest    Primary malignant neoplasm of lung ([facility] [address] ) 05/26/2015    Renal carcinoma, left ([facility] [address] ) 10/17/2016    Risk for falls     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    Skin cancer     Syncopal episodes 09/01/2015    2    Wears glasses      Past surgical history  Past Surgical History:   Procedure Laterality Date    BLADDER STONE REMOVAL      COLONOSCOPY      DENTAL SURGERY  2011    DENTAL EXTACTIONS     ESOPHAGOGASTRODUODENOSCOPY      HYDROCELE EXCISION / REPAIR Left 01/01/2011    KIDNEY STONE SURGERY      KIDNEY SURGERY      cancer of kidney/cryo of cancer    PORTACATH PLACEMENT      VA COLONOSCOPY FLX DX W/COLLJ SPEC WHEN PFRMD N/A 8/7/2018    Procedure: COLONOSCOPY with polypectomy ;  Surgeon: Bro Onofre MD;  [facility];   Service: General    VA LAP, RECURRENT INCISIONAL HERNIA REPAIR,INCARCERATED Bilateral 8/8/2018    Procedure: REPAIR HERNIA INGUINAL LAPAROSCOPIC W/ ROBOTICS W/ MESH;  Surgeon: Bro Onofre MD;  [facility];  Service: General    VA REMOVE BLADDER STONE,<2 5 CM N/A 11/27/2018    Procedure: Ludy Callahan HOLMIUM LASER,BLADDE BIOPSY;  Surgeon: Simon Reynolds MD;  [facility];  Service: Urology    SKIN BIOPSY      SKIN CANCER EXCISION      17 surgeries, basal cell    TONSILLECTOMY       Social history  Social History     Tobacco Use    Smoking status: Current Every Day Smoker     Packs/day: 0 50     Years: 48 00     Pack years: 24 00     Types: Cigarettes    Smokeless tobacco: Current User   Substance Use Topics    Alcohol use: Yes     Comment: "3x per year"    Drug use: No     ?  Physical Exam     vs  General appearance: alert and oriented, in no acute distress  Head: Normocephalic, without obvious abnormality, atraumatic  Eyes: conjunctivae/corneas clear  PERRL, EOM's intact  Fundi benign  Throat: lips, mucosa, and tongue normal; teeth and gums normal  Back: symmetric, no curvature  ROM normal  No CVA tenderness    Lungs: clear to auscultation bilaterally  Heart: regular rate and rhythm, S1, S2 normal, no murmur, click, rub or gallop  Abdomen: soft, non-tender; bowel sounds normal; no masses,  no organomegaly  Neurologic: Grossly normal  Gloria Rhoades MD

## 2021-02-05 ENCOUNTER — LAB (OUTPATIENT)
Dept: LAB | Facility: HOSPITAL | Age: 71
End: 2021-02-05
Attending: UROLOGY
Payer: MEDICARE

## 2021-02-05 ENCOUNTER — HOSPITAL ENCOUNTER (OUTPATIENT)
Dept: NON INVASIVE DIAGNOSTICS | Facility: HOSPITAL | Age: 71
Discharge: HOME | End: 2021-02-05
Attending: UROLOGY
Payer: MEDICARE

## 2021-02-05 DIAGNOSIS — D49.4 BLADDER TUMOR: ICD-10-CM

## 2021-02-05 DIAGNOSIS — Z01.812 PRE-OPERATIVE LABORATORY EXAMINATION: ICD-10-CM

## 2021-02-05 DIAGNOSIS — Z01.810 PRE-OPERATIVE CARDIOVASCULAR EXAMINATION: ICD-10-CM

## 2021-02-05 DIAGNOSIS — N21.0 BLADDER STONE: ICD-10-CM

## 2021-02-05 DIAGNOSIS — R39.89 SUSPECTED UTI: ICD-10-CM

## 2021-02-05 LAB
ALBUMIN SERPL BCP-MCNC: 3.6 G/DL (ref 3.5–5)
ALP SERPL-CCNC: 112 U/L (ref 46–116)
ALT SERPL W P-5'-P-CCNC: 45 U/L (ref 12–78)
ANION GAP SERPL CALCULATED.3IONS-SCNC: 9 MMOL/L (ref 4–13)
AST SERPL W P-5'-P-CCNC: 24 U/L (ref 5–45)
ATRIAL RATE: 87 BPM
BASOPHILS # BLD AUTO: 0.07 THOUSANDS/ΜL (ref 0–0.1)
BASOPHILS NFR BLD AUTO: 1 % (ref 0–1)
BILIRUB SERPL-MCNC: 0.48 MG/DL (ref 0.2–1)
BUN SERPL-MCNC: 15 MG/DL (ref 5–25)
CALCIUM SERPL-MCNC: 9.1 MG/DL (ref 8.3–10.1)
CHLORIDE SERPL-SCNC: 108 MMOL/L (ref 100–108)
CO2 SERPL-SCNC: 26 MMOL/L (ref 21–32)
CREAT SERPL-MCNC: 0.84 MG/DL (ref 0.6–1.3)
EOSINOPHIL # BLD AUTO: 0.19 THOUSAND/ΜL (ref 0–0.61)
EOSINOPHIL NFR BLD AUTO: 2 % (ref 0–6)
ERYTHROCYTE [DISTWIDTH] IN BLOOD BY AUTOMATED COUNT: 12.5 % (ref 11.6–15.1)
GFR SERPL CREATININE-BSD FRML MDRD: 89 ML/MIN/1.73SQ M
GLUCOSE SERPL-MCNC: 96 MG/DL (ref 65–140)
HCT VFR BLD AUTO: 45.3 % (ref 36.5–49.3)
HGB BLD-MCNC: 14.6 G/DL (ref 12–17)
IMM GRANULOCYTES # BLD AUTO: 0.05 THOUSAND/UL (ref 0–0.2)
IMM GRANULOCYTES NFR BLD AUTO: 1 % (ref 0–2)
LYMPHOCYTES # BLD AUTO: 2.76 THOUSANDS/ΜL (ref 0.6–4.47)
LYMPHOCYTES NFR BLD AUTO: 30 % (ref 14–44)
MCH RBC QN AUTO: 31.3 PG (ref 26.8–34.3)
MCHC RBC AUTO-ENTMCNC: 32.2 G/DL (ref 31.4–37.4)
MCV RBC AUTO: 97 FL (ref 82–98)
MONOCYTES # BLD AUTO: 0.68 THOUSAND/ΜL (ref 0.17–1.22)
MONOCYTES NFR BLD AUTO: 7 % (ref 4–12)
NEUTROPHILS # BLD AUTO: 5.6 THOUSANDS/ΜL (ref 1.85–7.62)
NEUTS SEG NFR BLD AUTO: 59 % (ref 43–75)
NRBC BLD AUTO-RTO: 0 /100 WBCS
P AXIS: 60 DEGREES
PLATELET # BLD AUTO: 218 THOUSANDS/UL (ref 149–390)
PMV BLD AUTO: 10.2 FL (ref 8.9–12.7)
POTASSIUM SERPL-SCNC: 4.1 MMOL/L (ref 3.5–5.3)
PR INTERVAL: 138 MS
PROT SERPL-MCNC: 7.2 G/DL (ref 6.4–8.2)
QRS AXIS: 72 DEGREES
QRSD INTERVAL: 96 MS
QT INTERVAL: 358 MS
QTC INTERVAL: 430 MS
RBC # BLD AUTO: 4.67 MILLION/UL (ref 3.88–5.62)
SODIUM SERPL-SCNC: 143 MMOL/L (ref 136–145)
T WAVE AXIS: 67 DEGREES
VENTRICULAR RATE: 87 BPM
WBC # BLD AUTO: 9.35 THOUSAND/UL (ref 4.31–10.16)

## 2021-02-05 PROCEDURE — 36415 COLL VENOUS BLD VENIPUNCTURE: CPT

## 2021-02-05 PROCEDURE — 93005 ELECTROCARDIOGRAM TRACING: CPT

## 2021-02-05 PROCEDURE — 80053 COMPREHEN METABOLIC PANEL: CPT

## 2021-02-05 PROCEDURE — 87086 URINE CULTURE/COLONY COUNT: CPT

## 2021-02-05 PROCEDURE — 85025 COMPLETE CBC W/AUTO DIFF WBC: CPT

## 2021-02-05 PROCEDURE — 93010 ELECTROCARDIOGRAM REPORT: CPT | Performed by: INTERNAL MEDICINE

## 2021-02-07 LAB — BACTERIA UR CULT: NORMAL

## 2021-02-08 ENCOUNTER — ANESTHESIA EVENT (OUTPATIENT)
Dept: PERIOP | Facility: HOSPITAL | Age: 71
End: 2021-02-08
Payer: MEDICARE

## 2021-02-08 RX ORDER — DIPHENHYDRAMINE HCL 25 MG
25 CAPSULE ORAL EVERY 6 HOURS PRN
COMMUNITY
End: 2022-01-24

## 2021-02-08 NOTE — PRE-PROCEDURE INSTRUCTIONS
Pre-Surgery Instructions:   Medication Instructions    Ascorbic Acid (VITAMIN C) 500 MG CAPS Instructed patient per Anesthesia Guidelines   diphenhydrAMINE (BENADRYL) 25 mg capsule Instructed patient per Anesthesia Guidelines   Multiple Vitamins-Minerals (MULTIVITAMIN ADULT PO) Instructed patient per Anesthesia Guidelines  Patient has no medications to take on DOS  Instructed to hold vitamins, NSAIDS, aspirin

## 2021-02-09 ENCOUNTER — ANESTHESIA (OUTPATIENT)
Dept: PERIOP | Facility: HOSPITAL | Age: 71
End: 2021-02-09
Payer: MEDICARE

## 2021-02-09 ENCOUNTER — HOSPITAL ENCOUNTER (OUTPATIENT)
Facility: HOSPITAL | Age: 71
Setting detail: OUTPATIENT SURGERY
Discharge: HOME | End: 2021-02-09
Attending: UROLOGY | Admitting: UROLOGY
Payer: MEDICARE

## 2021-02-09 VITALS — HEART RATE: 80 BPM

## 2021-02-09 VITALS
HEART RATE: 63 BPM | BODY MASS INDEX: 22.19 KG/M2 | SYSTOLIC BLOOD PRESSURE: 133 MMHG | OXYGEN SATURATION: 93 % | HEIGHT: 70 IN | RESPIRATION RATE: 18 BRPM | TEMPERATURE: 97.5 F | DIASTOLIC BLOOD PRESSURE: 59 MMHG | WEIGHT: 155 LBS

## 2021-02-09 DIAGNOSIS — D49.4 BLADDER TUMOR: ICD-10-CM

## 2021-02-09 DIAGNOSIS — N32.89 BLADDER MASS: Primary | ICD-10-CM

## 2021-02-09 DIAGNOSIS — N21.0 BLADDER STONE: ICD-10-CM

## 2021-02-09 PROBLEM — F17.200 SMOKER: Status: ACTIVE | Noted: 2021-02-09

## 2021-02-09 PROCEDURE — 99024 POSTOP FOLLOW-UP VISIT: CPT | Performed by: UROLOGY

## 2021-02-09 PROCEDURE — 88341 IMHCHEM/IMCYTCHM EA ADD ANTB: CPT | Performed by: CLINICAL MEDICAL LABORATORY

## 2021-02-09 PROCEDURE — 88307 TISSUE EXAM BY PATHOLOGIST: CPT | Performed by: CLINICAL MEDICAL LABORATORY

## 2021-02-09 PROCEDURE — 52318 REMOVE BLADDER STONE: CPT | Performed by: UROLOGY

## 2021-02-09 PROCEDURE — 88342 IMHCHEM/IMCYTCHM 1ST ANTB: CPT | Performed by: CLINICAL MEDICAL LABORATORY

## 2021-02-09 PROCEDURE — 52235 CYSTOSCOPY AND TREATMENT: CPT | Performed by: UROLOGY

## 2021-02-09 RX ORDER — FENTANYL CITRATE/PF 50 MCG/ML
25 SYRINGE (ML) INJECTION
Status: DISCONTINUED | OUTPATIENT
Start: 2021-02-09 | End: 2021-02-09 | Stop reason: HOSPADM

## 2021-02-09 RX ORDER — ROCURONIUM BROMIDE 10 MG/ML
INJECTION, SOLUTION INTRAVENOUS AS NEEDED
Status: DISCONTINUED | OUTPATIENT
Start: 2021-02-09 | End: 2021-02-09

## 2021-02-09 RX ORDER — OXYCODONE HYDROCHLORIDE AND ACETAMINOPHEN 5; 325 MG/1; MG/1
2 TABLET ORAL EVERY 4 HOURS PRN
Status: DISCONTINUED | OUTPATIENT
Start: 2021-02-09 | End: 2021-02-09 | Stop reason: HOSPADM

## 2021-02-09 RX ORDER — MAGNESIUM HYDROXIDE 1200 MG/15ML
LIQUID ORAL AS NEEDED
Status: DISCONTINUED | OUTPATIENT
Start: 2021-02-09 | End: 2021-02-09 | Stop reason: HOSPADM

## 2021-02-09 RX ORDER — ONDANSETRON 2 MG/ML
INJECTION INTRAMUSCULAR; INTRAVENOUS AS NEEDED
Status: DISCONTINUED | OUTPATIENT
Start: 2021-02-09 | End: 2021-02-09

## 2021-02-09 RX ORDER — NEOSTIGMINE METHYLSULFATE 1 MG/ML
INJECTION INTRAVENOUS AS NEEDED
Status: DISCONTINUED | OUTPATIENT
Start: 2021-02-09 | End: 2021-02-09

## 2021-02-09 RX ORDER — CEPHALEXIN 500 MG/1
500 CAPSULE ORAL EVERY 12 HOURS SCHEDULED
Qty: 10 CAPSULE | Refills: 0 | Status: SHIPPED | OUTPATIENT
Start: 2021-02-09 | End: 2021-02-14

## 2021-02-09 RX ORDER — CEFAZOLIN SODIUM 2 G/50ML
2000 SOLUTION INTRAVENOUS ONCE
Status: COMPLETED | OUTPATIENT
Start: 2021-02-09 | End: 2021-02-09

## 2021-02-09 RX ORDER — SODIUM CHLORIDE 9 MG/ML
125 INJECTION, SOLUTION INTRAVENOUS CONTINUOUS
Status: DISCONTINUED | OUTPATIENT
Start: 2021-02-09 | End: 2021-02-09 | Stop reason: HOSPADM

## 2021-02-09 RX ORDER — LIDOCAINE HYDROCHLORIDE 20 MG/ML
INJECTION, SOLUTION EPIDURAL; INFILTRATION; INTRACAUDAL; PERINEURAL AS NEEDED
Status: DISCONTINUED | OUTPATIENT
Start: 2021-02-09 | End: 2021-02-09

## 2021-02-09 RX ORDER — OXYCODONE HYDROCHLORIDE AND ACETAMINOPHEN 5; 325 MG/1; MG/1
1 TABLET ORAL EVERY 4 HOURS PRN
Status: DISCONTINUED | OUTPATIENT
Start: 2021-02-09 | End: 2021-02-09 | Stop reason: HOSPADM

## 2021-02-09 RX ORDER — GLYCOPYRROLATE 0.2 MG/ML
INJECTION INTRAMUSCULAR; INTRAVENOUS AS NEEDED
Status: DISCONTINUED | OUTPATIENT
Start: 2021-02-09 | End: 2021-02-09

## 2021-02-09 RX ORDER — PROPOFOL 10 MG/ML
INJECTION, EMULSION INTRAVENOUS AS NEEDED
Status: DISCONTINUED | OUTPATIENT
Start: 2021-02-09 | End: 2021-02-09

## 2021-02-09 RX ORDER — MIDAZOLAM HYDROCHLORIDE 2 MG/2ML
INJECTION, SOLUTION INTRAMUSCULAR; INTRAVENOUS AS NEEDED
Status: DISCONTINUED | OUTPATIENT
Start: 2021-02-09 | End: 2021-02-09

## 2021-02-09 RX ORDER — HYDROCODONE BITARTRATE AND ACETAMINOPHEN 5; 325 MG/1; MG/1
TABLET ORAL
Qty: 10 TABLET | Refills: 0 | Status: SHIPPED | OUTPATIENT
Start: 2021-02-09 | End: 2021-02-15 | Stop reason: HOSPADM

## 2021-02-09 RX ORDER — HYDROMORPHONE HCL/PF 1 MG/ML
0.5 SYRINGE (ML) INJECTION
Status: DISCONTINUED | OUTPATIENT
Start: 2021-02-09 | End: 2021-02-09 | Stop reason: HOSPADM

## 2021-02-09 RX ORDER — ONDANSETRON 2 MG/ML
4 INJECTION INTRAMUSCULAR; INTRAVENOUS ONCE AS NEEDED
Status: DISCONTINUED | OUTPATIENT
Start: 2021-02-09 | End: 2021-02-09 | Stop reason: HOSPADM

## 2021-02-09 RX ORDER — FENTANYL CITRATE 50 UG/ML
INJECTION, SOLUTION INTRAMUSCULAR; INTRAVENOUS AS NEEDED
Status: DISCONTINUED | OUTPATIENT
Start: 2021-02-09 | End: 2021-02-09

## 2021-02-09 RX ADMIN — FENTANYL CITRATE 100 MCG: 50 INJECTION, SOLUTION INTRAMUSCULAR; INTRAVENOUS at 11:59

## 2021-02-09 RX ADMIN — NEOSTIGMINE METHYLSULFATE 5 MG: 1 INJECTION INTRAVENOUS at 12:20

## 2021-02-09 RX ADMIN — ONDANSETRON 4 MG: 2 INJECTION INTRAMUSCULAR; INTRAVENOUS at 12:20

## 2021-02-09 RX ADMIN — ROCURONIUM BROMIDE 30 MG: 10 INJECTION, SOLUTION INTRAVENOUS at 11:59

## 2021-02-09 RX ADMIN — PROPOFOL 150 MG: 10 INJECTION, EMULSION INTRAVENOUS at 11:59

## 2021-02-09 RX ADMIN — SODIUM CHLORIDE 125 ML/HR: 0.9 INJECTION, SOLUTION INTRAVENOUS at 10:41

## 2021-02-09 RX ADMIN — CEFAZOLIN SODIUM 2000 MG: 2 SOLUTION INTRAVENOUS at 11:56

## 2021-02-09 RX ADMIN — GLYCOPYRROLATE 0.6 MG: 0.2 INJECTION, SOLUTION INTRAMUSCULAR; INTRAVENOUS at 12:20

## 2021-02-09 RX ADMIN — PROPOFOL 50 MG: 10 INJECTION, EMULSION INTRAVENOUS at 12:21

## 2021-02-09 RX ADMIN — MIDAZOLAM 2 MG: 1 INJECTION INTRAMUSCULAR; INTRAVENOUS at 11:51

## 2021-02-09 RX ADMIN — SODIUM CHLORIDE: 0.9 INJECTION, SOLUTION INTRAVENOUS at 12:21

## 2021-02-09 RX ADMIN — LIDOCAINE HYDROCHLORIDE 100 MG: 20 INJECTION, SOLUTION EPIDURAL; INFILTRATION; INTRACAUDAL; PERINEURAL at 11:59

## 2021-02-09 NOTE — DISCHARGE INSTRUCTIONS
ALL YOUR  PREVIOUS MEDS ARE THE SAME  NEW MEDS:  Cephalexin antibiotic  Hydrocodone if needed for pain    EXPECT SOME BLOOD IN URINE, BURNING, FREQUENT URINATION  ACTIVITY:  RESUME FULL ACTIVITY 2-3 days

## 2021-02-09 NOTE — DISCHARGE SUMMARY
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

## 2021-02-09 NOTE — OP NOTE
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]     * Bladder stone [N21 0]    Procedure(s) (LRB):  CYSTO, T U R B T  (N/A)  LASER LITHOLOPAXY (N/A)    Specimen(s):  ID Type Source Tests Collected by Time Destination   A : tumor left lateral wall-stone was overlying the tumor-do not analyze bladder stones [facility] Urinary Bladder STONE ANALYSIS Neftaly Giron MD 2021 1215        Estimated Blood Loss:   Minimal    Drains:  Urethral Catheter Latex 20 Fr  (Active)   Reasons to continue Urinary Catheter  Post-operative urological requirements 21 1244   Goal for Removal Will consult urology 21 1244   Site Assessment Clean;Skin intact 21 1244   Collection Container Standard drainage bag 21 1244   Securement Method Securing device (Describe) 21 1244   Output (mL) 75 mL 21 1321   Number of days: 0       Anesthesia Type:   General    Operative Indications:  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]  Bladder stone [N21 0]      Operative Findings:  Stone adherent to and covering tissue based on left lateral wall  Tissue very heaped up, either inflammatory or neoplastic  Resection done of this area, 5 cm diameter  Stone lasered  Complications:   None    Procedure and Technique:  After the patient was placed under adequate general anesthesia, he was prepped and draped using chlorhexidine solution in lithotomy position  The 25 Divehi scope was passed without difficulty urethra had no strictures  The prostate had mild lateral lobe hyperplasia  The bladder had a stone approximately 3 cm diameter on the left lateral wall  With irrigation I could see that the stone was covering tissue and the stone was stuck to the lateral wall    Around the stone was a 5 cm patch of erythema, heaped up mucosa, papillary inflammatory type of changes  The right posterior wall had a stone visible just below the mucosa of the bladder, similar to findings I saw in his bladder over the years  Using the holmium laser on various settings, I broke up the stone in many small particles  At the base of the stone I could see tissue that the stone was adherent to  As described above, it was 5 cm in diameter  I used the resectoscope to resect this tissue into but not through bladder  Care was taken not to perforate the bladder wall  Cautery was used for hemostasis  Pieces of tissue removed for biopsy  After thorough inspection to confirm no bleeding, the scope was removed, De La Cruz catheter inserted, patient taken to recovery good condition     I was present for the entire procedure    Patient Disposition:  PACU     SIGNATURE: Dinorah Puckett MD  DATE: February 9, 2021  TIME: 1:57 PM

## 2021-02-09 NOTE — INTERVAL H&P NOTE
H&P reviewed  After examining the patient I find no changes in the patients condition since the H&P had been written      Vitals:    02/09/21 1028   BP: 108/60   Pulse: 80   Resp: 18   Temp: 97 8 °F (36 6 °C)   SpO2: 90%

## 2021-02-09 NOTE — ANESTHESIA POSTPROCEDURE EVALUATION
Post-Op Assessment Note    CV Status:  Stable    Pain management: adequate     Mental Status:  Alert and awake   Hydration Status:  Euvolemic   PONV Controlled:  Controlled   Airway Patency:  Patent      Post Op Vitals Reviewed: Yes      Staff: Anesthesiologist         No complications documented      /62 (02/09/21 1314)    Temp     Pulse 57 (02/09/21 1314)   Resp 15 (02/09/21 1314)    SpO2 94 % (02/09/21 1314)

## 2021-02-10 ENCOUNTER — TELEPHONE (OUTPATIENT)
Dept: UROLOGY | Facility: AMBULATORY SURGERY CENTER | Age: 71
End: 2021-02-10

## 2021-02-15 ENCOUNTER — HOSPITAL ENCOUNTER (OUTPATIENT)
Dept: CT IMAGING | Facility: HOSPITAL | Age: 71
Discharge: HOME | End: 2021-02-15
Attending: INTERNAL MEDICINE
Payer: MEDICARE

## 2021-02-15 ENCOUNTER — PROCEDURE VISIT (OUTPATIENT)
Dept: UROLOGY | Facility: MEDICAL CENTER | Age: 71
End: 2021-02-15
Payer: MEDICARE

## 2021-02-15 VITALS
DIASTOLIC BLOOD PRESSURE: 62 MMHG | HEART RATE: 68 BPM | BODY MASS INDEX: 22.33 KG/M2 | WEIGHT: 156 LBS | HEIGHT: 70 IN | SYSTOLIC BLOOD PRESSURE: 120 MMHG

## 2021-02-15 DIAGNOSIS — K76.9 LIVER LESION: ICD-10-CM

## 2021-02-15 DIAGNOSIS — C34.12 PRIMARY MALIGNANT NEOPLASM OF BRONCHUS OF LEFT UPPER LOBE (HCC): ICD-10-CM

## 2021-02-15 DIAGNOSIS — Z85.51 HISTORY OF BLADDER CANCER: ICD-10-CM

## 2021-02-15 DIAGNOSIS — N32.89 BLADDER MASS: Primary | ICD-10-CM

## 2021-02-15 PROCEDURE — 1124F ACP DISCUSS-NO DSCNMKR DOCD: CPT

## 2021-02-15 PROCEDURE — G1004 CDSM NDSC: HCPCS

## 2021-02-15 PROCEDURE — 71260 CT THORAX DX C+: CPT

## 2021-02-15 PROCEDURE — 99211 OFF/OP EST MAY X REQ PHY/QHP: CPT

## 2021-02-15 PROCEDURE — 74177 CT ABD & PELVIS W/CONTRAST: CPT

## 2021-02-15 RX ADMIN — IOHEXOL 100 ML: 350 INJECTION, SOLUTION INTRAVENOUS at 09:47

## 2021-02-15 NOTE — PROGRESS NOTES
2/15/2021    Luis Enrique Crain is a 73 y o  male  [id number]    Diagnosis:  Chief Complaint     Bladder Tumor; De La Cruz Catheter Removal        Patient presents for De La Cruz Catheter removal s/p Cysto, TURBT, Laser Litholopaxy on 2/9/21 with Dr Home Martins  Plan:  Return 2/23/21 for Post op check and pathology results  Procedure:  Urine draining clear yellow in drainage bag  De La Cruz removed without difficulty, after deflation of intact balloon  Patient tolerated procedure well  Denies fever or urinary symptoms  Patient instructed to increase fluids, especially water, and limit caffeine intake  To return to office if unable to void within 5-6 hours, or has increased discomfort      Vitals:    02/15/21 0851   BP: 120/62   Pulse: 68   Weight: 70 8 kg (156 lb)   Height: 5' 10" (1 778 m)       Dacia Cho LPN

## 2021-02-17 ENCOUNTER — VBI (OUTPATIENT)
Dept: ADMINISTRATIVE | Facility: OTHER | Age: 71
End: 2021-02-17

## 2021-02-17 NOTE — TELEPHONE ENCOUNTER
02/17/21 3:52 PM     See documentation in the VB [employer] SmartForm       Lydia Sierra, [address] [facility]

## 2021-02-19 ENCOUNTER — TELEPHONE (OUTPATIENT)
Dept: HEMATOLOGY ONCOLOGY | Facility: CLINIC | Age: 71
End: 2021-02-19

## 2021-02-19 NOTE — TELEPHONE ENCOUNTER
IMPRESSION:     Significant abnormal asymmetric bladder wall thickening with associated mucosal enhancement worrisome for malignancy      5 mm left lower lobe pulmonary nodule similar in appearance to the prior study but worth following with surveillance CT  Background emphysema and left upper lobe scarring otherwise stable      Liver lesion is larger than on the prior study but has features favoring a benign cyst      Bilateral cystic-appearing renal lesions without substantial change from the prior study  There is a new left lower pole nonobstructing kidney stone      Heterogeneous density of the prostate gland  No discrete mass seen  Significance uncertain      Small midline abdominal wall hernia containing fat and a portion of the bowel loop, but without obstruction      The study was marked in EPIC for significant notification        Radiology calling with significant findings on CT scan from 2/15/21  Will send to RN to review with MD

## 2021-02-23 ENCOUNTER — OFFICE VISIT (OUTPATIENT)
Dept: UROLOGY | Facility: MEDICAL CENTER | Age: 71
End: 2021-02-23
Payer: MEDICARE

## 2021-02-23 VITALS
DIASTOLIC BLOOD PRESSURE: 80 MMHG | HEIGHT: 70 IN | HEART RATE: 98 BPM | BODY MASS INDEX: 22.19 KG/M2 | WEIGHT: 155 LBS | SYSTOLIC BLOOD PRESSURE: 118 MMHG

## 2021-02-23 DIAGNOSIS — N21.0 BLADDER CALCULUS: Primary | ICD-10-CM

## 2021-02-23 PROCEDURE — 99213 OFFICE O/P EST LOW 20 MIN: CPT | Performed by: UROLOGY

## 2021-02-26 ENCOUNTER — TELEPHONE (OUTPATIENT)
Dept: HEMATOLOGY ONCOLOGY | Facility: CLINIC | Age: 71
End: 2021-02-26

## 2021-02-26 NOTE — TELEPHONE ENCOUNTER
Patient has 9:30 a m  f/u apt on Monday 03/01/2021 w/Mari Cooper at the [address] [place] [address] office  Called pt and left message to call the office to do covid pre screening questionnaire, [phone]

## 2021-03-01 ENCOUNTER — OFFICE VISIT (OUTPATIENT)
Dept: HEMATOLOGY ONCOLOGY | Facility: CLINIC | Age: 71
End: 2021-03-01
Payer: MEDICARE

## 2021-03-01 ENCOUNTER — HOSPITAL ENCOUNTER (OUTPATIENT)
Dept: INFUSION CENTER | Facility: HOSPITAL | Age: 71
Discharge: HOME | End: 2021-03-01
Payer: MEDICARE

## 2021-03-01 VITALS
SYSTOLIC BLOOD PRESSURE: 122 MMHG | RESPIRATION RATE: 18 BRPM | OXYGEN SATURATION: 98 % | DIASTOLIC BLOOD PRESSURE: 70 MMHG | HEART RATE: 102 BPM | HEIGHT: 70 IN | TEMPERATURE: 97.1 F | BODY MASS INDEX: 22.71 KG/M2 | WEIGHT: 158.6 LBS

## 2021-03-01 DIAGNOSIS — C79.49 SECONDARY MALIGNANT NEOPLASM OF BRAIN AND SPINAL CORD (HCC): ICD-10-CM

## 2021-03-01 DIAGNOSIS — C34.12 PRIMARY MALIGNANT NEOPLASM OF BRONCHUS OF LEFT UPPER LOBE (HCC): Primary | ICD-10-CM

## 2021-03-01 DIAGNOSIS — C64.2 RENAL CELL CARCINOMA OF LEFT KIDNEY (HCC): ICD-10-CM

## 2021-03-01 DIAGNOSIS — R93.5 ABNORMAL FINDINGS ON DIAGNOSTIC IMAGING OF OTHER ABDOMINAL REGIONS, INCLUDING RETROPERITONEUM: ICD-10-CM

## 2021-03-01 DIAGNOSIS — E55.9 VITAMIN D DEFICIENCY: ICD-10-CM

## 2021-03-01 DIAGNOSIS — K76.9 LIVER LESION: ICD-10-CM

## 2021-03-01 DIAGNOSIS — Z45.2 ENCOUNTER FOR CENTRAL LINE CARE: ICD-10-CM

## 2021-03-01 DIAGNOSIS — C79.31 SECONDARY MALIGNANT NEOPLASM OF BRAIN AND SPINAL CORD (HCC): ICD-10-CM

## 2021-03-01 LAB
ALBUMIN SERPL BCP-MCNC: 4.1 G/DL (ref 3–5.2)
ALP SERPL-CCNC: 94 U/L (ref 43–122)
ALT SERPL W P-5'-P-CCNC: 29 U/L (ref 9–52)
ANION GAP SERPL CALCULATED.3IONS-SCNC: 6 MMOL/L (ref 5–14)
AST SERPL W P-5'-P-CCNC: 28 U/L (ref 17–59)
BASOPHILS # BLD AUTO: 0.1 THOUSANDS/ΜL (ref 0–0.1)
BASOPHILS NFR BLD AUTO: 1 % (ref 0–1)
BILIRUB SERPL-MCNC: 0.6 MG/DL
BUN SERPL-MCNC: 15 MG/DL (ref 5–25)
CALCIUM SERPL-MCNC: 9.2 MG/DL (ref 8.4–10.2)
CHLORIDE SERPL-SCNC: 108 MMOL/L (ref 97–108)
CO2 SERPL-SCNC: 25 MMOL/L (ref 22–30)
CREAT SERPL-MCNC: 0.74 MG/DL (ref 0.7–1.5)
EOSINOPHIL # BLD AUTO: 0.2 THOUSAND/ΜL (ref 0–0.4)
EOSINOPHIL NFR BLD AUTO: 2 % (ref 0–6)
ERYTHROCYTE [DISTWIDTH] IN BLOOD BY AUTOMATED COUNT: 12.8 %
GFR SERPL CREATININE-BSD FRML MDRD: 94 ML/MIN/1.73SQ M
GLUCOSE SERPL-MCNC: 83 MG/DL (ref 70–99)
HCT VFR BLD AUTO: 44.7 % (ref 41–53)
HGB BLD-MCNC: 14.7 G/DL (ref 13.5–17.5)
LDH SERPL-CCNC: 311 U/L (ref 313–618)
LYMPHOCYTES # BLD AUTO: 2.5 THOUSANDS/ΜL (ref 0.5–4)
LYMPHOCYTES NFR BLD AUTO: 24 % (ref 25–45)
MAGNESIUM SERPL-MCNC: 2 MG/DL (ref 1.6–2.3)
MCH RBC QN AUTO: 31 PG (ref 26–34)
MCHC RBC AUTO-ENTMCNC: 32.8 G/DL (ref 31–36)
MCV RBC AUTO: 94 FL (ref 80–100)
MONOCYTES # BLD AUTO: 0.5 THOUSAND/ΜL (ref 0.2–0.9)
MONOCYTES NFR BLD AUTO: 5 % (ref 1–10)
NEUTROPHILS # BLD AUTO: 7 THOUSANDS/ΜL (ref 1.8–7.8)
NEUTS SEG NFR BLD AUTO: 68 % (ref 45–65)
PLATELET # BLD AUTO: 221 THOUSANDS/UL (ref 150–450)
PMV BLD AUTO: 8.4 FL (ref 8.9–12.7)
POTASSIUM SERPL-SCNC: 4.7 MMOL/L (ref 3.6–5)
PROT SERPL-MCNC: 6.9 G/DL (ref 5.9–8.4)
RBC # BLD AUTO: 4.74 MILLION/UL (ref 4.5–5.9)
SODIUM SERPL-SCNC: 139 MMOL/L (ref 137–147)
WBC # BLD AUTO: 10.3 THOUSAND/UL (ref 4.5–11)

## 2021-03-01 PROCEDURE — 99215 OFFICE O/P EST HI 40 MIN: CPT | Performed by: NURSE PRACTITIONER

## 2021-03-01 PROCEDURE — 80053 COMPREHEN METABOLIC PANEL: CPT

## 2021-03-01 PROCEDURE — 83615 LACTATE (LD) (LDH) ENZYME: CPT

## 2021-03-01 PROCEDURE — 83735 ASSAY OF MAGNESIUM: CPT

## 2021-03-01 PROCEDURE — 85025 COMPLETE CBC W/AUTO DIFF WBC: CPT | Performed by: INTERNAL MEDICINE

## 2021-03-01 NOTE — PROGRESS NOTES
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)  The patient's recent CT imaging of the chest abdomen pelvis did not reveal any obvious hint of recurrence of his adenocarcinoma of the lung or his renal cell carcinoma  He was noted to have significant abnormal asymmetric bladder wall thickening with mucosal enhancement which was extensively evaluated by his urologist; he did have cystoscopy and TURBT in the beginning of February 2021 with benign findings on his pathology  We will continue to monitor patient closely on an every six-month basis  Will repeat  CT scan of the chest abdomen pelvis with contrast before his next visit  I did encourage him to follow-up with his primary care physician and dermatologist on a regular basis  He was also encouraged to get his repeat colonoscopy this year since it is due  We will continue to maintain his Port-A-Cath flushing it on an every 6-8 week basis in the infusion center     - CBC and differential; Future  - Comprehensive metabolic panel; Future  - MRI brain w wo contrast; Future  - CT chest abdomen pelvis w contrast; Future    2  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([facility] [address] )  Status post resection/radiation to the right frontoparietal brain metastasis 2015  He denies any neurological symptoms at this time  We will order repeat surveillance imaging of the brain with an MRI before his next visit  - MRI brain w wo contrast; Future    3  Renal cell carcinoma of left kidney (HCC)  As above #1     - CT chest abdomen pelvis w contrast; Future    4  Liver lesion  Patient continues to have liver lesion which appears slightly larger on recent imaging but noted to favor benign cyst   Will continue to monitor area closely on imaging studies      - CT chest abdomen pelvis w contrast; Future  - PSA Total, Diagnostic; Future    5  Abnormal findings on diagnostic imaging of other abdominal regions, including retroperitoneum   Patient's recent imaging showed heterogeneous density of the prostate without obvious mass  He does follow with a urologist on a regular basis  Will check PSA prior to his next follow-up  - PSA Total, Diagnostic; Future    6  Vitamin D deficiency  - Vitamin D 25 hydroxy; Future    HPI:  The patient presents today for a follow-up visit  He has been having some issues with his bladder and hematuria which was being monitored closely by his urologist  Reports that he had a D Ela Cruz catheter for some time  His recent cystoscopy showed suspicious soft tissue lesion and bladder stone therefore his urologist took him to the OR on 02/09/2021 to do a cystoscopy with TURBT  The pathology from his surgery showed benign polypoid cystitis with reactive epithelial changes and granulation tissue along with bladder calculus  He reports that he has recovered well from his surgery and has not been having any urinary issues  Otherwise is doing well and has no new complaints  His most recent laboratory studies from this morning showed essentially normal CBC and CMP, hemoglobin 14 7    He had a repeat CT scan of the chest abdomen and pelvis with contrast recently 02/15/2021 which showed:  IMPRESSION:  Significant abnormal asymmetric bladder wall thickening with associated mucosal enhancement worrisome for malignancy      5 mm left lower lobe pulmonary nodule similar in appearance to the prior study but worth following with surveillance CT  Background emphysema and left upper lobe scarring otherwise stable      Liver lesion is larger than on the prior study but has features favoring a benign cyst      Bilateral cystic-appearing renal lesions without substantial change from the prior study  There is a new left lower pole nonobstructing kidney stone      Heterogeneous density of the prostate gland  No discrete mass seen  Significance uncertain      Small midline abdominal wall hernia containing fat and a portion of the bowel loop, but without obstruction  Oncology History Overview Note   Patient has a history of tobacco use for more than 40 years and history of 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 which was diagnosed in May of 2015  At that time the patient was found to have multiple brain lesions status post whole brain radiation  He then received 6 cycles of palliative chemotherapy with Alimta and carboplatin  After 5 cycles of treatment he had significant GI bleeding which required ICU hospitalization  After completion of palliative chemotherapy he was maintained on Alimta since October 2015  A brain MRI January 2016 showed multiple bilateral hemorrhagic lesions felt not to be due to malignant process  PET scan January 2016 showed a 1 6 cm metabolically active left lung apical lesion  The patient received radiation treatment palliatively under the care of Dr George Wilson to the left apical lung lesion and completed it in April of 2016  He was then restarted on maintenance Alimta  He had an MRI of the abdomen on June 23, 2016 which showed a 3 cm cystic mass with a solid component on the posterior medial aspect of the left kidney, compatible with malignant process  A core biopsy was taking from the left kidney July 12, 2016 which was 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  The patient was then treated with cryotherapy of the left kidney lesion in September of 2016  Patient had a PET-CT scan on March 3, 2017 which was compared to the prior PET scan from November 2016  At this time there is no evidence of residual or recurrent neoplastic disease anywhere in his body  Patient had a CT scan of the chest abdomen and pelvis on August 24, 2017 which showed a 1 3 cm left upper lobe mass versus scarring with adjacent radiation fibrosis      Patient had another MRI of the brain January 8, 2018 which was compared to the previous MRI of the brain in January 2016  This revealed multiple areas of artifact in the supra and infratentorial spaces consistent with hemorrhagic metastasis  The largest lesion was in the frontal lobe and is significantly smaller with only a tiny enhancing component remaining  No new intra-axial lesions were reported  Patient had another CT scan of the chest abdomen and pelvis for close monitoring on June 5th 2018 that was compared with prior study from January 2018  He continues to have a 1 3 cm left lung apex pulmonary nodule with surrounding fibrosis which is stable in appearance  He also has a stable appearance of the treated medial left renal cortex lesion  There is no evidence of metastatic disease within the chest abdomen or pelvis  After lengthy discussion June 2018 patient stated that he is not interested in continuing his maintenance Alimta treatment beyond today's treatment since his CT scan showed stable disease for a long time he has been on Alimta for 2 years  He was told that he seems to be in complete remission but there is a chance that the cancer may reoccur  Patient was interested in the watch and wait type of approach instead of continuing active maintenance Alimta treatment  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 ([address] )   5/2015 Initial Diagnosis    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)  Stage IV     5/5/2015 - 5/20/2015 Radiation    3150 cGy to large right frontoparietal mass; 3000 cGy to other tumor volumes in brainstem, and bilateral cerebral hemispheres  Radiation oncologist:  Dr Marc Garcia     3/17/2016 - 4/12/2016 Radiation    Left upper lobe lesion, 4675 cGy  Radiation oncologist: Dr Marc Garcia      Chemotherapy    1  Alimta and carboplatin:   05/20/2015 through 08/12/2015 (5 cycles total)  2     Maintenance Alimta:   10/21/2015 through 06/12/2018 (43 total Treatment doses)     Renal cell carcinoma of left kidney ([facility] [address] )   7/2016 Initial Diagnosis    Renal cell carcinoma of left kidney [facility])  S/p cryoablation of the left kidney lesion in September of 2016 @ [facility]-Dr Steph Rosa           Interval history:    ROS: Review of Systems   Constitutional: Negative for activity change, appetite change, chills, fatigue, fever and unexpected weight change  HENT: Negative for congestion, mouth sores, nosebleeds, sore throat and trouble swallowing  Eyes: Negative  Respiratory: Positive for cough (mild chronic)  Negative for chest tightness and shortness of breath  Cardiovascular: Negative for chest pain, palpitations and leg swelling  Gastrointestinal: Negative for abdominal distention, abdominal pain, blood in stool, constipation, diarrhea, nausea and vomiting  Genitourinary: Negative for difficulty urinating, dysuria, frequency, hematuria and urgency  Musculoskeletal: Negative for arthralgias, back pain, gait problem, joint swelling and myalgias  Skin: Positive for color change  Negative for pallor and rash  Neurological: Negative for dizziness, weakness, light-headedness, numbness and headaches  Hematological: Negative for adenopathy  Does not bruise/bleed easily  Psychiatric/Behavioral: Positive for dysphoric mood  Negative for sleep disturbance  The patient is not nervous/anxious          Past Medical History:   Diagnosis Date    Arthritis     Balance problems     and" coordination issues/best to walk slow"    Bladder stones     [facility]     lung, brain, kidney, spinal    Colon polyp     Dysphagia 02/16/2017    "not right now but in the past"    Environmental and seasonal allergies     Exercise involving walking     "approx 1 mile 3x/week"    Eye injury     right,  "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"    Full dentures     Hemiparesis ([facility] ) 04/27/2015    left weakness,"due to tumor on brain"    History of brain tumor     "had radiation for it"    History of cancer chemotherapy     last tx  5/22/18    History of GI bleed 2015    History of kidney cancer     left    History of kidney stones     History of radiation therapy     History of transfusion     5 units of blood 2015    Hydrocele 11/30/2010    Inguinal hernia, bilateral     Joint stiffness of multiple sites     Kidney stone     Paresthesia 05/01/2015    Portacath in place     right chest    Primary malignant neoplasm of lung ([facility] [address] ) 05/26/2015    Renal carcinoma, left ([facility] [address] ) 10/17/2016    Risk for falls     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([facility] [address] ) 05/26/2015    Skin cancer     Syncopal episodes 09/01/2015    2    Wears glasses        Past Surgical History:   Procedure Laterality Date    BLADDER STONE REMOVAL      BLADDER STONE REMOVAL N/A 2/9/2021    Procedure: Nel Izquierdo;  Surgeon: Gian Brannon MD;  Location: [facility];  Service: Urology    COLONOSCOPY     [facility] DENTAL SURGERY  2011    DENTAL EXTACTIONS     ESOPHAGOGASTRODUODENOSCOPY      HERNIA REPAIR      HYDROCELE EXCISION / REPAIR Left 01/01/2011    KIDNEY STONE SURGERY      KIDNEY SURGERY      cancer of kidney/cryo of cancer    PORTACATH PLACEMENT      WI COLONOSCOPY FLX DX W/COLLJ SPEC WHEN PFRMD N/A 8/7/2018    Procedure: COLONOSCOPY with polypectomy ;  Surgeon: Vick Kirby MD;  Location: [facility];   Service: General    WI CYSTOURETHROSCOPY,FULGUR 2-5 CM LESN N/A 2/9/2021    Procedure: NIEVES Garner U R DEVI T ;  Surgeon: Gian Brannon MD;  Location: [facility];  Service: Urology    WI LAP, RECURRENT INCISIONAL HERNIA REPAIR,INCARCERATED Bilateral 8/8/2018    Procedure: REPAIR HERNIA INGUINAL LAPAROSCOPIC W/ ROBOTICS W/ MESH;  Surgeon: Vick Kirby MD;  Location: [facility];  Service: General    WI REMOVE BLADDER STONE,<2 5 CM N/A 11/27/2018    Procedure: Adam Jobs LASER,BLAABELARDOE BIOPSY;  Surgeon: Gian Brannon MD;  Location: [facility];  Service: Urology    SKIN BIOPSY      SKIN CANCER EXCISION      17 surgeries, basal cell    TONSILLECTOMY         Social History     Socioeconomic History    Marital status:      Spouse name: None    Number of children: None    Years of education: None    Highest education level: None   Occupational History    None   Social Needs    Financial resource strain: None    Food insecurity     Worry: None     Inability: None    Transportation needs     Medical: None     Non-medical: None   Tobacco Use    Smoking status: Current Every Day Smoker     Packs/day: 0 50     Years: 48 00     Pack years: 24 00     Types: Cigarettes    Smokeless tobacco: Current User    Tobacco comment: last cigarette 2/8   Substance and Sexual Activity    Alcohol use: Yes     Frequency: Monthly or less     Comment: "3x per year"    Drug use: No    Sexual activity: None   Lifestyle    Physical activity     Days per week: None     Minutes per session: None    Stress: None   Relationships    Social connections     Talks on phone: None     Gets together: None     Attends L[employer] service: None     Active member of club or organization: None     Attends meetings of clubs or organizations: None     Relationship status: None    Intimate partner violence     Fear of current or ex partner: None     Emotionally abused: None     Physically abused: None     Forced sexual activity: None   Other Topics Concern    None   Social History Narrative    None       Family History   Problem Relation Age of Onset    Cancer Mother         EYE    Cancer Father     Colon cancer Father     Cancer Brother        No Known Allergies      Current Outpatient Medications:     Ascorbic Acid (VITAMIN C) 500 MG CAPS, Take 500 mg by mouth daily  , Disp: , Rfl:     Multiple Vitamins-Minerals (MULTIVITAMIN ADULT PO), Take 1 tablet by mouth daily  , Disp: , Rfl:     diphenhydrAMINE (BENADRYL) 25 mg capsule, Take 25 mg by mouth every 6 (six) hours as needed for itching, Disp: , Rfl:       Physical Exam:  /70 (BP Location: Left arm, Patient Position: Sitting, Cuff Size: Adult)   Pulse 102   Temp (!) 97 1 °F (36 2 °C) (Tympanic)   Resp 18   Ht 5' 10" (1 778 m)   Wt 71 9 kg (158 lb 9 6 oz)   SpO2 98%   BMI 22 76 kg/m²     Physical Exam  Vitals signs reviewed  Constitutional:       General: He is not in acute distress  Appearance: He is well-developed  He is not diaphoretic  HENT:      Head: Normocephalic and atraumatic  Eyes:      General: Lids are normal  No scleral icterus  Conjunctiva/sclera: Conjunctivae normal       Pupils: Pupils are equal, round, and reactive to light  Neck:      Musculoskeletal: Normal range of motion and neck supple  Thyroid: No thyromegaly  Cardiovascular:      Rate and Rhythm: Normal rate and regular rhythm  Heart sounds: Normal heart sounds  No murmur  Pulmonary:      Effort: Pulmonary effort is normal  No respiratory distress  Breath sounds: Normal breath sounds  Abdominal:      General: There is no distension  Palpations: Abdomen is soft  There is no hepatomegaly or splenomegaly  Tenderness: There is no abdominal tenderness  Musculoskeletal: Normal range of motion  General: No swelling  Lymphadenopathy:      Cervical: No cervical adenopathy  Upper Body:      Right upper body: No axillary adenopathy  Left upper body: No axillary adenopathy  Skin:     General: Skin is warm and dry  Findings: No erythema or rash  Neurological:      General: No focal deficit present  Mental Status: He is alert and oriented to person, place, and time  Psychiatric:         Mood and Affect: Mood is depressed  Affect is blunt  Behavior: Behavior normal  Behavior is cooperative  Thought Content:  Thought content normal          Judgment: Judgment normal            Labs:  Lab Results   Component Value Date    WBC 10 30 03/01/2021    HGB 14 7 03/01/2021    HCT 44 7 03/01/2021    MCV 94 03/01/2021     03/01/2021     Lab Results   Component Value Date     06/11/2018    K 4 7 03/01/2021     03/01/2021    CO2 25 03/01/2021    ANIONGAP 11 06/11/2018    BUN 15 03/01/2021    CREATININE 0 74 03/01/2021    GLUCOSE 163 (H) 06/11/2018    GLUF 82 12/12/2019    CALCIUM 9 2 03/01/2021    AST 28 03/01/2021    ALT 29 03/01/2021    ALKPHOS 94 03/01/2021    PROT 6 8 06/11/2018    BILITOT 0 9 06/11/2018    EGFR 94 03/01/2021       Patient voiced understanding and agreement in the above discussion  Aware to contact our office with questions/symptoms in the interim  This note has been generated by voice recognition software system  Therefore, there may be spelling, grammar, and or syntax errors  Please contact if questions arise

## 2021-03-10 DIAGNOSIS — Z23 ENCOUNTER FOR IMMUNIZATION: ICD-10-CM

## 2021-03-22 ENCOUNTER — IMMUNIZATIONS (OUTPATIENT)
Dept: FAMILY MEDICINE CLINIC | Facility: HOSPITAL | Age: 71
End: 2021-03-22

## 2021-03-22 DIAGNOSIS — Z23 ENCOUNTER FOR IMMUNIZATION: Primary | ICD-10-CM

## 2021-03-22 PROCEDURE — 91301 SARS-COV-2 / COVID-19 MRNA VACCINE (MODERNA) 100 MCG: CPT

## 2021-03-22 PROCEDURE — 0011A SARS-COV-2 / COVID-19 MRNA VACCINE (MODERNA) 100 MCG: CPT

## 2021-04-19 ENCOUNTER — HOSPITAL ENCOUNTER (OUTPATIENT)
Dept: INFUSION CENTER | Facility: HOSPITAL | Age: 71
Discharge: HOME | End: 2021-04-19
Payer: MEDICARE

## 2021-04-19 DIAGNOSIS — Z45.2 ENCOUNTER FOR CENTRAL LINE CARE: Primary | ICD-10-CM

## 2021-04-19 DIAGNOSIS — C34.12 PRIMARY MALIGNANT NEOPLASM OF BRONCHUS OF LEFT UPPER LOBE (HCC): ICD-10-CM

## 2021-04-19 PROCEDURE — 96523 IRRIG DRUG DELIVERY DEVICE: CPT

## 2021-04-19 NOTE — PLAN OF CARE
Problem: SAFETY ADULT  Goal: Patient will remain free of falls  Description: INTERVENTIONS:  - Assess patient frequently for physical needs  -  Identify cognitive and physical deficits and behaviors that affect risk of falls    -  [place] fall precautions as indicated by assessment   - Educate patient/family on patient safety including physical limitations  - Instruct patient to call for assistance with activity based on assessment  - Modify environment to reduce risk of injury  - Consider OT/PT consult to assist with strengthening/mobility  Outcome: Progressing  Goal: Maintain or return to baseline ADL function  Description: INTERVENTIONS:  -  Assess patient's ability to carry out ADLs; assess patient's baseline for ADL function and identify physical deficits which impact ability to perform ADLs (bathing, care of mouth/teeth, toileting, grooming, dressing, etc )  - Assess/evaluate cause of self-care deficits   - Assess range of motion  - Assess patient's mobility; develop plan if impaired  - Assess patient's need for assistive devices and provide as appropriate  - Encourage maximum independence but intervene and supervise when necessary  - Involve family in performance of ADLs  - Assess for home care needs following discharge   - Consider OT consult to assist with ADL evaluation and planning for discharge  - Provide patient education as appropriate  Outcome: Progressing  Goal: Maintain or return mobility status to optimal level  Description: INTERVENTIONS:  - Assess patient's baseline mobility status (ambulation, transfers, stairs, etc )    - Identify cognitive and physical deficits and behaviors that affect mobility  - Identify mobility aids required to assist with transfers and/or ambulation (gait belt, sit-to-stand, lift, walker, cane, etc )  - [place] fall precautions as indicated by assessment  - Record patient progress and toleration of activity level on Mobility SBAR; progress patient to next Phase/Stage  - Instruct patient to call for assistance with activity based on assessment  - Consider rehabilitation consult to assist with strengthening/weightbearing, etc   Outcome: Progressing     Problem: Knowledge Deficit  Goal: Patient/family/caregiver demonstrates understanding of disease process, treatment plan, medications, and discharge instructions  Description: Complete learning assessment and assess knowledge base    Interventions:  - Provide teaching at level of understanding  - Provide teaching via preferred learning methods  Outcome: Progressing

## 2021-04-19 NOTE — PROGRESS NOTES
Port flushed  Brisk blood return noted  Patient offers no complaints   Next appointment verified, patient declined AVS

## 2021-04-21 ENCOUNTER — IMMUNIZATIONS (OUTPATIENT)
Dept: FAMILY MEDICINE CLINIC | Facility: HOSPITAL | Age: 71
End: 2021-04-21

## 2021-04-21 DIAGNOSIS — Z23 ENCOUNTER FOR IMMUNIZATION: Primary | ICD-10-CM

## 2021-04-21 PROCEDURE — 91301 SARS-COV-2 / COVID-19 MRNA VACCINE (MODERNA) 100 MCG: CPT

## 2021-04-21 PROCEDURE — 0012A SARS-COV-2 / COVID-19 MRNA VACCINE (MODERNA) 100 MCG: CPT

## 2021-05-31 ENCOUNTER — HOSPITAL ENCOUNTER (OUTPATIENT)
Dept: INFUSION CENTER | Facility: HOSPITAL | Age: 71
End: 2021-05-31

## 2021-06-01 ENCOUNTER — HOSPITAL ENCOUNTER (OUTPATIENT)
Dept: INFUSION CENTER | Facility: HOSPITAL | Age: 71
Discharge: HOME | End: 2021-06-01
Payer: MEDICARE

## 2021-06-01 DIAGNOSIS — C34.12 PRIMARY MALIGNANT NEOPLASM OF BRONCHUS OF LEFT UPPER LOBE (HCC): ICD-10-CM

## 2021-06-01 DIAGNOSIS — Z45.2 ENCOUNTER FOR CENTRAL LINE CARE: Primary | ICD-10-CM

## 2021-06-01 PROCEDURE — 96523 IRRIG DRUG DELIVERY DEVICE: CPT

## 2021-06-01 NOTE — PROGRESS NOTES
Port accessed & flushed for catheter maintenance per protocol  Confirmed pts  Next appt   Pt  Declined AVS

## 2021-07-12 ENCOUNTER — HOSPITAL ENCOUNTER (OUTPATIENT)
Dept: INFUSION CENTER | Facility: HOSPITAL | Age: 71
Discharge: HOME | End: 2021-07-12
Payer: MEDICARE

## 2021-07-12 VITALS — TEMPERATURE: 98.3 F

## 2021-07-12 DIAGNOSIS — C34.12 PRIMARY MALIGNANT NEOPLASM OF BRONCHUS OF LEFT UPPER LOBE (HCC): ICD-10-CM

## 2021-07-12 DIAGNOSIS — Z45.2 ENCOUNTER FOR CENTRAL LINE CARE: Primary | ICD-10-CM

## 2021-07-12 PROCEDURE — 96523 IRRIG DRUG DELIVERY DEVICE: CPT

## 2021-07-12 NOTE — PROGRESS NOTES
Patient tolerated Port A Cath flush  Next appt confirmed, he declined AVS  Left ambulatory with steady gait

## 2021-07-12 NOTE — PLAN OF CARE
Problem: Potential for Falls  Goal: Patient will remain free of falls  Description: INTERVENTIONS:  - Assess patient frequently for physical needs  -  Identify cognitive and physical deficits and behaviors that affect risk of falls  -  [place] fall precautions as indicated by assessment   - Educate patient/family on patient safety including physical limitations  - Instruct patient to call for assistance with activity based on assessment  - Modify environment to reduce risk of injury  - Consider OT/PT consult to assist with strengthening/mobility  Outcome: Progressing     Problem: SAFETY ADULT  Goal: Patient will remain free of falls  Description: INTERVENTIONS:  - Assess patient frequently for physical needs  -  Identify cognitive and physical deficits and behaviors that affect risk of falls    -  [place] fall precautions as indicated by assessment   - Educate patient/family on patient safety including physical limitations  - Instruct patient to call for assistance with activity based on assessment  - Modify environment to reduce risk of injury  - Consider OT/PT consult to assist with strengthening/mobility  Outcome: Progressing  Goal: Maintain or return to baseline ADL function  Description: INTERVENTIONS:  -  Assess patient's ability to carry out ADLs; assess patient's baseline for ADL function and identify physical deficits which impact ability to perform ADLs (bathing, care of mouth/teeth, toileting, grooming, dressing, etc )  - Assess/evaluate cause of self-care deficits   - Assess range of motion  - Assess patient's mobility; develop plan if impaired  - Assess patient's need for assistive devices and provide as appropriate  - Encourage maximum independence but intervene and supervise when necessary  - Involve family in performance of ADLs  - Assess for home care needs following discharge   - Consider OT consult to assist with ADL evaluation and planning for discharge  - Provide patient education as appropriate  Outcome: Progressing  Goal: Maintain or return mobility status to optimal level  Description: INTERVENTIONS:  - Assess patient's baseline mobility status (ambulation, transfers, stairs, etc )    - Identify cognitive and physical deficits and behaviors that affect mobility  - Identify mobility aids required to assist with transfers and/or ambulation (gait belt, sit-to-stand, lift, walker, cane, etc )  - [place] fall precautions as indicated by assessment  - Record patient progress and toleration of activity level on Mobility SBAR; progress patient to next Phase/Stage  - Instruct patient to call for assistance with activity based on assessment  - Consider rehabilitation consult to assist with strengthening/weightbearing, etc   Outcome: Progressing     Problem: Knowledge Deficit  Goal: Patient/family/caregiver demonstrates understanding of disease process, treatment plan, medications, and discharge instructions  Description: Complete learning assessment and assess knowledge base    Interventions:  - Provide teaching at level of understanding  - Provide teaching via preferred learning methods  Outcome: Progressing

## 2021-08-30 ENCOUNTER — HOSPITAL ENCOUNTER (OUTPATIENT)
Dept: INFUSION CENTER | Facility: HOSPITAL | Age: 71
Discharge: HOME | End: 2021-08-30
Payer: MEDICARE

## 2021-08-30 DIAGNOSIS — Z45.2 ENCOUNTER FOR CENTRAL LINE CARE: Primary | ICD-10-CM

## 2021-08-30 DIAGNOSIS — C34.12 PRIMARY MALIGNANT NEOPLASM OF BRONCHUS OF LEFT UPPER LOBE (HCC): ICD-10-CM

## 2021-08-30 PROCEDURE — 96523 IRRIG DRUG DELIVERY DEVICE: CPT

## 2021-08-30 NOTE — PROGRESS NOTES
Pt tolerated port flush without difficulty  Port flushed and deaccessed per protocol  Next appt scheduled    Left ambulatory with AVS

## 2021-09-01 ENCOUNTER — HOSPITAL ENCOUNTER (OUTPATIENT)
Dept: CT IMAGING | Facility: HOSPITAL | Age: 71
Discharge: HOME | End: 2021-09-01
Payer: MEDICARE

## 2021-09-01 DIAGNOSIS — C64.2 RENAL CELL CARCINOMA OF LEFT KIDNEY (HCC): ICD-10-CM

## 2021-09-01 DIAGNOSIS — K76.9 LIVER LESION: ICD-10-CM

## 2021-09-01 DIAGNOSIS — C34.12 PRIMARY MALIGNANT NEOPLASM OF BRONCHUS OF LEFT UPPER LOBE (HCC): ICD-10-CM

## 2021-09-01 PROCEDURE — 71260 CT THORAX DX C+: CPT

## 2021-09-01 PROCEDURE — 74177 CT ABD & PELVIS W/CONTRAST: CPT

## 2021-09-01 PROCEDURE — G1004 CDSM NDSC: HCPCS

## 2021-09-01 RX ADMIN — IODIXANOL 100 ML: 320 INJECTION, SOLUTION INTRAVASCULAR at 09:18

## 2021-09-02 ENCOUNTER — HOSPITAL ENCOUNTER (OUTPATIENT)
Dept: MRI IMAGING | Facility: HOSPITAL | Age: 71
Discharge: HOME | End: 2021-09-02
Payer: MEDICARE

## 2021-09-02 DIAGNOSIS — C79.49 SECONDARY MALIGNANT NEOPLASM OF BRAIN AND SPINAL CORD (HCC): ICD-10-CM

## 2021-09-02 DIAGNOSIS — C34.12 PRIMARY MALIGNANT NEOPLASM OF BRONCHUS OF LEFT UPPER LOBE (HCC): ICD-10-CM

## 2021-09-02 DIAGNOSIS — C79.31 SECONDARY MALIGNANT NEOPLASM OF BRAIN AND SPINAL CORD (HCC): ICD-10-CM

## 2021-09-02 PROCEDURE — 70553 MRI BRAIN STEM W/O & W/DYE: CPT

## 2021-09-02 PROCEDURE — G1004 CDSM NDSC: HCPCS

## 2021-09-02 PROCEDURE — A9585 GADOBUTROL INJECTION: HCPCS | Performed by: NURSE PRACTITIONER

## 2021-09-02 RX ADMIN — GADOBUTROL 7 ML: 604.72 INJECTION INTRAVENOUS at 10:37

## 2021-09-08 ENCOUNTER — TELEPHONE (OUTPATIENT)
Dept: HEMATOLOGY ONCOLOGY | Facility: CLINIC | Age: 71
End: 2021-09-08

## 2021-09-08 NOTE — TELEPHONE ENCOUNTER
LVM to patient in regards to lab work that needs to be completed prior to his appointment on 9/10/21 at 11:20am  Informed patient that the lab work is in the system so he can go to any [place] [id number] to have his lab work completed  Informed patient if he can not have his lab work completed prior to his appointment he can give the office a call back to reschedule at [phone]

## 2021-09-10 ENCOUNTER — OFFICE VISIT (OUTPATIENT)
Dept: HEMATOLOGY ONCOLOGY | Facility: CLINIC | Age: 71
End: 2021-09-10
Payer: MEDICARE

## 2021-09-10 VITALS
SYSTOLIC BLOOD PRESSURE: 126 MMHG | WEIGHT: 157.6 LBS | BODY MASS INDEX: 22.56 KG/M2 | TEMPERATURE: 98.3 F | HEIGHT: 70 IN | HEART RATE: 99 BPM | OXYGEN SATURATION: 96 % | DIASTOLIC BLOOD PRESSURE: 76 MMHG | RESPIRATION RATE: 18 BRPM

## 2021-09-10 DIAGNOSIS — C79.31 SECONDARY MALIGNANT NEOPLASM OF BRAIN AND SPINAL CORD (HCC): ICD-10-CM

## 2021-09-10 DIAGNOSIS — C64.2 RENAL CELL CARCINOMA OF LEFT KIDNEY (HCC): ICD-10-CM

## 2021-09-10 DIAGNOSIS — C79.49 SECONDARY MALIGNANT NEOPLASM OF BRAIN AND SPINAL CORD (HCC): ICD-10-CM

## 2021-09-10 DIAGNOSIS — C34.12 PRIMARY MALIGNANT NEOPLASM OF BRONCHUS OF LEFT UPPER LOBE (HCC): Primary | ICD-10-CM

## 2021-09-10 PROCEDURE — 99214 OFFICE O/P EST MOD 30 MIN: CPT | Performed by: INTERNAL MEDICINE

## 2021-09-10 NOTE — PROGRESS NOTES
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)    I reviewed with the patient the CT scan of the chest abdomen pelvis which did not reveal any obvious hint of recurrence of his lung cancer or kidney cancer  A follow-up CT scan will be ordered prior to his next visit in 6 months from now  I did discuss with him also getting his Port-A-Cath removed since he has been tumor free for more than 5 years  The patient stated that he would rather continue to have the port get it flushed every 6-8 weeks for the time being   - CBC and differential; Future  - Comprehensive metabolic panel; Future  - Magnesium; Future  - CT chest abdomen pelvis w contrast; Future    2  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([address] )    I also discussed the results of the recent brain MRI with the patient which was negative for any hint of metastatic disease or recurrence of his malignancies  He does have microangiopathic changes of unknown clinical significance  3  Renal cell carcinoma of left kidney (HCC)    No obvious hint of recurrence of his left kidney cancer  We will pursue  Follow-up imaging prior to his next visit in 6 months  - Comprehensive metabolic panel; Future  - CT chest abdomen pelvis w contrast; Future        HPI:    The patient came today for follow-up visit  He denied significant bleeding or significant changes of his overall condition    He had a CT scan of the chest abdomen pelvis on 09/01/2021 for close follow-up which showed:  IMPRESSION:     Stable CT     Cryoablated left renal lesion remains stable     There is no new worrisome lesion to suggest any new metastatic disease  Faint nodular density seen left lower lung remain stable though evaluation degraded due to motion  Continued surveillance with follow-up at 6 months  He also had an MRI of the brain on 09/02/2021 for follow-up which showed:  IMPRESSION:     1  Stable postoperative appearance of right frontal tumor resection without evidence of progressive disease      2   Grossly stable extensive white matter signal abnormality suggesting combination of posttreatment change and microangiopathy  The patient did not do blood work prior to this office visit as it was planned  Oncology History Overview Note   Patient has a history of tobacco use for more than 40 years and history of 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 which was diagnosed in May of 2015  At that time the patient was found to have multiple brain lesions status post whole brain radiation  He then received 6 cycles of palliative chemotherapy with Alimta and carboplatin  After 5 cycles of treatment he had significant GI bleeding which required ICU hospitalization  After completion of palliative chemotherapy he was maintained on Alimta since October 2015  A brain MRI January 2016 showed multiple bilateral hemorrhagic lesions felt not to be due to malignant process  PET scan January 2016 showed a 1 6 cm metabolically active left lung apical lesion  The patient received radiation treatment palliatively under the care of Dr Iam Vazquez to the left apical lung lesion and completed it in April of 2016  He was then restarted on maintenance Alimta  He had an MRI of the abdomen on June 23, 2016 which showed a 3 cm cystic mass with a solid component on the posterior medial aspect of the left kidney, compatible with malignant process  A core biopsy was taking from the left kidney July 12, 2016 which was 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  The patient was then treated with cryotherapy of the left kidney lesion in September of 2016  Patient had a PET-CT scan on March 3, 2017 which was compared to the prior PET scan from November 2016    At this time there is no evidence of residual or recurrent neoplastic disease anywhere in his body  Patient had a CT scan of the chest abdomen and pelvis on August 24, 2017 which showed a 1 3 cm left upper lobe mass versus scarring with adjacent radiation fibrosis  Patient had another MRI of the brain January 8, 2018 which was compared to the previous MRI of the brain in January 2016  This revealed multiple areas of artifact in the supra and infratentorial spaces consistent with hemorrhagic metastasis  The largest lesion was in the frontal lobe and is significantly smaller with only a tiny enhancing component remaining  No new intra-axial lesions were reported  Patient had another CT scan of the chest abdomen and pelvis for close monitoring on June 5th 2018 that was compared with prior study from January 2018  He continues to have a 1 3 cm left lung apex pulmonary nodule with surrounding fibrosis which is stable in appearance  He also has a stable appearance of the treated medial left renal cortex lesion  There is no evidence of metastatic disease within the chest abdomen or pelvis  After lengthy discussion June 2018 patient stated that he is not interested in continuing his maintenance Alimta treatment beyond today's treatment since his CT scan showed stable disease for a long time he has been on Alimta for 2 years  He was told that he seems to be in complete remission but there is a chance that the cancer may reoccur  Patient was interested in the watch and wait type of approach instead of continuing active maintenance Alimta treatment       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 ([address] )   5/2015 Initial Diagnosis    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)  Stage IV     5/5/2015 - 5/20/2015 Radiation    3150 cGy to large right frontoparietal mass; 3000 cGy to other tumor volumes in brainstem, and bilateral cerebral hemispheres  Radiation oncologist:  Dr Jayashree Aguilera     3/17/2016 - 4/12/2016 Radiation    Left upper lobe lesion, 4675 cGy  Radiation oncologist: Dr Angelo Bach      Chemotherapy    1  Alimta and carboplatin:   05/20/2015 through 08/12/2015 (5 cycles total)  2  Maintenance Alimta:   10/21/2015 through 06/12/2018 (43 total Treatment doses)     Renal cell carcinoma of left kidney ([facility] [address] )   7/2016 Initial Diagnosis    Renal cell carcinoma of left kidney [facility])  S/p cryoablation of the left kidney lesion in September of 2016 @ [facility] IR-Dr Bryan Degroot           Interval history:    ROS: Review of Systems   Constitutional: Positive for fatigue  Negative for chills and fever  HENT: Negative for ear pain and sore throat  Eyes: Negative for pain and visual disturbance  Respiratory: Positive for cough  Negative for shortness of breath  Cardiovascular: Negative for chest pain and palpitations  Gastrointestinal: Positive for diarrhea  Negative for abdominal pain and vomiting  Genitourinary: Negative for dysuria and hematuria  Musculoskeletal: Negative for arthralgias and back pain  Skin: Negative for color change and rash  Neurological: Positive for dizziness  Negative for seizures and syncope  All other systems reviewed and are negative        Past Medical History:   Diagnosis Date    Arthritis     Balance problems     and" coordination issues/best to walk slow"    Bladder stones     Cancer [facility]     lung, brain, kidney, spinal    Colon polyp     Dysphagia 02/16/2017    "not right now but in the past"    Environmental and seasonal allergies     Exercise involving walking     "approx 1 mile 3x/week"    Eye injury     right,  "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"    Full dentures     Hemiparesis ([facility] ) 04/27/2015    left weakness,"due to tumor on brain"    History of brain tumor     "had radiation for it"    History of cancer chemotherapy     last tx  5/22/18    History of GI bleed 2015    History of kidney cancer     left    History of kidney stones     History of radiation therapy     History of transfusion     5 units of blood 2015    Hydrocele 11/30/2010    Inguinal hernia, bilateral     Joint stiffness of multiple sites     Kidney stone     Paresthesia 05/01/2015    Portacath in place     right chest    Primary malignant neoplasm of lung [facility] [address] ) 05/26/2015    Renal carcinoma, left ([facility] ) 10/17/2016    Risk for falls     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([facility] ) 05/26/2015    Skin cancer     Syncopal episodes 09/01/2015    2    Wears glasses        Past Surgical History:   Procedure Laterality Date    BLADDER STONE REMOVAL      BLADDER STONE REMOVAL N/A 2/9/2021    Procedure: Charlynne Branch;  Surgeon: Jeremías Goodman MD;  [facility];  Service: Urology    COLONOSCOPY     [facility] DENTAL SURGERY  2011    DENTAL EXTACTIONS     ESOPHAGOGASTRODUODENOSCOPY      HERNIA REPAIR      HYDROCELE EXCISION / REPAIR Left 01/01/2011    KIDNEY STONE SURGERY      KIDNEY SURGERY      cancer of kidney/cryo of cancer    PORTACATH PLACEMENT      MA COLONOSCOPY FLX DX W/COLLJ SPEC WHEN PFRMD N/A 8/7/2018    Procedure: COLONOSCOPY with polypectomy ;  Surgeon: Quita Crigler, MD;  Location: [facility];   Service: General    MA CYSTOURETHROSCOPY,FULGUR 2-5 CM LESN N/A 2/9/2021    Procedure: Lukas Purcell, T U R B T ;  Surgeon: Jeremías Goodman MD;  [facility];  Service: Urology    MA LAP, RECURRENT INCISIONAL HERNIA REPAIR,INCARCERATED Bilateral 8/8/2018    Procedure: REPAIR HERNIA INGUINAL LAPAROSCOPIC W/ ROBOTICS W/ MESH;  Surgeon: Quita Crigler, MD;  [facility];  Service: General    MA REMOVE BLADDER STONE,<2 5 CM N/A 11/27/2018    Procedure: Ruddy Mendoza HOLMIUM LASER,BLADDE BIOPSY;  Surgeon: Jeremías Goodman MD;  [facility];  Service: Urology    SKIN BIOPSY      SKIN CANCER EXCISION      17 surgeries, basal cell    TONSILLECTOMY         Social History     Socioeconomic History    Marital status:      Spouse name: None    Number of children: None    Years of education: None    Highest education level: None   Occupational History    None   Tobacco Use    Smoking status: Current Every Day Smoker     Packs/day: 0 50     Years: 48 00     Pack years: 24 00     Types: Cigarettes    Smokeless tobacco: Current User    Tobacco comment: last cigarette 2/8   Vaping Use    Vaping Use: Never used   Substance and Sexual Activity    Alcohol use: Yes     Comment: "3x per year"    Drug use: No    Sexual activity: None   Other Topics Concern    None   Social History Narrative    None     Social Determinants of Health     Financial Resource Strain:     Difficulty of Paying Living Expenses:    Food Insecurity:     Worried About Running Out of Food in the Last Year:     Ran Out of Food in the Last Year:    Transportation Needs:     Lack of Transportation (Medical):      Lack of Transportation (Non-Medical):    Physical Activity:     Days of Exercise per Week:     Minutes of Exercise per Session:    Stress:     Feeling of Stress :    Social Connections:     Frequency of Communication with Friends and Family:     Frequency of Social Gatherings with Friends and Family:     Attends Worship Services:     Active Member of Clubs or Organizations:     Attends Club or Organization Meetings:     Marital Status:    Intimate Partner Violence:     Fear of Current or Ex-Partner:     Emotionally Abused:     Physically Abused:     Sexually Abused:        Family History   Problem Relation Age of Onset    Cancer Mother         EYE    Cancer Father     Colon cancer Father     Cancer Brother        No Known Allergies      Current Outpatient Medications:     Ascorbic Acid (VITAMIN C) 500 MG CAPS, Take 500 mg by mouth daily  , Disp: , Rfl:     Multiple Vitamins-Minerals (MULTIVITAMIN ADULT PO), Take 1 tablet by mouth daily  , Disp: , Rfl:     diphenhydrAMINE (BENADRYL) 25 mg capsule, Take 25 mg by mouth every 6 (six) hours as needed for itching, Disp: , Rfl:       Physical Exam:  /76 (BP Location: Left arm, Patient Position: Sitting, Cuff Size: Adult)   Pulse 99   Temp 98 3 °F (36 8 °C) (Tympanic)   Resp 18   Ht 5' 10" (1 778 m)   Wt 71 5 kg (157 lb 9 6 oz)   SpO2 96%   BMI 22 61 kg/m²     Physical Exam  Constitutional:       Appearance: He is well-developed  HENT:      Head: Normocephalic and atraumatic  Eyes:      General: No scleral icterus  Right eye: No discharge  Left eye: No discharge  Conjunctiva/sclera: Conjunctivae normal       Pupils: Pupils are equal, round, and reactive to light  Neck:      Thyroid: No thyromegaly  Trachea: No tracheal deviation  Cardiovascular:      Rate and Rhythm: Normal rate and regular rhythm  Heart sounds: Normal heart sounds  No murmur heard  No friction rub  Pulmonary:      Effort: Pulmonary effort is normal  No respiratory distress  Breath sounds: Normal breath sounds  No wheezing or rales  Chest:      Chest wall: No tenderness  Abdominal:      General: There is no distension  Palpations: Abdomen is soft  There is no hepatomegaly or splenomegaly  Tenderness: There is no abdominal tenderness  There is no guarding or rebound  Musculoskeletal:         General: No tenderness or deformity  Normal range of motion  Cervical back: Normal range of motion and neck supple  Lymphadenopathy:      Cervical: No cervical adenopathy  Skin:     General: Skin is warm and dry  Coloration: Skin is not pale  Findings: No erythema or rash  Neurological:      Mental Status: He is alert and oriented to person, place, and time  Cranial Nerves: No cranial nerve deficit  Coordination: Coordination normal       Deep Tendon Reflexes: Reflexes are normal and symmetric  Psychiatric:         Behavior: Behavior normal          Thought Content:  Thought content normal          Judgment: Judgment normal            Labs:  Lab Results   Component Value Date    WBC 10 30 03/01/2021    HGB 14 7 03/01/2021    HCT 44 7 03/01/2021    MCV 94 03/01/2021     03/01/2021     Lab Results   Component Value Date     06/11/2018    K 4 7 03/01/2021     03/01/2021    CO2 25 03/01/2021    ANIONGAP 11 06/11/2018    BUN 15 03/01/2021    CREATININE 0 74 03/01/2021    GLUCOSE 163 (H) 06/11/2018    GLUF 82 12/12/2019    CALCIUM 9 2 03/01/2021    AST 28 03/01/2021    ALT 29 03/01/2021    ALKPHOS 94 03/01/2021    PROT 6 8 06/11/2018    BILITOT 0 9 06/11/2018    EGFR 94 03/01/2021     No results found for: TSH    Patient voiced understanding and agreement in the above discussion  Aware to contact our office with questions/symptoms in the interim

## 2021-10-18 ENCOUNTER — HOSPITAL ENCOUNTER (OUTPATIENT)
Dept: INFUSION CENTER | Facility: HOSPITAL | Age: 71
Discharge: HOME | End: 2021-10-18
Payer: MEDICARE

## 2021-10-18 DIAGNOSIS — C34.12 PRIMARY MALIGNANT NEOPLASM OF BRONCHUS OF LEFT UPPER LOBE (HCC): ICD-10-CM

## 2021-10-18 DIAGNOSIS — Z45.2 ENCOUNTER FOR CENTRAL LINE CARE: Primary | ICD-10-CM

## 2021-10-18 PROCEDURE — 96523 IRRIG DRUG DELIVERY DEVICE: CPT

## 2021-11-10 ENCOUNTER — DOCUMENTATION (OUTPATIENT)
Dept: FAMILY MEDICINE CLINIC | Facility: CLINIC | Age: 71
End: 2021-11-10

## 2021-12-13 ENCOUNTER — HOSPITAL ENCOUNTER (OUTPATIENT)
Dept: INFUSION CENTER | Facility: HOSPITAL | Age: 71
Discharge: HOME | End: 2021-12-13
Payer: MEDICARE

## 2021-12-13 VITALS — TEMPERATURE: 98 F

## 2021-12-13 DIAGNOSIS — Z45.2 ENCOUNTER FOR CENTRAL LINE CARE: Primary | ICD-10-CM

## 2021-12-13 DIAGNOSIS — C34.12 PRIMARY MALIGNANT NEOPLASM OF BRONCHUS OF LEFT UPPER LOBE (HCC): ICD-10-CM

## 2021-12-13 PROCEDURE — 96523 IRRIG DRUG DELIVERY DEVICE: CPT

## 2021-12-15 ENCOUNTER — IMMUNIZATIONS (OUTPATIENT)
Dept: FAMILY MEDICINE CLINIC | Facility: HOSPITAL | Age: 71
End: 2021-12-15

## 2021-12-15 DIAGNOSIS — Z23 ENCOUNTER FOR IMMUNIZATION: Primary | ICD-10-CM

## 2021-12-15 PROCEDURE — 0064A COVID-19 MODERNA VACC 0.25 ML BOOSTER: CPT

## 2021-12-15 PROCEDURE — 91306 COVID-19 MODERNA VACC 0.25 ML BOOSTER: CPT

## 2022-01-25 ENCOUNTER — OFFICE VISIT (OUTPATIENT)
Dept: FAMILY MEDICINE CLINIC | Facility: CLINIC | Age: 72
End: 2022-01-25
Payer: MEDICARE

## 2022-01-25 VITALS
DIASTOLIC BLOOD PRESSURE: 68 MMHG | HEART RATE: 94 BPM | OXYGEN SATURATION: 97 % | HEIGHT: 70 IN | SYSTOLIC BLOOD PRESSURE: 100 MMHG | WEIGHT: 163 LBS | TEMPERATURE: 98.1 F | RESPIRATION RATE: 17 BRPM | BODY MASS INDEX: 23.34 KG/M2

## 2022-01-25 DIAGNOSIS — C79.31 SECONDARY MALIGNANT NEOPLASM OF BRAIN AND SPINAL CORD (HCC): ICD-10-CM

## 2022-01-25 DIAGNOSIS — N13.8 BPH WITH URINARY OBSTRUCTION: ICD-10-CM

## 2022-01-25 DIAGNOSIS — Z13.220 SCREENING FOR HYPERLIPIDEMIA: ICD-10-CM

## 2022-01-25 DIAGNOSIS — Z11.59 NEED FOR HEPATITIS C SCREENING TEST: Primary | ICD-10-CM

## 2022-01-25 DIAGNOSIS — J40 BRONCHITIS: ICD-10-CM

## 2022-01-25 DIAGNOSIS — R73.9 ELEVATED BLOOD SUGAR: ICD-10-CM

## 2022-01-25 DIAGNOSIS — Z00.00 MEDICARE ANNUAL WELLNESS VISIT, SUBSEQUENT: Chronic | ICD-10-CM

## 2022-01-25 DIAGNOSIS — C79.49 SECONDARY MALIGNANT NEOPLASM OF BRAIN AND SPINAL CORD (HCC): ICD-10-CM

## 2022-01-25 DIAGNOSIS — N40.1 BPH WITH URINARY OBSTRUCTION: ICD-10-CM

## 2022-01-25 PROBLEM — K63.5 COLON POLYPS: Chronic | Status: ACTIVE | Noted: 2022-01-25

## 2022-01-25 PROCEDURE — 99214 OFFICE O/P EST MOD 30 MIN: CPT | Performed by: FAMILY MEDICINE

## 2022-01-25 PROCEDURE — G0439 PPPS, SUBSEQ VISIT: HCPCS | Performed by: FAMILY MEDICINE

## 2022-01-25 RX ORDER — AZITHROMYCIN 250 MG/1
250 TABLET, FILM COATED ORAL EVERY 24 HOURS
Qty: 6 TABLET | Refills: 0 | Status: SHIPPED | OUTPATIENT
Start: 2022-01-25 | End: 2022-01-30

## 2022-01-25 NOTE — PATIENT INSTRUCTIONS
Medicare Preventive Visit Patient Instructions  Thank you for completing your Welcome to Medicare Visit or Medicare Annual Wellness Visit today  Your next wellness visit will be due in one year (1/26/2023)  The screening/preventive services that you may require over the next 5-10 years are detailed below  Some tests may not apply to you based off risk factors and/or age  Screening tests ordered at today's visit but not completed yet may show as past due  Also, please note that scanned in results may not display below  Preventive Screenings:  Service Recommendations Previous Testing/Comments   Colorectal Cancer Screening  · Colonoscopy    · Fecal Occult Blood Test (FOBT)/Fecal Immunochemical Test (FIT)  · Fecal DNA/Cologuard Test  · Flexible Sigmoidoscopy Age: 54-65 years old   Colonoscopy: every 10 years (May be performed more frequently if at higher risk)  OR  FOBT/FIT: every 1 year  OR  Cologuard: every 3 years  OR  Sigmoidoscopy: every 5 years  Screening may be recommended earlier than age 48 if at higher risk for colorectal cancer  Also, an individualized decision between you and your healthcare provider will decide whether screening between the ages of 74-80 would be appropriate   Colonoscopy: 08/07/2018  FOBT/FIT: Not on file  Cologuard: Not on file  Sigmoidoscopy: Not on file    Screening Current     Prostate Cancer Screening Individualized decision between patient and health care provider in men between ages of 53-78   Medicare will cover every 12 months beginning on the day after your 50th birthday PSA: 2 6 ng/mL           Hepatitis C Screening Once for adults born between 1945 and 1965  More frequently in patients at high risk for Hepatitis C Hep C Antibody: Not on file        Diabetes Screening 1-2 times per year if you're at risk for diabetes or have pre-diabetes Fasting glucose: 82 mg/dL   A1C: No results in last 5 years    Screening Current   Cholesterol Screening Once every 5 years if you don't have a lipid disorder  May order more often based on risk factors  Lipid panel: Not on file           Other Preventive Screenings Covered by Medicare:  1  Abdominal Aortic Aneurysm (AAA) Screening: covered once if your at risk  You're considered to be at risk if you have a family history of AAA or a male between the age of 73-68 who smoking at least 100 cigarettes in your lifetime  2  Lung Cancer Screening: covers low dose CT scan once per year if you meet all of the following conditions: (1) Age 50-69; (2) No signs or symptoms of lung cancer; (3) Current smoker or have quit smoking within the last 15 years; (4) You have a tobacco smoking history of at least 30 pack years (packs per day x number of years you smoked); (5) You get a written order from a healthcare provider  3  Glaucoma Screening: covered annually if you're considered high risk: (1) You have diabetes OR (2) Family history of glaucoma OR (3)  aged 48 and older OR (3)  American aged 72 and older  3  Osteoporosis Screening: covered every 2 years if you meet one of the following conditions: (1) Have a vertebral abnormality; (2) On glucocorticoid therapy for more than 3 months; (3) Have primary hyperparathyroidism; (4) On osteoporosis medications and need to assess response to drug therapy  5  HIV Screening: covered annually if you're between the age of 12-76  Also covered annually if you are younger than 13 and older than 72 with risk factors for HIV infection  For pregnant patients, it is covered up to 3 times per pregnancy      Immunizations:  Immunization Recommendations   Influenza Vaccine Annual influenza vaccination during flu season is recommended for all persons aged >= 6 months who do not have contraindications   Pneumococcal Vaccine (Prevnar and Pneumovax)  * Prevnar = PCV13  * Pneumovax = PPSV23 Adults 25-60 years old: 1-3 doses may be recommended based on certain risk factors  Adults 72 years old: Prevnar (PCV13) vaccine recommended followed by Pneumovax (PPSV23) vaccine  If already received PPSV23 since turning 65, then PCV13 recommended at least one year after PPSV23 dose  Hepatitis B Vaccine 3 dose series if at intermediate or high risk (ex: diabetes, end stage renal disease, liver disease)   Tetanus (Td) Vaccine - COST NOT COVERED BY MEDICARE PART B Following completion of primary series, a booster dose should be given every 10 years to maintain immunity against tetanus  Td may also be given as tetanus wound prophylaxis  Tdap Vaccine - COST NOT COVERED BY MEDICARE PART B Recommended at least once for all adults  For pregnant patients, recommended with each pregnancy  Shingles Vaccine (Shingrix) - COST NOT COVERED BY MEDICARE PART B  2 shot series recommended in those aged 48 and above     Health Maintenance Due:      Topic Date Due    Hepatitis C Screening  Never done    Colorectal Cancer Screening  08/07/2028     Immunizations Due:      Topic Date Due    DTaP,Tdap,and Td Vaccines (1 - Tdap) Never done    COVID-19 Vaccine (3 - Moderna risk 4-dose series) 01/12/2022     Advance Directives   What are advance directives? Advance directives are legal documents that state your wishes and plans for medical care  These plans are made ahead of time in case you lose your ability to make decisions for yourself  Advance directives can apply to any medical decision, such as the treatments you want, and if you want to donate organs  What are the types of advance directives? There are many types of advance directives, and each state has rules about how to use them  You may choose a combination of any of the following:  · Living will: This is a written record of the treatment you want  You can also choose which treatments you do not want, which to limit, and which to stop at a certain time  This includes surgery, medicine, IV fluid, and tube feedings  · Durable power of  for healthcare [facility]):   This is a written record that states who you want to make healthcare choices for you when you are unable to make them for yourself  This person, called a proxy, is usually a family member or a friend  You may choose more than 1 proxy  · Do not resuscitate (DNR) order:  A DNR order is used in case your heart stops beating or you stop breathing  It is a request not to have certain forms of treatment, such as CPR  A DNR order may be included in other types of advance directives  · Medical directive: This covers the care that you want if you are in a coma, near death, or unable to make decisions for yourself  You can list the treatments you want for each condition  Treatment may include pain medicine, surgery, blood transfusions, dialysis, IV or tube feedings, and a ventilator (breathing machine)  · Values history: This document has questions about your views, beliefs, and how you feel and think about life  This information can help others choose the care that you would choose  Why are advance directives important? An advance directive helps you control your care  Although spoken wishes may be used, it is better to have your wishes written down  Spoken wishes can be misunderstood, or not followed  Treatments may be given even if you do not want them  An advance directive may make it easier for your family to make difficult choices about your care  Fall Prevention    Fall prevention  includes ways to make your home and other areas safer  It also includes ways you can move more carefully to prevent a fall  Health conditions that cause changes in your blood pressure, vision, or muscle strength and coordination may increase your risk for falls  Medicines may also increase your risk for falls if they make you dizzy, weak, or sleepy  Fall prevention tips:   · Stand or sit up slowly  · Use assistive devices as directed  · Wear shoes that fit well and have soles that   · Wear a personal alarm  · Stay active  · Manage your medical conditions  Home Safety Tips:  · Add items to prevent falls in the bathroom  · Keep paths clear  · Install bright lights in your home  · Keep items you use often on shelves within reach  · Paint or place reflective tape on the edges of your stairs  Cigarette Smoking and Your Health   Risks to your health if you smoke:  Nicotine and other chemicals found in tobacco damage every cell in your body  Even if you are a light smoker, you have an increased risk for cancer, heart disease, and lung disease  If you are pregnant or have diabetes, smoking increases your risk for complications  Benefits to your health if you stop smoking:   · You decrease respiratory symptoms such as coughing, wheezing, and shortness of breath  · You reduce your risk for cancers of the lung, mouth, throat, kidney, bladder, pancreas, stomach, and cervix  If you already have cancer, you increase the benefits of chemotherapy  You also reduce your risk for cancer returning or a second cancer from developing  · You reduce your risk for heart disease, blood clots, heart attack, and stroke  · You reduce your risk for lung infections, and diseases such as pneumonia, asthma, chronic bronchitis, and emphysema  · Your circulation improves  More oxygen can be delivered to your body  If you have diabetes, you lower your risk for complications, such as kidney, artery, and eye diseases  You also lower your risk for nerve damage  Nerve damage can lead to amputations, poor vision, and blindness  · You improve your body's ability to heal and to fight infections  For more information and support to stop smoking:   · [employer]  [employer]  Phone: 7- 673 - 799-9564  Web Address: [employer]  How to Quit Using Smokeless Tobacco   Why it is important to stop using smokeless tobacco:  Smokeless tobacco comes in many forms  Examples include chew, snuff, dip, dissolvable tobacco, and snus   All smokeless tobacco products contain nicotine and may contain as much nicotine as 3 cigarettes  You may be physically dependent on nicotine  You may also be emotionally addicted to it  The cravings can be strong, but it is important to quit using smokeless tobacco  You will improve your health and decrease your cancer, stroke, and heart attack risk  Mouth sores and tooth problems will also improve when you quit  You can benefit from quitting no matter how long you have used smokeless tobacco    Prepare to stop using smokeless tobacco:  Nicotine is a highly addictive drug  Withdrawal symptoms can happen when you stop and make it hard to quit  The following can help keep you on track:  · Set a quit date  · Tell friends, family, and coworkers that you plan to quit  · Remove all smokeless tobacco products from your home, car, and workplace  Manage weight gain after you quit:  Nicotine can affect your metabolism  You may gain a few pounds after you quit  The following can help you control your weight:  · Eat healthy foods  · Drink water before, during, and between meals  · Exercise as directed  © Copyright [employer] 2018 Information is for End User's use only and may not be sold, redistributed or otherwise used for commercial purposes  All illustrations and images included in CareNotes® are the copyrighted property of [employer]  or [facility] Preventive Visit Patient Instructions  Thank you for completing your Welcome to Medicare Visit or Medicare Annual Wellness Visit today  Your next wellness visit will be due in one year (1/26/2023)  The screening/preventive services that you may require over the next 5-10 years are detailed below  Some tests may not apply to you based off risk factors and/or age  Screening tests ordered at today's visit but not completed yet may show as past due  Also, please note that scanned in results may not display below    Preventive Screenings:  Service Recommendations Previous Testing/Comments   Colorectal Cancer Screening  · Colonoscopy    · Fecal Occult Blood Test (FOBT)/Fecal Immunochemical Test (FIT)  · Fecal DNA/Cologuard Test  · Flexible Sigmoidoscopy Age: 54-65 years old   Colonoscopy: every 10 years (May be performed more frequently if at higher risk)  OR  FOBT/FIT: every 1 year  OR  Cologuard: every 3 years  OR  Sigmoidoscopy: every 5 years  Screening may be recommended earlier than age 48 if at higher risk for colorectal cancer  Also, an individualized decision between you and your healthcare provider will decide whether screening between the ages of 74-80 would be appropriate  Colonoscopy: 08/07/2018  FOBT/FIT: Not on file  Cologuard: Not on file  Sigmoidoscopy: Not on file    Screening Current  Screening Current     Prostate Cancer Screening Individualized decision between patient and health care provider in men between ages of 53-78   Medicare will cover every 12 months beginning on the day after your 50th birthday PSA: 2 6 ng/mL     Screening Current     Hepatitis C Screening Once for adults born between 1945 and 1965  More frequently in patients at high risk for Hepatitis C Hep C Antibody: Not on file    Screening Not Indicated   Diabetes Screening 1-2 times per year if you're at risk for diabetes or have pre-diabetes Fasting glucose: 82 mg/dL   A1C: No results in last 5 years    Screening Current  Screening Current   Cholesterol Screening Once every 5 years if you don't have a lipid disorder  May order more often based on risk factors  Lipid panel: Not on file    Risks and Benefits Discussed  Due for Lipid Panel      Other Preventive Screenings Covered by Medicare:  6  Abdominal Aortic Aneurysm (AAA) Screening: covered once if your at risk  You're considered to be at risk if you have a family history of AAA or a male between the age of 73-68 who smoking at least 100 cigarettes in your lifetime    7  Lung Cancer Screening: covers low dose CT scan once per year if you meet all of the following conditions: (1) Age 50-69; (2) No signs or symptoms of lung cancer; (3) Current smoker or have quit smoking within the last 15 years; (4) You have a tobacco smoking history of at least 30 pack years (packs per day x number of years you smoked); (5) You get a written order from a healthcare provider  8  Glaucoma Screening: covered annually if you're considered high risk: (1) You have diabetes OR (2) Family history of glaucoma OR (3)  aged 48 and older OR (3)  American aged 72 and older  5  Osteoporosis Screening: covered every 2 years if you meet one of the following conditions: (1) Have a vertebral abnormality; (2) On glucocorticoid therapy for more than 3 months; (3) Have primary hyperparathyroidism; (4) On osteoporosis medications and need to assess response to drug therapy  10  HIV Screening: covered annually if you're between the age of 12-76  Also covered annually if you are younger than 13 and older than 72 with risk factors for HIV infection  For pregnant patients, it is covered up to 3 times per pregnancy  Immunizations:  Immunization Recommendations   Influenza Vaccine Annual influenza vaccination during flu season is recommended for all persons aged >= 6 months who do not have contraindications   Pneumococcal Vaccine (Prevnar and Pneumovax)  * Prevnar = PCV13  * Pneumovax = PPSV23 Adults 25-60 years old: 1-3 doses may be recommended based on certain risk factors  Adults 72 years old: Prevnar (PCV13) vaccine recommended followed by Pneumovax (PPSV23) vaccine  If already received PPSV23 since turning 65, then PCV13 recommended at least one year after PPSV23 dose     Hepatitis B Vaccine 3 dose series if at intermediate or high risk (ex: diabetes, end stage renal disease, liver disease)   Tetanus (Td) Vaccine - COST NOT COVERED BY MEDICARE PART B Following completion of primary series, a booster dose should be given every 10 years to maintain immunity against tetanus  Td may also be given as tetanus wound prophylaxis  Tdap Vaccine - COST NOT COVERED BY MEDICARE PART B Recommended at least once for all adults  For pregnant patients, recommended with each pregnancy  Shingles Vaccine (Shingrix) - COST NOT COVERED BY MEDICARE PART B  2 shot series recommended in those aged 48 and above     Health Maintenance Due:      Topic Date Due    Hepatitis C Screening  Never done    Colorectal Cancer Screening  08/07/2028     Immunizations Due:      Topic Date Due    DTaP,Tdap,and Td Vaccines (1 - Tdap) Never done    COVID-19 Vaccine (3 - Moderna risk 4-dose series) 01/12/2022     Advance Directives   What are advance directives? Advance directives are legal documents that state your wishes and plans for medical care  These plans are made ahead of time in case you lose your ability to make decisions for yourself  Advance directives can apply to any medical decision, such as the treatments you want, and if you want to donate organs  What are the types of advance directives? There are many types of advance directives, and each state has rules about how to use them  You may choose a combination of any of the following:  · Living will: This is a written record of the treatment you want  You can also choose which treatments you do not want, which to limit, and which to stop at a certain time  This includes surgery, medicine, IV fluid, and tube feedings  · Durable power of  for healthcare [place]): This is a written record that states who you want to make healthcare choices for you when you are unable to make them for yourself  This person, called a proxy, is usually a family member or a friend  You may choose more than 1 proxy  · Do not resuscitate (DNR) order:  A DNR order is used in case your heart stops beating or you stop breathing  It is a request not to have certain forms of treatment, such as CPR   A DNR order may be included in other types of advance directives  · Medical directive: This covers the care that you want if you are in a coma, near death, or unable to make decisions for yourself  You can list the treatments you want for each condition  Treatment may include pain medicine, surgery, blood transfusions, dialysis, IV or tube feedings, and a ventilator (breathing machine)  · Values history: This document has questions about your views, beliefs, and how you feel and think about life  This information can help others choose the care that you would choose  Why are advance directives important? An advance directive helps you control your care  Although spoken wishes may be used, it is better to have your wishes written down  Spoken wishes can be misunderstood, or not followed  Treatments may be given even if you do not want them  An advance directive may make it easier for your family to make difficult choices about your care  Fall Prevention    Fall prevention  includes ways to make your home and other areas safer  It also includes ways you can move more carefully to prevent a fall  Health conditions that cause changes in your blood pressure, vision, or muscle strength and coordination may increase your risk for falls  Medicines may also increase your risk for falls if they make you dizzy, weak, or sleepy  Fall prevention tips:   · Stand or sit up slowly  · Use assistive devices as directed  · Wear shoes that fit well and have soles that   · Wear a personal alarm  · Stay active  · Manage your medical conditions  Home Safety Tips:  · Add items to prevent falls in the bathroom  · Keep paths clear  · Install bright lights in your home  · Keep items you use often on shelves within reach  · Paint or place reflective tape on the edges of your stairs  Cigarette Smoking and Your Health   Risks to your health if you smoke:  Nicotine and other chemicals found in tobacco damage every cell in your body   Even if you are a light smoker, you have an increased risk for cancer, heart disease, and lung disease  If you are pregnant or have diabetes, smoking increases your risk for complications  Benefits to your health if you stop smoking:   · You decrease respiratory symptoms such as coughing, wheezing, and shortness of breath  · You reduce your risk for cancers of the lung, mouth, throat, kidney, bladder, pancreas, stomach, and cervix  If you already have cancer, you increase the benefits of chemotherapy  You also reduce your risk for cancer returning or a second cancer from developing  · You reduce your risk for heart disease, blood clots, heart attack, and stroke  · You reduce your risk for lung infections, and diseases such as pneumonia, asthma, chronic bronchitis, and emphysema  · Your circulation improves  More oxygen can be delivered to your body  If you have diabetes, you lower your risk for complications, such as kidney, artery, and eye diseases  You also lower your risk for nerve damage  Nerve damage can lead to amputations, poor vision, and blindness  · You improve your body's ability to heal and to fight infections  For more information and support to stop smoking:   · [employer]  Phone: 7- 062 - 608-7509  Web Address: [employer]  How to Quit Using Smokeless Tobacco   Why it is important to stop using smokeless tobacco:  Smokeless tobacco comes in many forms  Examples include chew, snuff, dip, dissolvable tobacco, and snus  All smokeless tobacco products contain nicotine and may contain as much nicotine as 3 cigarettes  You may be physically dependent on nicotine  You may also be emotionally addicted to it  The cravings can be strong, but it is important to quit using smokeless tobacco  You will improve your health and decrease your cancer, stroke, and heart attack risk  Mouth sores and tooth problems will also improve when you quit   You can benefit from quitting no matter how long you have used smokeless tobacco    Prepare to stop using smokeless tobacco:  Nicotine is a highly addictive drug  Withdrawal symptoms can happen when you stop and make it hard to quit  The following can help keep you on track:  · Set a quit date  · Tell friends, family, and coworkers that you plan to quit  · Remove all smokeless tobacco products from your home, car, and workplace  Manage weight gain after you quit:  Nicotine can affect your metabolism  You may gain a few pounds after you quit  The following can help you control your weight:  · Eat healthy foods  · Drink water before, during, and between meals  · Exercise as directed  © Copyright [employer] 2018 Information is for End User's use only and may not be sold, redistributed or otherwise used for commercial purposes   All illustrations and images included in CareNotes® are the copyrighted property of [employer]  or [address] [place] [address] [facility]

## 2022-01-25 NOTE — PROGRESS NOTES
Assessment and Plan:     Problem List Items Addressed This Visit     None      Visit Diagnoses     Need for hepatitis C screening test    -  Primary           Preventive health issues were discussed with patient, and age appropriate screening tests were ordered as noted in patient's After Visit Summary  Personalized health advice and appropriate referrals for health education or preventive services given if needed, as noted in patient's After Visit Summary       History of Present Illness:     Patient presents for Medicare Annual Wellness visit    Patient Care Team:  Abimbola Rodriguez MD as PCP - General (Family Medicine)  Oni Pond MD as Endoscopist     Problem List:     Patient Active Problem List   Diagnosis    Family history of colon cancer    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 [facility]    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([facility]    Bladder stone    Urge incontinence    BPH with urinary obstruction    Renal cell carcinoma of left kidney ([facility] [address] [facility]    Medicare annual wellness visit, initial    Encounter for central line care    Elevated blood sugar    Bladder mass    Smoker      Past Medical and Surgical History:     Past Medical History:   Diagnosis Date    Arthritis     Balance problems     and" coordination issues/best to walk slow"    Bladder stones     Cancer ([facility] [address] [facility]     lung, brain, kidney, spinal    Colon polyp     Dysphagia 02/16/2017    "not right now but in the past"    Environmental and seasonal allergies     Exercise involving walking     "approx 1 mile 3x/week"    Eye injury     right,  "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"    Full dentures     Hemiparesis ([facility] [address] [facility] 04/27/2015    left weakness,"due to tumor on brain"    History of brain tumor     "had radiation for it"    History of cancer chemotherapy     last tx  5/22/18    History of GI bleed 2015    History of kidney cancer     left    History of kidney stones     History of radiation therapy  History of transfusion     5 units of blood 2015    Hydrocele 11/30/2010    Inguinal hernia, bilateral     Joint stiffness of multiple sites     Kidney stone     Paresthesia 05/01/2015    Portacath in place     right chest    Primary malignant neoplasm of lung [facility] [address] [facility] 05/26/2015    Renal carcinoma, left ([facility] [address] [facility] 10/17/2016    Risk for falls     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([facility] [address] [facility] 05/26/2015    Skin cancer     Syncopal episodes 09/01/2015    2    Wears glasses      Past Surgical History:   Procedure Laterality Date    BLADDER STONE REMOVAL      BLADDER STONE REMOVAL N/A 2/9/2021    Procedure: Nyoka Heady;  Surgeon: Janie Reed MD;  [facility];  Service: Urology    COLONOSCOPY     Mavis Cousin DENTAL SURGERY  2011    DENTAL EXTACTIONS     ESOPHAGOGASTRODUODENOSCOPY      HERNIA REPAIR      HYDROCELE EXCISION / REPAIR Left 01/01/2011    KIDNEY STONE SURGERY      KIDNEY SURGERY      cancer of kidney/cryo of cancer    PORTACATH PLACEMENT      ME COLONOSCOPY FLX DX W/COLLJ SPEC WHEN PFRMD N/A 8/7/2018    Procedure: COLONOSCOPY with polypectomy ;  Surgeon: Juan Hammond MD;  [facility];   Service: General    ME CYSTOURETHROSCOPY,FULGUR 2-5 CM LESN N/A 2/9/2021    Procedure: Judene Gilford, T U R B T ;  Surgeon: Janie Reed MD;  [facility];  Service: Urology    ME LAP, RECURRENT INCISIONAL HERNIA REPAIR,INCARCERATED Bilateral 8/8/2018    Procedure: REPAIR HERNIA INGUINAL LAPAROSCOPIC W/ ROBOTICS W/ MESH;  Surgeon: Juan Hammond MD;  [facility];  Service: General    ME REMOVE BLADDER STONE,<2 5 CM N/A 11/27/2018    Procedure: Julio Longo HOLMIUM LASER,BLADDE BIOPSY;  Surgeon: Janie Reed MD;  [facility];  Service: Urology    SKIN BIOPSY      SKIN CANCER EXCISION      17 surgeries, basal cell    TONSILLECTOMY        Family History:     Family History   Problem Relation Age of Onset    Cancer Mother         EYE  Cancer Father     Colon cancer Father     Cancer Brother       Social History:     Social History     Socioeconomic History    Marital status:      Spouse name: Not on file    Number of children: Not on file    Years of education: Not on file    Highest education level: Not on file   Occupational History    Not on file   Tobacco Use    Smoking status: Current Every Day Smoker     Packs/day: 0 50     Years: 48 00     Pack years: 24 00     Types: Cigarettes    Smokeless tobacco: Current User    Tobacco comment: last cigarette 2/8   Vaping Use    Vaping Use: Never used   Substance and Sexual Activity    Alcohol use: Yes     Comment: "3x per year"    Drug use: No    Sexual activity: Not on file   Other Topics Concern    Not on file   Social History Narrative    Not on file     Social Determinants of Health     Financial Resource Strain: Not on file   Food Insecurity: Not on file   Transportation Needs: Not on file   Physical Activity: Not on file   Stress: Not on file   Social Connections: Not on file   Intimate Partner Violence: Not on file   Housing Stability: Not on file      Medications and Allergies:     Current Outpatient Medications   Medication Sig Dispense Refill    Ascorbic Acid (VITAMIN C) 500 MG CAPS Take 500 mg by mouth daily        Multiple Vitamins-Minerals (MULTIVITAMIN ADULT PO) Take 1 tablet by mouth daily         No current facility-administered medications for this visit       No Known Allergies   Immunizations:     Immunization History   Administered Date(s) Administered    COVID-19 MODERNA VACC 0 25 ML IM BOOSTER 12/15/2021    COVID-19 MODERNA VACC 0 5 ML IM 03/22/2021, 04/21/2021    INFLUENZA 11/03/2017, 10/09/2018, 10/05/2020, 10/11/2021    Influenza Split High Dose Preservative Free IM 10/09/2018, 09/09/2019    Pneumococcal Conjugate 13-Valent 01/25/2019    Pneumococcal Polysaccharide PPV23 12/31/2020      Health Maintenance:         Topic Date Due    Hepatitis C Screening  Never done    Colorectal Cancer Screening  08/07/2028         Topic Date Due    DTaP,Tdap,and Td Vaccines (1 - Tdap) Never done    COVID-19 Vaccine (3 - Moderna risk 4-dose series) 01/12/2022      Medicare Health Risk Assessment:     There were no vitals taken for this visit  Juan Horowitz is here for his Subsequent Wellness visit  Health Risk Assessment:   Patient rates overall health as good  Patient feels that their physical health rating is same  Patient is very satisfied with their life  Eyesight was rated as same  Hearing was rated as same  Patient feels that their emotional and mental health rating is same  Patients states they are never, rarely angry  Patient states they are never, rarely unusually tired/fatigued  Pain experienced in the last 7 days has been none  Patient states that he has experienced no weight loss or gain in last 6 months  Depression Screening:   PHQ-2 Score: 1      Fall Risk Screening: In the past year, patient has experienced: history of falling in past year    Number of falls: 1  Injured during fall?: Yes    Feels unsteady when standing or walking?: Yes    Worried about falling?: No      Home Safety:  Patient does not have trouble with stairs inside or outside of their home  Patient has working smoke alarms and has working carbon monoxide detector  Home safety hazards include: none  Nutrition:   Current diet is Regular  Medications:   Patient is currently taking over-the-counter supplements  OTC medications include: see medication list  Patient is able to manage medications  Activities of Daily Living (ADLs)/Instrumental Activities of Daily Living (IADLs):   Walk and transfer into and out of bed and chair?: Yes  Dress and groom yourself?: Yes    Bathe or shower yourself?: Yes    Feed yourself?  Yes  Do your laundry/housekeeping?: Yes  Manage your money, pay your bills and track your expenses?: Yes  Make your own meals?: Yes    Do your own shopping?: Yes    Previous Hospitalizations:   Any hospitalizations or ED visits within the last 12 months?: No      Advance Care Planning:   Living will: Yes    Advanced directive: Yes      PREVENTIVE SCREENINGS        Diabetes Screening:     General: Screening Current      Colorectal Cancer Screening:     General: Screening Current      Abdominal Aortic Aneurysm (AAA) Screening:    Risk factors include: age between 73-69 yo and tobacco use        Lung Cancer Screening:     General: Screening Not Indicated and History Lung Cancer    Screening, Brief Intervention, and Referral to Treatment (SBIRT)    Screening      Single Item Drug Screening:  How often have you used an illegal drug (including marijuana) or a prescription medication for non-medical reasons in the past year? never    Single Item Drug Screen Score: 0  Interpretation: Negative screen for possible drug use disorder      Magdiel Hartman MD

## 2022-01-25 NOTE — PROGRESS NOTES
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)  CÉSAR recently       Diagnoses and all orders for this visit:    Need for hepatitis C screening test    BPH with urinary obstruction    Elevated blood sugar    Medicare annual wellness visit, subsequent          Subjective:      Patient ID: Gilford Sleight is a 73 y o  male  PATIENT RETURNS FOR FOLLOW-UP OF CHRONIC MEDICAL CONDITIONS  ANY HOSPITAL VISITS, EMERGENCY VISITS AND OTHER PROVIDER VISITS SINCE LAST TIME WERE REVIEWED  MEDS WERE REVIEWED AND NO SIDE EFFECTS  NO NEW ISSUES  UNLESS NOTED BELOW  NO NEW MEDICAL PROVIDER REPORTED  THE CHRONIC DISEASES LISTED ABOVE ARE STABLE AND UNCHANGED/ THE PLAN OF CARE FOR THOSE WILL REMAIN UNCHANGED UNLESS NOTED BELOW  AWV/sub     Colon 20 18 and OK       Recent >> cough / phlegm : 2 wks ; The following portions of the patient's history were reviewed and updated as appropriate: allergies, current medications, past family history, past medical history, past social history, past surgical history and problem list     Review of Systems   Constitutional: Negative for activity change and appetite change  HENT: Negative for trouble swallowing  Eyes: Negative for visual disturbance  Respiratory: Negative for cough and shortness of breath  Cardiovascular: Negative for chest pain, palpitations and leg swelling  Gastrointestinal: Negative for abdominal pain and blood in stool  Endocrine: Negative for polyuria  Genitourinary: Negative for difficulty urinating and hematuria  Skin: Negative for rash  Neurological: Negative for dizziness  Psychiatric/Behavioral: Negative for behavioral problems           Objective:  Vitals:    01/25/22 1131   BP: 100/68   BP Location: Left arm   Patient Position: Sitting   Cuff Size: Adult   Pulse: 94   Resp: 17   Temp: 98 1 °F (36 7 °C)   TempSrc: Tympanic   SpO2: 97%   Weight: 73 9 kg (163 lb)   Height: 5' 10" (1 778 m)      Physical Exam  Constitutional:       Appearance: He is well-developed  HENT:      Head: Normocephalic and atraumatic  Eyes:      Conjunctiva/sclera: Conjunctivae normal    Neck:      Thyroid: No thyromegaly  Cardiovascular:      Rate and Rhythm: Normal rate and regular rhythm  Heart sounds: Normal heart sounds  No murmur heard  Pulmonary:      Effort: Pulmonary effort is normal  No respiratory distress  Breath sounds: Normal breath sounds  Musculoskeletal:      Cervical back: Neck supple  Lymphadenopathy:      Cervical: No cervical adenopathy  Skin:     General: Skin is warm and dry  Psychiatric:         Behavior: Behavior normal            Empiric antibx treatment for bronchtis  Patient's chronic problems that were reviewed today are stable  Recent hospital stays reviewed  Recent labs and imaging reviewed  Recent visits to other providers reviewed  Meds reviewed and no changes made  Appropriate labs and imaging were ordered  Preventive measures appropriate for age and sex were reviewed with patient  Immunizations were updated as appropriate  Assessment and Plan:     Problem List Items Addressed This Visit        Genitourinary    BPH with urinary obstruction       Other    Medicare annual wellness visit, subsequent (Chronic)    Elevated blood sugar      Other Visit Diagnoses     Need for hepatitis C screening test    -  Primary           Preventive health issues were discussed with patient, and age appropriate screening tests were ordered as noted in patient's After Visit Summary  Personalized health advice and appropriate referrals for health education or preventive services given if needed, as noted in patient's After Visit Summary       History of Present Illness:     Patient presents for Medicare Annual Wellness visit    Patient Care Team:  Bailey Carpenter MD as PCP - General (Family Medicine)  Juan Hammond MD as Endoscopist     Problem List:     Patient Active Problem List Diagnosis    Family history of colon cancer    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 ([facility] [address] [facility]    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([facility] [address] [facility]    Bladder stone    Urge incontinence    BPH with urinary obstruction    Renal cell carcinoma of left kidney ([facility] [address] [facility]    Medicare annual wellness visit, subsequent    Encounter for central line care    Elevated blood sugar    Bladder mass    Smoker    Colon polyps      Past Medical and Surgical History:     Past Medical History:   Diagnosis Date    Arthritis     Balance problems     and" coordination issues/best to walk slow"    Bladder stones     [facility]     lung, brain, kidney, spinal    Colon polyp     Dysphagia 02/16/2017    "not right now but in the past"    Environmental and seasonal allergies     Exercise involving walking     "approx 1 mile 3x/week"    Eye injury     right,  "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"    Full dentures     Hemiparesis ([facility] 04/27/2015    left weakness,"due to tumor on brain"    History of brain tumor     "had radiation for it"    History of cancer chemotherapy     last tx  5/22/18    History of GI bleed 2015    History of kidney cancer     left    History of kidney stones     History of radiation therapy     History of transfusion     5 units of blood 2015    Hydrocele 11/30/2010    Inguinal hernia, bilateral     Joint stiffness of multiple sites     Kidney stone     Paresthesia 05/01/2015    Portacath in place     right chest    Primary malignant neoplasm of lung ([facility] 05/26/2015    Renal carcinoma, left ([facility] [address] [facility] 10/17/2016    Risk for falls     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([facility] 05/26/2015    Skin cancer     Syncopal episodes 09/01/2015    2    Wears glasses      Past Surgical History:   Procedure Laterality Date    BLADDER STONE REMOVAL      BLADDER STONE REMOVAL N/A 2/9/2021    Procedure: Claudette Bloomer;  Surgeon: Yeny Rodriguez MD;  [facility];  Service: Urology    COLONOSCOPY     Sparkle Derek DENTAL SURGERY  2011    DENTAL EXTACTIONS     ESOPHAGOGASTRODUODENOSCOPY      HERNIA REPAIR      HYDROCELE EXCISION / REPAIR Left 01/01/2011    KIDNEY STONE SURGERY      KIDNEY SURGERY      cancer of kidney/cryo of cancer    PORTACATH PLACEMENT      TX COLONOSCOPY FLX DX W/COLLJ SPEC WHEN PFRMD N/A 8/7/2018    Procedure: COLONOSCOPY with polypectomy ;  Surgeon: Sukhdev Dasilva MD;  [facility];   Service: General    TX CYSTOURETHROSCOPY,FULGUR 2-5 CM LESN N/A 2/9/2021    Procedure: Toya Calhoun, T U R B T ;  Surgeon: Angie Tillman MD;  [facility];  Service: Urology    TX LAP, RECURRENT INCISIONAL HERNIA REPAIR,INCARCERATED Bilateral 8/8/2018    Procedure: REPAIR HERNIA INGUINAL LAPAROSCOPIC W/ ROBOTICS W/ MESH;  Surgeon: Sukhdev Dasilva MD;  [facility];  Service: General    TX REMOVE BLADDER STONE,<2 5 CM N/A 11/27/2018    Procedure: Tone Eid HOLMIUM LASER,BLADDE BIOPSY;  Surgeon: Angie Tillman MD;  [facility];  Service: Urology    SKIN BIOPSY      SKIN CANCER EXCISION      17 surgeries, basal cell    TONSILLECTOMY        Family History:     Family History   Problem Relation Age of Onset    Cancer Mother         EYE    Cancer Father     Colon cancer Father     Cancer Brother       Social History:     Social History     Socioeconomic History    Marital status:      Spouse name: Not on file    Number of children: Not on file    Years of education: Not on file    Highest education level: Not on file   Occupational History    Not on file   Tobacco Use    Smoking status: Current Every Day Smoker     Packs/day: 0 50     Years: 48 00     Pack years: 24 00     Types: Cigarettes    Smokeless tobacco: Current User    Tobacco comment: last cigarette 2/8   Vaping Use    Vaping Use: Never used   Substance and Sexual Activity    Alcohol use: Yes     Comment: "3x per year"    Drug use: No    Sexual activity: Not on file   Other Topics Concern    Not on file   Social History Narrative    Not on file     Social Determinants of Health     Financial Resource Strain: Not on file   Food Insecurity: Not on file   Transportation Needs: Not on file   Physical Activity: Not on file   Stress: Not on file   Social Connections: Not on file   Intimate Partner Violence: Not on file   Housing Stability: Not on file      Medications and Allergies:     Current Outpatient Medications   Medication Sig Dispense Refill    Ascorbic Acid (VITAMIN C) 500 MG CAPS Take 500 mg by mouth daily        Multiple Vitamins-Minerals (MULTIVITAMIN ADULT PO) Take 1 tablet by mouth daily         No current facility-administered medications for this visit  No Known Allergies   Immunizations:     Immunization History   Administered Date(s) Administered    COVID-19 MODERNA VACC 0 25 ML IM BOOSTER 12/15/2021    COVID-19 MODERNA VACC 0 5 ML IM 03/22/2021, 04/21/2021    INFLUENZA 11/03/2017, 10/09/2018, 10/05/2020, 10/11/2021    Influenza Split High Dose Preservative Free IM 10/09/2018, 09/09/2019    Pneumococcal Conjugate 13-Valent 01/25/2019    Pneumococcal Polysaccharide PPV23 12/31/2020      Health Maintenance:         Topic Date Due    Hepatitis C Screening  Never done    Colorectal Cancer Screening  08/07/2028         Topic Date Due    DTaP,Tdap,and Td Vaccines (1 - Tdap) Never done    COVID-19 Vaccine (3 - Moderna risk 4-dose series) 01/12/2022      Medicare Health Risk Assessment:     /68 (BP Location: Left arm, Patient Position: Sitting, Cuff Size: Adult)   Pulse 94   Temp 98 1 °F (36 7 °C) (Tympanic)   Resp 17   Ht 5' 10" (1 778 m)   Wt 73 9 kg (163 lb)   SpO2 97%   BMI 23 39 kg/m²      Walton Leak is here for his Subsequent Wellness visit  Last Medicare Wellness visit information reviewed, patient interviewed, no change since last AWV       Depression Screening:   PHQ-2 Score: 1      Advance Care Planning: Living will: Yes    Durable POA for healthcare:  Yes    Advanced directive: Yes      Cognitive Screening:   Provider or family/friend/caregiver concerned regarding cognition?: No    PREVENTIVE SCREENINGS      Cardiovascular Screening:    General: Risks and Benefits Discussed    Due for: Lipid Panel      Diabetes Screening:     General: Screening Current      Colorectal Cancer Screening:     General: Screening Current      Prostate Cancer Screening:    General: Screening Current      Osteoporosis Screening:    General: Screening Not Indicated      Abdominal Aortic Aneurysm (AAA) Screening:    Risk factors include: age between 73-69 yo and tobacco use        Lung Cancer Screening:     General: Screening Not Indicated and History Lung Cancer      Hepatitis C Screening:    General: Screening Not Indicated      Helder Bradfodr MD

## 2022-02-07 ENCOUNTER — HOSPITAL ENCOUNTER (OUTPATIENT)
Dept: INFUSION CENTER | Facility: HOSPITAL | Age: 72
Discharge: HOME | End: 2022-02-07
Payer: MEDICARE

## 2022-02-07 DIAGNOSIS — C34.12 PRIMARY MALIGNANT NEOPLASM OF BRONCHUS OF LEFT UPPER LOBE (HCC): ICD-10-CM

## 2022-02-07 DIAGNOSIS — Z45.2 ENCOUNTER FOR CENTRAL LINE CARE: Primary | ICD-10-CM

## 2022-02-07 PROCEDURE — 96523 IRRIG DRUG DELIVERY DEVICE: CPT

## 2022-02-07 NOTE — PROGRESS NOTES
Pt tolerated port flush without difficulty  Port flushed and deaccessed per protocol  Next appt scheduled to coincide with lab draw for appt w/[facility]    Left ambulatory with AVS

## 2022-03-01 ENCOUNTER — HOSPITAL ENCOUNTER (OUTPATIENT)
Dept: CT IMAGING | Facility: HOSPITAL | Age: 72
Discharge: HOME | End: 2022-03-01
Attending: INTERNAL MEDICINE
Payer: MEDICARE

## 2022-03-01 DIAGNOSIS — C34.12 PRIMARY MALIGNANT NEOPLASM OF BRONCHUS OF LEFT UPPER LOBE (HCC): ICD-10-CM

## 2022-03-01 DIAGNOSIS — C64.2 RENAL CELL CARCINOMA OF LEFT KIDNEY (HCC): ICD-10-CM

## 2022-03-01 PROCEDURE — 71260 CT THORAX DX C+: CPT

## 2022-03-01 PROCEDURE — 74177 CT ABD & PELVIS W/CONTRAST: CPT

## 2022-03-01 PROCEDURE — G1004 CDSM NDSC: HCPCS

## 2022-03-01 RX ADMIN — IOHEXOL 100 ML: 350 INJECTION, SOLUTION INTRAVENOUS at 10:25

## 2022-03-02 ENCOUNTER — PROCEDURE VISIT (OUTPATIENT)
Dept: UROLOGY | Facility: MEDICAL CENTER | Age: 72
End: 2022-03-02
Payer: MEDICARE

## 2022-03-02 VITALS
HEIGHT: 70 IN | HEART RATE: 102 BPM | DIASTOLIC BLOOD PRESSURE: 68 MMHG | SYSTOLIC BLOOD PRESSURE: 114 MMHG | WEIGHT: 161.6 LBS | BODY MASS INDEX: 23.13 KG/M2

## 2022-03-02 DIAGNOSIS — Z85.51 HISTORY OF BLADDER CANCER: Primary | ICD-10-CM

## 2022-03-02 DIAGNOSIS — N13.8 BPH WITH URINARY OBSTRUCTION: ICD-10-CM

## 2022-03-02 DIAGNOSIS — N40.1 BPH WITH URINARY OBSTRUCTION: ICD-10-CM

## 2022-03-02 LAB
SL AMB  POCT GLUCOSE, UA: NORMAL
SL AMB LEUKOCYTE ESTERASE,UA: NORMAL
SL AMB POCT BILIRUBIN,UA: NORMAL
SL AMB POCT BLOOD,UA: NORMAL
SL AMB POCT CLARITY,UA: CLEAR
SL AMB POCT COLOR,UA: YELLOW
SL AMB POCT KETONES,UA: NORMAL
SL AMB POCT NITRITE,UA: POSITIVE
SL AMB POCT PH,UA: 5
SL AMB POCT SPECIFIC GRAVITY,UA: 1.02
SL AMB POCT URINE PROTEIN: 30
SL AMB POCT UROBILINOGEN: 0.2

## 2022-03-02 PROCEDURE — 52000 CYSTOURETHROSCOPY: CPT | Performed by: UROLOGY

## 2022-03-02 PROCEDURE — 81003 URINALYSIS AUTO W/O SCOPE: CPT | Performed by: UROLOGY

## 2022-03-02 PROCEDURE — 99213 OFFICE O/P EST LOW 20 MIN: CPT | Performed by: UROLOGY

## 2022-03-02 NOTE — PROGRESS NOTES
HISTORY:    Follow-up history of bladder stones, see prior notes regarding bladder perforation at time of bladder stone surgery long ago  Stone particles have been imbedded in the bladder wall since that time  In 2018, biopsy of lesion at site of the stone showed early noninvasive low-grade TCC  For the biopsies have been negative  One year ago, I did bladder biopsy because suspicion of neoplasm at the site of the perforation, no tumor seen     Moderate BPH, tolerates that okay, not bothered urgency or stream         ASSESSMENT / PLAN:    Cysto today shows poor emptying with cloudy urine, about 200 mL PVR  No obvious stone particles seen at this time or lesions of bladder     Cysto one year    The following portions of the patient's history were reviewed and updated as appropriate: allergies, current medications, past family history, past medical history, past social history, past surgical history and problem list     Review of Systems   All other systems reviewed and are negative  Objective:     Physical Exam  Constitutional:       General: He is not in acute distress  Appearance: He is well-developed  He is not diaphoretic  HENT:      Head: Normocephalic and atraumatic  Eyes:      General: No scleral icterus  Pulmonary:      Effort: Pulmonary effort is normal    Skin:     Coloration: Skin is not pale  Neurological:      Mental Status: He is alert and oriented to person, place, and time  Psychiatric:         Behavior: Behavior normal          Thought Content: Thought content normal          Judgment: Judgment normal             Cystoscopy     Date/Time 3/2/2022 10:22 AM     Performed by  Val Giron MD     Authorized by Val Giron MD          Procedure Details:  Procedure type: cystoscopy    Patient tolerance: Patient tolerated the procedure well with no immediate complications    Additional Procedure Details:      Patient presents for cystoscopy    I have discussed the reasons for doing the test, and the potential risks and complications  Patient expressed understanding, and signed informed consent document  The patient was carefully  positioned supine on the examining table  Sterile preparation was performed on the urethra  Xylocaine jelly was instilled and left  Indwelling for the procedure  The 13 Malay flexible cystoscope was passed with the following findings:      Urethra:  No stricture    Prostate:  lateral lobes significant lateral lobe hyperplasia, minimal median lobe                   Bladder: Moderate trabeculation, cloudy urine, no lesions, tumor, or stones  Residual urine:  200 mL    Patient tolerated the procedure well and was escorted from the examining table  0   Lab Value Date/Time    PSA 2 6 11/06/2018 1130   ]  BUN   Date Value Ref Range Status   03/01/2021 15 5 - 25 mg/dL Final   06/11/2018 16 5 - 25 MG/DL Final     Creatinine   Date Value Ref Range Status   03/01/2021 0 74 0 70 - 1 50 mg/dL Final     Comment:     Standardized to IDMS reference method     No components found for: CBC      Patient Active Problem List   Diagnosis    Family history of colon cancer    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 ([address] )    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([address] )    Bladder stone    Urge incontinence    BPH with urinary obstruction    Renal cell carcinoma of left kidney ([address] )    Screening for hyperlipidemia    Encounter for central line care    Elevated blood sugar    Bladder mass    Smoker    Colon polyps    Bronchitis        Diagnoses and all orders for this visit:    History of bladder cancer  -     POCT urine dip auto non-scope  -     Cystoscopy    BPH with urinary obstruction  -     PSA Total, Diagnostic; Future           Patient ID: Sidra Lundberg is a 81 y o  male        Current Outpatient Medications:     Ascorbic Acid (VITAMIN C) 500 MG CAPS, Take 500 mg by mouth daily  , Disp: , Rfl:    Multiple Vitamins-Minerals (MULTIVITAMIN ADULT PO), Take 1 tablet by mouth daily  , Disp: , Rfl:   No current facility-administered medications for this visit      Past Medical History:   Diagnosis Date    Arthritis     Balance problems     and" coordination issues/best to walk slow"    Bladder stones     Cancer (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"    Full dentures     Hem[facility] ) 04/27/2015    left weakness,"due to tumor on brain"    History of brain tumor     "had radiation for it"    History of cancer chemotherapy     last tx  5/22/18    History of GI bleed 2015    History of kidney cancer     left    History of kidney stones     History of radiation therapy     History of transfusion     5 units of blood 2015    Hydrocele 11/30/2010    Inguinal hernia, bilateral     Joint stiffness of multiple sites     Kidney stone     Paresthesia 05/01/2015    Portacath in place     right chest    Primary malignant neoplasm of lung ([facility] ) 05/26/2015    Renal carcinoma, left (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([place] [address] ) 05/26/2015    Skin cancer     Syncopal episodes 09/01/2015    2    Wears glasses        Past Surgical History:   Procedure Laterality Date    BLADDER STONE REMOVAL      BLADDER STONE REMOVAL N/A 2/9/2021    Procedure: [place];  Surgeon: Mickie Brannon MD;  [facility];  Service: Urology    COLONOSCOPY     [facility] DENT[facility] SURGERY  2011    DENTAL EXTACTIONS     ESOPHAGOGASTRODUODENOSCOPY      HERNIA REPAIR      HYDROCELE EXCISION / REPAIR Left 01/01/2011    KIDNEY STONE SURGERY      KIDNEY SURGERY      cancer of kidney/cryo of cancer    PORTACATH PLACEMENT      OK COLONOSCOPY FLX DX W/COLLJ SPEC WHEN PFRMD N/A 8/7/2018    Procedure: COLONOSCOPY with polypectomy ;  Surgeon: Charmayne Honey, MD;  [facility];   Service: General    OH CYSTOURETHROSCOPY,FULGUR 2-5 CM LESN N/A 2/9/2021    Procedure: Janine Medina T U R B T ;  Surgeon: Esteban Ridley MD;  [facility];  Service: Urology    OH LAP, RECURRENT INCISIONAL HERNIA REPAIR,INCARCERATED Bilateral 8/8/2018    Procedure: REPAIR HERNIA INGUINAL LAPAROSCOPIC W/ ROBOTICS W/ MESH;  Surgeon: Charmayne Honey, MD;  [facility];  Service: General    OH REMOVE BLADDER STONE,<2 5 CM N/A 11/27/2018    Procedure: Jenny Milla LASER,BLADDE BIOPSY;  Surgeon: Esteban Ridley MD;  [facility];  Service: Urology    SKIN BIOPSY      SKIN CANCER EXCISION      17 surgeries, basal cell    TONSILLECTOMY         Social History

## 2022-03-07 ENCOUNTER — HOSPITAL ENCOUNTER (OUTPATIENT)
Dept: INFUSION CENTER | Facility: HOSPITAL | Age: 72
Discharge: HOME | End: 2022-03-07
Payer: MEDICARE

## 2022-03-07 DIAGNOSIS — Z45.2 ENCOUNTER FOR CENTRAL LINE CARE: ICD-10-CM

## 2022-03-07 DIAGNOSIS — C64.2 RENAL CELL CARCINOMA OF LEFT KIDNEY (HCC): ICD-10-CM

## 2022-03-07 DIAGNOSIS — C34.12 PRIMARY MALIGNANT NEOPLASM OF BRONCHUS OF LEFT UPPER LOBE (HCC): Primary | ICD-10-CM

## 2022-03-07 LAB
ALBUMIN SERPL BCP-MCNC: 4 G/DL (ref 3–5.2)
ALP SERPL-CCNC: 98 U/L (ref 43–122)
ALT SERPL W P-5'-P-CCNC: 24 U/L
ANION GAP SERPL CALCULATED.3IONS-SCNC: 8 MMOL/L (ref 5–14)
AST SERPL W P-5'-P-CCNC: 23 U/L (ref 17–59)
BASOPHILS # BLD AUTO: 0.1 THOUSANDS/ΜL (ref 0–0.1)
BASOPHILS NFR BLD AUTO: 1 % (ref 0–1)
BILIRUB SERPL-MCNC: 0.89 MG/DL
BUN SERPL-MCNC: 13 MG/DL (ref 5–25)
CALCIUM SERPL-MCNC: 8.4 MG/DL (ref 8.4–10.2)
CHLORIDE SERPL-SCNC: 105 MMOL/L (ref 97–108)
CO2 SERPL-SCNC: 24 MMOL/L (ref 22–30)
CREAT SERPL-MCNC: 0.83 MG/DL (ref 0.7–1.5)
EOSINOPHIL # BLD AUTO: 0.2 THOUSAND/ΜL (ref 0–0.4)
EOSINOPHIL NFR BLD AUTO: 2 % (ref 0–6)
ERYTHROCYTE [DISTWIDTH] IN BLOOD BY AUTOMATED COUNT: 12.7 %
GFR SERPL CREATININE-BSD FRML MDRD: 89 ML/MIN/1.73SQ M
GLUCOSE SERPL-MCNC: 145 MG/DL (ref 70–99)
HCT VFR BLD AUTO: 43.2 % (ref 41–53)
HGB BLD-MCNC: 14.7 G/DL (ref 13.5–17.5)
LYMPHOCYTES # BLD AUTO: 2.8 THOUSANDS/ΜL (ref 0.5–4)
LYMPHOCYTES NFR BLD AUTO: 27 % (ref 25–45)
MAGNESIUM SERPL-MCNC: 1.8 MG/DL (ref 1.6–2.3)
MCH RBC QN AUTO: 31.4 PG (ref 26–34)
MCHC RBC AUTO-ENTMCNC: 34.1 G/DL (ref 31–36)
MCV RBC AUTO: 92 FL (ref 80–100)
MONOCYTES # BLD AUTO: 0.4 THOUSAND/ΜL (ref 0.2–0.9)
MONOCYTES NFR BLD AUTO: 4 % (ref 1–10)
NEUTROPHILS # BLD AUTO: 7 THOUSANDS/ΜL (ref 1.8–7.8)
NEUTS SEG NFR BLD AUTO: 67 % (ref 45–65)
PLATELET # BLD AUTO: 191 THOUSANDS/UL (ref 150–450)
PMV BLD AUTO: 8.3 FL (ref 8.9–12.7)
POTASSIUM SERPL-SCNC: 3.8 MMOL/L (ref 3.6–5)
PROT SERPL-MCNC: 7.1 G/DL (ref 5.9–8.4)
RBC # BLD AUTO: 4.7 MILLION/UL (ref 4.5–5.9)
SODIUM SERPL-SCNC: 137 MMOL/L (ref 137–147)
WBC # BLD AUTO: 10.4 THOUSAND/UL (ref 4.5–11)

## 2022-03-07 PROCEDURE — 80053 COMPREHEN METABOLIC PANEL: CPT

## 2022-03-07 PROCEDURE — 85025 COMPLETE CBC W/AUTO DIFF WBC: CPT

## 2022-03-07 PROCEDURE — 83735 ASSAY OF MAGNESIUM: CPT

## 2022-03-07 NOTE — PROGRESS NOTES
Labs drawn via port  Brisk blood return noted  Patient offers no complaints  Next appointment verified, AVS declined

## 2022-03-07 NOTE — PLAN OF CARE
Problem: SAFETY ADULT  Goal: Patient will remain free of falls  Description: INTERVENTIONS:  - Educate patient/family on patient safety including physical limitations  - Instruct patient to call for assistance with activity   - Consult OT/PT to assist with strengthening/mobility   - Keep Call bell within reach    3/7/2022 1023 by Marena Libman, RN  Outcome: Progressing  3/7/2022 1022 by Marena Libman, RN  Outcome: Progressing     Problem: Knowledge Deficit  Goal: Patient/family/caregiver demonstrates understanding of disease process, treatment plan, medications, and discharge instructions  Description: Complete learning assessment and assess knowledge base    Interventions:  - Provide teaching at level of understanding  - Provide teaching via preferred learning methods  3/7/2022 1023 by Marena Libman, RN  Outcome: Progressing  3/7/2022 1022 by Marena Libman, RN  Outcome: Progressing

## 2022-03-11 ENCOUNTER — OFFICE VISIT (OUTPATIENT)
Dept: HEMATOLOGY ONCOLOGY | Facility: CLINIC | Age: 72
End: 2022-03-11
Payer: MEDICARE

## 2022-03-11 VITALS
OXYGEN SATURATION: 96 % | DIASTOLIC BLOOD PRESSURE: 70 MMHG | RESPIRATION RATE: 16 BRPM | HEART RATE: 99 BPM | WEIGHT: 163.2 LBS | SYSTOLIC BLOOD PRESSURE: 114 MMHG | TEMPERATURE: 98.2 F | BODY MASS INDEX: 23.37 KG/M2 | HEIGHT: 70 IN

## 2022-03-11 DIAGNOSIS — C64.2 RENAL CELL CARCINOMA OF LEFT KIDNEY (HCC): ICD-10-CM

## 2022-03-11 DIAGNOSIS — C34.12 PRIMARY MALIGNANT NEOPLASM OF BRONCHUS OF LEFT UPPER LOBE (HCC): Primary | ICD-10-CM

## 2022-03-11 DIAGNOSIS — C79.31 SECONDARY MALIGNANT NEOPLASM OF BRAIN AND SPINAL CORD (HCC): ICD-10-CM

## 2022-03-11 DIAGNOSIS — C79.49 SECONDARY MALIGNANT NEOPLASM OF BRAIN AND SPINAL CORD (HCC): ICD-10-CM

## 2022-03-11 PROCEDURE — 99214 OFFICE O/P EST MOD 30 MIN: CPT | Performed by: NURSE PRACTITIONER

## 2022-03-11 NOTE — PROGRESS NOTES
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 [facility])  Patient with out any hint of recurrence of his metastatic lung cancer based off of today's examination and his recent imaging studies  We will continue to monitor him closely for his history of his 2 primary malignancies according to the NCCN guidelines  Request he follow-up again with repeat labs/imaging in 6 months  Patient states today that he is considering having his Port-A-Cath removed but would like more time to think about it  Will revisit subject at his next office visit in the meantime we will continue to maintain it flushing it every 6-8 weeks  We did briefly did discuss smoking cessation and some helpful smoking cessation tips  He is not ready at this time  - CBC and differential; Future  - Comprehensive metabolic panel; Future  - CT chest w contrast; Future  - CT renal protocol; Future    2  Renal cell carcinoma of left kidney [facility])  Patient is without evidence of recurrence of his renal cell carcinoma which was treated with cryoablation 2016  He was however found to have obstructing kidney stone with moderate hydronephrosis on the left side on his recent imaging  Will reach out to his Urology team to make them aware  He is not currently symptomatic and renal function was normal according to his labs from Monday     - CT chest w contrast; Future  - CT renal protocol; Future    3  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[facility])  Status post resection/radiation to the right frontoparietal brain metastasis 2015  He denies any neurological symptoms at this time  His most recent MRI of the brain September 2021 did not show any evidence of recurrence  Most likely cured at this point  Will pursue further brain imaging only on an as-needed basis      HPI:  Patient presents today for a follow-up visit  He is doing fairly well and has no new complaints  Continues to smoke on a daily basis  Is just seen by his urologist recently 03/02/2022 and has cystoscopy  Denies any pain or new symptoms  His most recent laboratory studies from 03/07/2022 showed glucose 145 nonfasting otherwise normal CBC and CMP  He had repeat CT scan of the chest abdomen and pelvis with contrast 03/01/2022 which showed:  IMPRESSION:  CHEST:  1  Unchanged left upper lobe scar without findings of recurrence  2   Unchanged 5 mm and smaller pulmonary nodules  No new nodules      ABDOMEN/PELVIS:  1  Moderate left hydroureteronephrosis due to obstructing calculus in the distal ureter measuring 8 x 6 mm  Similar large bladder calculi  2   No change in cryoablated left renal lesion  3   No findings of metastatic disease in the abdomen or pelvis  Oncology History Overview Note   Patient has a history of tobacco use for more than 40 years and history of 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 which was diagnosed in May of 2015  At that time the patient was found to have multiple brain lesions status post whole brain radiation  He then received 6 cycles of palliative chemotherapy with Alimta and carboplatin  After 5 cycles of treatment he had significant GI bleeding which required ICU hospitalization  After completion of palliative chemotherapy he was maintained on Alimta since October 2015  A brain MRI January 2016 showed multiple bilateral hemorrhagic lesions felt not to be due to malignant process  PET scan January 2016 showed a 1 6 cm metabolically active left lung apical lesion  The patient received radiation treatment palliatively under the care of Dr Dolores Kurtz to the left apical lung lesion and completed it in April of 2016  He was then restarted on maintenance Alimta      He had an MRI of the abdomen on June 23, 2016 which showed a 3 cm cystic mass with a solid component on the posterior medial aspect of the left kidney, compatible with malignant process  A core biopsy was taking from the left kidney July 12, 2016 which was 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  The patient was then treated with cryotherapy of the left kidney lesion in September of 2016  Patient had a PET-CT scan on March 3, 2017 which was compared to the prior PET scan from November 2016  At this time there is no evidence of residual or recurrent neoplastic disease anywhere in his body  Patient had a CT scan of the chest abdomen and pelvis on August 24, 2017 which showed a 1 3 cm left upper lobe mass versus scarring with adjacent radiation fibrosis  Patient had another MRI of the brain January 8, 2018 which was compared to the previous MRI of the brain in January 2016  This revealed multiple areas of artifact in the supra and infratentorial spaces consistent with hemorrhagic metastasis  The largest lesion was in the frontal lobe and is significantly smaller with only a tiny enhancing component remaining  No new intra-axial lesions were reported  Patient had another CT scan of the chest abdomen and pelvis for close monitoring on June 5th 2018 that was compared with prior study from January 2018  He continues to have a 1 3 cm left lung apex pulmonary nodule with surrounding fibrosis which is stable in appearance  He also has a stable appearance of the treated medial left renal cortex lesion  There is no evidence of metastatic disease within the chest abdomen or pelvis  After lengthy discussion June 2018 patient stated that he is not interested in continuing his maintenance Alimta treatment beyond today's treatment since his CT scan showed stable disease for a long time he has been on Alimta for 2 years  He was told that he seems to be in complete remission but there is a chance that the cancer may reoccur    Patient was interested in the watch and wait type of approach instead of continuing active maintenance Alimta treatment  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 ([facility] )   5/2015 Initial Diagnosis    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)  Stage IV     5/5/2015 - 5/20/2015 Radiation    3150 cGy to large right frontoparietal mass; 3000 cGy to other tumor volumes in brainstem, and bilateral cerebral hemispheres  Radiation oncologist:  Dr Faisal White     3/17/2016 - 4/12/2016 Radiation    Left upper lobe lesion, 4675 cGy  Radiation oncologist: Dr Faisal White      Chemotherapy    1  Alimta and carboplatin:   05/20/2015 through 08/12/2015 (5 cycles total)  2  Maintenance Alimta:   10/21/2015 through 06/12/2018 (43 total Treatment doses)     Renal cell carcinoma of left kidney ([facility] )   7/2016 Initial Diagnosis    Renal cell carcinoma of left kidney [facility])  S/p cryoablation of the left kidney lesion in September of 2016 @ [facility]-Dr Myriam Mehta           Interval history:    ROS: Review of Systems   Constitutional: Positive for fatigue  Negative for activity change, appetite change, chills, fever and unexpected weight change  HENT: Negative for congestion, mouth sores, nosebleeds, sore throat and trouble swallowing  Eyes: Negative  Respiratory: Positive for cough (mild)  Negative for chest tightness and shortness of breath  Cardiovascular: Negative for chest pain, palpitations and leg swelling  Gastrointestinal: Negative for abdominal distention, abdominal pain, blood in stool, constipation, diarrhea, nausea and vomiting  Genitourinary: Negative for difficulty urinating, dysuria, frequency, hematuria and urgency  Musculoskeletal: Positive for arthralgias (1/10) and myalgias (1/10)  Negative for back pain, gait problem and joint swelling  Skin: Positive for rash  Negative for color change and pallor  Neurological: Negative for dizziness, weakness, light-headedness, numbness and headaches     Hematological: Negative for adenopathy  Bruises/bleeds easily (easy brusing)  Psychiatric/Behavioral: Negative for dysphoric mood and sleep disturbance  The patient is not nervous/anxious          Past Medical History:   Diagnosis Date    Arthritis     Balance problems     and" coordination issues/best to walk slow"    Bladder stones     Cancer (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 ) 04/27/2015    left weakness,"due to tumor on brain"    History of brain tumor     "had radiation for it"    History of cancer chemotherapy     last tx  5/22/18    History of GI bleed 2015    History of kidney cancer     left    History of kidney stones     History of radiation therapy     History of transfusion     5 units of blood 2015    Hydrocele 11/30/2010    Inguinal hernia, bilateral     Joint stiffness of multiple sites     Kidney stone     Paresthesia 05/01/2015    Portacath in place     right chest    Primary malignant neoplasm of lung ([facility] ) 05/26/2015    Renal carcinoma, left ([facility] ) 10/17/2016    Risk for falls     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([place] [address] ) 05/26/2015    Skin cancer     Syncopal episodes 09/01/2015    2    Wears glasses        Past Surgical History:   Procedure Laterality Date    BLADDER STONE REMOVAL      BLADDER STONE REMOVAL N/A 2/9/2021    Procedure: Steven Watson;  Surgeon: Laura Byrnes MD;  [facility];  Service: Urology    COLONOSCOPY     [facility] DENT[facility] SURGERY  2011    DENTAL EXTACTIONS     ESOPHAGOGASTRODUODENOSCOPY      HERNIA REPAIR      HYDROCELE EXCISION / REPAIR Left 01/01/2011    KIDNEY STONE SURGERY      KIDNEY SURGERY      cancer of kidney/cryo of cancer    PORTACATH PLACEMENT      NV COLONOSCOPY FLX DX W/COLLJ SPEC WHEN PFRMD N/A 8/7/2018    Procedure: COLONOSCOPY with polypectomy ;  Surgeon: Mary Ellen Stewart MD;  [facility];   Service: General    SD CYSTOURETHROSCOPY,FULGUR 2-5 CM LESN N/A 2/9/2021    Procedure: [place], T U R B T ;  Surgeon: Guille Martinez MD;  [facility];  Service: Urology    SD LAP, RECURRENT INCISIONAL HERNIA REPAIR,INCARCERATED Bilateral 8/8/2018    Procedure: REPAIR HERNIA INGUINAL LAPAROSCOPIC W/ ROBOTICS W/ MESH;  Surgeon: Mary Ellen Stewart MD;  [facility];  Service: General    SD REMOVE BLADDER STONE,<2 5 CM N/A 11/27/2018    Procedure: Yoana Seen HOLMIUM LASER,BLADDE BIOPSY;  Surgeon: Guille Martinez MD;  [facility];  Service: Urology    SKIN BIOPSY      SKIN CANCER EXCISION      17 surgeries, basal cell    TONSILLECTOMY         Social History     Socioeconomic History    Marital status:      Spouse name: None    Number of children: None    Years of education: None    Highest education level: None   Occupational History    None   Tobacco Use    Smoking status: Current Every Day Smoker     Packs/day: 0 50     Years: 48 00     Pack years: 24 00     Types: Cigarettes    Smokeless tobacco: Current User    Tobacco comment: last cigarette 2/8   Vaping Use    Vaping Use: Never used   Substance and Sexual Activity    Alcohol use: Yes     Comment: "3x per year"    Drug use: No    Sexual activity: None   Other Topics Concern    None   Social History Narrative    None     Social Determinants of Health     Financial Resource Strain: Not on file   Food Insecurity: Not on file   Transportation Needs: Not on file   Physical Activity: Not on file   Stress: Not on file   Social Connections: Not on file   Intimate Partner Violence: Not on file   Housing Stability: Not on file       Family History   Problem Relation Age of Onset    Cancer Mother         EYE    Cancer Father     Colon cancer Father     Cancer Brother No Known Allergies      Current Outpatient Medications:     Ascorbic Acid (VITAMIN C) 500 MG CAPS, Take 500 mg by mouth daily  , Disp: , Rfl:     Multiple Vitamins-Minerals (MULTIVITAMIN ADULT PO), Take 1 tablet by mouth daily  , Disp: , Rfl:       Physical Exam:  /70 (BP Location: Left arm, Patient Position: Sitting, Cuff Size: Adult)   Pulse 99   Temp 98 2 °F (36 8 °C) (Probe)   Resp 16   Ht 5' 10" (1 778 m)   Wt 74 kg (163 lb 3 2 oz)   SpO2 96%   BMI 23 42 kg/m²     Physical Exam  Vitals reviewed  Constitutional:       General: He is not in acute distress  Appearance: He is well-developed  He is not diaphoretic  HENT:      Head: Normocephalic and atraumatic  Eyes:      General: Lids are normal  No scleral icterus  Conjunctiva/sclera: Conjunctivae normal       Pupils: Pupils are equal, round, and reactive to light  Neck:      Thyroid: No thyromegaly  Cardiovascular:      Rate and Rhythm: Normal rate and regular rhythm  Heart sounds: Normal heart sounds  No murmur heard  Pulmonary:      Effort: Pulmonary effort is normal  No respiratory distress  Breath sounds: Normal breath sounds  Chest:   Breasts:      Right: No axillary adenopathy  Left: No axillary adenopathy  Abdominal:      General: There is no distension  Palpations: Abdomen is soft  There is no hepatomegaly or splenomegaly  Tenderness: There is no abdominal tenderness  Musculoskeletal:         General: No swelling  Normal range of motion  Cervical back: Normal range of motion and neck supple  Lymphadenopathy:      Cervical: No cervical adenopathy  Upper Body:      Right upper body: No axillary adenopathy  Left upper body: No axillary adenopathy  Skin:     General: Skin is warm and dry  Findings: No erythema or rash  Neurological:      General: No focal deficit present  Mental Status: He is alert and oriented to person, place, and time  Psychiatric:         Mood and Affect: Mood is depressed  Affect is blunt  Behavior: Behavior normal  Behavior is cooperative  Thought Content: Thought content normal          Judgment: Judgment normal            Labs:  Lab Results   Component Value Date    WBC 10 40 03/07/2022    HGB 14 7 03/07/2022    HCT 43 2 03/07/2022    MCV 92 03/07/2022     03/07/2022     Lab Results   Component Value Date     06/11/2018    K 3 8 03/07/2022     03/07/2022    CO2 24 03/07/2022    ANIONGAP 11 06/11/2018    BUN 13 03/07/2022    CREATININE 0 83 03/07/2022    GLUCOSE 163 (H) 06/11/2018    GLUF 82 12/12/2019    CALCIUM 8 4 03/07/2022    AST 23 03/07/2022    ALT 24 03/07/2022    ALKPHOS 98 03/07/2022    PROT 6 8 06/11/2018    BILITOT 0 9 06/11/2018    EGFR 89 03/07/2022       Patient voiced understanding and agreement in the above discussion  Aware to contact our office with questions/symptoms in the interim  This note has been generated by voice recognition software system  Therefore, there may be spelling, grammar, and or syntax errors  Please contact if questions arise

## 2022-03-21 ENCOUNTER — TELEPHONE (OUTPATIENT)
Dept: OTHER | Facility: OTHER | Age: 72
End: 2022-03-21

## 2022-03-21 NOTE — TELEPHONE ENCOUNTER
Patient called and said that Dr Sigrid Geronimo had been trying to reach him regarding imaging results  Patient was unavailable at the time he called but should be free today   The number listed was confirmed as the best call back number

## 2022-03-22 NOTE — TELEPHONE ENCOUNTER
Call placed to patient and spoke with him  Informed him of the recommendations of the CRNP at this time  Pt is scheduled for office visit tomorrow to discuss next steps and possible surgery  Pt confirmed appointment at 8:15am with AP in [facility] office

## 2022-03-22 NOTE — TELEPHONE ENCOUNTER
Looks sick patient has a large ureteral stone    He will need to be brought into the office for H&P for stone procedure discussion

## 2022-03-23 ENCOUNTER — OFFICE VISIT (OUTPATIENT)
Dept: UROLOGY | Facility: MEDICAL CENTER | Age: 72
End: 2022-03-23
Payer: MEDICARE

## 2022-03-23 VITALS
WEIGHT: 163 LBS | BODY MASS INDEX: 23.34 KG/M2 | HEIGHT: 70 IN | SYSTOLIC BLOOD PRESSURE: 110 MMHG | DIASTOLIC BLOOD PRESSURE: 70 MMHG | HEART RATE: 101 BPM

## 2022-03-23 DIAGNOSIS — N40.1 BPH WITH URINARY OBSTRUCTION: ICD-10-CM

## 2022-03-23 DIAGNOSIS — Z85.51 HISTORY OF BLADDER CANCER: ICD-10-CM

## 2022-03-23 DIAGNOSIS — N20.1 URETEROLITHIASIS: ICD-10-CM

## 2022-03-23 DIAGNOSIS — N13.8 BPH WITH URINARY OBSTRUCTION: ICD-10-CM

## 2022-03-23 DIAGNOSIS — N21.0 BLADDER CALCULUS: ICD-10-CM

## 2022-03-23 DIAGNOSIS — N20.0 KIDNEY STONES: Primary | ICD-10-CM

## 2022-03-23 PROCEDURE — 99214 OFFICE O/P EST MOD 30 MIN: CPT | Performed by: PHYSICIAN ASSISTANT

## 2022-03-23 NOTE — H&P
Pre-op visit  3/23/2022      Chief Complaint   Patient presents with    Nephrolithiasis         Assessment and Plan     81 y o  male managed by Dr Nataliia Olvera    1  Ureterolithiasis   · Currently asymptomatic  He is suspicious he may have passed his stone  · Repeat CT stone study ordered for evaluation  · Consent signed today as precaution  · Unable to provide urine sample today  · Will place case request once CT results are back  · Patient agreeable to plan above  History and physical was performed for the patients upcoming possible cystoscopy, ureteroscopy with lithotripsy holmium laser, retrograde pyelogram and left ureteral stent insertion yet to be scheduled  All questions and concerns regarding surgery and perioperative expectations have been addressed and answered  No overall changes in their health since last visit, denies any prior complications with anesthesia  Will proceed with surgery as planned  History of Present Illness  Tevin Akins is a 81 y o  male here for history and physical prior to their possible upcoming cystoscopy, ureteroscopy with lithotripsy holmium laser, retrograde pyelogram and left ureteral stent insertion  Patient does have 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  He has been treated with chemotherapy and radiation therapy  He does not have thrombocytopenia but he does report bruising easily  He does have a previous history of nephrolithiasis and bladder stones  He has passed his kidney stones on his own but did require a cystolitholapaxy procedure for his bladder stone  Please see ROS below for full documentation of patient's symptoms today  Review of Systems   Constitutional: Negative for chills and fever  HENT: Positive for congestion (Seasonal allergies)  Negative for sore throat  Eyes: Negative for pain and visual disturbance  Respiratory: Positive for cough (chronic)  Negative for shortness of breath           No hx of COPD or asthma   Cardiovascular: Negative for chest pain and leg swelling  Gastrointestinal: Positive for diarrhea (occasional)  Negative for abdominal pain, constipation, nausea and vomiting  Genitourinary: Positive for difficulty urinating (hesitancy x few months), frequency (has increased his fluids) and urgency (at night, uses bedside urinal to prevent issues with incontinence)  Negative for dysuria, flank pain and hematuria  Occasionally has nocturia 1x per night  Musculoskeletal: Positive for back pain (chronic lumbar pain from injury many years ago)  Negative for neck pain  Skin: Negative  Neurological: Negative for dizziness and light-headedness  Hematological: Bruises/bleeds easily (but not on any blood thinners)  Vitals  Vitals:    03/23/22 0806   BP: 110/70   Pulse: 101   Weight: 73 9 kg (163 lb)   Height: 5' 10" (1 778 m)     Physical Exam:   General: Well appearing, no distress, appears stated age  HEENT:  Normocephalic, atraumatic  Conjunctiva clear  Cardiovascular: Heart regular rate and rhythm  No extremity edema  Respiratory: Lungs are clear to auscultation bilaterally without wheeze or rhonchi, respirations are non-labored and symmetrical   Abdomen:  Soft nontender without tympany without hernia  No suprapubic or CVA tenderness  Musculoskeletal:  FROM x 4 extremities with good equal strength  Neuro: No gross neurologic defect or abnormality  Steady unassisted gait   Speech and affect normal   Dermatologic: skin warm, dry; no rash erythema or ecchymosis    Past Medical History  Past Medical History:   Diagnosis Date    Arthritis     Balance problems     and" coordination issues/best to walk slow"    Bladder stones     Cancer ([facility] [address] )     lung, brain, kidney, spinal    Colon polyp     Dysphagia 02/16/2017    "not right now but in the past"    Environmental and seasonal allergies     Exercise involving walking     "approx 1 mile 3x/week"    Eye injury     right,  "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"    Full dentures     Hemiparesis ([facility] [address] ) 04/27/2015    left weakness,"due to tumor on brain"    History of brain tumor     "had radiation for it"    History of cancer chemotherapy     last tx  5/22/18    History of GI bleed 2015    History of kidney cancer     left    History of kidney stones     History of radiation therapy     History of transfusion     5 units of blood 2015    Hydrocele 11/30/2010    Inguinal hernia, bilateral     Joint stiffness of multiple sites     Kidney stone     Paresthesia 05/01/2015    Portacath in place     right chest    Primary malignant neoplasm of lung ([facility] [address] ) 05/26/2015    Renal carcinoma, left ([facility] [address] ) 10/17/2016    Risk for falls     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([facility] [address] ) 05/26/2015    Skin cancer     Syncopal episodes 09/01/2015    2    Wears glasses        Past Social History  Past Surgical History:   Procedure Laterality Date    BLADDER STONE REMOVAL      BLADDER STONE REMOVAL N/A 2/9/2021    Procedure: Slade Lewis;  Surgeon: Christina Gentile MD;  [facility];  Service: Urology    COLONOSCOPY     [facility] DENTAL SURGERY  2011    DENTAL EXTACTIONS     ESOPHAGOGASTRODUODENOSCOPY      HERNIA REPAIR      HYDROCELE EXCISION / REPAIR Left 01/01/2011    KIDNEY STONE SURGERY      KIDNEY SURGERY      cancer of kidney/cryo of cancer    PORTACATH PLACEMENT      AK COLONOSCOPY FLX DX W/COLLJ SPEC WHEN PFRMD N/A 8/7/2018    Procedure: COLONOSCOPY with polypectomy ;  Surgeon: Anai Rasmussen MD;  [facility];   Service: General    AK CYSTOURETHROSCOPY,FULGUR 2-5 CM LESN N/A 2/9/2021    Procedure: NIEVES Holguin U R B T ;  Surgeon: Christina Gentile MD;  [facility];  Service: Urology    AK LAP, RECURRENT INCISIONAL HERNIA REPAIR,INCARCERATED Bilateral 8/8/2018    Procedure: REPAIR HERNIA INGUINAL LAPAROSCOPIC W/ ROBOTICS W/ MESH;  Surgeon: Anai Rasmussen MD;  [facility];  Service: General    AK REMOVE BLADDER STONE,<2 5 CM N/A 11/27/2018    Procedure: Susanne Sánchze LASER,AINSLEYE BIOPSY;  Surgeon: Mickie Brannon MD;  [facility];  Service: Urology    SKIN BIOPSY      SKIN CANCER EXCISION      17 surgeries, basal cell    TONSILLECTOMY         Past Family History  Family History   Problem Relation Age of Onset    Cancer Mother         EYE    Cancer Father     Colon cancer Father     Cancer Brother        Past Social history  Social History     Socioeconomic History    Marital status:      Spouse name: Not on file    Number of children: Not on file    Years of education: Not on file    Highest education level: Not on file   Occupational History    Not on file   Tobacco Use    Smoking status: Current Every Day Smoker     Packs/day: 0 50     Years: 48 00     Pack years: 24 00     Types: Cigarettes    Smokeless tobacco: Current User    Tobacco comment: last cigarette 2/8   Vaping Use    Vaping Use: Never used   Substance and Sexual Activity    Alcohol use: Yes     Comment: "3x per year"    Drug use: No    Sexual activity: Not on file   Other Topics Concern    Not on file   Social History Narrative    Not on file     Social Determinants of Health     Financial Resource Strain: Not on file   Food Insecurity: Not on file   Transportation Needs: Not on file   Physical Activity: Not on file   Stress: Not on file   Social Connections: Not on file   Intimate Partner Violence: Not on file   Housing Stability: Not on file       Current Medications  Current Outpatient Medications   Medication Sig Dispense Refill    Ascorbic Acid (VITAMIN C) 500 MG CAPS Take 500 mg by mouth daily        Multiple Vitamins-Minerals (MULTIVITAMIN ADULT PO) Take 1 tablet by mouth daily         No current facility-administered medications for this visit         Allergies  No Known Allergies      Past Medical History, Social History, Family History, medications and allergies were reviewed      Vibha Worthy PA-C

## 2022-03-25 ENCOUNTER — HOSPITAL ENCOUNTER (OUTPATIENT)
Dept: CT IMAGING | Facility: HOSPITAL | Age: 72
Discharge: HOME | End: 2022-03-25
Payer: MEDICARE

## 2022-03-25 DIAGNOSIS — N20.1 URETEROLITHIASIS: ICD-10-CM

## 2022-03-25 PROCEDURE — G1004 CDSM NDSC: HCPCS

## 2022-03-25 PROCEDURE — 74176 CT ABD & PELVIS W/O CONTRAST: CPT

## 2022-04-01 ENCOUNTER — TREATMENT (OUTPATIENT)
Dept: UROLOGY | Facility: MEDICAL CENTER | Age: 72
End: 2022-04-01

## 2022-04-01 DIAGNOSIS — N20.1 URETEROLITHIASIS: Primary | ICD-10-CM

## 2022-04-01 RX ORDER — CEFAZOLIN SODIUM 1 G/50ML
1000 SOLUTION INTRAVENOUS ONCE
Status: CANCELLED | OUTPATIENT
Start: 2022-04-27 | End: 2022-04-01

## 2022-04-04 ENCOUNTER — TELEPHONE (OUTPATIENT)
Dept: OTHER | Facility: OTHER | Age: 72
End: 2022-04-04

## 2022-04-04 NOTE — TELEPHONE ENCOUNTER
Patient returning a phone call he missed regarding his CT scan results and about surgery scheduling   Please call him back

## 2022-04-05 ENCOUNTER — TELEPHONE (OUTPATIENT)
Dept: UROLOGY | Facility: HOSPITAL | Age: 72
End: 2022-04-05

## 2022-04-05 ENCOUNTER — TELEPHONE (OUTPATIENT)
Dept: UROLOGY | Facility: MEDICAL CENTER | Age: 72
End: 2022-04-05

## 2022-04-05 ENCOUNTER — PREP FOR PROCEDURE (OUTPATIENT)
Dept: UROLOGY | Facility: CLINIC | Age: 72
End: 2022-04-05

## 2022-04-05 DIAGNOSIS — N21.0 BLADDER STONE: Primary | ICD-10-CM

## 2022-04-05 NOTE — TELEPHONE ENCOUNTER
Linda Recinos MD  You 16 hours ago (4:24 PM)         I have called several times, always goes to [facility] called again today    Message text

## 2022-04-05 NOTE — TELEPHONE ENCOUNTER
----- Message from Bay Tovar MD sent at 4/5/2022  9:29 AM EDT -----   I spoke to one the phone and described the procedure  Scheduling is working on giving him a date around late April  Please call him and tell him to get a urine culture next how which he can do at [facility], he lives down there

## 2022-04-05 NOTE — TELEPHONE ENCOUNTER
I spoke to patient regarding his stones  Large left distal ureteral stone causing moderate obstruction but minimal pain  Also bladder stone seen as well    We recommend cysto, laser bladder stone, left ureteroscopy, laser stone and stent placement  He has chronic bacteriuria so we will treat with antibiotics preop  I will go over the details of the procedure again and do the H and P the day of his surgery    He gives informed consent

## 2022-04-05 NOTE — TELEPHONE ENCOUNTER
Call placed to patient  He did not answer  LMOM advising patient of the recommendations of Dr Reza Martins at this time  Informed patient that urine testing orders have been ordered and he needs to have this collected by next week  Office number was provided for the patient to call back with any questions or concerns

## 2022-04-05 NOTE — PROGRESS NOTES
Spoke with patient and he is scheduled for 4/27 at [address] with BM  He is aware the hospital will call the day prior with time of arrival, nothing to eat or drink after midnight, he will need a , and to hold blood thinning medications 7 days prior  He will have urine culture and ekg done this week       Emailed surgical pkt per patient request

## 2022-04-08 ENCOUNTER — APPOINTMENT (OUTPATIENT)
Dept: LAB | Facility: HOSPITAL | Age: 72
End: 2022-04-08
Attending: UROLOGY
Payer: MEDICARE

## 2022-04-08 DIAGNOSIS — N21.0 BLADDER STONE: ICD-10-CM

## 2022-04-08 PROCEDURE — 87086 URINE CULTURE/COLONY COUNT: CPT

## 2022-04-08 PROCEDURE — 87186 SC STD MICRODIL/AGAR DIL: CPT

## 2022-04-10 LAB — BACTERIA UR CULT: ABNORMAL

## 2022-04-19 ENCOUNTER — TELEPHONE (OUTPATIENT)
Dept: OTHER | Facility: OTHER | Age: 72
End: 2022-04-19

## 2022-04-19 NOTE — TELEPHONE ENCOUNTER
Patient called concerned that he hadn't heard about the time of his surgery on 4/27  I told him they usually don't call until the day before

## 2022-04-20 DIAGNOSIS — N30.00 ACUTE CYSTITIS WITHOUT HEMATURIA: Primary | ICD-10-CM

## 2022-04-20 RX ORDER — NITROFURANTOIN 25; 75 MG/1; MG/1
100 CAPSULE ORAL 2 TIMES DAILY
Qty: 32 CAPSULE | Refills: 0 | Status: SHIPPED | OUTPATIENT
Start: 2022-04-20

## 2022-04-21 ENCOUNTER — OFFICE VISIT (OUTPATIENT)
Dept: LAB | Facility: HOSPITAL | Age: 72
End: 2022-04-21
Attending: UROLOGY
Payer: MEDICARE

## 2022-04-21 ENCOUNTER — TELEPHONE (OUTPATIENT)
Dept: UROLOGY | Facility: MEDICAL CENTER | Age: 72
End: 2022-04-21

## 2022-04-21 DIAGNOSIS — N21.0 BLADDER STONE: ICD-10-CM

## 2022-04-21 PROCEDURE — 93005 ELECTROCARDIOGRAM TRACING: CPT

## 2022-04-21 NOTE — TELEPHONE ENCOUNTER
Left detailed message regarding how to get the EKG done, I let him know he needs to go to a [facility]'s lab and tell them he is there for an EKG they will be able to see it in his chart and will complete it  I let him know if he has any further questions to please give the office a call

## 2022-04-21 NOTE — TELEPHONE ENCOUNTER
----- Message from Mikki Rivas MD sent at 4/20/2022  4:31 PM EDT -----   Tell him I sent antibiotic to pharmacy, twice per day starting tomorrow through time of surgery in two weeks

## 2022-04-22 LAB
ATRIAL RATE: 86 BPM
P AXIS: 60 DEGREES
PR INTERVAL: 136 MS
QRS AXIS: 69 DEGREES
QRSD INTERVAL: 102 MS
QT INTERVAL: 362 MS
QTC INTERVAL: 433 MS
T WAVE AXIS: 74 DEGREES
VENTRICULAR RATE: 86 BPM

## 2022-04-22 PROCEDURE — 93010 ELECTROCARDIOGRAM REPORT: CPT | Performed by: INTERNAL MEDICINE

## 2022-04-22 NOTE — PRE-PROCEDURE INSTRUCTIONS
Pre-Surgery Instructions:   Medication Instructions    Ascorbic Acid (VITAMIN C) 500 MG CAPS pt instructed to stop prior to surgery     Multiple Vitamins-Minerals (MULTIVITAMIN ADULT PO) pt instructed to stop prior to surgery     nitrofurantoin (MACROBID) 100 mg capsule Hold day of surgery  Pt denies fever, sob, sore throat and cough  Pt verbalized understanding of shower and med instructions  Pt instructed to stop nsaids and supplements prior to surgery

## 2022-04-25 ENCOUNTER — ANESTHESIA EVENT (OUTPATIENT)
Dept: PERIOP | Facility: HOSPITAL | Age: 72
End: 2022-04-25
Payer: MEDICARE

## 2022-04-26 PROCEDURE — NC001 PR NO CHARGE: Performed by: UROLOGY

## 2022-04-26 NOTE — H&P
HISTORY AND PHYSICAL  ? ? Patient Name: Patience Eaton  Patient MRN: [id number]  Attending Provider: Lexis Will MD  Service: Urology  Chief Complaint    Left distal ureteral calculus, and bladder calculus    HPI   Patience Eaotn is a 81 y o  male with above on CT  Minimal symptoms  I plan cysto, laser bladder stone, left ureteroscopy, laser ureteral stone and stent  Potential risks and complications discussed, and informed consent was given by the patient  Medications  Meds/Allergies   No current facility-administered medications for this encounter  Cannot display prior to admission medications because the patient has not been admitted in this contact  No current facility-administered medications for this encounter      Current Outpatient Medications:     Ascorbic Acid (VITAMIN C) 500 MG CAPS, Take 500 mg by mouth daily  , Disp: , Rfl:     Multiple Vitamins-Minerals (MULTIVITAMIN ADULT PO), Take 1 tablet by mouth daily  , Disp: , Rfl:     nitrofurantoin (MACROBID) 100 mg capsule, Take 1 capsule (100 mg total) by mouth 2 (two) times a day, Disp: 32 capsule, Rfl: 0  Review of Systems  10 point review of systems negative except as noted in HPI  Allergies  No Known Allergies  PMH  Past Medical History:   Diagnosis Date    Arthritis     Balance problems     and" coordination issues/best to walk slow"    Bladder stones     Cancer ([facility] [address] )     lung, brain, kidney, spinal    Colon polyp     Dysphagia 02/16/2017    "not right now but in the past"    Environmental and seasonal allergies     Exercise involving walking     "approx 1 mile 3x/week"    Eye injury     right,  "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"    Full dentures     Hemiparesis ([facility] [address] ) 04/27/2015    left weakness,"due to tumor on brain"    History of brain tumor     "had radiation for it"    History of cancer chemotherapy     last tx  5/22/18    History of GI bleed 2015    History of kidney cancer     left    History of kidney stones     History of radiation therapy     History of transfusion     5 units of blood 2015    Hydrocele 11/30/2010    Inguinal hernia, bilateral     Joint stiffness of multiple sites     Kidney stone     Paresthesia 05/01/2015    Portacath in place     right chest    Primary malignant neoplasm of lung [facility] [address] ) 05/26/2015    Renal carcinoma, left ([facility] [address] ) 10/17/2016    Risk for falls     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 05/26/2015    Skin cancer     Syncopal episodes 09/01/2015    2    Wears glasses      Past surgical history  Past Surgical History:   Procedure Laterality Date    BLADDER STONE REMOVAL      BLADDER STONE REMOVAL N/A 2/9/2021    Procedure: Mu Race;  Surgeon: Nasim Gallagher MD;  [facility];  Service: Urology    COLONOSCOPY     [facility]  2011    DENTAL EXTACTIONS     ESOPHAGOGASTRODUODENOSCOPY      HERNIA REPAIR      HYDROCELE EXCISION / REPAIR Left 01/01/2011    KIDNEY STONE SURGERY      KIDNEY SURGERY      cancer of kidney/cryo of cancer    PORTACATH PLACEMENT      NC COLONOSCOPY FLX DX W/COLLJ SPEC WHEN PFRMD N/A 8/7/2018    Procedure: COLONOSCOPY with polypectomy ;  Surgeon: Prateek Romo MD;  [facility];   Service: General    NC CYSTOURETHROSCOPY,FULGUR 2-5 CM LESN N/A 2/9/2021    Procedure: NIEVES Huff ;  Surgeon: Nasim Gallagher MD;  [facility];  Service: Urology    NC LAP, RECURRENT INCISIONAL HERNIA REPAIR,INCARCERATED Bilateral 8/8/2018    Procedure: REPAIR HERNIA INGUINAL LAPAROSCOPIC W/ ROBOTICS W/ MESH;  Surgeon: Prateek Romo MD;  [facility];  Service: General    NC REMOVE BLADDER STONE,<2 5 CM N/A 11/27/2018    Procedure: Keanu Mckenna LASER,BLADDE BIOPSY;  Surgeon: Nasim Gallagher MD;  [facility];  Service: Urology    SKIN BIOPSY      SKIN CANCER EXCISION      17 surgeries, basal cell    TONSILLECTOMY       Social history  Social History     Tobacco Use  Smoking status: Current Every Day Smoker     Packs/day: 0 50     Years: 48 00     Pack years: 24 00     Types: Cigarettes    Smokeless tobacco: Never Used   Vaping Use    Vaping Use: Never used   Substance Use Topics    Alcohol use: Not Currently    Drug use: No     ?  Physical Exam     vs  General appearance: alert and oriented, in no acute distress  Head: Normocephalic, without obvious abnormality, atraumatic  Throat: lips, mucosa, and tongue normal; teeth and gums normal  Neck: no adenopathy, no carotid bruit, no JVD, supple, symmetrical, trachea midline and thyroid not enlarged, symmetric, no tenderness/mass/nodules  Lungs: clear to auscultation bilaterally  Heart: regular rate and rhythm, S1, S2 normal, no murmur, click, rub or gallop  Abdomen: soft, non-tender; bowel sounds normal; no masses,  no organomegaly  Extremities: extremities normal, warm and well-perfused; no cyanosis, clubbing, or edema  Devin MD Marino

## 2022-04-27 ENCOUNTER — ANESTHESIA (OUTPATIENT)
Dept: PERIOP | Facility: HOSPITAL | Age: 72
End: 2022-04-27
Payer: MEDICARE

## 2022-04-27 ENCOUNTER — APPOINTMENT (OUTPATIENT)
Dept: RADIOLOGY | Facility: HOSPITAL | Age: 72
End: 2022-04-27
Payer: MEDICARE

## 2022-04-27 ENCOUNTER — HOSPITAL ENCOUNTER (OUTPATIENT)
Facility: HOSPITAL | Age: 72
Setting detail: OUTPATIENT SURGERY
Discharge: HOME | End: 2022-04-27
Attending: UROLOGY | Admitting: UROLOGY
Payer: MEDICARE

## 2022-04-27 ENCOUNTER — TELEPHONE (OUTPATIENT)
Dept: UROLOGY | Facility: HOSPITAL | Age: 72
End: 2022-04-27

## 2022-04-27 VITALS
SYSTOLIC BLOOD PRESSURE: 130 MMHG | TEMPERATURE: 97.5 F | BODY MASS INDEX: 23.51 KG/M2 | WEIGHT: 164.24 LBS | HEART RATE: 71 BPM | OXYGEN SATURATION: 95 % | DIASTOLIC BLOOD PRESSURE: 69 MMHG | HEIGHT: 70 IN | RESPIRATION RATE: 18 BRPM

## 2022-04-27 DIAGNOSIS — N20.1 URETEROLITHIASIS: ICD-10-CM

## 2022-04-27 DIAGNOSIS — N21.0 BLADDER STONE: Primary | ICD-10-CM

## 2022-04-27 PROCEDURE — 52356 CYSTO/URETERO W/LITHOTRIPSY: CPT | Performed by: UROLOGY

## 2022-04-27 PROCEDURE — C1769 GUIDE WIRE: HCPCS | Performed by: UROLOGY

## 2022-04-27 PROCEDURE — 82360 CALCULUS ASSAY QUANT: CPT | Performed by: UROLOGY

## 2022-04-27 PROCEDURE — C2617 STENT, NON-COR, TEM W/O DEL: HCPCS | Performed by: UROLOGY

## 2022-04-27 PROCEDURE — 52317 REMOVE BLADDER STONE: CPT | Performed by: UROLOGY

## 2022-04-27 PROCEDURE — 99024 POSTOP FOLLOW-UP VISIT: CPT | Performed by: UROLOGY

## 2022-04-27 PROCEDURE — 76000 FLUOROSCOPY <1 HR PHYS/QHP: CPT

## 2022-04-27 DEVICE — VARIABLE LENGTH INJECTION STENT WITH HYDROPLUSTM COATING
Type: IMPLANTABLE DEVICE | Site: URETER | Status: FUNCTIONAL
Brand: CONTOUR VL™ INJECTION STENT

## 2022-04-27 RX ORDER — ONDANSETRON 2 MG/ML
4 INJECTION INTRAMUSCULAR; INTRAVENOUS ONCE AS NEEDED
Status: DISCONTINUED | OUTPATIENT
Start: 2022-04-27 | End: 2022-04-27 | Stop reason: HOSPADM

## 2022-04-27 RX ORDER — HYDROCODONE BITARTRATE AND ACETAMINOPHEN 5; 325 MG/1; MG/1
1-2 TABLET ORAL EVERY 6 HOURS PRN
Qty: 12 TABLET | Refills: 0 | Status: SHIPPED | OUTPATIENT
Start: 2022-04-27 | End: 2022-05-07

## 2022-04-27 RX ORDER — SODIUM CHLORIDE, SODIUM LACTATE, POTASSIUM CHLORIDE, CALCIUM CHLORIDE 600; 310; 30; 20 MG/100ML; MG/100ML; MG/100ML; MG/100ML
125 INJECTION, SOLUTION INTRAVENOUS CONTINUOUS
Status: DISCONTINUED | OUTPATIENT
Start: 2022-04-27 | End: 2022-04-27 | Stop reason: HOSPADM

## 2022-04-27 RX ORDER — LIDOCAINE HYDROCHLORIDE 20 MG/ML
INJECTION, SOLUTION EPIDURAL; INFILTRATION; INTRACAUDAL; PERINEURAL AS NEEDED
Status: DISCONTINUED | OUTPATIENT
Start: 2022-04-27 | End: 2022-04-27

## 2022-04-27 RX ORDER — ONDANSETRON 2 MG/ML
INJECTION INTRAMUSCULAR; INTRAVENOUS AS NEEDED
Status: DISCONTINUED | OUTPATIENT
Start: 2022-04-27 | End: 2022-04-27

## 2022-04-27 RX ORDER — MAGNESIUM HYDROXIDE 1200 MG/15ML
LIQUID ORAL AS NEEDED
Status: DISCONTINUED | OUTPATIENT
Start: 2022-04-27 | End: 2022-04-27 | Stop reason: HOSPADM

## 2022-04-27 RX ORDER — CEFAZOLIN SODIUM 1 G/50ML
1000 SOLUTION INTRAVENOUS ONCE
Status: COMPLETED | OUTPATIENT
Start: 2022-04-27 | End: 2022-04-27

## 2022-04-27 RX ORDER — MIDAZOLAM HYDROCHLORIDE 2 MG/2ML
INJECTION, SOLUTION INTRAMUSCULAR; INTRAVENOUS AS NEEDED
Status: DISCONTINUED | OUTPATIENT
Start: 2022-04-27 | End: 2022-04-27

## 2022-04-27 RX ORDER — PROPOFOL 10 MG/ML
INJECTION, EMULSION INTRAVENOUS AS NEEDED
Status: DISCONTINUED | OUTPATIENT
Start: 2022-04-27 | End: 2022-04-27

## 2022-04-27 RX ORDER — FENTANYL CITRATE 50 UG/ML
INJECTION, SOLUTION INTRAMUSCULAR; INTRAVENOUS AS NEEDED
Status: DISCONTINUED | OUTPATIENT
Start: 2022-04-27 | End: 2022-04-27

## 2022-04-27 RX ORDER — DEXAMETHASONE SODIUM PHOSPHATE 10 MG/ML
INJECTION, SOLUTION INTRAMUSCULAR; INTRAVENOUS AS NEEDED
Status: DISCONTINUED | OUTPATIENT
Start: 2022-04-27 | End: 2022-04-27

## 2022-04-27 RX ORDER — EPHEDRINE SULFATE 50 MG/ML
INJECTION INTRAVENOUS AS NEEDED
Status: DISCONTINUED | OUTPATIENT
Start: 2022-04-27 | End: 2022-04-27

## 2022-04-27 RX ORDER — FENTANYL CITRATE/PF 50 MCG/ML
25 SYRINGE (ML) INJECTION
Status: DISCONTINUED | OUTPATIENT
Start: 2022-04-27 | End: 2022-04-27 | Stop reason: HOSPADM

## 2022-04-27 RX ADMIN — IOHEXOL 6 ML: 240 INJECTION, SOLUTION INTRATHECAL; INTRAVASCULAR; INTRAVENOUS; ORAL at 11:00

## 2022-04-27 RX ADMIN — MIDAZOLAM 2 MG: 1 INJECTION INTRAMUSCULAR; INTRAVENOUS at 10:05

## 2022-04-27 RX ADMIN — EPHEDRINE SULFATE 10 MG: 50 INJECTION, SOLUTION INTRAVENOUS at 10:34

## 2022-04-27 RX ADMIN — EPHEDRINE SULFATE 10 MG: 50 INJECTION, SOLUTION INTRAVENOUS at 10:25

## 2022-04-27 RX ADMIN — FENTANYL CITRATE 25 MCG: 50 INJECTION INTRAMUSCULAR; INTRAVENOUS at 10:52

## 2022-04-27 RX ADMIN — ONDANSETRON 4 MG: 2 INJECTION INTRAMUSCULAR; INTRAVENOUS at 11:08

## 2022-04-27 RX ADMIN — LIDOCAINE HYDROCHLORIDE 80 MG: 20 INJECTION, SOLUTION EPIDURAL; INFILTRATION; INTRACAUDAL; PERINEURAL at 10:06

## 2022-04-27 RX ADMIN — DEXAMETHASONE SODIUM PHOSPHATE 6 MG: 10 INJECTION INTRAMUSCULAR; INTRAVENOUS at 10:32

## 2022-04-27 RX ADMIN — CEFAZOLIN SODIUM 1000 MG: 1 SOLUTION INTRAVENOUS at 10:09

## 2022-04-27 RX ADMIN — SODIUM CHLORIDE, SODIUM LACTATE, POTASSIUM CHLORIDE, AND CALCIUM CHLORIDE 125 ML/HR: .6; .31; .03; .02 INJECTION, SOLUTION INTRAVENOUS at 09:07

## 2022-04-27 RX ADMIN — EPHEDRINE SULFATE 10 MG: 50 INJECTION, SOLUTION INTRAVENOUS at 10:11

## 2022-04-27 RX ADMIN — FENTANYL CITRATE 25 MCG: 50 INJECTION INTRAMUSCULAR; INTRAVENOUS at 10:31

## 2022-04-27 RX ADMIN — PROPOFOL 150 MG: 10 INJECTION, EMULSION INTRAVENOUS at 10:06

## 2022-04-27 RX ADMIN — FENTANYL CITRATE 50 MCG: 50 INJECTION INTRAMUSCULAR; INTRAVENOUS at 10:05

## 2022-04-27 NOTE — ANESTHESIA PREPROCEDURE EVALUATION
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) - primary lung CA s/p chemo/XRT, now in remission      PULMONARY   (+) Smoker        Physical Exam    Airway    Mallampati score: II  TM Distance: <3 FB  Neck ROM: full     Dental   upper dentures and lower dentures,     Cardiovascular  Cardiovascular exam normal    Pulmonary  Pulmonary exam normal     Other Findings        Anesthesia Plan  ASA Score- 3     Anesthesia Type- general with ASA Monitors  Additional Monitors:   Airway Plan: LMA  Plan Factors-    Chart reviewed  EKG reviewed  Imaging results reviewed  Existing labs reviewed  Patient summary reviewed  Patient is a current smoker  Patient instructed to abstain from smoking on day of procedure  Patient smoked on day of surgery  Induction- intravenous  Postoperative Plan-     Informed Consent- Anesthetic plan and risks discussed with patient

## 2022-04-27 NOTE — DISCHARGE SUMMARY
Discharge Summary - Isra Eller 81 y o  male MRN: [id number]    Admission Date: 4/27/2022     Admitting Diagnosis: Ureterolithiasis [N20 1]    Procedures Performed:  Cysto, laser bladder stone, left ureteroscopy laser and basket of ureteral stone and stent placement    Patient underwent planned outpt surgery and recovered without complication  Discharged in good condition  Medications were prescribed  Pt knows to have office follow-up at the appropriate time

## 2022-04-27 NOTE — OP NOTE
OPERATIVE REPORT  PATIENT NAME: Navin Osborne    :  1950  MRN: [id number]  Pt Location: [facility]    SURGERY DATE: 2022    Surgeon(s) and Role:     * Parvin Phillips MD - Primary    Preop Diagnosis:  Ureterolithiasis [N20 1]    Post-Op Diagnosis Codes:     * Ureterolithiasis [N20 1]    Procedure(s) (LRB):  CYSTO URETEROSCOPY WITH LITHO HOLMIUM LASER, RETROGRADE PYELOGRAM AND INSERTION STENT URETERAL (Left)  LITHOLOPAXY HOLMIUM LASER for bladder stones (N/A)    Specimen(s):  ID Type Source Tests Collected by Time Destination   A : left ureteral stone  Calculus Ureter, Left STONE ANALYSIS Parvin Phillips MD 2022 11:12 AM        Estimated Blood Loss:   Minimal    Drains:  Urethral Catheter Latex 16 Fr  (Active)   Number of days: 0       Ureteral Internal Stent Left ureter 6 Fr  (Active)   Number of days: 0       Anesthesia Type:   General    Operative Indications:  Ureterolithiasis [N20 1]  And large bladder stone    Operative Findings:  2 6 cm bladder stone  10 mm stone distal left ureter      Complications:   None    Procedure and Technique:      PLAN FOR STENT:  Will be removed with string next Wednesday or Thursday    The De [place] will be removed this coming Friday       The patient was brought into the OR, properly identified and positioned on the table  General anesthesia was induced, and they were placed in lithotomy position and prepped and draped using chlorhexidine solution  The 22F scope was introduced in the urethra, which showed no stricture  Other findings upon placement of the scope included mild prostate enlargement, and the stone in the bladder as listed  There was a second stone about 5 mm embedded in the wall the bladder  Using the 1000 laser fiber, the bladder stone was broken up to numerous small particles which were all flushed out  The stone that was imbedded in the bladder wall was plucked out with the stone grasping forceps       The left  ureteral orifice was identified and retrograde pyelogram performed, showing large stone distal ureter, moderate hydronephrosis     One wire was placed up the ureter  The rigid ureteroscope was introduced and carefully advanced up to stone  The Holmium laser fiber was introduced thru the scope and advanced to the edge of stone  Using various laser settings, stone was lasered into numerous small particles  Care was taken not to laser tissue  Large particles were basketed  All other remaining particles appeared small enough to pass around the stent  The scope was removed from the ureter, and the cystoscope was used to place a 6F Contour VL stent in the ureter, with the upper coils in the renal pelvis, and the distal coil in the bladder  Retrograde pyelogram on that side confirmed good position and no extravasation of contrast    The patient was awaken from anesthesia and taken to the PACU in good condition        I was present for the entire procedure    Patient Disposition:  PACU       SIGNATURE: Jayden Babin MD  DATE: April 27, 2022  TIME: 11:36 AM

## 2022-04-27 NOTE — DISCHARGE INSTRUCTIONS
ALL YOUR  PREVIOUS MEDS ARE THE SAME  NEW MEDS:  Hydrocodone if needed for pain    BE CAREFUL NOT TO PULL THE STRING  You can get in the shower with the catheter    EXPECT SOME BLOOD IN URINE, BURNING, FREQUENT URINATION  ACTIVITY:  RESUME FULL ACTIVITY after stent is out  De La Cruz Catheter Placement and Care   WHAT YOU NEED TO KNOW:   A De La Cruz catheter is a sterile tube that is inserted into your bladder to drain urine  It is also called an indwelling urinary catheter  DISCHARGE INSTRUCTIONS:   Return to the emergency department if:   · Your catheter comes out  · You suddenly have material that looks like sand in the tubing or drainage bag  · No urine is draining into the bag and you have checked the system  · You have pain in your hip, back, pelvis, or lower abdomen  · You are confused or cannot think clearly  Call your doctor or urologist if:   · You have a fever  · You have bladder spasms for more than 1 day after the catheter is placed  · You see blood in the tubing or drainage bag  · You have a rash or itching where the catheter tube is secured to your skin  · Urine leaks from or around the catheter, tubing, or drainage bag  · The closed drainage system has accidently come open or apart  · You see a layer of crystals inside the tubing  · You have questions or concerns about your condition or care  Care for your catheter and drainage bag: You can reduce your risk for infection and injury by caring for your catheter and drainage bag properly  · Wash your hands often  Wash before and after you touch your catheter, tubing, or drainage bag  Use soap and water  Wear clean disposable gloves when you care for your catheter or disconnect the drainage bag  Wash your hands before you prepare or eat food  · Clean your genital area 2 times every day  Clean your catheter area and anal opening after every bowel movement  ?  For men:  Use a soapy cloth to clean the tip of your penis  Start where the catheter enters  Wipe backward making sure to pull back the foreskin  Then use a cloth with clear water in the same direction to clean away the soap  ? For women:  Use a soapy cloth to clean the area that the catheter enters your body  Make sure to separate your labia and wipe toward the anus  Then use a cloth with clear water and wipe in the same direction  · Secure the catheter tube  so you do not pull or move the catheter  This helps prevent pain and bladder spasms  Healthcare providers will show you how to use medical tape or a strap to secure the catheter tube to your body  · Keep a closed drainage system  Your catheter should always be attached to the drainage bag to form a closed system  Do not disconnect any part of the closed system unless you need to change the bag  · Keep the drainage bag below the level of your waist   This helps stop urine from moving back up the tubing and into your bladder  Do not loop or kink the tubing  This can cause urine to back up and collect in your bladder  Do not let the drainage bag touch or lie on the floor  · Empty the drainage bag when needed  The weight of a full drainage bag can be painful  Empty the drainage bag every 3 to 6 hours or when it is ? full  · Clean and change the drainage bag as directed  Ask your healthcare provider how often you should change the drainage bag and what cleaning solution to use  Wear disposable gloves when you change the bag  Do not allow the end of the catheter or tubing to touch anything  Clean the ends with an alcohol pad before you reconnect them  What to do if problems develop:   · No urine is draining into the bag:      ? Check for kinks in the tubing and straighten them out  ? Check the tape or strap used to secure the catheter tube to your skin  Make sure it is not blocking the tube  ? Make sure you are not sitting or lying on the tubing      ? Make sure the urine bag is hanging below the level of your waist     · Urine leaks from or around the catheter, tubing, or drainage bag:  Check if the closed drainage system has accidently come open or apart  Clean the catheter and tubing ends with a new alcohol pad and reconnect them  Follow up with your doctor or urologist as directed:  Write down your questions so you remember to ask them during your visits  © Copyright [employer] 2022 Information is for End User's use only and may not be sold, redistributed or otherwise used for commercial purposes  All illustrations and images included in CareNotes® are the copyrighted property of A D A M [employer] Inc  or [facility] Adele Lee   The above information is an  only  It is not intended as medical advice for individual conditions or treatments  Talk to your doctor, nurse or pharmacist before following any medical regimen to see if it is safe and effective for you

## 2022-04-27 NOTE — ANESTHESIA POSTPROCEDURE EVALUATION
Post-Op Assessment Note    CV Status:  Stable  Pain Score: 2    Pain management: adequate     Mental Status:  Alert and awake   Hydration Status:  Euvolemic   PONV Controlled:  Controlled   Airway Patency:  Patent      Post Op Vitals Reviewed: Yes      Staff: Anesthesiologist         No complications documented      /66 (04/27/22 1133)    Temp (!) 96 8 °F (36 °C) (04/27/22 1133)    Pulse 86 (04/27/22 1133)   Resp 12 (04/27/22 1133)    SpO2 91 % (04/27/22 1133)

## 2022-04-29 ENCOUNTER — PROCEDURE VISIT (OUTPATIENT)
Dept: UROLOGY | Facility: MEDICAL CENTER | Age: 72
End: 2022-04-29

## 2022-04-29 VITALS
HEIGHT: 70 IN | BODY MASS INDEX: 24.05 KG/M2 | SYSTOLIC BLOOD PRESSURE: 138 MMHG | DIASTOLIC BLOOD PRESSURE: 74 MMHG | WEIGHT: 168 LBS

## 2022-04-29 DIAGNOSIS — N20.1 URETEROLITHIASIS: Primary | ICD-10-CM

## 2022-04-29 PROCEDURE — 1123F ACP DISCUSS/DSCN MKR DOCD: CPT

## 2022-04-29 PROCEDURE — 99024 POSTOP FOLLOW-UP VISIT: CPT

## 2022-04-29 NOTE — PROGRESS NOTES
4/29/2022    Tiffanie Greene  1950  [id number]    Diagnosis  Chief Complaint     Nephrolithiasis          Patient presents for campbell catheter removal s/p CYSTO URETEROSCOPY WITH LITHO HOLMIUM LASER, RETROGRADE PYELOGRAM AND INSERTION STENT URETERAL (Left Bladder)       LITHOLOPAXY HOLMIUM LASER for bladder stones (N/A Bladder  managed by Dr Dore Dakin  Return to the office as scheduled for follow up s/p stone surgery and to review stone risk profile  Procedure Campbell removal    Campbell catheter removed after deflation of an intact balloon  Patient tolerated well  Encouraged patient to hydrate well  Patient agrees to this plan  Pt does have stent in place  Reviewed instructions with him on how to remove this at home  Pt is comfortable removing his stent at home as he had done this in the past    Reviewed post stent removal symptoms with patient  He is aware and will contact the office with any further questions or concerns he should have           Vitals:    04/29/22 0907   BP: 138/74   BP Location: Left arm   Patient Position: Sitting   Cuff Size: Standard   Weight: 76 2 kg (168 lb)   Height: 5' 10" (1 778 m)           Za Mir RN

## 2022-05-03 LAB
CALCIUM OXALATE DIHYDRATE MFR STONE IR: 30 %
COLOR STONE: NORMAL
COM MFR STONE: 65 %
COMMENT-STONE3: NORMAL
COMPOSITION: NORMAL
HYDROXYAPATITE 24H ENGDIFF UR: 5 %
LABORATORY COMMENT REPORT: NORMAL
PHOTO: NORMAL
SIZE STONE: NORMAL MM
SPEC SOURCE SUBJ: NORMAL
STONE ANALYSIS-IMP: NORMAL
STONE ANALYSIS-IMP: NORMAL
WT STONE: 3 MG

## 2022-05-06 ENCOUNTER — APPOINTMENT (OUTPATIENT)
Dept: LAB | Facility: HOSPITAL | Age: 72
End: 2022-05-06
Attending: UROLOGY
Payer: MEDICARE

## 2022-05-06 PROCEDURE — 82140 ASSAY OF AMMONIA: CPT

## 2022-05-06 PROCEDURE — 83735 ASSAY OF MAGNESIUM: CPT

## 2022-05-06 PROCEDURE — 84560 ASSAY OF URINE/URIC ACID: CPT

## 2022-05-06 PROCEDURE — 84392 ASSAY OF URINE SULFATE: CPT

## 2022-05-06 PROCEDURE — 84105 ASSAY OF URINE PHOSPHORUS: CPT

## 2022-05-06 PROCEDURE — 82507 ASSAY OF CITRATE: CPT

## 2022-05-06 PROCEDURE — 83945 ASSAY OF OXALATE: CPT

## 2022-05-06 PROCEDURE — 84300 ASSAY OF URINE SODIUM: CPT

## 2022-05-06 PROCEDURE — 84133 ASSAY OF URINE POTASSIUM: CPT

## 2022-05-06 PROCEDURE — 82436 ASSAY OF URINE CHLORIDE: CPT

## 2022-05-06 PROCEDURE — 82131 AMINO ACIDS SINGLE QUANT: CPT

## 2022-05-06 PROCEDURE — 83935 ASSAY OF URINE OSMOLALITY: CPT

## 2022-05-06 PROCEDURE — 82340 ASSAY OF CALCIUM IN URINE: CPT

## 2022-05-06 PROCEDURE — 82570 ASSAY OF URINE CREATININE: CPT

## 2022-05-06 PROCEDURE — 81003 URINALYSIS AUTO W/O SCOPE: CPT

## 2022-05-13 LAB
AMMONIA 24H UR-MRATE: 13 MEQ/24 HR
AMMONIA UR-SCNC: ABNORMAL UG/DL
CA H2 PHOS DIHYD CRY URNS QL MICRO: 0.58 RATIO (ref 0–3)
CALCIUM 24H UR-MCNC: 7.7 MG/DL
CALCIUM 24H UR-MRATE: 115.5 MG/24 HR (ref 0–320)
CHLORIDE 24H UR-SCNC: 81 MMOL/L
CHLORIDE 24H UR-SRATE: 122 MMOL/24 HR (ref 52–264)
CITRATE 24H UR-MCNC: 246 MG/L
CITRATE 24H UR-MRATE: 369 MG/24 HR (ref 320–1240)
COM CRY STONE QL IR: 4.26 RATIO (ref 0–6)
CREAT 24H UR-MCNC: 57.8 MG/DL
CREAT 24H UR-MRATE: 867 MG/24 HR (ref 1000–2000)
CYSTINE 24H UR-MCNC: 5.83 MG/L
CYSTINE 24H UR-MRATE: 8.75 MG/24 HR (ref 2.1–58)
MAGNESIUM 24H UR-MRATE: 77 MG/24 HR (ref 12–293)
MAGNESIUM UR-MCNC: 5.1 MG/DL
NA URATE CRY STONE QL IR: 1.85 RATIO (ref 0–4)
OSMOLALITY UR: 384 MOSMOL/KG (ref 300–900)
OXALATE 24H UR-MRATE: 24 MG/24 HR (ref 7–44)
OXALATE UR-MCNC: 16 MG/L
PH 24H UR: 5.9 [PH] (ref 4.5–8)
PHOSPHATE 24H UR-MCNC: 33.3 MG/DL
PHOSPHATE 24H UR-MRATE: 499.5 MG/24 HR (ref 390–1425)
PLEASE NOTE (STONE RISK): ABNORMAL
POTASSIUM 24H UR-SCNC: 53 MMOL/24 HR (ref 20–116)
POTASSIUM UR-SCNC: 35.3 MMOL/L
PRESERVED URINE: 1500 ML/24 HR (ref 800–1800)
SODIUM 24H UR-SCNC: 82 MMOL/L
SODIUM 24H UR-SRATE: 123 MMOL/24 HR (ref 58–337)
SPECIMEN VOL 24H UR: 1500 ML/24 HR (ref 800–1800)
SULFATE 24H UR-MCNC: 12 MEQ/24 HR (ref 0–30)
SULFATE UR-MCNC: 8 MEQ/L
TRI-PHOS CRY STONE MICRO: 0.01 RATIO (ref 0–1)
URATE 24H UR-MCNC: 22.6 MG/DL
URATE 24H UR-MRATE: 339 MG/24 HR (ref 182–937)
URATE DIHYD CRY STONE QL IR: 1.11 RATIO (ref 0–1.2)

## 2022-06-22 ENCOUNTER — HOSPITAL ENCOUNTER (OUTPATIENT)
Dept: INFUSION CENTER | Facility: HOSPITAL | Age: 72
Discharge: HOME | End: 2022-06-22
Payer: MEDICARE

## 2022-06-22 DIAGNOSIS — C34.12 PRIMARY MALIGNANT NEOPLASM OF BRONCHUS OF LEFT UPPER LOBE (HCC): ICD-10-CM

## 2022-06-22 DIAGNOSIS — Z45.2 ENCOUNTER FOR CENTRAL LINE CARE: Primary | ICD-10-CM

## 2022-06-22 PROCEDURE — 96523 IRRIG DRUG DELIVERY DEVICE: CPT

## 2022-06-22 NOTE — PROGRESS NOTES
Pt presents for port flush  Port accessed, positive blood return noted, flushed per protocol   Future appt scheduled, pt provided with AVS

## 2022-07-01 ENCOUNTER — OFFICE VISIT (OUTPATIENT)
Dept: UROLOGY | Facility: MEDICAL CENTER | Age: 72
End: 2022-07-01
Payer: MEDICARE

## 2022-07-01 VITALS
BODY MASS INDEX: 23.51 KG/M2 | HEART RATE: 88 BPM | SYSTOLIC BLOOD PRESSURE: 128 MMHG | HEIGHT: 70 IN | OXYGEN SATURATION: 92 % | WEIGHT: 164.2 LBS | DIASTOLIC BLOOD PRESSURE: 70 MMHG

## 2022-07-01 DIAGNOSIS — N20.0 NEPHROLITHIASIS: Primary | ICD-10-CM

## 2022-07-01 PROCEDURE — 99213 OFFICE O/P EST LOW 20 MIN: CPT | Performed by: PHYSICIAN ASSISTANT

## 2022-07-01 NOTE — PROGRESS NOTES
7/1/2022      Chief Complaint   Patient presents with    Follow-up    Nephrolithiasis         Assessment and Plan    81 y o  male managed by Dr Latasha Gutierrez     1  Nephrolithiasis  · S/p left ureteroscopy and laser lithotripsy  As well as cystolitholapaxy on 04/27/2022  · Risk profile unremarkable  · Reviewed dietary modifications and encourage daily water intake of 40-60 oz  · Patient has Q 6 month CT scans for surveillance of cancer unknown origin  No need to order ultrasound at this time  · And follow-up in March 2023 for surveillance cystoscopy with Dr Latasha Gutierrez     2  Prostate cancer screening  · PSA previously ordered  Completed  Most recent PSA 2018 was 2 6  · Reviewed activities to avoid 72 hours prior to testing  History of Present Illness  Tiffany Randolph is a 81 y o  male here for evaluation of nephrolithiasis and bladder calculi  Patient has a longstanding history of nephrolithiasis  Most recently he is s/p left ureteroscopy and laser lithotripsy  As well as cystolitholapaxy on 04/27/2022  He has not had any imaging following his procedure but does follow with Hematology-Oncology with Q 6 month surveillance CT scans for 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, lung, and brain with unknown primary origin  Denies any urinary complaints today  He denies any episodes of gross hematuria  He denies any fevers or chills  Denies any flank or abdominal pain  History 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  Subsequent biopsies have been negative  He is to follow-up as listed in plan above  PSA previously ordered in March of this year has not yet been completed  Review of Systems   Constitutional: Negative for chills and fever  HENT: Negative  Respiratory: Negative  Cardiovascular: Negative  Gastrointestinal: Negative for abdominal pain, nausea and vomiting     Genitourinary: Negative for difficulty urinating, dysuria, flank pain, hematuria, scrotal swelling, testicular pain and urgency  Denies nocturia, incontinence, or sensation of incomplete bladder emptying  Reports good stream strength  Musculoskeletal: Negative  Skin: Negative  Neurological: Negative  Urinary Incontinence Screening    Flowsheet Row Most Recent Value   Urinary Incontinence    Urinary Incontinence? No   Incomplete emptying? No   Urinary frequency? Yes   Urinary urgency? Yes   Urinary hesitancy? Yes  [occ]   Dysuria (painful difficult urination)? No   Nocturia (waking up to use the bathroom)? No   Straining (having to push to go)? Yes   Weak stream? No   Intermittent stream? No          AUA SYMPTOM SCORE    Flowsheet Row Most Recent Value   AUA SYMPTOM SCORE    How often have you had a sensation of not emptying your bladder completely after you finished urinating? 0   How often have you had to urinate again less than two hours after you finished urinating? 1   How often have you found you stopped and started again several times when you urinate? 0   How often have you found it difficult to postpone urination? 1   How often have you had a weak urinary stream? 0   How often have you had to push or strain to begin urination? 1   How many times did you most typically get up to urinate from the time you went to bed at night until the time you got up in the morning? 0   Quality of Life: If you were to spend the rest of your life with your urinary condition just the way it is now, how would you feel about that? 2   AUA SYMPTOM SCORE 3           Vitals  Vitals:    07/01/22 0818   BP: 128/70   BP Location: Left arm   Patient Position: Sitting   Cuff Size: Adult   Pulse: 88   SpO2: 92%   Weight: 74 5 kg (164 lb 3 2 oz)   Height: 5' 10" (1 778 m)       Physical Exam  Vitals reviewed  Constitutional:       General: He is not in acute distress  Appearance: Normal appearance  He is normal weight  He is not ill-appearing, toxic-appearing or diaphoretic     HENT:      Head: Normocephalic and atraumatic  Eyes:      Conjunctiva/sclera: Conjunctivae normal    Pulmonary:      Effort: Pulmonary effort is normal  No respiratory distress  Abdominal:      General: There is no distension  Palpations: Abdomen is soft  Tenderness: There is no abdominal tenderness  There is no right CVA tenderness, left CVA tenderness, guarding or rebound  Musculoskeletal:         General: Normal range of motion  Cervical back: Normal range of motion  Skin:     General: Skin is warm and dry  Neurological:      General: No focal deficit present  Mental Status: He is alert and oriented to person, place, and time  Psychiatric:         Mood and Affect: Mood normal          Behavior: Behavior normal          Thought Content:  Thought content normal          Judgment: Judgment normal        Past History  Past Medical History:   Diagnosis Date    Arthritis     Balance problems     and" coordination issues/best to walk slow"    Bladder stones     Cancer ([facility] )     lung, brain, kidney, spinal    Colon polyp     Dysphagia 02/16/2017    "not right now but in the past"    Environmental and seasonal allergies     Exercise involving walking     "approx 1 mile 3x/week"    Eye injury     right,  "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"    Full dentures     Hemiparesis ([facility] ) 04/27/2015    left weakness,"due to tumor on brain"    History of brain tumor     "had radiation for it"    History of cancer chemotherapy     last tx  5/22/18    History of GI bleed 2015    History of kidney cancer     left    History of kidney stones     History of radiation therapy     History of transfusion     5 units of blood 2015    Hydrocele 11/30/2010    Inguinal hernia, bilateral     Joint stiffness of multiple sites     Kidney stone     Paresthesia 05/01/2015    Portacath in place     right chest    [facility] 05/26/2015    Renal carcinoma, left ([facility] ) 10/17/2016    Risk for falls     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    Skin cancer     Syncopal episodes 09/01/2015    2    Wears glasses      Social History     Socioeconomic History    Marital status:      Spouse name: None    Number of children: None    Years of education: None    Highest education level: None   Occupational History    None   Tobacco Use    Smoking status: Light Tobacco Smoker     Packs/day: 0 50     Years: 48 00     Pack years: 24 00     Types: Cigarettes    Smokeless tobacco: Never Used    Tobacco comment: smoked 4/27 0530    Vaping Use    Vaping Use: Never used   Substance and Sexual Activity    Alcohol use: Yes     Comment: 3-4 times a year      Drug use: No    Sexual activity: Not Currently     Partners: Female     Birth control/protection: Abstinence, Condom Male   Other Topics Concern    None   Social History Narrative    None     Social Determinants of Health     Financial Resource Strain: Not on file   Food Insecurity: Not on file   Transportation Needs: Not on file   Physical Activity: Not on file   Stress: Not on file   Social Connections: Not on file   Intimate Partner Violence: Not on file   Housing Stability: Not on file     Social History     Tobacco Use   Smoking Status Light Tobacco Smoker    Packs/day: 0 50    Years: 48 00    Pack years: 24 00    Types: Cigarettes   Smokeless Tobacco Never Used   Tobacco Comment    smoked 4/27 0530      Family History   Problem Relation Age of Onset    Cancer Mother         EYE    Cancer Father     Colon cancer Father     Cancer Brother        The following portions of the patient's history were reviewed and updated as appropriate: allergies, current medications, past medical history, past social history, past surgical history and problem list     Results  No results found for this or any previous visit (from the past 1 hour(s)) ]  Lab Results   Component Value Date    PSA 2 6 11/06/2018     Lab Results Component Value Date    GLUCOSE 163 (H) 06/11/2018    CALCIUM 8 4 03/07/2022     06/11/2018    K 3 8 03/07/2022    CO2 24 03/07/2022     03/07/2022    BUN 13 03/07/2022    CREATININE 0 83 03/07/2022     Lab Results   Component Value Date    WBC 10 40 03/07/2022    HGB 14 7 03/07/2022    HCT 43 2 03/07/2022    MCV 92 03/07/2022     03/07/2022     Ricky Glover PA-C

## 2022-08-05 ENCOUNTER — HOSPITAL ENCOUNTER (OUTPATIENT)
Dept: INFUSION CENTER | Facility: HOSPITAL | Age: 72
Discharge: HOME | End: 2022-08-05
Payer: MEDICARE

## 2022-08-05 DIAGNOSIS — Z45.2 ENCOUNTER FOR CENTRAL LINE CARE: Primary | ICD-10-CM

## 2022-08-05 DIAGNOSIS — C34.12 PRIMARY MALIGNANT NEOPLASM OF BRONCHUS OF LEFT UPPER LOBE (HCC): ICD-10-CM

## 2022-08-05 PROCEDURE — 96523 IRRIG DRUG DELIVERY DEVICE: CPT

## 2022-09-06 ENCOUNTER — HOSPITAL ENCOUNTER (OUTPATIENT)
Dept: CT IMAGING | Facility: HOSPITAL | Age: 72
Discharge: HOME | End: 2022-09-06
Payer: MEDICARE

## 2022-09-06 ENCOUNTER — HOSPITAL ENCOUNTER (OUTPATIENT)
Dept: INFUSION CENTER | Facility: HOSPITAL | Age: 72
Discharge: HOME | End: 2022-09-06
Payer: MEDICARE

## 2022-09-06 ENCOUNTER — APPOINTMENT (OUTPATIENT)
Dept: CT IMAGING | Facility: HOSPITAL | Age: 72
End: 2022-09-06
Payer: MEDICARE

## 2022-09-06 DIAGNOSIS — C64.2 RENAL CELL CARCINOMA OF LEFT KIDNEY (HCC): ICD-10-CM

## 2022-09-06 DIAGNOSIS — C34.12 PRIMARY MALIGNANT NEOPLASM OF BRONCHUS OF LEFT UPPER LOBE (HCC): ICD-10-CM

## 2022-09-06 DIAGNOSIS — Z45.2 ENCOUNTER FOR CENTRAL LINE CARE: ICD-10-CM

## 2022-09-06 DIAGNOSIS — N13.8 BPH WITH URINARY OBSTRUCTION: ICD-10-CM

## 2022-09-06 DIAGNOSIS — N40.1 BPH WITH URINARY OBSTRUCTION: ICD-10-CM

## 2022-09-06 DIAGNOSIS — Z13.220 SCREENING FOR HYPERLIPIDEMIA: Primary | ICD-10-CM

## 2022-09-06 LAB
ALBUMIN SERPL BCP-MCNC: 3.9 G/DL (ref 3.5–5)
ALP SERPL-CCNC: 116 U/L (ref 43–122)
ALT SERPL W P-5'-P-CCNC: 36 U/L
ANION GAP SERPL CALCULATED.3IONS-SCNC: 6 MMOL/L (ref 5–14)
AST SERPL W P-5'-P-CCNC: 29 U/L (ref 17–59)
BASOPHILS # BLD AUTO: 0.08 THOUSANDS/ΜL (ref 0–0.1)
BASOPHILS NFR BLD AUTO: 1 % (ref 0–1)
BILIRUB SERPL-MCNC: 1.05 MG/DL (ref 0.2–1)
BUN SERPL-MCNC: 19 MG/DL (ref 5–25)
CALCIUM SERPL-MCNC: 8.7 MG/DL (ref 8.4–10.2)
CHLORIDE SERPL-SCNC: 104 MMOL/L (ref 96–108)
CHOLEST SERPL-MCNC: 186 MG/DL
CO2 SERPL-SCNC: 25 MMOL/L (ref 21–32)
CREAT SERPL-MCNC: 0.84 MG/DL (ref 0.7–1.5)
EOSINOPHIL # BLD AUTO: 0.18 THOUSAND/ΜL (ref 0–0.61)
EOSINOPHIL NFR BLD AUTO: 2 % (ref 0–6)
ERYTHROCYTE [DISTWIDTH] IN BLOOD BY AUTOMATED COUNT: 12.3 % (ref 11.6–15.1)
GFR SERPL CREATININE-BSD FRML MDRD: 88 ML/MIN/1.73SQ M
GLUCOSE P FAST SERPL-MCNC: 93 MG/DL (ref 70–99)
GLUCOSE SERPL-MCNC: 93 MG/DL (ref 70–99)
HCT VFR BLD AUTO: 45 % (ref 36.5–49.3)
HDLC SERPL-MCNC: 23 MG/DL
HGB BLD-MCNC: 15.2 G/DL (ref 12–17)
IMM GRANULOCYTES # BLD AUTO: 0.04 THOUSAND/UL (ref 0–0.2)
IMM GRANULOCYTES NFR BLD AUTO: 0 % (ref 0–2)
LDLC SERPL CALC-MCNC: 123 MG/DL
LYMPHOCYTES # BLD AUTO: 2.84 THOUSANDS/ΜL (ref 0.6–4.47)
LYMPHOCYTES NFR BLD AUTO: 24 % (ref 14–44)
MCH RBC QN AUTO: 31.1 PG (ref 26.8–34.3)
MCHC RBC AUTO-ENTMCNC: 33.8 G/DL (ref 31.4–37.4)
MCV RBC AUTO: 92 FL (ref 82–98)
MONOCYTES # BLD AUTO: 0.57 THOUSAND/ΜL (ref 0.17–1.22)
MONOCYTES NFR BLD AUTO: 5 % (ref 4–12)
NEUTROPHILS # BLD AUTO: 8.06 THOUSANDS/ΜL (ref 1.85–7.62)
NEUTS SEG NFR BLD AUTO: 68 % (ref 43–75)
NONHDLC SERPL-MCNC: 163 MG/DL
NRBC BLD AUTO-RTO: 0 /100 WBCS
PLATELET # BLD AUTO: 192 THOUSANDS/UL (ref 149–390)
PMV BLD AUTO: 10.2 FL (ref 8.9–12.7)
POTASSIUM SERPL-SCNC: 4.3 MMOL/L (ref 3.5–5.3)
PROT SERPL-MCNC: 7.1 G/DL (ref 6.4–8.4)
PSA SERPL-MCNC: 5.8 NG/ML (ref 0–4)
RBC # BLD AUTO: 4.89 MILLION/UL (ref 3.88–5.62)
SODIUM SERPL-SCNC: 135 MMOL/L (ref 135–147)
TRIGL SERPL-MCNC: 199 MG/DL
WBC # BLD AUTO: 11.77 THOUSAND/UL (ref 4.31–10.16)

## 2022-09-06 PROCEDURE — 80061 LIPID PANEL: CPT

## 2022-09-06 PROCEDURE — G1004 CDSM NDSC: HCPCS

## 2022-09-06 PROCEDURE — 85025 COMPLETE CBC W/AUTO DIFF WBC: CPT

## 2022-09-06 PROCEDURE — 84153 ASSAY OF PSA TOTAL: CPT

## 2022-09-06 PROCEDURE — 80053 COMPREHEN METABOLIC PANEL: CPT

## 2022-09-06 PROCEDURE — 71270 CT THORAX DX C-/C+: CPT

## 2022-09-06 PROCEDURE — 74178 CT ABD&PLV WO CNTR FLWD CNTR: CPT

## 2022-09-06 RX ADMIN — IOHEXOL 70 ML: 350 INJECTION, SOLUTION INTRAVENOUS at 09:23

## 2022-09-07 ENCOUNTER — TELEPHONE (OUTPATIENT)
Dept: UROLOGY | Facility: AMBULATORY SURGERY CENTER | Age: 72
End: 2022-09-07

## 2022-09-07 ENCOUNTER — TELEPHONE (OUTPATIENT)
Dept: UROLOGY | Facility: MEDICAL CENTER | Age: 72
End: 2022-09-07

## 2022-09-07 DIAGNOSIS — R97.20 ELEVATED PSA: Primary | ICD-10-CM

## 2022-09-07 DIAGNOSIS — N20.0 NEPHROLITHIASIS: Primary | ICD-10-CM

## 2022-09-07 NOTE — TELEPHONE ENCOUNTER
Spoke to pt and advised per CRNP :    Message from Ethan August sent at 9/6/2022  3:27 PM EDT -----  Recent PSA 5 8, previously 2 6 (3 years ago)  Recommend patient repeat his PSA in the next 4 weeks  Reviewed reasons for false PSA elevation

## 2022-09-07 NOTE — TELEPHONE ENCOUNTER
----- Message from [id number] Christiane Parisi sent at 9/6/2022  3:27 PM EDT -----  Recent PSA 5 8, previously 2 6 (3 years ago)  Recommend patient repeat his PSA in the next 4 weeks  Please review causes for false elevation

## 2022-09-12 ENCOUNTER — OFFICE VISIT (OUTPATIENT)
Dept: HEMATOLOGY ONCOLOGY | Facility: CLINIC | Age: 72
End: 2022-09-12
Payer: MEDICARE

## 2022-09-12 VITALS
HEART RATE: 85 BPM | SYSTOLIC BLOOD PRESSURE: 126 MMHG | TEMPERATURE: 97.3 F | DIASTOLIC BLOOD PRESSURE: 66 MMHG | HEIGHT: 70 IN | OXYGEN SATURATION: 95 % | BODY MASS INDEX: 22.82 KG/M2 | RESPIRATION RATE: 18 BRPM | WEIGHT: 159.4 LBS

## 2022-09-12 DIAGNOSIS — R97.20 HIGH PROSTATE SPECIFIC ANTIGEN (PSA): ICD-10-CM

## 2022-09-12 DIAGNOSIS — C64.2 RENAL CELL CARCINOMA OF LEFT KIDNEY (HCC): ICD-10-CM

## 2022-09-12 DIAGNOSIS — C34.12 PRIMARY MALIGNANT NEOPLASM OF BRONCHUS OF LEFT UPPER LOBE (HCC): Primary | ICD-10-CM

## 2022-09-12 PROCEDURE — 99214 OFFICE O/P EST MOD 30 MIN: CPT | Performed by: INTERNAL MEDICINE

## 2022-09-12 NOTE — PROGRESS NOTES
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)  The patient does not seem to have any obvious hint of recurrence of his lung cancer according to the recent CT scan of the chest abdomen pelvis  We will continue to monitor him closely on every 6 months basis  We did discuss getting his port removed  The patient does not seem to be interested at this point  He stated that he would reconsider it in 6 months from now  - CBC and differential; Future  - Comprehensive metabolic panel; Future  - Magnesium  - CT chest abdomen pelvis w contrast; Future    2  Renal cell carcinoma of left kidney (HCC)  No obvious hint of recurrence  Status post cryoablation in 2016   - CBC and differential; Future  - Comprehensive metabolic panel; Future  - CT chest abdomen pelvis w contrast; Future    3  High prostate specific antigen (PSA)  He seems to have high PSA level which needs to be further worked up by his urologist which is being scheduled in the near future  - PSA Total, Diagnostic        HPI:  The patient came today for follow-up visit  He denied significant symptoms or change of his overall condition since last visit in March of this year  He did have a CT scan of the chest abdomen pelvis with contrast on 09/06/2022 which was negative for any obvious hint of recurrence of his cancer in the chest abdomen or pelvis  He does seem to have chronic emphysema  Recent blood work on 09/06/2022 showed hemoglobin of 15 2  White cell count was slightly above normal 11 7 with normal platelets  Creatinine was 0 8 with normal calcium liver enzymes  PSA slightly elevated 5 8    Oncology History Overview Note   Patient has a history of tobacco use for more than 40 years and history of 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 which was diagnosed in May of 2015   At that time the patient was found to have multiple brain lesions status post whole brain radiation  He then received 6 cycles of palliative chemotherapy with Alimta and carboplatin  After 5 cycles of treatment he had significant GI bleeding which required ICU hospitalization  After completion of palliative chemotherapy he was maintained on Alimta since October 2015  A brain MRI January 2016 showed multiple bilateral hemorrhagic lesions felt not to be due to malignant process  PET scan January 2016 showed a 1 6 cm metabolically active left lung apical lesion  The patient received radiation treatment palliatively under the care of Dr Yu Christianson to the left apical lung lesion and completed it in April of 2016  He was then restarted on maintenance Alimta  He had an MRI of the abdomen on June 23, 2016 which showed a 3 cm cystic mass with a solid component on the posterior medial aspect of the left kidney, compatible with malignant process  A core biopsy was taking from the left kidney July 12, 2016 which was 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  The patient was then treated with cryotherapy of the left kidney lesion in September of 2016  Patient had a PET-CT scan on March 3, 2017 which was compared to the prior PET scan from November 2016  At this time there is no evidence of residual or recurrent neoplastic disease anywhere in his body  Patient had a CT scan of the chest abdomen and pelvis on August 24, 2017 which showed a 1 3 cm left upper lobe mass versus scarring with adjacent radiation fibrosis  Patient had another MRI of the brain January 8, 2018 which was compared to the previous MRI of the brain in January 2016  This revealed multiple areas of artifact in the supra and infratentorial spaces consistent with hemorrhagic metastasis  The largest lesion was in the frontal lobe and is significantly smaller with only a tiny enhancing component remaining    No new intra-axial lesions were reported  Patient had another CT scan of the chest abdomen and pelvis for close monitoring on June 5th 2018 that was compared with prior study from January 2018  He continues to have a 1 3 cm left lung apex pulmonary nodule with surrounding fibrosis which is stable in appearance  He also has a stable appearance of the treated medial left renal cortex lesion  There is no evidence of metastatic disease within the chest abdomen or pelvis  After lengthy discussion June 2018 patient stated that he is not interested in continuing his maintenance Alimta treatment beyond today's treatment since his CT scan showed stable disease for a long time he has been on Alimta for 2 years  He was told that he seems to be in complete remission but there is a chance that the cancer may reoccur  Patient was interested in the watch and wait type of approach instead of continuing active maintenance Alimta treatment  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 ([facility] [address] )   5/2015 Initial Diagnosis    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)  Stage IV     5/5/2015 - 5/20/2015 Radiation    3150 cGy to large right frontoparietal mass; 3000 cGy to other tumor volumes in brainstem, and bilateral cerebral hemispheres  Radiation oncologist:  Dr Chapito Wang     3/17/2016 - 4/12/2016 Radiation    Left upper lobe lesion, 4675 cGy  Radiation oncologist: Dr Chapito Wang      Chemotherapy    1  Alimta and carboplatin:   05/20/2015 through 08/12/2015 (5 cycles total)  2  Maintenance Alimta:   10/21/2015 through 06/12/2018 (43 total Treatment doses)     Renal cell carcinoma of left kidney ([facility] [address] )   7/2016 Initial Diagnosis    Renal cell carcinoma of left kidney [facility])  S/p cryoablation of the left kidney lesion in September of 2016 @ [facility]-Dr Mariaelena Santos           Interval history:    ROS: Review of Systems   Constitutional: Positive for fatigue  Negative for chills and fever     HENT: Negative for ear pain and sore throat  Eyes: Negative for pain and visual disturbance  Respiratory: Positive for cough  Negative for shortness of breath  Cardiovascular: Negative for chest pain and palpitations  Gastrointestinal: Positive for diarrhea  Negative for abdominal pain and vomiting  Genitourinary: Negative for dysuria and hematuria  Musculoskeletal: Negative for arthralgias and back pain  Skin: Negative for color change and rash  Neurological: Negative for seizures and syncope  Hematological: Bruises/bleeds easily  All other systems reviewed and are negative        Past Medical History:   Diagnosis Date    Arthritis     Balance problems     and" coordination issues/best to walk slow"    Bladder stones     Cancer ([facility] )     lung, brain, kidney, spinal    Colon polyp     Dysphagia 02/16/2017    "not right now but in the past"    Environmental and seasonal allergies     Exercise involving walking     "approx 1 mile 3x/week"    Eye injury     right,  "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"    Full dentures     Hemiparesis ([place] [address] ) 04/27/2015    left weakness,"due to tumor on brain"    History of brain tumor     "had radiation for it"    History of cancer chemotherapy     last tx  5/22/18    History of GI bleed 2015    History of kidney cancer     left    History of kidney stones     History of radiation therapy     History of transfusion     5 units of blood 2015    Hydrocele 11/30/2010    Inguinal hernia, bilateral     Joint stiffness of multiple sites     Kidney stone     Paresthesia 05/01/2015    Portacath in place     right chest    Primary malignant neoplasm of lung ([facility] ) 05/26/2015    Renal carcinoma, left ([place] [address] ) 10/17/2016    Risk for falls     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([place] [address] ) 05/26/2015    Skin cancer     Syncopal episodes 09/01/2015    2    Wears glasses        Past Surgical History:   Procedure Laterality Date    BLADDER STONE REMOVAL  BLADDER STONE REMOVAL N/A 2/9/2021    Procedure: Moiz Diones;  Surgeon: Kilo Beckford MD;  [facility];  Service: Urology    COLONOSCOPY     [place] EXTACTIONS     ESOPHAGOGASTRODUODENOSCOPY      HERNIA REPAIR      HYDROCELE EXCISION / REPAIR Left 01/01/2011    KIDNEY STONE SURGERY      KIDNEY SURGERY      cancer of kidney/cryo of cancer    PORTACATH PLACEMENT      TN COLONOSCOPY FLX DX W/COLLJ SPEC WHEN PFRMD N/A 8/7/2018    Procedure: COLONOSCOPY with polypectomy ;  Surgeon: Ho Groves MD;  [facility];   Service: General    TN CYSTO/URETERO W/LITHOTRIPSY &INDWELL STENT INSRT Left 4/27/2022    Procedure: CYSTO URETEROSCOPY WITH LITHO HOLMIUM LASER, RETROGRADE PYELOGRAM AND INSERTION STENT URETERAL;  Surgeon: Kilo Beckford MD;  [facility];  Service: Urology    TN CYSTOURETHROSCOPY,FULGUR 2-5 CM LESN N/A 2/9/2021    Procedure: Jose F Schaffer, T U R B T ;  Surgeon: Kilo Beckford MD;  [facility];  Service: Urology    TN LAP, RECURRENT INCISIONAL HERNIA REPAIR,INCARCERATED Bilateral 8/8/2018    Procedure: REPAIR HERNIA INGUINAL LAPAROSCOPIC W/ ROBOTICS W/ MESH;  Surgeon: Ho Groves MD;  [facility];  Service: General    TN REMOVE BLADDER STONE,<2 5 CM N/A 11/27/2018    Procedure: Mari Mosqueda HOLMIUM LASER,BLADDE BIOPSY;  Surgeon: Kilo Beckford MD;  [facility];  Service: Urology    TN REMOVE BLADDER STONE,<2 5 CM N/A 4/27/2022    Procedure: Austin Cullen for bladder stones;  Surgeon: Kilo Beckford MD;  [facility];  Service: Urology    SKIN BIOPSY      SKIN CANCER EXCISION      17 surgeries, basal cell    TONSILLECTOMY         Social History     Socioeconomic History    Marital status:      Spouse name: None    Number of children: None    Years of education: None    Highest education level: None   Occupational History    None   Tobacco Use    Smoking status: Light Tobacco Smoker     Packs/day: 0 50     Years: 48 00     Pack years: 24 00     Types: Cigarettes    Smokeless tobacco: Never Used    Tobacco comment: smoked 4/27 0530    Vaping Use    Vaping Use: Never used   Substance and Sexual Activity    Alcohol use: Yes     Comment: 3-4 times a year   Drug use: No    Sexual activity: Not Currently     Partners: Female     Birth control/protection: Abstinence, Condom Male   Other Topics Concern    None   Social History Narrative    None     Social Determinants of Health     Financial Resource Strain: Not on file   Food Insecurity: Not on file   Transportation Needs: Not on file   Physical Activity: Not on file   Stress: Not on file   Social Connections: Not on file   Intimate Partner Violence: Not on file   Housing Stability: Not on file       Family History   Problem Relation Age of Onset    Cancer Mother         EYE    Cancer Father     Colon cancer Father     Cancer Brother        No Known Allergies      Current Outpatient Medications:     Ascorbic Acid (VITAMIN C) 500 MG CAPS, Take 500 mg by mouth daily  , Disp: , Rfl:     Multiple Vitamins-Minerals (MULTIVITAMIN ADULT PO), Take 1 tablet by mouth daily  , Disp: , Rfl:     nitrofurantoin (MACROBID) 100 mg capsule, Take 1 capsule (100 mg total) by mouth 2 (two) times a day, Disp: 32 capsule, Rfl: 0      Physical Exam:  /66 (BP Location: Left arm, Patient Position: Sitting, Cuff Size: Adult)   Pulse 85   Temp (!) 97 3 °F (36 3 °C) (Tympanic)   Resp 18   Ht 5' 10" (1 778 m)   Wt 72 3 kg (159 lb 6 4 oz)   SpO2 95%   BMI 22 87 kg/m²     Physical Exam  Constitutional:       Appearance: He is well-developed  HENT:      Head: Normocephalic and atraumatic  Eyes:      General: No scleral icterus  Right eye: No discharge  Left eye: No discharge  Conjunctiva/sclera: Conjunctivae normal       Pupils: Pupils are equal, round, and reactive to light     Neck:      Thyroid: No thyromegaly  Trachea: No tracheal deviation  Cardiovascular:      Rate and Rhythm: Normal rate and regular rhythm  Heart sounds: Normal heart sounds  No murmur heard  No friction rub  Pulmonary:      Effort: Pulmonary effort is normal  No respiratory distress  Breath sounds: Normal breath sounds  No wheezing or rales  Chest:      Chest wall: No tenderness  Abdominal:      General: There is no distension  Palpations: Abdomen is soft  There is no hepatomegaly or splenomegaly  Tenderness: There is no abdominal tenderness  There is no guarding or rebound  Musculoskeletal:         General: No tenderness or deformity  Normal range of motion  Cervical back: Normal range of motion and neck supple  Lymphadenopathy:      Cervical: No cervical adenopathy  Skin:     General: Skin is warm and dry  Coloration: Skin is not pale  Findings: No erythema or rash  Neurological:      Mental Status: He is alert and oriented to person, place, and time  Cranial Nerves: No cranial nerve deficit  Coordination: Coordination normal       Deep Tendon Reflexes: Reflexes are normal and symmetric  Psychiatric:         Behavior: Behavior normal          Thought Content:  Thought content normal          Judgment: Judgment normal            Labs:  Lab Results   Component Value Date    WBC 11 77 (H) 09/06/2022    HGB 15 2 09/06/2022    HCT 45 0 09/06/2022    MCV 92 09/06/2022     09/06/2022     Lab Results   Component Value Date     06/11/2018    K 4 3 09/06/2022     09/06/2022    CO2 25 09/06/2022    ANIONGAP 11 06/11/2018    BUN 19 09/06/2022    CREATININE 0 84 09/06/2022    GLUCOSE 163 (H) 06/11/2018    GLUF 93 09/06/2022    CALCIUM 8 7 09/06/2022    AST 29 09/06/2022    ALT 36 09/06/2022    ALKPHOS 116 09/06/2022    PROT 6 8 06/11/2018    BILITOT 0 9 06/11/2018    EGFR 88 09/06/2022     No results found for: TSH    Patient voiced understanding and agreement in the above discussion  Aware to contact our office with questions/symptoms in the interim

## 2022-10-11 ENCOUNTER — APPOINTMENT (OUTPATIENT)
Dept: LAB | Facility: HOSPITAL | Age: 72
End: 2022-10-11
Attending: INTERNAL MEDICINE
Payer: MEDICARE

## 2022-10-11 DIAGNOSIS — C34.12 PRIMARY MALIGNANT NEOPLASM OF BRONCHUS OF LEFT UPPER LOBE (HCC): ICD-10-CM

## 2022-10-11 DIAGNOSIS — C64.2 RENAL CELL CARCINOMA OF LEFT KIDNEY (HCC): ICD-10-CM

## 2022-10-11 LAB
ALBUMIN SERPL BCP-MCNC: 4.3 G/DL (ref 3.5–5)
ALP SERPL-CCNC: 118 U/L (ref 43–122)
ALT SERPL W P-5'-P-CCNC: 43 U/L
ANION GAP SERPL CALCULATED.3IONS-SCNC: 9 MMOL/L (ref 5–14)
AST SERPL W P-5'-P-CCNC: 29 U/L (ref 17–59)
BASOPHILS # BLD AUTO: 0.1 THOUSANDS/ΜL (ref 0–0.1)
BASOPHILS NFR BLD AUTO: 1 % (ref 0–1)
BILIRUB SERPL-MCNC: 1 MG/DL (ref 0.2–1)
BUN SERPL-MCNC: 15 MG/DL (ref 5–25)
CALCIUM SERPL-MCNC: 9.2 MG/DL (ref 8.4–10.2)
CHLORIDE SERPL-SCNC: 103 MMOL/L (ref 96–108)
CO2 SERPL-SCNC: 27 MMOL/L (ref 21–32)
CREAT SERPL-MCNC: 0.88 MG/DL (ref 0.7–1.5)
EOSINOPHIL # BLD AUTO: 0.14 THOUSAND/ΜL (ref 0–0.61)
EOSINOPHIL NFR BLD AUTO: 1 % (ref 0–6)
ERYTHROCYTE [DISTWIDTH] IN BLOOD BY AUTOMATED COUNT: 12.4 % (ref 11.6–15.1)
GFR SERPL CREATININE-BSD FRML MDRD: 87 ML/MIN/1.73SQ M
GLUCOSE P FAST SERPL-MCNC: 124 MG/DL (ref 70–99)
HCT VFR BLD AUTO: 49.2 % (ref 36.5–49.3)
HGB BLD-MCNC: 15.7 G/DL (ref 12–17)
IMM GRANULOCYTES # BLD AUTO: 0.05 THOUSAND/UL (ref 0–0.2)
IMM GRANULOCYTES NFR BLD AUTO: 0 % (ref 0–2)
LYMPHOCYTES # BLD AUTO: 2.75 THOUSANDS/ΜL (ref 0.6–4.47)
LYMPHOCYTES NFR BLD AUTO: 23 % (ref 14–44)
MAGNESIUM SERPL-MCNC: 2 MG/DL (ref 1.6–2.3)
MCH RBC QN AUTO: 30.5 PG (ref 26.8–34.3)
MCHC RBC AUTO-ENTMCNC: 31.9 G/DL (ref 31.4–37.4)
MCV RBC AUTO: 96 FL (ref 82–98)
MONOCYTES # BLD AUTO: 0.54 THOUSAND/ΜL (ref 0.17–1.22)
MONOCYTES NFR BLD AUTO: 5 % (ref 4–12)
NEUTROPHILS # BLD AUTO: 8.54 THOUSANDS/ΜL (ref 1.85–7.62)
NEUTS SEG NFR BLD AUTO: 70 % (ref 43–75)
NRBC BLD AUTO-RTO: 0 /100 WBCS
PLATELET # BLD AUTO: 220 THOUSANDS/UL (ref 149–390)
PMV BLD AUTO: 9.9 FL (ref 8.9–12.7)
POTASSIUM SERPL-SCNC: 4.7 MMOL/L (ref 3.5–5.3)
PROT SERPL-MCNC: 7.5 G/DL (ref 6.4–8.4)
PSA SERPL-MCNC: 6.8 NG/ML (ref 0–4)
RBC # BLD AUTO: 5.15 MILLION/UL (ref 3.88–5.62)
SODIUM SERPL-SCNC: 139 MMOL/L (ref 135–147)
WBC # BLD AUTO: 12.12 THOUSAND/UL (ref 4.31–10.16)

## 2022-10-11 PROCEDURE — 80053 COMPREHEN METABOLIC PANEL: CPT

## 2022-10-11 PROCEDURE — 85025 COMPLETE CBC W/AUTO DIFF WBC: CPT

## 2022-10-11 PROCEDURE — 83735 ASSAY OF MAGNESIUM: CPT | Performed by: INTERNAL MEDICINE

## 2022-10-11 PROCEDURE — 84153 ASSAY OF PSA TOTAL: CPT | Performed by: INTERNAL MEDICINE

## 2022-10-11 PROCEDURE — 36415 COLL VENOUS BLD VENIPUNCTURE: CPT

## 2022-10-12 PROBLEM — Z13.220 SCREENING FOR HYPERLIPIDEMIA: Status: RESOLVED | Noted: 2019-07-26 | Resolved: 2022-10-12

## 2022-10-14 NOTE — TELEPHONE ENCOUNTER
Called pt and left msg on VM to return call to office    When pt returns call he is to be told due to his last PSA lab from 10/11/22 he should make an appt with [facility] per Dr Ary Cockayne:    Alicia Harrison MD      2:36 PM   Patient should have appointment with the [facility], within one month or so   Rectal exam will be done, and then decide if MRI is the next step

## 2022-11-01 ENCOUNTER — HOSPITAL ENCOUNTER (OUTPATIENT)
Dept: INFUSION CENTER | Facility: HOSPITAL | Age: 72
Discharge: HOME | End: 2022-11-01

## 2022-11-01 DIAGNOSIS — Z45.2 ENCOUNTER FOR CENTRAL LINE CARE: Primary | ICD-10-CM

## 2022-11-01 DIAGNOSIS — C34.12 PRIMARY MALIGNANT NEOPLASM OF BRONCHUS OF LEFT UPPER LOBE (HCC): ICD-10-CM

## 2022-11-04 ENCOUNTER — OFFICE VISIT (OUTPATIENT)
Dept: UROLOGY | Facility: MEDICAL CENTER | Age: 72
End: 2022-11-04

## 2022-11-04 VITALS
DIASTOLIC BLOOD PRESSURE: 72 MMHG | WEIGHT: 164.6 LBS | SYSTOLIC BLOOD PRESSURE: 118 MMHG | BODY MASS INDEX: 23.56 KG/M2 | HEART RATE: 85 BPM | HEIGHT: 70 IN | OXYGEN SATURATION: 96 %

## 2022-11-04 DIAGNOSIS — C64.9 RENAL CELL CARCINOMA, UNSPECIFIED LATERALITY (HCC): ICD-10-CM

## 2022-11-04 DIAGNOSIS — Z85.51 HISTORY OF BLADDER CANCER: ICD-10-CM

## 2022-11-04 DIAGNOSIS — C71.9 MALIGNANT NEOPLASM OF BRAIN, UNSPECIFIED LOCATION (HCC): ICD-10-CM

## 2022-11-04 DIAGNOSIS — N40.2 PROSTATE NODULE: ICD-10-CM

## 2022-11-04 DIAGNOSIS — R97.20 ELEVATED PSA: Primary | ICD-10-CM

## 2022-11-04 RX ORDER — CIPROFLOXACIN 500 MG/1
500 TABLET, FILM COATED ORAL EVERY 12 HOURS SCHEDULED
Qty: 2 TABLET | Refills: 0 | Status: SHIPPED | OUTPATIENT
Start: 2022-11-04 | End: 2022-11-05

## 2022-11-04 NOTE — PROGRESS NOTES
11/4/2022      Chief Complaint   Patient presents with   • Nephrolithiasis     Assessment and Plan    81 y o  male managed by Dr Patty Navarrete    1  Prostate cancer screening/elevated PSA  · PSA performed 10/11/2022 resulted 6 8  · KAT-large firm nodule noted to left-side of prostate  · Recommend moving forward with office biopsy urgently  · Patient in agreement with plan  · Prescription for ciprofloxacin sent to pharmacy on file  · Discussed use of enema prior to procedure  · Repeat PSA in 3 months  · Follow up the office within 1 month for biopsy    2  Nephrolithiasis  · Status post left ureteroscopy with laser lithotripsy 04/27/2022  · Will continue to monitor for formation of future stones  · Discussed dietary behavior modifications to assist in reduction stone formation    3  Left renal cell carcinoma (2016)  · Status post cryo ablation therapy    4  Bladder cancer  · Status post 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 on second opinion from [facility] 02/09/2021    5  History of lung cancer (2018)  · Status post radiation and chemotherapy (2018)    6  History of brain cancer  · Status post radiation therapy    Referral to genetics provided given multiple diagnoses of cancer  History of Present Illness  Chris Maldonado is a 81 y o  male here for follow up evaluation of  rising PSA  PSA trend below  The patient has a history of nephrolithiasis status post left ureteroscopy with laser lithotripsy 04/2022 by Dr Patty Navarrete  On evaluation in the office today patient with no complaints of lower urinary tract symptoms  He reports sensation of complete bladder emptying with urination  He denies dysuria hematuria  Denies suprapubic abdominal pain flank pain  Patient with a significant past medical history of left upper lobe lung cancer, brain cancer, left renal cell carcinoma, benign prostatic hyperplasia, skin cancer    He is status post cystoscopy 12/04/2019 and TURBT with findings consistent with superficial bladder cancer on 2nd opinion from [facility]  Component       PSA, Total   Latest Ref Rng & Units       0 0 - 4 0 ng/mL   11/6/2018      11:30 AM 2 6   9/6/2022      9:43 AM 5 8 (H)   10/11/2022      10:03 AM 6 8 (H)     Review of Systems   Constitutional: Negative for chills and fever  Respiratory: Negative for cough and shortness of breath  Cardiovascular: Negative for chest pain  Gastrointestinal: Negative for abdominal distention, abdominal pain, blood in stool, nausea and vomiting  Genitourinary: Negative for difficulty urinating, dysuria, enuresis, flank pain, frequency, hematuria and urgency  Skin: Negative for rash       Past Medical History  Past Medical History:   Diagnosis Date   • Arthritis    • Balance problems     and" coordination issues/best to walk slow"   • Bladder stones    • Cancer ([facility] )     lung, brain, kidney, spinal   • Colon polyp    • Dysphagia 02/16/2017    "not right now but in the past"   • Environmental and seasonal allergies    • Exercise involving walking     "approx 1 mile 3x/week"   • Eye injury     right,  "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"   • Full dentures    • Hemiparesis ([facility] ) 04/27/2015    left weakness,"due to tumor on brain"   • History of brain tumor     "had radiation for it"   • History of cancer chemotherapy     last tx  5/22/18   • History of GI bleed 2015   • History of kidney cancer     left   • History of kidney stones    • History of radiation therapy    • History of transfusion     5 units of blood 2015   • Hydrocele 11/30/2010   • Inguinal hernia, bilateral    • Joint stiffness of multiple sites    • Kidney stone    • Paresthesia 05/01/2015   • Portacath in place     right chest   • Primary malignant neoplasm of lung ([facility] ) 05/26/2015   • Renal carcinoma, left ([facility] ) 10/17/2016   • Risk for falls    • 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([facility] ) 05/26/2015   • Skin cancer    • Syncopal episodes 09/01/2015    2   • Wears glasses        Past Social History  Past Surgical History:   Procedure Laterality Date   • BLADDER STONE REMOVAL     • BLADDER STONE REMOVAL N/A 2/9/2021    Procedure: Patty Phillip;  Surgeon: Huma Rubio MD;  [facility];  Service: Urology   • COLONOSCOPY     • DENTAL SURGERY  2011    DENTAL EXTACTIONS    • ESOPHAGOGASTRODUODENOSCOPY     • HERNIA REPAIR     • HYDROCELE EXCISION / REPAIR Left 01/01/2011   • KIDNEY STONE SURGERY     • KIDNEY SURGERY      cancer of kidney/cryo of cancer   • PORTACATH PLACEMENT     • CO COLONOSCOPY FLX DX W/COLLJ SPEC WHEN PFRMD N/A 8/7/2018    Procedure: COLONOSCOPY with polypectomy ;  Surgeon: Dalton Medeiros MD;  Location: [facility];   Service: General   • CO CYSTO/URETERO W/LITHOTRIPSY &INDWELL STENT INSRT Left 4/27/2022    Procedure: CYSTO URETEROSCOPY WITH LITHO HOLMIUM LASER, RETROGRADE PYELOGRAM AND INSERTION STENT URETERAL;  Surgeon: Huma Rubio MD;  [facility];  Service: Urology   • CO CYSTOURETHROSCOPY,FULGUR 2-5 CM LESN N/A 2/9/2021    Procedure: CYSTO, T U R B T ;  Surgeon: Huma Rubio MD;  [facility];  Service: Urology   • CO LAP, RECURRENT INCISIONAL HERNIA REPAIR,INCARCERATED Bilateral 8/8/2018    Procedure: REPAIR HERNIA INGUINAL LAPAROSCOPIC W/ ROBOTICS W/ MESH;  Surgeon: Dalton Medeiros MD;  [facility];  Service: General   • CO REMOVE BLADDER STONE,<2 5 CM N/A 11/27/2018    Procedure: Zeyad MERIDAIUM LASER,BLADDE BIOPSY;  Surgeon: Huma Rubio MD;  [facility];  Service: Urology   • CO REMOVE BLADDER STONE,<2 5 CM N/A 4/27/2022    Procedure: Mosie Deepika for bladder stones;  Surgeon: Huma Rubio MD;  [facility];  Service: Urology   • SKIN BIOPSY     • SKIN CANCER EXCISION      17 surgeries, basal cell   • TONSILLECTOMY       Social History     Tobacco Use   Smoking Status Light Tobacco Smoker   • Packs/day: 0 50   • Years: 48 00   • Pack years: 24 00   • Types: Cigarettes   Smokeless Tobacco Never Used   Tobacco Comment    smoked 4/27 0530        Past Family History  Family History   Problem Relation Age of Onset   • Cancer Mother         EYE   • Cancer Father    • Colon cancer Father    • Cancer Brother        Past Social history  Social History     Socioeconomic History   • Marital status:      Spouse name: Not on file   • Number of children: Not on file   • Years of education: Not on file   • Highest education level: Not on file   Occupational History   • Not on file   Tobacco Use   • Smoking status: Light Tobacco Smoker     Packs/day: 0 50     Years: 48 00     Pack years: 24 00     Types: Cigarettes   • Smokeless tobacco: Never Used   • Tobacco comment: smoked 4/27 0530    Vaping Use   • Vaping Use: Never used   Substance and Sexual Activity   • Alcohol use: Not Currently     Comment: 3-4 times a year  • Drug use: No   • Sexual activity: Not Currently     Partners: Female     Birth control/protection: Abstinence, Condom Male   Other Topics Concern   • Not on file   Social History Narrative   • Not on file     Social Determinants of Health     Financial Resource Strain: Not on file   Food Insecurity: Not on file   Transportation Needs: Not on file   Physical Activity: Not on file   Stress: Not on file   Social Connections: Not on file   Intimate Partner Violence: Not on file   Housing Stability: Not on file       Current Medications  Current Outpatient Medications   Medication Sig Dispense Refill   • Ascorbic Acid (VITAMIN C) 500 MG CAPS Take 500 mg by mouth daily       • ciprofloxacin (CIPRO) 500 mg tablet Take 1 tablet (500 mg total) by mouth every 12 (twelve) hours for 2 doses 2 tablet 0   • Multiple Vitamins-Minerals (MULTIVITAMIN ADULT PO) Take 1 tablet by mouth daily         No current facility-administered medications for this visit         Allergies  No Known Allergies      The following portions of the patient's history were reviewed and updated as appropriate: allergies, current medications, past medical history, past social history, past surgical history and problem list       Vitals  Vitals:    11/04/22 1605   BP: 118/72   BP Location: Left arm   Patient Position: Sitting   Cuff Size: Adult   Pulse: 85   SpO2: 96%   Weight: 74 7 kg (164 lb 9 6 oz)   Height: 5' 10" (1 778 m)           Physical Exam  Physical Exam  Vitals reviewed  Constitutional:       General: He is not in acute distress  Appearance: Normal appearance  He is normal weight  HENT:      Head: Normocephalic  Cardiovascular:      Rate and Rhythm: Normal rate  Pulmonary:      Effort: No respiratory distress  Breath sounds: Normal breath sounds  Genitourinary:     Comments: KAT-large, for nodule noted to left sided prostate  Skin:     General: Skin is warm and dry  Neurological:      General: No focal deficit present  Mental Status: He is alert and oriented to person, place, and time     Psychiatric:         Mood and Affect: Mood normal          Behavior: Behavior normal        Results  No results found for this or any previous visit (from the past 1 hour(s)) ]  Lab Results   Component Value Date    PSA 6 8 (H) 10/11/2022    PSA 5 8 (H) 09/06/2022    PSA 2 6 11/06/2018     Lab Results   Component Value Date    GLUCOSE 163 (H) 06/11/2018    CALCIUM 9 2 10/11/2022     06/11/2018    K 4 7 10/11/2022    CO2 27 10/11/2022     10/11/2022    BUN 15 10/11/2022    CREATININE 0 88 10/11/2022     Lab Results   Component Value Date    WBC 12 12 (H) 10/11/2022    HGB 15 7 10/11/2022    HCT 49 2 10/11/2022    MCV 96 10/11/2022     10/11/2022     Orders  Orders Placed This Encounter   Procedures   • PSA Total, Diagnostic     Standing Status:   Future     Standing Expiration Date:   11/4/2023   • Ambulatory Referral to Genetics     Standing Status:   Future     Standing Expiration Date:   11/4/2023     Referral Priority:   Routine     Referral Type:   Consult - AMB Referral Reason:   Specialty Services Required     Requested Specialty:   Genetics     Number of Visits Requested:   1     Expiration Date:   11/4/2023       LOBITO Garza

## 2022-11-11 ENCOUNTER — TELEPHONE (OUTPATIENT)
Dept: GENETICS | Facility: CLINIC | Age: 72
End: 2022-11-11

## 2022-11-11 ENCOUNTER — TELEPHONE (OUTPATIENT)
Dept: UROLOGY | Facility: MEDICAL CENTER | Age: 72
End: 2022-11-11

## 2022-11-11 NOTE — TELEPHONE ENCOUNTER
Patient seen by Nighat Andre in [facility]    Patient called stating he was seen in the office on 11/04/22 and he is scheduled for biopsy in the office on 12/09/22 with Dr Ruiz Brood  He has a prescription with 2 tablets and he wants to know when is he to take it  He has a lot of questions on instruction of procedure         Patient can be reached at [phone] (

## 2022-11-11 NOTE — TELEPHONE ENCOUNTER
I called [facility] to schedule a new patient appointment with the Cancer Risk and Genetics Program       Outcome:   I left a voice message encouraging the patient to call the genetics team at [phone] to schedule this appointment  Follow-up: We will make one more attempt to reach the patient

## 2022-11-14 ENCOUNTER — TELEPHONE (OUTPATIENT)
Dept: GENETICS | Facility: CLINIC | Age: 72
End: 2022-11-14

## 2022-11-14 NOTE — TELEPHONE ENCOUNTER
I called Steph Sorensen a second time to schedule a new patient appointment with the Cancer Risk and Genetics Program       Outcome:   I left a voice message encouraging the patient to call the genetics team at [phone] to schedule this appointment  Follow-up:   At this time the referral will be closed and we will wait to hear back from the patient regarding scheduling this appointment  I also sent a [employer] message with our contact information for scheduling

## 2022-11-16 ENCOUNTER — TELEPHONE (OUTPATIENT)
Dept: GENETICS | Facility: CLINIC | Age: 72
End: 2022-11-16

## 2022-11-16 NOTE — TELEPHONE ENCOUNTER
Malgorzata Guillaume called to schedule a new patient appointment with the Cancer Risk and Genetics Program       Outcome:  Genetics appointment scheduled for 3/23/2023    Personal/Family History Related to Appointment:  Hx of lung, renal and bladder cancer, brain mets, and skin cancer; fhx of eye and colon cancer     History of Genetic Testing:  Patient reports no personal or family history of genetic testing    Genetics Family History Questionnaire:  I confirmed the patient's e-mail on file as the best e-mail to send an invite link for our genetics family history intake

## 2022-11-16 NOTE — TELEPHONE ENCOUNTER
I called Rosie Roger returning his voicemail to schedule a new patient appointment with the Cancer Risk and Genetics Program       Outcome:   I left a voice message encouraging the patient to call the genetics team at [phone] to schedule this appointment

## 2022-12-09 ENCOUNTER — PROCEDURE VISIT (OUTPATIENT)
Dept: UROLOGY | Facility: MEDICAL CENTER | Age: 72
End: 2022-12-09

## 2022-12-09 VITALS
DIASTOLIC BLOOD PRESSURE: 82 MMHG | BODY MASS INDEX: 23.13 KG/M2 | HEIGHT: 70 IN | HEART RATE: 92 BPM | SYSTOLIC BLOOD PRESSURE: 132 MMHG | WEIGHT: 161.6 LBS

## 2022-12-09 DIAGNOSIS — R97.20 ELEVATED PSA: Primary | ICD-10-CM

## 2022-12-09 RX ORDER — CEFTRIAXONE 1 G/1
1000 INJECTION, POWDER, FOR SOLUTION INTRAMUSCULAR; INTRAVENOUS ONCE
Status: COMPLETED | OUTPATIENT
Start: 2022-12-09 | End: 2022-12-09

## 2022-12-09 RX ADMIN — CEFTRIAXONE 1000 MG: 1 INJECTION, POWDER, FOR SOLUTION INTRAMUSCULAR; INTRAVENOUS at 09:46

## 2022-12-09 NOTE — PROGRESS NOTES
Biopsy prostate     Date/Time 12/9/2022 10:11 AM     Performed by  Mundo Johnson MD     Authorized by Mundo Johnson MD      Universal Protocol   Consent: Verbal consent obtained  Written consent obtained  Risks and benefits: risks, benefits and alternatives were discussed  Consent given by: patient  Patient understanding: patient states understanding of the procedure being performed  Patient consent: the patient's understanding of the procedure matches consent given  Procedure consent: procedure consent matches procedure scheduled  Required items: required blood products, implants, devices, and special equipment available  Patient identity confirmed: verbally with patient        Local anesthesia used: yes      Anesthesia: local infiltration and nerve block     Anesthesia   Local anesthesia used: yes  Local Anesthetic: lidocaine 1% without epinephrine  Anesthetic total: 10 mL     Sedation   Patient sedated: no        Specimen: yes    Culture: no   Procedure Details   Procedure Notes: The patient was brought to the procedure room and properly identified  Rocephin was given intramuscularly as ordered  The patient had also taken oral antibiotic  The patient was then placed in the left lateral position  Digital rectal examination was performed  KAT reveals firm area left side of prostate  Betadine was instilled into the rectum  The transrectal ultrasound probe was placed in the rectum  Transrectal ultrasound of prostate then commenced  Findings on ultrasound: Normal seminal vesicles  1 cm hypoechoic area left base  The remainder of the prostate is unremarkable  The visualized bladder is unremarkable  The prostate measures 4 6 cm and length by 3 83 cm in height and 5 6 cm in width  Estimated prostate size is 51 g  After completion  of the ultrasound a prostate block was administered in standard fashion using 2% xylocaine without epinephrine and a spinal needle      Transrectal ultrasound-guided biopsies were then obtained  12 biopsies were obtained with standard template  Biopsies were directed medially and laterally on both sides of the gland at the base, mid gland and apex  The hypoechoic area noted toward the left base was biopsied  A digital biopsy was also performed of the firm area on the left side  No cristobal prostatic hematoma was noted  The procedure was well tolerated  The patient was given discharge instructions  He left the procedure room in satisfactory condition    Patient Transportation: confirmed  Patient tolerance: patient tolerated the procedure well with no immediate complications

## 2022-12-27 ENCOUNTER — HOSPITAL ENCOUNTER (OUTPATIENT)
Dept: INFUSION CENTER | Facility: HOSPITAL | Age: 72
Discharge: HOME | End: 2022-12-27

## 2022-12-27 DIAGNOSIS — Z45.2 ENCOUNTER FOR CENTRAL LINE CARE: Primary | ICD-10-CM

## 2022-12-27 DIAGNOSIS — C34.12 PRIMARY MALIGNANT NEOPLASM OF BRONCHUS OF LEFT UPPER LOBE (HCC): ICD-10-CM

## 2023-01-03 ENCOUNTER — OFFICE VISIT (OUTPATIENT)
Dept: UROLOGY | Facility: MEDICAL CENTER | Age: 73
End: 2023-01-03

## 2023-01-03 VITALS
HEIGHT: 70 IN | WEIGHT: 161 LBS | HEART RATE: 102 BPM | DIASTOLIC BLOOD PRESSURE: 60 MMHG | BODY MASS INDEX: 23.05 KG/M2 | SYSTOLIC BLOOD PRESSURE: 108 MMHG

## 2023-01-03 DIAGNOSIS — C61 PROSTATE CANCER (HCC): Primary | ICD-10-CM

## 2023-01-03 NOTE — ASSESSMENT & PLAN NOTE
Pathology is reviewed  6 of 12 cores are positive for grade 1 prostate cancer  The patient's most recent PSA was 6 8  Prostate was estimated to be 51 g on ultrasound  PSA density is 0 13  I reviewed the risk stratification with the patient based on NCCN guidelines  He falls into the low risk category  We then reviewed options  The patient has a history of lung cancer and bladder cancer  The lung cancer was metastatic  Options were discussed  We reviewed the NCCN guidelines  I recommended pursuing active surveillance  We will plan to have him return in 6 months  We will repeat a PSA prior to that visit  We will then consider multi parametric MRI of the prostate and then confirmatory biopsy

## 2023-01-03 NOTE — PROGRESS NOTES
Assessment/Plan:    Prostate cancer [facility])  Pathology is reviewed  6 of 12 cores are positive for grade 1 prostate cancer  The patient's most recent PSA was 6 8  Prostate was estimated to be 51 g on ultrasound  PSA density is 0 13  I reviewed the risk stratification with the patient based on NCCN guidelines  He falls into the low risk category  We then reviewed options  The patient has a history of lung cancer and bladder cancer  The lung cancer was metastatic  Options were discussed  We reviewed the NCCN guidelines  I recommended pursuing active surveillance  We will plan to have him return in 6 months  We will repeat a PSA prior to that visit  We will then consider multi parametric MRI of the prostate and then confirmatory biopsy  Diagnoses and all orders for this visit:    Prostate cancer ([facility] )  -     PSA Total, Diagnostic; Future  -     Comprehensive metabolic panel; Future          Subjective:      Patient ID: Juanita Cm is a 81 y o  male  Chief complaint: Elevated PSA    HPI: 45-year-old male followed for the above complaints  The patient underwent transrectal ultrasound of the prostate with biopsy approximately 1 month ago  He tolerated the procedure well  He is voiding adequately  Is no gross hematuria, dysuria or symptoms of infection  He has no fever  Returns to the office today to review his pathology  The following portions of the patient's history were reviewed and updated as appropriate: allergies, current medications, past family history, past medical history, past social history, past surgical history and problem list     Review of Systems   Constitutional: Negative for chills, diaphoresis, fatigue and fever  HENT: Negative  Eyes: Positive for visual disturbance  Respiratory: Negative  Cardiovascular: Negative  Endocrine: Negative  Genitourinary:        See HPI   Musculoskeletal: Negative  Skin: Negative  Allergic/Immunologic: Negative  Neurological: Negative  Hematological: Negative  Psychiatric/Behavioral: Negative  Objective:      /60 (BP Location: Left arm, Patient Position: Sitting, Cuff Size: Large)   Pulse 102   Ht 5' 10" (1 778 m)   Wt 73 kg (161 lb)   BMI 23 10 kg/m²          Physical Exam  Constitutional:       Appearance: Normal appearance  He is normal weight  HENT:      Head: Normocephalic and atraumatic  Cardiovascular:      Rate and Rhythm: Normal rate  Pulmonary:      Effort: Pulmonary effort is normal    Abdominal:      General: Abdomen is flat  Musculoskeletal:         General: Normal range of motion  Cervical back: Normal range of motion  Skin:     General: Skin is warm  Neurological:      General: No focal deficit present  Mental Status: He is alert  Psychiatric:         Mood and Affect: Mood normal          Behavior: Behavior normal          Thought Content:  Thought content normal          Judgment: Judgment normal

## 2023-01-26 ENCOUNTER — OFFICE VISIT (OUTPATIENT)
Dept: FAMILY MEDICINE CLINIC | Facility: CLINIC | Age: 73
End: 2023-01-26

## 2023-01-26 VITALS
HEART RATE: 95 BPM | SYSTOLIC BLOOD PRESSURE: 110 MMHG | BODY MASS INDEX: 23.56 KG/M2 | WEIGHT: 164.6 LBS | HEIGHT: 70 IN | TEMPERATURE: 96.6 F | DIASTOLIC BLOOD PRESSURE: 68 MMHG | OXYGEN SATURATION: 100 %

## 2023-01-26 DIAGNOSIS — F17.200 SMOKER: ICD-10-CM

## 2023-01-26 DIAGNOSIS — R73.9 ELEVATED BLOOD SUGAR: ICD-10-CM

## 2023-01-26 DIAGNOSIS — C34.12 PRIMARY MALIGNANT NEOPLASM OF BRONCHUS OF LEFT UPPER LOBE (HCC): Primary | ICD-10-CM

## 2023-01-26 DIAGNOSIS — C64.2 RENAL CELL CARCINOMA OF LEFT KIDNEY (HCC): ICD-10-CM

## 2023-01-26 DIAGNOSIS — C61 PROSTATE CANCER (HCC): ICD-10-CM

## 2023-01-26 NOTE — PROGRESS NOTES
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) - Primary       Genitourinary    Prostate cancer ([facility] )       Other    Elevated blood sugar        Preventive health issues were discussed with patient, and age appropriate screening tests were ordered as noted in patient's After Visit Summary  Personalized health advice and appropriate referrals for health education or preventive services given if needed, as noted in patient's After Visit Summary       History of Present Illness:     Patient presents for a Medicare Wellness Visit    HPI   Patient Care Team:  Marion Edmondson MD as PCP - General (Family Medicine)  Panda Ramírez MD as Endoscopist     Review of Systems:     Review of Systems     Problem List:     Patient Active Problem List   Diagnosis   • Family history of colon cancer   • 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 [facility])   • 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[facility])   • Bladder stone   • Urge incontinence   • BPH with urinary obstruction   • Renal cell carcinoma of left kidney ([facility] )   • Encounter for central line care   • Elevated blood sugar   • Bladder mass   • Smoker   • Colon polyps   • Bronchitis   • High prostate specific antigen (PSA)   • Prostate cancer [facility])      Past Medical and Surgical History:     Past Medical History:   Diagnosis Date   • Arthritis    • Balance problems     and" coordination issues/best to walk slow"   • Bladder stones    • Cancer ([facility] )     lung, brain, kidney, spinal   • Colon polyp    • Dysphagia 02/16/2017    "not right now but in the past"   • Environmental and seasonal allergies    • Exercise involving walking     "approx 1 mile 3x/week"   • Eye injury     right,  "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"   • Full dentures    • Hemiparesis ([facility] ) 04/27/2015    left weakness,"due to tumor on brain"   • History of brain tumor     "had radiation for it"   • History of cancer chemotherapy     last tx  5/22/18   • History of GI bleed 2015   • History of kidney cancer     left   • History of kidney stones    • History of radiation therapy    • History of transfusion     5 units of blood 2015   • Hydrocele 11/30/2010   • Inguinal hernia, bilateral    • Joint stiffness of multiple sites    • Kidney stone    • Paresthesia 05/01/2015   • Portacath in place     right chest   • Primary malignant ne[facility] of [facility] ) 05/26/2015   • Renal carcinoma, left ([facility] ) 10/17/2016   • Risk for falls    • 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([facility] ) 05/26/2015   • Skin cancer    • Syncopal episodes 09/01/2015    2   • 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   • Wears glasses      Past Surgical History:   Procedure Laterality Date   • BLADDER STONE REMOVAL     • BLADDER STONE REMOVAL N/A 2/9/2021    Procedure: Angel Leslie;  Surgeon: Fredi Garrett MD;  [facility];  Service: Urology   • COLONOSCOPY     • DENTAL SURGERY  2011    DENTAL EXTACTIONS    • ESOPHAGOGASTRODUODENOSCOPY     • HERNIA REPAIR     • HYDROCELE EXCISION / REPAIR Left 01/01/2011   • KIDNEY STONE SURGERY     • KIDNEY SURGERY      cancer of kidney/cryo of cancer   • PORTACATH PLACEMENT     • TX COLONOSCOPY FLX DX W/COLLJ SPEC WHEN PFRMD N/A 8/7/2018    Procedure: COLONOSCOPY with polypectomy ;  Surgeon: Fernando Valencia MD;  [facility];   Service: General   • TX CYSTO/URETERO W/LITHOTRIPSY &INDWELL STENT INSRT Left 4/27/2022    Procedure: CYSTO URETEROSCOPY WITH LITHO HOLMIUM LASER, RETROGRADE PYELOGRAM AND INSERTION STENT URETERAL;  Surgeon: Fredi Garrett MD;  [facility];  Service: Urology   • TX CYSTOURETHROSCOPY W/DEST &/RMVL MED BLADDER JACOB N/A 2/9/2021    Procedure: Guyann Boas, T U R B T ;  Surgeon: Fredi Garrett MD;  [facility];  Service: Urology   • TX LAPS RPR RECURRENT INCAL HRNA NCRC8/STRANGULATED Bilateral 8/8/2018    Procedure: REPAIR HERNIA INGUINAL LAPAROSCOPIC W/ ROBOTICS W/ MESH;  Surgeon: Fernando Valencia MD;  [facility];  Service: General   • RI LITHOLAPAXY SMPL/SM <2 5 CM N/A 11/27/2018    Procedure: Arlene Inks HOLMIUM LASER,BLADDE BIOPSY;  Surgeon: Fredi Garrett MD;  [facility];  Service: Urology   • RI LITHOLAPAXY SMPL/SM <2 5 CM N/A 4/27/2022    Procedure: Sujata Esquivel for bladder stones;  Surgeon: Fredi Garrett MD;  [facility];  Service: Urology   • SKIN BIOPSY     • SKIN CANCER EXCISION      17 surgeries, basal cell   • TONSILLECTOMY        Family History:     Family History   Problem Relation Age of Onset   • Cancer Mother         EYE   • Cancer Father    • Colon cancer Father    • Cancer Brother       Social History:     Social History     Socioeconomic History   • Marital status:      Spouse name: None   • Number of children: None   • Years of education: None   • Highest education level: None   Occupational History   • None   Tobacco Use   • Smoking status: Light Smoker     Packs/day: 0 50     Years: 48 00     Pack years: 24 00     Types: Cigarettes   • Smokeless tobacco: Never   • Tobacco comments:     smoked 4/27 0530    Vaping Use   • Vaping Use: Never used   Substance and Sexual Activity   • Alcohol use: Yes     Comment: 3-4 times a year  • Drug use: No   • Sexual activity: Not Currently     Partners: Female     Birth control/protection: Abstinence, Condom Male   Other Topics Concern   • None   Social History Narrative   • None     Social Determinants of Health     Financial Resource Strain: Low Risk    • Difficulty of Paying Living Expenses: Not very hard   Food Insecurity: Not on file   Transportation Needs: No Transportation Needs   • Lack of Transportation (Medical): No   • Lack of Transportation (Non-Medical):  No   Physical Activity: Not on file   Stress: Not on file   Social Connections: Not on file   Intimate Partner Violence: Not on file   Housing Stability: Not on file      Medications and Allergies:     Current Outpatient Medications   Medication Sig Dispense Refill   • Ascorbic Acid (VITAMIN C) 500 MG CAPS Take 500 mg by mouth daily       • Multiple Vitamins-Minerals (MULTIVITAMIN ADULT PO) Take 1 tablet by mouth daily         No current facility-administered medications for this visit  No Known Allergies   Immunizations:     Immunization History   Administered Date(s) Administered   • COVID-19 MODERNA VACC 0 25 ML IM BOOSTER 12/15/2021   • COVID-19 MODERNA VACC 0 5 ML IM 03/22/2021, 04/21/2021, 12/15/2021   • COVID-19 Moderna Vac BIVALENT 12 Yr+ IM (BOOSTER ONLY) 0 5 ML 10/03/2022   • INFLUENZA 11/03/2017, 10/09/2018, 10/05/2020, 10/11/2021, 10/03/2022   • Influenza Split High Dose Preservative Free IM 10/09/2018, 09/09/2019   • Pneumococcal Conjugate 13-Valent 01/25/2019   • Pneumococcal Polysaccharide PPV23 12/31/2020      Health Maintenance:         Topic Date Due   • Hepatitis C Screening  Never done   • Colorectal Cancer Screening  08/07/2028         Topic Date Due   • COVID-19 Vaccine (3 - Moderna risk series) 10/31/2022      Medicare Screening Tests and Risk Assessments:     Patrick Charles is here for his Subsequent Wellness visit  Last Medicare Wellness visit information reviewed, patient interviewed, no change since last AWV  Depression Screening:   PHQ-2 Score: 1      Previous Hospitalizations:   Any hospitalizations or ED visits within the last 12 months?: No      Hospitalization Comments: As above     Advance Care Planning:   Living will: Yes    Durable POA for healthcare:  Yes    Advanced directive: Yes      Cognitive Screening:   Provider or family/friend/caregiver concerned regarding cognition?: No    PREVENTIVE SCREENINGS      Cardiovascular Screening:    General: Screening Current      Diabetes Screening:     General: Screening Current      Colorectal Cancer Screening:     General: Screening Current      Prostate Cancer Screening: General: History Prostate Cancer      Osteoporosis Screening:    General: Screening Not Indicated      Abdominal Aortic Aneurysm (AAA) Screening:    Risk factors include: age between 73-69 yo and tobacco use        Lung Cancer Screening:     General: Screening Not Indicated and History Lung Cancer    Screening, Brief Intervention, and Referral to Treatment (SBIRT)      Brief Intervention  Alcohol & drug use screenings were reviewed  No concerns regarding substance use disorder identified  No results found       Physical Exam:     /68 (BP Location: Left arm, Patient Position: Sitting, Cuff Size: Large)   Pulse 95   Temp (!) 96 6 °F (35 9 °C) (Temporal)   Ht 5' 10" (1 778 m)   Wt 74 7 kg (164 lb 9 6 oz)   SpO2 100%   BMI 23 62 kg/m²     Physical Exam     Cosme Meraz MD

## 2023-01-26 NOTE — PROGRESS NOTES
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)    Prostate cancer ([facility] [address] )    Elevated blood sugar          Subjective:      Patient ID: Ubaldo Early is a 72 y o  male  PATIENT RETURNS FOR FOLLOW-UP OF CHRONIC MEDICAL CONDITIONS  ANY HOSPITAL VISITS, EMERGENCY VISITS AND OTHER PROVIDER VISITS SINCE LAST TIME WERE REVIEWED  MEDS WERE REVIEWED AND NO SIDE EFFECTS  NO NEW ISSUES  UNLESS NOTED BELOW  NO NEW MEDICAL PROVIDER REPORTED  THE CHRONIC DISEASES LISTED ABOVE ARE STABLE AND UNCHANGED/ THE PLAN OF CARE FOR THOSE WILL REMAIN UNCHANGED UNLESS NOTED BELOW  Recent eval for elevating PSA / bx = Ca   Asx   Diarrhea 2 mos ; The following portions of the patient's history were reviewed and updated as appropriate: allergies, current medications, past family history, past medical history, past social history, past surgical history and problem list     Review of Systems   Constitutional: Negative for activity change and appetite change  HENT: Negative for trouble swallowing  Eyes: Negative for visual disturbance  Respiratory: Negative for cough and shortness of breath  Cardiovascular: Negative for chest pain, palpitations and leg swelling  Gastrointestinal: Negative for abdominal pain and blood in stool  Endocrine: Negative for polyuria  Genitourinary: Negative for difficulty urinating and hematuria  Skin: Negative for rash  Neurological: Negative for dizziness  Psychiatric/Behavioral: Negative for behavioral problems           Objective:  Vitals:    01/26/23 0955   BP: 110/68   BP Location: Left arm   Patient Position: Sitting   Cuff Size: Large   Pulse: 95   Temp: (!) 96 6 °F (35 9 °C)   TempSrc: Temporal   SpO2: 100%   Weight: 74 7 kg (164 lb 9 6 oz)   Height: 5' 10" (1 778 m)      Physical Exam  Constitutional: Appearance: He is well-developed  HENT:      Head: Normocephalic and atraumatic  Eyes:      Conjunctiva/sclera: Conjunctivae normal    Neck:      Thyroid: No thyromegaly  Cardiovascular:      Rate and Rhythm: Normal rate and regular rhythm  Heart sounds: Normal heart sounds  No murmur heard  Pulmonary:      Effort: Pulmonary effort is normal  No respiratory distress  Breath sounds: Normal breath sounds  Musculoskeletal:      Cervical back: Neck supple  Lymphadenopathy:      Cervical: No cervical adenopathy  Skin:     General: Skin is warm and dry  Psychiatric:         Behavior: Behavior normal            Patient's chronic problems that were reviewed today are stable  Recent hospital stays reviewed  Recent labs and imaging reviewed  Recent visits to other providers reviewed  Meds reviewed and no changes made  Appropriate labs and imaging were ordered  Preventive measures appropriate for age and sex were reviewed with patient  Immunizations were updated as appropriate

## 2023-01-26 NOTE — PROGRESS NOTES
Assessment and Plan:     Problem List Items Addressed This Visit    None       Preventive health issues were discussed with patient, and age appropriate screening tests were ordered as noted in patient's After Visit Summary  Personalized health advice and appropriate referrals for health education or preventive services given if needed, as noted in patient's After Visit Summary       History of Present Illness:     Patient presents for a Medicare Wellness Visit    HPI   Patient Care Team:  Lavonne Avalos MD as PCP - General (Family Medicine)  Hiram Lombard, MD as Endoscopist     Review of Systems:     Review of Systems     Problem List:     Patient Active Problem List   Diagnosis   • Family history of colon cancer   • 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 [facility])   • 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[facility])   • Bladder stone   • Urge incontinence   • BPH with urinary obstruction   • Renal cell carcinoma of left kidney ([facility] )   • Encounter for central line care   • Elevated blood sugar   • Bladder mass   • Smoker   • Colon polyps   • Bronchitis   • High prostate specific antigen (PSA)   • Prostate cancer [facility])      Past Medical and Surgical History:     Past Medical History:   Diagnosis Date   • Arthritis    • Balance problems     and" coordination issues/best to walk slow"   • Bladder stones    • Cancer ([facility] )     lung, brain, kidney, spinal   • Colon polyp    • Dysphagia 02/16/2017    "not right now but in the past"   • Environmental and seasonal allergies    • Exercise involving walking     "approx 1 mile 3x/week"   • Eye injury     right,  "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"   • Full dentures    • Hemiparesis ([facility] [address] ) 04/27/2015    left weakness,"due to tumor on brain"   • History of brain tumor     "had radiation for it"   • History of cancer chemotherapy     last tx  5/22/18   • History of GI bleed 2015   • History of kidney cancer     left   • History of kidney stones    • History of radiation therapy    • History of transfusion     5 units of blood 2015   • Hydrocele 11/30/2010   • Inguinal hernia, bilateral    • Joint stiffness of multiple sites    • Kidney stone    • Paresthesia 05/01/2015   • Portacath in place     right chest   • Primary malignant neoplasm of lung ([facility] ) 05/26/2015   • Renal carcinoma, left ([facility] ) 10/17/2016   • Risk for falls    • 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([facility] ) 05/26/2015   • Skin cancer    • Syncopal episodes 09/01/2015    2   • Wears glasses      Past Surgical History:   Procedure Laterality Date   • BLADDER STONE REMOVAL     • BLADDER STONE REMOVAL N/A 2/9/2021    Procedure: Linette Rawlss;  Surgeon: Barber Layne MD;  [facility];  Service: Urology   • COLONOSCOPY     • DENTAL SURGERY  2011    DENTAL EXTACTIONS    • ESOPHAGOGASTRODUODENOSCOPY     • HERNIA REPAIR     • HYDROCELE EXCISION / REPAIR Left 01/01/2011   • KIDNEY STONE SURGERY     • KIDNEY SURGERY      cancer of kidney/cryo of cancer   • PORTACATH PLACEMENT     • WY COLONOSCOPY FLX DX W/COLLJ SPEC WHEN PFRMD N/A 8/7/2018    Procedure: COLONOSCOPY with polypectomy ;  Surgeon: Kurt Tse MD;  [facility];   Service: General   • WY CYSTO/URETERO W/LITHOTRIPSY &INDWELL STENT INSRT Left 4/27/2022    Procedure: CYSTO URETEROSCOPY WITH LITHO HOLMIUM LASER, RETROGRADE PYELOGRAM AND INSERTION STENT URETERAL;  Surgeon: Barber Layne MD;  [facility];  Service: Urology   • WY CYSTOURETHROSCOPY W/DEST &/RMVL MED BLADDER JACOB N/A 2/9/2021    Procedure: NIEVES Ng U R B T ;  Surgeon: Barber Layne MD;  [facility];  Service: Urology   • WY LAPS RPR RECURRENT INCAL HRNA NCRC8/STRANGULATED Bilateral 8/8/2018    Procedure: REPAIR HERNIA INGUINAL LAPAROSCOPIC W/ ROBOTICS W/ MESH;  Surgeon: Kurt Tse MD;  [facility];  Service: General   • WY LITHOLAPAXY SMPL/SM <2 5 CM N/A 11/27/2018    Procedure: Montserrat Blanc LASER,BLADDE BIOPSY;  Surgeon: Juhi Bowens MD;  [facility];  Service: Urology   • CT LITHOLAPAXY SMPL/SM <2 5 CM N/A 4/27/2022    Procedure: Shereen Case for bladder stones;  Surgeon: Juhi Bowens MD;  [facility];  Service: Urology   • SKIN BIOPSY     • SKIN CANCER EXCISION      17 surgeries, basal cell   • TONSILLECTOMY        Family History:     Family History   Problem Relation Age of Onset   • Cancer Mother         EYE   • Cancer Father    • Colon cancer Father    • Cancer Brother       Social History:     Social History     Socioeconomic History   • Marital status:      Spouse name: Not on file   • Number of children: Not on file   • Years of education: Not on file   • Highest education level: Not on file   Occupational History   • Not on file   Tobacco Use   • Smoking status: Light Smoker     Packs/day: 0 50     Years: 48 00     Pack years: 24 00     Types: Cigarettes   • Smokeless tobacco: Never   • Tobacco comments:     smoked 4/27 0530    Vaping Use   • Vaping Use: Never used   Substance and Sexual Activity   • Alcohol use: Not Currently     Comment: 3-4 times a year     • Drug use: No   • Sexual activity: Not Currently     Partners: Female     Birth control/protection: Abstinence, Condom Male   Other Topics Concern   • Not on file   Social History Narrative   • Not on file     Social Determinants of Health     Financial Resource Strain: Not on file   Food Insecurity: Not on file   Transportation Needs: Not on file   Physical Activity: Not on file   Stress: Not on file   Social Connections: Not on file   Intimate Partner Violence: Not on file   Housing Stability: Not on file      Medications and Allergies:     Current Outpatient Medications   Medication Sig Dispense Refill   • Ascorbic Acid (VITAMIN C) 500 MG CAPS Take 500 mg by mouth daily       • Multiple Vitamins-Minerals (MULTIVITAMIN ADULT PO) Take 1 tablet by mouth daily         No current facility-administered medications for this visit  No Known Allergies   Immunizations:     Immunization History   Administered Date(s) Administered   • COVID-19 MODERNA VACC 0 25 ML IM BOOSTER 12/15/2021   • COVID-19 MODERNA VACC 0 5 ML IM 03/22/2021, 04/21/2021, 12/15/2021   • COVID-19 Moderna Vac BIVALENT 6 Yr+ IM (BOOSTER ONLY) 10/03/2022   • INFLUENZA 11/03/2017, 10/09/2018, 10/05/2020, 10/11/2021, 10/03/2022   • Influenza Split High Dose Preservative Free IM 10/09/2018, 09/09/2019   • Pneumococcal Conjugate 13-Valent 01/25/2019   • Pneumococcal Polysaccharide PPV23 12/31/2020      Health Maintenance:         Topic Date Due   • Hepatitis C Screening  Never done   • Colorectal Cancer Screening  08/07/2028         Topic Date Due   • COVID-19 Vaccine (3 - Moderna risk series) 10/31/2022      Medicare Screening Tests and Risk Assessments:     Radha Coyle is here for his Subsequent Wellness visit  Health Risk Assessment:   Patient rates overall health as fair  Patient feels that their physical health rating is same  Patient is satisfied with their life  Eyesight was rated as slightly worse  Hearing was rated as same  Patient feels that their emotional and mental health rating is same  Patients states they are never, rarely angry  Patient states they are sometimes unusually tired/fatigued  Pain experienced in the last 7 days has been none  Patient states that he has experienced no weight loss or gain in last 6 months  Fall Risk Screening: In the past year, patient has experienced: no history of falling in past year      Home Safety:  Patient does not have trouble with stairs inside or outside of their home  Patient has working smoke alarms and has working carbon monoxide detector  Home safety hazards include: none  Nutrition:   Current diet is Regular  Medications:   Patient is currently taking over-the-counter supplements   OTC medications include: see medication list  Patient is able to manage medications  Activities of Daily Living (ADLs)/Instrumental Activities of Daily Living (IADLs):   Walk and transfer into and out of bed and chair?: Yes  Dress and groom yourself?: Yes    Bathe or shower yourself?: Yes    Feed yourself? Yes  Do your laundry/housekeeping?: Yes  Manage your money, pay your bills and track your expenses?: Yes  Make your own meals?: Yes    Do your own shopping?: Yes    Previous Hospitalizations:   Any hospitalizations or ED visits within the last 12 months?: No      Advance Care Planning:   Living will: Yes    Advanced directive: Yes      PREVENTIVE SCREENINGS      Cardiovascular Screening:    General: Screening Current      Diabetes Screening:     General: Screening Current      Colorectal Cancer Screening:     General: Screening Current      Prostate Cancer Screening:    General: History Prostate Cancer      Abdominal Aortic Aneurysm (AAA) Screening:    Risk factors include: age between 73-67 yo and tobacco use        Lung Cancer Screening:     General: Screening Not Indicated and History Lung Cancer    Screening, Brief Intervention, and Referral to Treatment (SBIRT)    Screening  Typical number of drinks in a day: 0  Typical number of drinks in a week: 0  Interpretation: Low risk drinking behavior  Single Item Drug Screening:  How often have you used an illegal drug (including marijuana) or a prescription medication for non-medical reasons in the past year? never    Single Item Drug Screen Score: 0  Interpretation: Negative screen for possible drug use disorder    No results found  Physical Exam:     There were no vitals taken for this visit      Physical Exam     Constanza Goldman MD

## 2023-02-22 ENCOUNTER — HOSPITAL ENCOUNTER (OUTPATIENT)
Dept: INFUSION CENTER | Facility: HOSPITAL | Age: 73
Discharge: HOME | End: 2023-02-22
Attending: INTERNAL MEDICINE

## 2023-02-22 DIAGNOSIS — C34.12 PRIMARY MALIGNANT NEOPLASM OF BRONCHUS OF LEFT UPPER LOBE (HCC): ICD-10-CM

## 2023-02-22 DIAGNOSIS — Z45.2 ENCOUNTER FOR CENTRAL LINE CARE: Primary | ICD-10-CM

## 2023-02-22 NOTE — PROGRESS NOTES
Pt's port flushed per protocol  Brisk blood return and flushes freely  Next apt confirmed  Left unit ambulatory

## 2023-02-27 ENCOUNTER — HOSPITAL ENCOUNTER (OUTPATIENT)
Dept: CT IMAGING | Facility: HOSPITAL | Age: 73
Discharge: HOME | End: 2023-02-27
Attending: INTERNAL MEDICINE

## 2023-02-27 DIAGNOSIS — C64.2 RENAL CELL CARCINOMA OF LEFT KIDNEY (HCC): ICD-10-CM

## 2023-02-27 DIAGNOSIS — C34.12 PRIMARY MALIGNANT NEOPLASM OF BRONCHUS OF LEFT UPPER LOBE (HCC): ICD-10-CM

## 2023-02-27 RX ADMIN — IOHEXOL 100 ML: 350 INJECTION, SOLUTION INTRAVENOUS at 10:18

## 2023-03-10 ENCOUNTER — TELEPHONE (OUTPATIENT)
Dept: HEMATOLOGY ONCOLOGY | Facility: CLINIC | Age: 73
End: 2023-03-10

## 2023-03-10 ENCOUNTER — TELEPHONE (OUTPATIENT)
Dept: GENETICS | Facility: CLINIC | Age: 73
End: 2023-03-10

## 2023-03-10 NOTE — TELEPHONE ENCOUNTER
I left Connor a message to call our office and confirm his genetics appointment on 3/23/2023 at 1:30PM

## 2023-03-13 ENCOUNTER — OFFICE VISIT (OUTPATIENT)
Dept: HEMATOLOGY ONCOLOGY | Facility: CLINIC | Age: 73
End: 2023-03-13

## 2023-03-13 VITALS
DIASTOLIC BLOOD PRESSURE: 70 MMHG | TEMPERATURE: 98.3 F | BODY MASS INDEX: 23.65 KG/M2 | RESPIRATION RATE: 18 BRPM | OXYGEN SATURATION: 97 % | SYSTOLIC BLOOD PRESSURE: 122 MMHG | HEART RATE: 96 BPM | HEIGHT: 70 IN | WEIGHT: 165.2 LBS

## 2023-03-13 DIAGNOSIS — C79.31 SECONDARY MALIGNANT NEOPLASM OF BRAIN AND SPINAL CORD (HCC): ICD-10-CM

## 2023-03-13 DIAGNOSIS — D72.828 OTHER ELEVATED WHITE BLOOD CELL (WBC) COUNT: ICD-10-CM

## 2023-03-13 DIAGNOSIS — C79.49 SECONDARY MALIGNANT NEOPLASM OF BRAIN AND SPINAL CORD (HCC): ICD-10-CM

## 2023-03-13 DIAGNOSIS — C64.2 RENAL CELL CARCINOMA OF LEFT KIDNEY (HCC): ICD-10-CM

## 2023-03-13 DIAGNOSIS — I71.43 INFRARENAL ABDOMINAL AORTIC ANEURYSM (AAA) WITHOUT RUPTURE: ICD-10-CM

## 2023-03-13 DIAGNOSIS — C34.12 PRIMARY MALIGNANT NEOPLASM OF BRONCHUS OF LEFT UPPER LOBE (HCC): Primary | ICD-10-CM

## 2023-03-13 DIAGNOSIS — C61 PROSTATE CA (HCC): ICD-10-CM

## 2023-03-13 NOTE — PROGRESS NOTES
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)  Patientis  with out any obvious hint of recurrence of his metastatic lung cancer based off of today's examination and his recent imaging studies  We will continue to monitor him closely for his history of his 3 primary malignancies  Seems to be scheduled for consultation with oncology genetics in the near future 3/23 which is appropriate  Request he follow-up again with repeat labs/imaging in 6 months or definitely sooner should the need arise  He still has his Port-A-Cath we will continue to maintain this flushing it every 6 to 8 weeks      - CT renal protocol; Future  - CT chest w contrast; Future  - CBC and differential; Future  - Comprehensive metabolic panel; Future  - LD,Blood; Future  - C-reactive protein; Future  - Sedimentation rate, automated; Future    2  Renal cell carcinoma of left kidney ([address] )  Treated with cryoablation 2016; treated medial area appears stable  However it was noted that he has mildly hyperdense lesion of the upper pole of the left kidney measuring 1 1 cm which is stable from his prior study  Retrospective review showed that the lesion was likely evident dating back to 12/2019 but smaller at that time 6 mm  Indolent neoplasm cannot be ruled out  We will plan to pursue 6-month follow-up renal protocol CT abdomen/pelvis and 6 months for close monitoring  However will reach out to his urology team regarding the indeterminate left upper pole lesion to see if they would have any further different recommendations  - CT renal protocol; Future  - CT chest w contrast; Future  - CBC and differential; Future  - Comprehensive metabolic panel; Future  - LD,Blood; Future  - C-reactive protein; Future  - Sedimentation rate, automated; Future    3   Prostate CA [facility])  Patient was recently diagnosed with low risk/grade 1 Chirag 6 prostate cancer  Urology is planning to pursue close surveillance  Has follow-up with them again 7/14/2023     - CT renal protocol; Future  - PSA Total, Diagnostic; Future    4  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)  Status post resection/radiation to the right frontoparietal brain metastasis 2015  He denies any neurological symptoms at this time  His most recent MRI of the brain September 2021 did not show any evidence of recurrence  Will pursue further brain imaging on an as-needed basis  5  Infrarenal abdominal aortic aneurysm (AAA) without rupture  Patient seems to have 3 cm abdominal aortic aneurysm infrarenal stable in size in comparison to prior study  He does not seem to be symptomatic and blood pressure is good 122/70  Initial plan was to continue to monitor AAA on future imaging studies however after further review of imaging study after encounter noticed that he now has penetrating atherosclerotic ulcer  Due to this we will refer him to vascular surgery  Will call patient to notify him about vascular surgery consultation (left message) and arrange as soon as he calls back to confirm     - Ambulatory Referral to Vascular Surgery; Future    6  Other elevated white blood cell (WBC) count  Patient was noted to have mild leukocytosis which is likely reactive in nature due to tobacco abuse  We will continue to monitor closely  - CBC and differential; Future  - Comprehensive metabolic panel; Future  - LD,Blood; Future  - C-reactive protein; Future  - Sedimentation rate, automated; Future    HPI:  Patient presents today for a follow-up visit    He states that he was recently diagnosed with low risk prostate cancer and will be observed closely off treatment by his urology team   He states that he has been having some issues with his eyes he has bilateral cataracts and newly diagnosed macular degeneration on the left side which is being treated by his ophthalmology team   He mentions that he did have a syncopal event recently when he was at a car show in [place]  He was evaluated in the emergency department at [place]  His symptoms were felt to be due to dehydration and not eating as he went all day without consuming any nutrition or drinking anything  His symptoms improved with supportive care and did not recur after episode  Otherwise he has no new complaints  Continues to smoke cigarettes without desire to quit  His most recent laboratory studies from 10/11/2022 showed mild leukocytosis WBC 12 12 with neutrophilia and no obvious sign of immature cells on the peripheral blood, he is not anemic H&H 15 7/49 2, MCV 96 platelet count normal 220  Glucose 124 remaining metabolic panel is normal   PSA 6 8  He did have prostate biopsies done 12/9/2022 which showed prostatic adenocarcinoma Chirag 6, grade 1 in 6 cores  He had repeat imaging CT scan of the chest abdomen and pelvis with contrast 2/27/2023:  IMPRESSION:  1  No evidence of recurrent or metastatic disease in the chest   Stable centrilobular emphysema and left upper lobe posttreatment change  2  Small, mildly hyperdense lesion in the left upper pole which appear to enhance in retrospect on the previous renal protocol CT, demonstrating mild, slow increase in size since 2019  This may represent a small indolent neoplasm which can be confirmed   on by MRI or continued follow-up  3   No evidence of metastatic disease in the abdomen or pelvis  4   Stable ablation site in the left kidney without evidence of recurrent tumor  5   3 cm infrarenal abdominal aortic aneurysm with penetrating atherosclerotic ulcer      Oncology History Overview Note   Patient has a history of tobacco use for more than 40 years and history of 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 which was diagnosed in May of 2015    At that time the patient was found to have multiple brain lesions status post whole brain radiation  He then received 6 cycles of palliative chemotherapy with Alimta and carboplatin  After 5 cycles of treatment he had significant GI bleeding which required ICU hospitalization  After completion of palliative chemotherapy he was maintained on Alimta since October 2015  A brain MRI January 2016 showed multiple bilateral hemorrhagic lesions felt not to be due to malignant process  PET scan January 2016 showed a 1 6 cm metabolically active left lung apical lesion  The patient received radiation treatment palliatively under the care of Dr Natasha Eli to the left apical lung lesion and completed it in April of 2016  He was then restarted on maintenance Alimta  He had an MRI of the abdomen on June 23, 2016 which showed a 3 cm cystic mass with a solid component on the posterior medial aspect of the left kidney, compatible with malignant process  A core biopsy was taking from the left kidney July 12, 2016 which was 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  The patient was then treated with cryotherapy of the left kidney lesion in September of 2016  Patient had a PET-CT scan on March 3, 2017 which was compared to the prior PET scan from November 2016  At this time there is no evidence of residual or recurrent neoplastic disease anywhere in his body  Patient had a CT scan of the chest abdomen and pelvis on August 24, 2017 which showed a 1 3 cm left upper lobe mass versus scarring with adjacent radiation fibrosis  Patient had another MRI of the brain January 8, 2018 which was compared to the previous MRI of the brain in January 2016  This revealed multiple areas of artifact in the supra and infratentorial spaces consistent with hemorrhagic metastasis  The largest lesion was in the frontal lobe and is significantly smaller with only a tiny enhancing component remaining    No new intra-axial lesions were reported  Patient had another CT scan of the chest abdomen and pelvis for close monitoring on June 5th 2018 that was compared with prior study from January 2018  He continues to have a 1 3 cm left lung apex pulmonary nodule with surrounding fibrosis which is stable in appearance  He also has a stable appearance of the treated medial left renal cortex lesion  There is no evidence of metastatic disease within the chest abdomen or pelvis  After lengthy discussion June 2018 patient stated that he is not interested in continuing his maintenance Alimta treatment beyond today's treatment since his CT scan showed stable disease for a long time he has been on Alimta for 2 years  He was told that he seems to be in complete remission but there is a chance that the cancer may reoccur  Patient was interested in the watch and wait type of approach instead of continuing active maintenance Alimta treatment  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 ([facility] [address] )   5/2015 Initial Diagnosis    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)  Stage IV     5/5/2015 - 5/20/2015 Radiation    3150 cGy to large right frontoparietal mass; 3000 cGy to other tumor volumes in brainstem, and bilateral cerebral hemispheres  Radiation oncologist:  Dr Priscila Sanches     3/17/2016 - 4/12/2016 Radiation    Left upper lobe lesion, 4675 cGy  Radiation oncologist: Dr Priscila Sanches      Chemotherapy    1  Alimta and carboplatin:   05/20/2015 through 08/12/2015 (5 cycles total)  2  Maintenance Alimta:   10/21/2015 through 06/12/2018 (43 total Treatment doses)     Renal cell carcinoma of left kidney ([facility] [address] )   7/2016 Initial Diagnosis    Renal cell carcinoma of left kidney [facility])  S/p cryoablation of the left kidney lesion in September of 2016 @ [facility]-Dr Beba Martinez           Interval history:    ROS: Review of Systems   Constitutional: Positive for fatigue (mild)  Eyes: Positive for visual disturbance     Respiratory: Positive for cough (mild)  All other systems reviewed and are negative        Past Medical History:   Diagnosis Date   • Arthritis    • Balance problems     and" coordination issues/best to walk slow"   • Bladder stones    • Cancer ([facility] )     lung, brain, kidney, spinal   • Colon polyp    • Dysphagia 02/16/2017    "not right now but in the past"   • Environmental and seasonal allergies    • Exercise involving walking     "approx 1 mile 3x/week"   • Eye injury     right,  "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"   • Full dentures    • Hemiparesis ([facility] ) 04/27/2015    left weakness,"due to tumor on brain"   • History of brain tumor     "had radiation for it"   • History of cancer chemotherapy     last tx  5/22/18   • History of GI bleed 2015   • History of kidney cancer     left   • History of kidney stones    • History of radiation therapy    • History of transfusion     5 units of blood 2015   • Hydrocele 11/30/2010   • Inguinal hernia, bilateral    • Joint stiffness of multiple sites    • Kidney stone    • Paresthesia 05/01/2015   • Portacath in place     right chest   • Primary malignant neoplasm of lung ([place] [address] ) 05/26/2015   • Renal carcinoma, left ([place] [address] ) 10/17/2016   • Risk for falls    • 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([place] [address] ) 05/26/2015   • Skin cancer    • Syncopal episodes 09/01/2015    2   • 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   • Wears glasses        Past Surgical History:   Procedure Laterality Date   • BLADDER STONE REMOVAL     • BLADDER STONE REMOVAL N/A 2/9/2021    Procedure: Honey Sides;  Surgeon: Elana Miguel MD;  Location: [facility];  Service: Urology   • COLONOSCOPY     • DENTAL SURGERY  2011    DENTAL EXTACTIONS    • ESOPHAGOGASTRODUODENOSCOPY     • HERNIA REPAIR     • HYDROCELE EXCISION / REPAIR Left 01/01/2011   • KIDNEY STONE SURGERY     • KIDNEY SURGERY      cancer of kidney/cryo of cancer   • PORTACATH PLACEMENT     • KS COLONOSCOPY FLX DX W/COLLJ SPEC WHEN PFRMD N/A 8/7/2018    Procedure: COLONOSCOPY with polypectomy ;  Surgeon: Carmita Cavazos MD;  Location: [facility]; Service: General   • GA CYSTO/URETERO W/LITHOTRIPSY &INDWELL STENT INSRT Left 4/27/2022    Procedure: CYSTO URETEROSCOPY WITH LITHO HOLMIUM LASER, RETROGRADE PYELOGRAM AND INSERTION STENT URETERAL;  Surgeon: Deja Saldana MD;  [facility];  Service: Urology   • GA CYSTOURETHROSCOPY W/DEST &/RMVL MED BLADDER JACOB N/A 2/9/2021    Procedure: Brad Smith T U R B T ;  Surgeon: Deja Saldana MD;  [facility];  Service: Urology   • GA LAPS RPR RECURRENT INCAL HRNA NCRC8/STRANGULATED Bilateral 8/8/2018    Procedure: REPAIR HERNIA INGUINAL LAPAROSCOPIC W/ ROBOTICS W/ MESH;  Surgeon: Carmita Cavazos MD;  [facility];  Service: General   • GA LITHOLAPAXY SMPL/SM <2 5 CM N/A 11/27/2018    Procedure: CYSTOSCOPY, CYSTOLITHOLOPAXY HOLMIUM LASER,BLADDE BIOPSY;  Surgeon: Deja Saldana MD;  [facility];  Service: Urology   • GA LITHOLAPAXY SMPL/SM <2 5 CM N/A 4/27/2022    Procedure: Stephon Antonio for bladder stones;  Surgeon: Deja Saldana MD;  [facility];  Service: Urology   • SKIN BIOPSY     • SKIN CANCER EXCISION      17 surgeries, basal cell   • TONSILLECTOMY         Social History     Socioeconomic History   • Marital status:      Spouse name: None   • Number of children: None   • Years of education: None   • Highest education level: None   Occupational History   • None   Tobacco Use   • Smoking status: Light Smoker     Packs/day: 0 50     Years: 48 00     Pack years: 24 00     Types: Cigarettes   • Smokeless tobacco: Never   • Tobacco comments:     smoked 4/27 0530    Vaping Use   • Vaping Use: Never used   Substance and Sexual Activity   • Alcohol use: Yes     Comment: 3-4 times a year     • Drug use: No   • Sexual activity: Not Currently     Partners: Female     Birth control/protection: Abstinence, Condom Male   Other Topics Concern   • None   Social History Narrative   • None     Social Determinants of Health     Financial Resource Strain: Low Risk    • Difficulty of Paying Living Expenses: Not very hard   Food Insecurity: Not on file   Transportation Needs: No Transportation Needs   • Lack of Transportation (Medical): No   • Lack of Transportation (Non-Medical): No   Physical Activity: Not on file   Stress: Not on file   Social Connections: Not on file   Intimate Partner Violence: Not on file   Housing Stability: Not on file       Family History   Problem Relation Age of Onset   • Cancer Mother         EYE   • Cancer Father    • Colon cancer Father    • Cancer Brother        No Known Allergies      Current Outpatient Medications:   •  Ascorbic Acid (VITAMIN C) 500 MG CAPS, Take 500 mg by mouth daily  , Disp: , Rfl:   •  Multiple Vitamins-Minerals (MULTIVITAMIN ADULT PO), Take 1 tablet by mouth daily  , Disp: , Rfl:       Physical Exam:  /70 (BP Location: Left arm, Patient Position: Sitting, Cuff Size: Adult)   Pulse 96   Temp 98 3 °F (36 8 °C) (Tympanic)   Resp 18   Ht 5' 10" (1 778 m)   Wt 74 9 kg (165 lb 3 2 oz)   SpO2 97%   BMI 23 70 kg/m²     Physical Exam  Vitals reviewed  Constitutional:       General: He is not in acute distress  Appearance: He is well-developed  He is not diaphoretic  HENT:      Head: Normocephalic and atraumatic  Eyes:      General: Lids are normal  No scleral icterus  Conjunctiva/sclera: Conjunctivae normal       Pupils: Pupils are equal, round, and reactive to light  Neck:      Thyroid: No thyromegaly  Cardiovascular:      Rate and Rhythm: Normal rate and regular rhythm  Heart sounds: Normal heart sounds  No murmur heard  Pulmonary:      Effort: Pulmonary effort is normal  No respiratory distress  Breath sounds: Normal breath sounds  Abdominal:      General: There is no distension  Palpations: Abdomen is soft  There is no hepatomegaly or splenomegaly  Tenderness:  There is no abdominal tenderness  Musculoskeletal:         General: No swelling  Normal range of motion  Cervical back: Normal range of motion and neck supple  Lymphadenopathy:      Cervical: No cervical adenopathy  Upper Body:      Right upper body: No axillary adenopathy  Left upper body: No axillary adenopathy  Skin:     General: Skin is warm and dry  Findings: No erythema or rash  Neurological:      General: No focal deficit present  Mental Status: He is alert and oriented to person, place, and time  Psychiatric:         Mood and Affect: Mood is depressed  Affect is blunt  Behavior: Behavior normal  Behavior is cooperative  Thought Content: Thought content normal          Judgment: Judgment normal            Labs:  Lab Results   Component Value Date    WBC 12 12 (H) 10/11/2022    HGB 15 7 10/11/2022    HCT 49 2 10/11/2022    MCV 96 10/11/2022     10/11/2022          Patient voiced understanding and agreement in the above discussion  Aware to contact our office with questions/symptoms in the interim  This note has been generated by voice recognition software system  Therefore, there may be spelling, grammar, and or syntax errors  Please contact if questions arise

## 2023-03-14 ENCOUNTER — TELEPHONE (OUTPATIENT)
Dept: HEMATOLOGY ONCOLOGY | Facility: CLINIC | Age: 73
End: 2023-03-14

## 2023-03-14 NOTE — TELEPHONE ENCOUNTER
Called patient to let him know that I discussed with his urologist and he is in agreement with 6-month follow-up CT scan regarding his liver lesion  He will also discuss further with him when he sees him in July  Asked him to call us back or send message through [employer] to ensure he is in agreement with the vascular surgery consultation that has been placed  We will set up as soon as we hear back from him

## 2023-03-20 ENCOUNTER — TELEPHONE (OUTPATIENT)
Dept: HEMATOLOGY ONCOLOGY | Facility: CLINIC | Age: 73
End: 2023-03-20

## 2023-03-20 NOTE — TELEPHONE ENCOUNTER
Left message that I scheduled him a appointment with the vascula Doctor for April 6th at  9:30  Left the address and phone number if the time and date is not good

## 2023-03-23 ENCOUNTER — DOCUMENTATION (OUTPATIENT)
Dept: GENETICS | Facility: CLINIC | Age: 73
End: 2023-03-23

## 2023-03-23 ENCOUNTER — CLINICAL SUPPORT (OUTPATIENT)
Dept: GENETICS | Facility: CLINIC | Age: 73
End: 2023-03-23

## 2023-03-23 DIAGNOSIS — Z80.41 FAMILY HISTORY OF OVARIAN CANCER: ICD-10-CM

## 2023-03-23 DIAGNOSIS — C61 PROSTATE CANCER (HCC): ICD-10-CM

## 2023-03-23 DIAGNOSIS — C64.2 RENAL CELL CARCINOMA OF LEFT KIDNEY (HCC): Primary | ICD-10-CM

## 2023-03-23 DIAGNOSIS — Z80.0 FAMILY HISTORY OF PANCREATIC CANCER: ICD-10-CM

## 2023-03-23 NOTE — PROGRESS NOTES
Pre-Test Genetic Counseling Consult Note    Patient Name: Faye Ochoa   /Age: 1950/73 y o  Referring Provider: LOBITO Dan    Date of Service: 3/23/2023  Genetic Counselor: Alberto Hollis MS, [facility] AND [facility] Counseling Student Jannet Henderson   Interpretation Services: None  [facility]      Suzy Rivers was referred to the [address] [place] [address] and Genetic Assessment Program due to his personal history of multiple primary cancers and family history of ovarian, pancreas and breast cancer among others  He presents today to discuss the possibility of a hereditary cancer syndrome, options for genetic testing, and implications for him and his family  Cancer History and Treatment:     Personal History: Personal history of multiple primary cancers including skin cancer, lung cancer, renal cell cancer, bladder cancer and prostate cancer    Early  multiple BCC, Connor reports more than 20     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; history of tobacco use for more than 40 years     2016 - MRI of the abdomen on 2016 which showed a 3 cm cystic mass with a solid component on the posterior medial aspect of the left kidney, compatible with malignant process  A core biopsy was taking from the left kidney 2016 which was 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    2018- Bladder Cancer   A   Bladder, left posterior wall, biopsy:  -  BENIGN UROTHELIUM AND UNDERLYING STROMA WITH CALCIUM OXALATE WITHIN  LAMINA PROPRIA      B  Bladder, left trigone, biopsy:  -  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  Electronically signed by Tito Charlton MD on 2018 at 12:23 PM     Prostate Cancer   A   Prostate, L Lat [place]:  - Prostatic adenocarcinoma, Chirag score 3 + 3 = 6, Prognostic Grade Group 1,  involving 35% of 1 needle core  and measuring 6 mm in length       Comment: Immunohistochemistry for a prostate multiplex stain (p63, K903, and P504S) demonstrates the presence of prostatic adenocarcinoma      B  Prostate, L Base x 2:  - Benign prostate tissue in two cores      C  Prostate, L Lat Mid:  - Prostatic adenocarcinoma, Chirag score 3 + 3 = 6, Prognostic Grade Group 1,  involving ~33% of 1 needle core  and measuring  5 mm in length  Additional findings: Perineural invasion is present      Comment: Immunohistochemistry for a prostate multiplex stain (p63, K903, and P504S) demonstrates the presence of prostatic adenocarcinoma      D  Prostate, L Mid x 2:  - Prostatic adenocarcinoma, Chirag score 3 + 3 = 6, Prognostic Grade Group 1,  involving ~20% of 1 of 2 needle core  and measuring  ~3 mm in length       Comment: Immunohistochemistry for a prostate multiplex stain (p63, K903, and P504S) demonstrates the presence of prostatic adenocarcinoma      E  Prostate, L Lat [place]:  - Prostatic adenocarcinoma, Mayking score 3 + 3 = 6, Prognostic Grade Group 1,  discontinuously involving ~40% of 1 needle core  and measuring  7 mm in length       Comment: Immunohistochemistry for a prostate multiplex stain (p63, K903, and P504S) demonstrates the presence of prostatic adenocarcinoma      F  Prostate, L [place]:  - Prostatic adenocarcinoma, Chirag score 3 + 3 = 6, Prognostic Grade Group 1,  involving 35% of 1 needle core  and measuring  6 mm in length  Additional findings: Focus suspicious for perineural invasion       Comment: Immunohistochemistry for a prostate multiplex stain (p63, K903, and P504S) demonstrates the presence of prostatic adenocarcinoma      G  Prostate, R Lat [place]:  - Benign prostate tissue       H  Prostate, R Base:  - Prostatic adenocarcinoma, Mayking score 3 + 3 = 6, Prognostic Grade Group 1,  involving ~5% of 1 needle core  and measuring  ~1 mm in length       I   Prostate, R Lat Mid:  - Benign prostate tissue       J  Prostate, R Mid:  - Benign prostate tissue      K  Prostate, R Lat [place]:  - Benign prostate tissue       L  Prostate, R [place]:  - Benign prostate tissue  Screening Hx:     Colon:  Colonoscopy: Most recent colonoscopy 8/7/2018  A  Right colon polyp, measuring 0 4 cm (biopsy):     - Portions of tubular adenoma  - No high-grade dysplasia or malignancy identified      B  Hepatic flexure polyp, colon, measuring 1 cm (biopsy):     - Portions of polypoid colonic mucosa with focal suggestion of surface hyperplasia  - No high-grade dysplasia or malignancy identified      C  Descending colon polyp at 45 cm, measuring 1 cm (biopsy):     - Portions of polypoid colonic mucosa with focal suggestion of adenomatous epithelium      - No high-grade dysplasia or malignancy identified      D  Sigmoid colon polyp at 35 cm, measuring 0 7 cm (biopsy):     - Portions of polypoid colonic mucosa with focal suggestion of surface hyperplasia  - No high-grade dysplasia or malignancy identified      E  Sigmoid colon polyp at 20 cm, measuring 0 8 cm (biopsy):     - Portions of polypoid colonic mucosa with focal suggestion of surface hyperplasia  - No high-grade dysplasia or malignancy identified      F  Colon polyps at 15 cm, measuring 0 6 cm and 0 4 cm (biopsy):     - Portions of polypoid colonic mucosa with focal suggestion of surface hyperplasia  - No high-grade dysplasia or malignancy identified      G  Rectal polyps, measuring 0 6 cm and 0 3 cm (biopsy):     - Portions of tubular adenoma and separate hyperplastic polyp      - No high-grade dysplasia or malignancy identified      H  Anorectal polyp, measuring 0 7 cm (biopsy):     - Polypoid colonic mucosa with features suggesting hyperplastic polyp      - No high-grade dysplasia or malignancy identified    Electronically signed by Holly Covarrubias MD on 8/9/2018 at 11:31 AM    Medical and Surgical History  Pertinent surgical history:   Past Surgical History:   Procedure Laterality Date   • BLADDER "STONE REMOVAL     • BLADDER STONE REMOVAL N/A 2/9/2021    Procedure: Clinton Adhikari;  Surgeon: Carmenza Rayo MD;  [facility];  Service: Urology   • COLONOSCOPY     • DENTAL SURGERY  2011    DENTAL EXTACTIONS    • ESOPHAGOGASTRODUODENOSCOPY     • HERNIA REPAIR     • HYDROCELE EXCISION / REPAIR Left 01/01/2011   • KIDNEY STONE SURGERY     • KIDNEY SURGERY      cancer of kidney/cryo of cancer   • PORTACATH PLACEMENT     • MA COLONOSCOPY FLX DX W/COLLJ SPEC WHEN PFRMD N/A 8/7/2018    Procedure: COLONOSCOPY with polypectomy ;  Surgeon: Haley Acuña MD;  [facility];   Service: General   • MA CYSTO/URETERO W/LITHOTRIPSY &INDWELL STENT INSRT Left 4/27/2022    Procedure: CYSTO URETEROSCOPY WITH LITHO HOLMIUM LASER, RETROGRADE PYELOGRAM AND INSERTION STENT URETERAL;  Surgeon: Carmenza Rayo MD;  [facility];  Service: Urology   • MA CYSTOURETHROSCOPY W/DEST &/RMVL MED BLADDER JACOB N/A 2/9/2021    Procedure: NIEVES Marks U R DEVI T ;  Surgeon: Carmenza Rayo MD;  [facility];  Service: Urology   • MA LAPS RPR RECURRENT INCAL HRNA NCRC8/STRANGULATED Bilateral 8/8/2018    Procedure: REPAIR HERNIA INGUINAL LAPAROSCOPIC W/ ROBOTICS W/ MESH;  Surgeon: Haley Acuña MD;  [facility];  Service: General   • MA LITHOLAPAXY SMPL/SM <2 5 CM N/A 11/27/2018    Procedure: Lance Mckeon HOLMIUM LASER,BLADDE BIOPSY;  Surgeon: Carmenza Rayo MD;  [facility];  Service: Urology   • MA LITHOLAPAXY SMPL/SM <2 5 CM N/A 4/27/2022    Procedure: Xi Bernstein for bladder stones;  Surgeon: Carmenza Rayo MD;  [facility];  Service: Urology   • SKIN BIOPSY     • SKIN CANCER EXCISION      17 surgeries, basal cell   • TONSILLECTOMY        Pertinent medical history:  Past Medical History:   Diagnosis Date   • Arthritis    • Balance problems     and\" coordination issues/best to walk slow\"   • Bladder stones    • Cancer ([address] )     lung, brain, kidney, spinal   • Colon " "polyp    • Dysphagia 02/16/2017    \"not right now but in the past\"   • Environmental and seasonal allergies    • Exercise involving walking     \"approx 1 mile 3x/week\"   • Eye injury     right,  \"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\"   • Full dentures    • [facility] 04/27/2015    left weakness,\"due to tumor on brain\"   • History of brain tumor     \"had radiation for it\"   • History of cancer chemotherapy     last tx  5/22/18   • History of GI bleed 2015   • History of kidney cancer     left   • History of kidney stones    • History of radiation therapy    • History of transfusion     5 units of blood 2015   • Hydrocele 11/30/2010   • Inguinal hernia, bilateral    • Joint stiffness of multiple sites    • Kidney stone    • Paresthesia 05/01/2015   • Portacath in place     right chest   • Primary malignant neoplasm of lung ([facility] ) 05/26/2015   • Renal carcinoma, left ([facility] ) 10/17/2016   • Risk for falls    • 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([facility] ) 05/26/2015   • Skin cancer    • Syncopal episodes 09/01/2015    2   • 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   • Wears glasses        Other History:  Height:   Ht Readings from Last 1 Encounters:   03/13/23 5' 10\" (1 778 m)     Weight:   Wt Readings from Last 1 Encounters:   03/13/23 74 9 kg (165 lb 3 2 oz)     Relevant Family History   Patient reports no Ashkenazi Voodoo ancestry  Please refer to the scanned pedigree in the Media Tab for a complete family history     *All history is reported as provided by the patient; records are not available for review, except where indicated  Assessment:    We discussed sporadic, familial and hereditary cancer  We also discussed the many factors that influence our risk for cancer such as age, environmental exposures, lifestyle choices and family history  We reviewed the indications suggestive of a hereditary predisposition to cancer    Given Connor's multiple primary cancers and strong family history " of cancer, there is a strong suspicion that there may be an underlying genetic risk in this family  We will rule out Li-Fraumeni syndrome due to his multiple primary cancers, we will rule out PALB2 among other due to the history of pancreas, ovarian, breast and prostate cancer  Connor meets the following NCCN genetic testing criteria:     Meets NCCN V2 2023 Testing Criteria for Ovarian Cancer Susceptibility Genes: Family history of a sister [facility]) with ovarian cancer    Meets NCCN P2 8584 Testing Criteria for Pancreatic Cancer Susceptibility Genes: Family history of a father (FDR) with pancreatic cancer     The risks, benefits, and limitations of genetic testing were reviewed with the patient, as well as genetic discrimination laws, and possible test results (positive, negative, variants of uncertain significance) and their clinical implications  If positive for a mutation, options for managing cancer risk including increased surveillance, chemoprevention, and in some cases prophylactic surgery were discussed  Brynangela Orozco was informed that if a hereditary cancer syndrome was identified in him, first degree relatives (parents, siblings, and children) have a chance of also inheriting the condition  Genetic testing would allow for predictive genetic testing in other relatives, who may also be at risk depending on their degree of relation  Billing:Based on Angelo's primary insurance being Medicare and [employer], he should expect an out of pocket cost of $0  Brynangela Nicoleino verbalized understanding  Plan: Patient decided to proceed with testing and provided consent      Summary:     Sample Collection:  The patient's blood sample was drawn in the office on 3/23/23 by medical assistant Vishnu Mitchell    Genetic Testing Preformed: CancerNext-Expanded + RNA (68 genes): AIP, ALK, APC, RASHAD, AXIN2, BAP1, BARD1, BLM, BMPR1A, BRCA1, BRCA2, BRIP1, CDC73, CDH1, CDK4, CDKN1B, CDKN2A, CHEK2, CTNNA1, DICER1, EGFR, EGLN1, EPCAM, FANCC, FH, FLCN, GALNT12, GREM1, HOXB13, KIF1B, KIT, LZTR1, MAX, MEN1, MET, MITF, MLH1, MSH2, MSH3, MSH6, MUTYH, NBN, NF1, NF2, NTHL1, PALB2, PDGFRA, PHOX2B, PMS2, POLD1, POLE, POT1, HGMKR8A, PTCH1, PTEN, RAD51C, RAD51D, RB1, RECQL, RET, SDHA, SDHAF2, SDHB, SDHC, SDHD, SMAD4, SMARCA4, SMARCB1, SMARCE1, STK11, SUFU, QCVK433, TP53, TSC1, TSC2, VHL, XRCC2     Result Call Information:  I confirmed the patient's mobile number on file as the best number to call with results [phone]  I confirmed with the patient that we can leave a voicemail on the provided numbers    Results take approximately 2-3 weeks to complete once test is started  We will contact Grace Corral once results are available  Additional recommendations for surveillance/medical management will be made pending genetic test results

## 2023-04-05 ENCOUNTER — TELEPHONE (OUTPATIENT)
Dept: INFUSION CENTER | Facility: HOSPITAL | Age: 73
End: 2023-04-05

## 2023-04-06 ENCOUNTER — OFFICE VISIT (OUTPATIENT)
Dept: VASCULAR SURGERY | Facility: CLINIC | Age: 73
End: 2023-04-06

## 2023-04-06 VITALS
DIASTOLIC BLOOD PRESSURE: 70 MMHG | SYSTOLIC BLOOD PRESSURE: 120 MMHG | OXYGEN SATURATION: 99 % | HEIGHT: 70 IN | HEART RATE: 64 BPM | WEIGHT: 166.6 LBS | BODY MASS INDEX: 23.85 KG/M2

## 2023-04-06 DIAGNOSIS — I71.43 INFRARENAL ABDOMINAL AORTIC ANEURYSM (AAA) WITHOUT RUPTURE (HCC): ICD-10-CM

## 2023-04-06 DIAGNOSIS — F17.200 SMOKER: Primary | ICD-10-CM

## 2023-04-06 NOTE — ASSESSMENT & PLAN NOTE
33-year-old male with a history of multiple malignancies which include a non-small cell metastatic lung cancer in 2015 status postchemotherapy with metastasis to the brain, history of prostate cancer being closely surveyed, history of renal cell carcinoma left kidney status post cryoablation, history of right inguinal hernia repair, history of tobacco use half pack a day for 50+ years      Patient has a small infrarenal abdominal aortic aneurysm measuring 3 cm  On his recent CAT scan radiology had reported a penetrating aortic ulcer in association with the aortic aneurysm  On my review of his CAT scan above the most recent one in February 2023 in September 2022 I am able to see the penetrating ulcer on the posterior aspect of the abdominal aorta  The ulcer back in September 2022 measures roughly 1 cm in depth and his most recent 1 seems to have increased slightly measuring about 1 2 cm in depth  Discussed with patient that given the increase in size  this may need to be repaired    I would like to repeat his CAT scan in a month and have him return to discuss results

## 2023-04-06 NOTE — PROGRESS NOTES
Assessment/Plan:    Infrarenal abdominal aortic aneurysm (AAA) without rupture [facility])  53-year-old male with a history of multiple malignancies which include a non-small cell metastatic lung cancer in 2015 status postchemotherapy with metastasis to the brain, history of prostate cancer being closely surveyed, history of renal cell carcinoma left kidney status post cryoablation, history of right inguinal hernia repair, history of tobacco use half pack a day for 50+ years      Patient has a small infrarenal abdominal aortic aneurysm measuring 3 cm  On his recent CAT scan radiology had reported a penetrating aortic ulcer in association with the aortic aneurysm  On my review of his CAT scan above the most recent one in February 2023 in September 2022 I am able to see the penetrating ulcer on the posterior aspect of the abdominal aorta  The ulcer back in September 2022 measures roughly 1 cm in depth and his most recent 1 seems to have increased slightly measuring about 1 2 cm in depth  Discussed with patient that given the increase in size  this may need to be repaired  I would like to repeat his CAT scan in a month and have him return to discuss results    Smoker  Discussed the need for smoking cessation         Diagnoses and all orders for this visit:    Smoker    Infrarenal abdominal aortic aneurysm (AAA) without rupture [facility])  -     Ambulatory Referral to Vascular Surgery  -     Basic metabolic panel; Future  -     CTA chest abdomen pelvis w wo contrast; Future        Subjective:      Patient ID: Dani Mcbride is a 72 y o  male  Patient is new to our office and presents for review of incidiental finding on CT chest abd pelvis w contrast done 2/27/23  Pt denies pain after eating, abd pain, and low back pain  Pt denies pain in BLE  Pt is current smoker, 1/2 ppd       HPI  53-year-old male with a history of multiple malignancies which include a non-small cell metastatic lung cancer in 2015 status "postchemotherapy with metastasis to the brain, history of prostate cancer being closely surveyed, history of renal cell carcinoma left kidney status post cryoablation, history of right inguinal hernia repair, history of tobacco use half pack a day for 50+ years, and history of a known 3 cm abdominal aortic aneurysm recently seen on CAT scan to have a an associated penetrating aortic ulcer  Patient states he believes his father had a history of aneurysm however his father had  from pancreatic cancer  Patient has been asymptomatic from an abdominal standpoint, eating and drinking okay  No history of arterial or venous surgery in the past     The following portions of the patient's history were reviewed and updated as appropriate: allergies, current medications, past family history, past medical history, past social history, past surgical history and problem list     I have spent a total time of 35 minutes on 23 in caring for this patient including Diagnostic results, Prognosis, Risks and benefits of tx options, Instructions for management, Patient and family education, Importance of tx compliance, Risk factor reductions, Impressions, Counseling / Coordination of care, Documenting in the medical record, Reviewing / ordering tests, medicine, procedures   and Obtaining or reviewing history    Review of Systems   Musculoskeletal: Positive for gait problem  Skin: Negative  Objective:      /70 (BP Location: Left arm, Patient Position: Sitting, Cuff Size: Standard)   Pulse 64   Ht 5' 10\" (1 778 m)   Wt 75 6 kg (166 lb 9 6 oz)   SpO2 99%   BMI 23 90 kg/m²          Physical Exam  Constitutional:       Appearance: Normal appearance  HENT:      Head: Normocephalic and atraumatic  Eyes:      Extraocular Movements: Extraocular movements intact  Pupils: Pupils are equal, round, and reactive to light  Cardiovascular:      Rate and Rhythm: Normal rate and regular rhythm        " Pulses:           Femoral pulses are 2+ on the right side and 2+ on the left side  Pulmonary:      Effort: Pulmonary effort is normal       Breath sounds: Normal breath sounds  Abdominal:      Palpations: Abdomen is soft  Tenderness: There is no abdominal tenderness  Musculoskeletal:         General: Normal range of motion  Cervical back: Normal range of motion  Neurological:      General: No focal deficit present  Mental Status: He is oriented to person, place, and time     Psychiatric:         Mood and Affect: Mood normal

## 2023-04-07 ENCOUNTER — TELEPHONE (OUTPATIENT)
Dept: GENETICS | Facility: CLINIC | Age: 73
End: 2023-04-07

## 2023-04-07 NOTE — TELEPHONE ENCOUNTER
Post-Test Genetic Counseling Consult Note    Today I left a voicemail for Connor reviewing the results of his genetic test for hereditary cancer  We met previously on 3/23/23 for pre-test counseling  I encouraged Connor to call [phone] to discuss this result further  A copy of this consult note and genetic test result will be shared with the patient  SUMMARY:    Test(s): CancerNext-Expanded + RNA (68 genes): AIP, ALK, APC, RASHAD, AXIN2, BAP1, BARD1, BLM, BMPR1A, BRCA1, BRCA2, BRIP1, CDC73, CDH1, CDK4, CDKN1B, CDKN2A, CHEK2, CTNNA1, DICER1, EGFR, EGLN1, EPCAM, FANCC, FH, FLCN, GALNT12, GREM1, HOXB13, KIF1B, KIT, LZTR1, MAX, MEN1, MET, MITF, MLH1, MSH2, MSH3, MSH6, MUTYH, NBN, NF1, NF2, NTHL1, PALB2, PDGFRA, PHOX2B, PMS2, POLD1, POLE, POT1, ZEPNT1B, PTCH1, PTEN, RAD51C, RAD51D, RB1, RECQL, RET, SDHA, SDHAF2, SDHB, SDHC, SDHD, SMAD4, SMARCA4, SMARCB1, SMARCE1, STK11, SUFU, YGFS474, TP53, TSC1, TSC2, VHL, XRCC2      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); heterozygous; uncertain significance     Assessment:  A variant of uncertain significance (VUS) means that a change was identified in a specific gene but it cannot be determined whether the variant is associated with an increased risk of cancer or is a harmless genetic change  The significance of the OLUAV7Q variant is currently not known and therefore this test result cannot be used to help determine [facility] cancer risks  It is possible that the variant was seen in only a handful of individuals, or there may be conflicting or incomplete information in the medical literature about the variant and its association with hereditary cancer  Of note, the CYFDL8M gene is associated with a distinct clinical presentation including pigmented nodular adrenocortical disease, pituitary adenomas and cardiac and cutaneous myxomas  Connor's personal and family histories do not fit this typical presentation         The laboratory will continue to accumulate information on this variant and will reclassify it as either a positive or negative genetic test result when they are confident that they have adequate information  As updated information is obtained, we will notify Rosario Shah with the updated information  It is important to note that the majority of variants of uncertain significance are reclassified as likely benign or benign as additional information about the variant becomes available  Risks and Testing for Family Members:    Genetic testing for this variant is not recommended for relatives who wish to determine their cancer risks for purposes of determining medical management  The presence or absence of this variant in a relative is not clinically meaningful unless the variant is reclassified in the future  Despite this result, Angelo's first-degree relatives may be at increased risk for cancer based on Connor's personal history and the family history of cancer  We recommend they discuss screening and management recommendations with their healthcare providers  If Rosario Shah has any affected family members with a cancer diagnosis, especially at a young age, they may still consider genetic testing  Relatives who wish to pursue genetic testing can reach out to the [address] [place] [address] Road ([phone]) to schedule an appointment or visit www [facility] org to identify a local genetic counselor  Risk Based on Family History:  The significance of this variant is currently not known and therefore this test result cannot be used to help determine Rosario Shah cancer risks  Rather his personal medical history and family history of cancer are the most important factors used to estimate his risk for developing certain cancers and to direct his medical management  Plan:   There are no additional recommendations based on Angelo's result   He should continue cancer screening and medical management as clinically indicated and as determined appropriate by his healthcare providers  VUS Result: Brian Hess was strongly encouraged to contact us regarding any changes in his personal or family history of cancer as these changes could alter our recommendation regarding genetic testing and/or cancer screening  Domingoruba Hess was also encouraged to follow up with us on an annual basis as variant classifications are subject to change

## 2023-05-04 ENCOUNTER — APPOINTMENT (OUTPATIENT)
Dept: LAB | Facility: HOSPITAL | Age: 73
End: 2023-05-04

## 2023-05-04 ENCOUNTER — HOSPITAL ENCOUNTER (OUTPATIENT)
Dept: CT IMAGING | Facility: HOSPITAL | Age: 73
Discharge: HOME | End: 2023-05-04
Attending: SURGERY

## 2023-05-04 DIAGNOSIS — I71.43 INFRARENAL ABDOMINAL AORTIC ANEURYSM (AAA) WITHOUT RUPTURE (HCC): ICD-10-CM

## 2023-05-04 LAB
ANION GAP SERPL CALCULATED.3IONS-SCNC: 6 MMOL/L (ref 4–13)
BUN SERPL-MCNC: 18 MG/DL (ref 5–25)
CALCIUM SERPL-MCNC: 9.3 MG/DL (ref 8.4–10.2)
CHLORIDE SERPL-SCNC: 104 MMOL/L (ref 96–108)
CO2 SERPL-SCNC: 26 MMOL/L (ref 21–32)
CREAT SERPL-MCNC: 0.95 MG/DL (ref 0.6–1.3)
GFR SERPL CREATININE-BSD FRML MDRD: 80 ML/MIN/1.73SQ M
GLUCOSE SERPL-MCNC: 94 MG/DL (ref 65–140)
POTASSIUM SERPL-SCNC: 4.7 MMOL/L (ref 3.5–5.3)
SODIUM SERPL-SCNC: 136 MMOL/L (ref 135–147)

## 2023-05-04 RX ADMIN — IOHEXOL 100 ML: 350 INJECTION, SOLUTION INTRAVENOUS at 11:48

## 2023-05-18 ENCOUNTER — OFFICE VISIT (OUTPATIENT)
Dept: VASCULAR SURGERY | Facility: CLINIC | Age: 73
End: 2023-05-18

## 2023-05-18 VITALS
SYSTOLIC BLOOD PRESSURE: 118 MMHG | DIASTOLIC BLOOD PRESSURE: 64 MMHG | HEART RATE: 60 BPM | OXYGEN SATURATION: 99 % | WEIGHT: 165 LBS | HEIGHT: 70 IN | BODY MASS INDEX: 23.62 KG/M2

## 2023-05-18 DIAGNOSIS — I71.43 INFRARENAL ABDOMINAL AORTIC ANEURYSM (AAA) WITHOUT RUPTURE (HCC): Primary | ICD-10-CM

## 2023-05-18 RX ORDER — ATORVASTATIN CALCIUM 40 MG/1
40 TABLET, FILM COATED ORAL DAILY
Qty: 90 TABLET | Refills: 3 | Status: SHIPPED | OUTPATIENT
Start: 2023-05-18

## 2023-05-18 RX ORDER — ASPIRIN 81 MG/1
81 TABLET ORAL DAILY
Qty: 90 TABLET | Refills: 3 | Status: SHIPPED | OUTPATIENT
Start: 2023-05-18

## 2023-05-18 NOTE — ASSESSMENT & PLAN NOTE
77-year-old male with a history of multiple malignancies which include a non-small cell metastatic lung cancer in 2015 status postchemotherapy with metastasis to the brain, history of prostate cancer being closely surveyed, history of renal cell carcinoma left kidney status post cryoablation, history of right inguinal hernia repair, history of tobacco use half pack a day for 50+ years, and history of a known 3 cm abdominal aortic aneurysm and associate KANIKA    His CTA was reviewed which shows stable appearance of KANIKA  It seems exaggerated on the sagittal due to the thrombus in the area however on the axial the PACU does not appear to be as significant  It measures 1 2 cm in diameter and protrudes less than 1 cm out from the aortic wall  We discussed the signs and symptoms of symptomatic or ruptured KANIKA  This point would just continue with monitoring patient does get frequent CAT scans  I will plan for an official CTA in 1 year and follow-up at that time

## 2023-05-18 NOTE — PROGRESS NOTES
Assessment/Plan:    Infrarenal abdominal aortic aneurysm (AAA) without rupture [facility])  72-year-old male with a history of multiple malignancies which include a non-small cell metastatic lung cancer in 2015 status postchemotherapy with metastasis to the brain, history of prostate cancer being closely surveyed, history of renal cell carcinoma left kidney status post cryoablation, history of right inguinal hernia repair, history of tobacco use half pack a day for 50+ years, and history of a known 3 cm abdominal aortic aneurysm and associate KANIKA    His CTA was reviewed which shows stable appearance of KANIKA  It seems exaggerated on the sagittal due to the thrombus in the area however on the axial the PACU does not appear to be as significant  It measures 1 2 cm in diameter and protrudes less than 1 cm out from the aortic wall  We discussed the signs and symptoms of symptomatic or ruptured KANIKA  This point would just continue with monitoring patient does get frequent CAT scans  I will plan for an official CTA in 1 year and follow-up at that time  Diagnoses and all orders for this visit:    Infrarenal abdominal aortic aneurysm (AAA) without rupture ([address] )  -     CTA abdomen pelvis w wo contrast; Future  -     atorvastatin (LIPITOR) 40 mg tablet; Take 1 tablet (40 mg total) by mouth daily  -     aspirin (ECOTRIN LOW STRENGTH) 81 mg EC tablet; Take 1 tablet (81 mg total) by mouth daily    Other orders  -     polyethylene glycol-propylene glycol (SYSTANE) 0 4-0 3 %; Apply to eye Daily        Subjective:      Patient ID: Bret Recio is a 72 y o  male  Patient is here today to review results of a CTA chest/ABD/Pelvis w/wo contrast done 5/4/2023  Patient c/o chronic low back pain  He denies any abd pain or any abd pain after eating  Patient is a current smoker       HPI  72-year-old male with a history of multiple malignancies which include a non-small cell metastatic lung cancer in 2015 status postchemotherapy "with metastasis to the brain, history of prostate cancer being closely surveyed, history of renal cell carcinoma left kidney status post cryoablation, history of right inguinal hernia repair, history of tobacco use half pack a day for 50+ years, and history of a known 3 cm abdominal aortic aneurysm    Patient since last visit has had a CT scan to evaluate his penetrating aortic ulcer in the infrarenal aorta  He had no back pain abdominal pain or new issues  The following portions of the patient's history were reviewed and updated as appropriate: allergies, current medications, past family history, past medical history, past social history, past surgical history and problem list     I have spent a total time of 25 minutes on 05/18/23 in caring for this patient including Diagnostic results, Prognosis, Risks and benefits of tx options, Instructions for management, Patient and family education, Importance of tx compliance, Risk factor reductions, Impressions, Counseling / Coordination of care, Documenting in the medical record, Reviewing / ordering tests, medicine, procedures   and Obtaining or reviewing history    Review of Systems   Constitutional: Negative  HENT: Negative  Eyes: Negative  Respiratory: Negative  Cardiovascular: Negative  Gastrointestinal: Negative  Endocrine: Negative  Genitourinary: Negative  Musculoskeletal: Positive for back pain  Skin: Negative  Allergic/Immunologic: Positive for environmental allergies  Neurological: Negative  Hematological: Bruises/bleeds easily  Psychiatric/Behavioral: Negative  Objective:      /64 (BP Location: Left arm, Patient Position: Sitting, Cuff Size: Standard)   Pulse 60   Ht 5' 10\" (1 778 m)   Wt 74 8 kg (165 lb)   SpO2 99%   BMI 23 68 kg/m²          Physical Exam  Constitutional:       Appearance: Normal appearance  HENT:      Head: Normocephalic and atraumatic     Eyes:      Extraocular Movements: " Extraocular movements intact  Pupils: Pupils are equal, round, and reactive to light  Cardiovascular:      Rate and Rhythm: Normal rate and regular rhythm  Pulmonary:      Effort: Pulmonary effort is normal       Breath sounds: Normal breath sounds  Abdominal:      Palpations: Abdomen is soft  Tenderness: There is no abdominal tenderness  Musculoskeletal:         General: Normal range of motion  Cervical back: Normal range of motion  Neurological:      General: No focal deficit present  Mental Status: He is oriented to person, place, and time     Psychiatric:         Mood and Affect: Mood normal

## 2023-05-31 ENCOUNTER — HOSPITAL ENCOUNTER (OUTPATIENT)
Dept: INFUSION CENTER | Facility: HOSPITAL | Age: 73
Discharge: HOME | End: 2023-05-31
Attending: INTERNAL MEDICINE
Payer: MEDICARE

## 2023-05-31 DIAGNOSIS — Z45.2 ENCOUNTER FOR CENTRAL LINE CARE: Primary | ICD-10-CM

## 2023-05-31 DIAGNOSIS — C34.12 PRIMARY MALIGNANT NEOPLASM OF BRONCHUS OF LEFT UPPER LOBE (HCC): ICD-10-CM

## 2023-05-31 PROCEDURE — 96523 IRRIG DRUG DELIVERY DEVICE: CPT

## 2023-05-31 NOTE — PROGRESS NOTES
Pt tolerated port flush without difficulty  Port flushed and deaccessed per protocol  Next appt confirmed  AVS declined  Left ambulatory in stable condition

## 2023-07-12 ENCOUNTER — HOSPITAL ENCOUNTER (OUTPATIENT)
Dept: INFUSION CENTER | Facility: HOSPITAL | Age: 73
Discharge: HOME | End: 2023-07-12
Attending: INTERNAL MEDICINE
Payer: MEDICARE

## 2023-07-12 DIAGNOSIS — C34.12 PRIMARY MALIGNANT NEOPLASM OF BRONCHUS OF LEFT UPPER LOBE (HCC): ICD-10-CM

## 2023-07-12 DIAGNOSIS — Z45.2 ENCOUNTER FOR CENTRAL LINE CARE: ICD-10-CM

## 2023-07-12 DIAGNOSIS — C61 PROSTATE CANCER (HCC): Primary | ICD-10-CM

## 2023-07-12 LAB
ALBUMIN SERPL BCP-MCNC: 4.1 G/DL (ref 3.5–5)
ALP SERPL-CCNC: 129 U/L (ref 34–104)
ALT SERPL W P-5'-P-CCNC: 103 U/L (ref 7–52)
ANION GAP SERPL CALCULATED.3IONS-SCNC: 8 MMOL/L
AST SERPL W P-5'-P-CCNC: 50 U/L (ref 13–39)
BILIRUB SERPL-MCNC: 0.89 MG/DL (ref 0.2–1)
BUN SERPL-MCNC: 15 MG/DL (ref 5–25)
CALCIUM SERPL-MCNC: 8.9 MG/DL (ref 8.4–10.2)
CHLORIDE SERPL-SCNC: 108 MMOL/L (ref 96–108)
CO2 SERPL-SCNC: 24 MMOL/L (ref 21–32)
CREAT SERPL-MCNC: 0.81 MG/DL (ref 0.6–1.3)
GFR SERPL CREATININE-BSD FRML MDRD: 89 ML/MIN/1.73SQ M
GLUCOSE SERPL-MCNC: 151 MG/DL (ref 65–140)
POTASSIUM SERPL-SCNC: 4.1 MMOL/L (ref 3.5–5.3)
PROT SERPL-MCNC: 6.5 G/DL (ref 6.4–8.4)
PSA SERPL-MCNC: 4.56 NG/ML (ref 0–4)
SODIUM SERPL-SCNC: 140 MMOL/L (ref 135–147)

## 2023-07-12 PROCEDURE — 84153 ASSAY OF PSA TOTAL: CPT

## 2023-07-12 PROCEDURE — 80053 COMPREHEN METABOLIC PANEL: CPT

## 2023-07-12 NOTE — PROGRESS NOTES
Patient tolerated labs without incident, brisk blood return noted, port flushed and deaccessed per protocol. Confirmed patient's next appointment. Patient declined AVS. Patient left clinic ambulatory.

## 2023-07-14 ENCOUNTER — OFFICE VISIT (OUTPATIENT)
Dept: UROLOGY | Facility: MEDICAL CENTER | Age: 73
End: 2023-07-14
Payer: MEDICARE

## 2023-07-14 VITALS
DIASTOLIC BLOOD PRESSURE: 70 MMHG | OXYGEN SATURATION: 98 % | SYSTOLIC BLOOD PRESSURE: 108 MMHG | WEIGHT: 163 LBS | BODY MASS INDEX: 23.34 KG/M2 | HEART RATE: 90 BPM | HEIGHT: 70 IN

## 2023-07-14 DIAGNOSIS — C61 PROSTATE CANCER (HCC): Primary | ICD-10-CM

## 2023-07-14 DIAGNOSIS — C64.2 RENAL CELL CARCINOMA OF LEFT KIDNEY (HCC): ICD-10-CM

## 2023-07-14 PROCEDURE — 99214 OFFICE O/P EST MOD 30 MIN: CPT | Performed by: UROLOGY

## 2023-07-14 NOTE — PROGRESS NOTES
Assessment/Plan:    Prostate cancer [facility])  He is doing well. His PSA has fallen to 4.56 on July 12, 2023. AUA symptom score is 3 and he is satisfied his voiding pattern. Options were discussed. We will continue to follow. He will return in 6 months we will check a multiparametric MRI as well as a PSA prior to his next visit. We will then consider confirmatory biopsies. Renal cell carcinoma of left kidney (HCC)  We well post cryotherapy. CT scan in May 2023 reveals no evidence of local recurrence or metastatic disease. Diagnoses and all orders for this visit:    Prostate cancer ([address])  -     PSA Total, Diagnostic; Future  -     Urinalysis with microscopic; Future  -     MRI prostate multiparametric wo w contrast; Future    Renal cell carcinoma of left kidney (HCC)  -     Urinalysis with microscopic; Future          Subjective:      Patient ID: Leanna Davila is a 72 y.o. male. Chief complaint: Prostate cancer (active surveillance), left renal cell carcinoma s/p cryotherapy. HPI: 82-year-old male followed for the above complaints. He was diagnosed with low-risk prostate cancer in December 2022. He notes he is voiding well. His stream is good and he feels he empties adequately. He gets up once a night to urinate. There is no gross hematuria, dysuria or symptoms of infection. He is satisfied with his voiding pattern. He has no new back or bone pain and no new constitutional symptoms. He has had multiple malignancies. He has had cryotherapy to a renal cell carcinoma in the left kidney      The following portions of the patient's history were reviewed and updated as appropriate: allergies, current medications, past family history, past medical history, past social history, past surgical history and problem list.    Review of Systems   Constitutional: Negative for chills, diaphoresis, fatigue and fever. HENT: Negative. Eyes: Positive for visual disturbance. Respiratory: Negative. Cardiovascular: Negative. Endocrine: Negative. Genitourinary:        See HPI   Musculoskeletal: Negative. Skin: Negative. Allergic/Immunologic: Negative. Neurological: Negative. Hematological: Negative. Psychiatric/Behavioral: Negative. AUA SYMPTOM SCORE    Flowsheet Row Most Recent Value   AUA SYMPTOM SCORE    How often have you had a sensation of not emptying your bladder completely after you finished urinating? 0   How often have you had to urinate again less than two hours after you finished urinating? 1   How often have you found you stopped and started again several times when you urinate? 0   How often have you found it difficult to postpone urination? 0   How often have you had a weak urinary stream? 1   How often have you had to push or strain to begin urination? 0   How many times did you most typically get up to urinate from the time you went to bed at night until the time you got up in the morning? 1   Quality of Life: If you were to spend the rest of your life with your urinary condition just the way it is now, how would you feel about that? 2   AUA SYMPTOM SCORE 3        Objective:      /70   Pulse 90   Ht 5' 10" (1.778 m)   Wt 73.9 kg (163 lb)   SpO2 98%   BMI 23.39 kg/m²          Physical Exam  Constitutional:       Appearance: He is well-developed. HENT:      Head: Normocephalic and atraumatic. Eyes:      Conjunctiva/sclera: Conjunctivae normal.   Cardiovascular:      Rate and Rhythm: Normal rate. Pulmonary:      Effort: Pulmonary effort is normal.   Abdominal:      General: There is no distension. Palpations: There is no mass. Tenderness: There is no abdominal tenderness. There is no right CVA tenderness, left CVA tenderness, guarding or rebound. Hernia: A hernia is present. Comments: No hernia   Genitourinary:     Penis: Normal.       Comments: Testes descended and normal to palpation. Large left spermatoceles.   Musculoskeletal: Cervical back: Neck supple. Skin:     General: Skin is warm and dry. Neurological:      Mental Status: He is alert and oriented to person, place, and time. Psychiatric:         Mood and Affect: Mood normal.         Behavior: Behavior normal.         Thought Content:  Thought content normal.         Judgment: Judgment normal.

## 2023-07-14 NOTE — ASSESSMENT & PLAN NOTE
We well post cryotherapy. CT scan in May 2023 reveals no evidence of local recurrence or metastatic disease.

## 2023-07-14 NOTE — ASSESSMENT & PLAN NOTE
He is doing well. His PSA has fallen to 4.56 on July 12, 2023. AUA symptom score is 3 and he is satisfied his voiding pattern. Options were discussed. We will continue to follow. He will return in 6 months we will check a multiparametric MRI as well as a PSA prior to his next visit. We will then consider confirmatory biopsies.

## 2023-09-08 ENCOUNTER — HOSPITAL ENCOUNTER (OUTPATIENT)
Dept: INFUSION CENTER | Facility: HOSPITAL | Age: 73
End: 2023-09-08
Attending: INTERNAL MEDICINE
Payer: MEDICARE

## 2023-09-08 DIAGNOSIS — C34.12 PRIMARY MALIGNANT NEOPLASM OF BRONCHUS OF LEFT UPPER LOBE (HCC): Primary | ICD-10-CM

## 2023-09-08 DIAGNOSIS — D72.828 OTHER ELEVATED WHITE BLOOD CELL (WBC) COUNT: ICD-10-CM

## 2023-09-08 DIAGNOSIS — C61 PROSTATE CA (HCC): ICD-10-CM

## 2023-09-08 DIAGNOSIS — Z45.2 ENCOUNTER FOR CENTRAL LINE CARE: ICD-10-CM

## 2023-09-08 DIAGNOSIS — C64.2 RENAL CELL CARCINOMA OF LEFT KIDNEY (HCC): ICD-10-CM

## 2023-09-08 LAB
ALBUMIN SERPL BCP-MCNC: 4.2 G/DL (ref 3.5–5)
ALP SERPL-CCNC: 114 U/L (ref 34–104)
ALT SERPL W P-5'-P-CCNC: 72 U/L (ref 7–52)
ANION GAP SERPL CALCULATED.3IONS-SCNC: 7 MMOL/L
AST SERPL W P-5'-P-CCNC: 31 U/L (ref 13–39)
BASOPHILS # BLD AUTO: 0.06 THOUSANDS/ÂΜL (ref 0–0.1)
BASOPHILS NFR BLD AUTO: 1 % (ref 0–1)
BILIRUB SERPL-MCNC: 0.9 MG/DL (ref 0.2–1)
BUN SERPL-MCNC: 16 MG/DL (ref 5–25)
CALCIUM SERPL-MCNC: 9.3 MG/DL (ref 8.4–10.2)
CHLORIDE SERPL-SCNC: 108 MMOL/L (ref 96–108)
CO2 SERPL-SCNC: 25 MMOL/L (ref 21–32)
CREAT SERPL-MCNC: 0.78 MG/DL (ref 0.6–1.3)
CRP SERPL QL: 1 MG/L
EOSINOPHIL # BLD AUTO: 0.21 THOUSAND/ÂΜL (ref 0–0.61)
EOSINOPHIL NFR BLD AUTO: 2 % (ref 0–6)
ERYTHROCYTE [DISTWIDTH] IN BLOOD BY AUTOMATED COUNT: 12.8 % (ref 11.6–15.1)
ERYTHROCYTE [SEDIMENTATION RATE] IN BLOOD: 13 MM/HOUR (ref 0–19)
GFR SERPL CREATININE-BSD FRML MDRD: 90 ML/MIN/1.73SQ M
GLUCOSE SERPL-MCNC: 103 MG/DL (ref 65–140)
HCT VFR BLD AUTO: 45.2 % (ref 36.5–49.3)
HGB BLD-MCNC: 14.9 G/DL (ref 12–17)
IMM GRANULOCYTES # BLD AUTO: 0.03 THOUSAND/UL (ref 0–0.2)
IMM GRANULOCYTES NFR BLD AUTO: 0 % (ref 0–2)
LDH SERPL-CCNC: 113 U/L (ref 140–271)
LYMPHOCYTES # BLD AUTO: 3.04 THOUSANDS/ÂΜL (ref 0.6–4.47)
LYMPHOCYTES NFR BLD AUTO: 29 % (ref 14–44)
MCH RBC QN AUTO: 31 PG (ref 26.8–34.3)
MCHC RBC AUTO-ENTMCNC: 33 G/DL (ref 31.4–37.4)
MCV RBC AUTO: 94 FL (ref 82–98)
MONOCYTES # BLD AUTO: 0.58 THOUSAND/ÂΜL (ref 0.17–1.22)
MONOCYTES NFR BLD AUTO: 6 % (ref 4–12)
NEUTROPHILS # BLD AUTO: 6.72 THOUSANDS/ÂΜL (ref 1.85–7.62)
NEUTS SEG NFR BLD AUTO: 62 % (ref 43–75)
NRBC BLD AUTO-RTO: 0 /100 WBCS
PLATELET # BLD AUTO: 174 THOUSANDS/UL (ref 149–390)
PMV BLD AUTO: 10.5 FL (ref 8.9–12.7)
POTASSIUM SERPL-SCNC: 4.1 MMOL/L (ref 3.5–5.3)
PROT SERPL-MCNC: 6.3 G/DL (ref 6.4–8.4)
PSA SERPL-MCNC: 4.65 NG/ML (ref 0–4)
RBC # BLD AUTO: 4.8 MILLION/UL (ref 3.88–5.62)
SODIUM SERPL-SCNC: 140 MMOL/L (ref 135–147)
WBC # BLD AUTO: 10.64 THOUSAND/UL (ref 4.31–10.16)

## 2023-09-08 PROCEDURE — 86140 C-REACTIVE PROTEIN: CPT

## 2023-09-08 PROCEDURE — 85025 COMPLETE CBC W/AUTO DIFF WBC: CPT

## 2023-09-08 PROCEDURE — 85652 RBC SED RATE AUTOMATED: CPT

## 2023-09-08 PROCEDURE — 84153 ASSAY OF PSA TOTAL: CPT

## 2023-09-08 PROCEDURE — 83615 LACTATE (LD) (LDH) ENZYME: CPT

## 2023-09-08 PROCEDURE — 80053 COMPREHEN METABOLIC PANEL: CPT

## 2023-09-08 NOTE — PROGRESS NOTES
Pt tolerated central lab draw without difficulty. Port flushed and deaccessed per protocol. Next appt confirmed. AVS declined. Left ambulatory in stable condition. No

## 2023-09-13 ENCOUNTER — HOSPITAL ENCOUNTER (OUTPATIENT)
Dept: CT IMAGING | Facility: HOSPITAL | Age: 73
Discharge: HOME | End: 2023-09-13
Payer: MEDICARE

## 2023-09-13 DIAGNOSIS — C64.2 RENAL CELL CARCINOMA OF LEFT KIDNEY (HCC): ICD-10-CM

## 2023-09-13 DIAGNOSIS — C34.12 PRIMARY MALIGNANT NEOPLASM OF BRONCHUS OF LEFT UPPER LOBE (HCC): ICD-10-CM

## 2023-09-13 DIAGNOSIS — C61 PROSTATE CA (HCC): ICD-10-CM

## 2023-09-13 PROCEDURE — 71260 CT THORAX DX C+: CPT

## 2023-09-13 PROCEDURE — G1004 CDSM NDSC: HCPCS

## 2023-09-13 PROCEDURE — 74178 CT ABD&PLV WO CNTR FLWD CNTR: CPT

## 2023-09-13 RX ADMIN — IOHEXOL 100 ML: 350 INJECTION, SOLUTION INTRAVENOUS at 12:51

## 2023-09-15 ENCOUNTER — DOCUMENTATION (OUTPATIENT)
Dept: HEMATOLOGY ONCOLOGY | Facility: CLINIC | Age: 73
End: 2023-09-15

## 2023-09-15 NOTE — PROGRESS NOTES
Read requested for :    CT chest w ct abdomen pelvis w wo contrast (Order: [id number]) - 9/13/2023      Verbal confirmation from Naomi Corado.

## 2023-09-18 ENCOUNTER — OFFICE VISIT (OUTPATIENT)
Dept: HEMATOLOGY ONCOLOGY | Facility: CLINIC | Age: 73
End: 2023-09-18
Payer: MEDICARE

## 2023-09-18 VITALS
HEIGHT: 70 IN | DIASTOLIC BLOOD PRESSURE: 68 MMHG | BODY MASS INDEX: 23.34 KG/M2 | SYSTOLIC BLOOD PRESSURE: 118 MMHG | WEIGHT: 163 LBS | HEART RATE: 79 BPM | OXYGEN SATURATION: 96 % | TEMPERATURE: 97.6 F | RESPIRATION RATE: 18 BRPM

## 2023-09-18 DIAGNOSIS — C61 PROSTATE CA (HCC): ICD-10-CM

## 2023-09-18 DIAGNOSIS — C34.12 PRIMARY MALIGNANT NEOPLASM OF BRONCHUS OF LEFT UPPER LOBE (HCC): Primary | ICD-10-CM

## 2023-09-18 DIAGNOSIS — I71.43 INFRARENAL ABDOMINAL AORTIC ANEURYSM (AAA) WITHOUT RUPTURE (HCC): ICD-10-CM

## 2023-09-18 DIAGNOSIS — C79.31 SECONDARY MALIGNANT NEOPLASM OF BRAIN AND SPINAL CORD (HCC): ICD-10-CM

## 2023-09-18 DIAGNOSIS — C64.2 RENAL CELL CARCINOMA OF LEFT KIDNEY (HCC): ICD-10-CM

## 2023-09-18 DIAGNOSIS — C79.49 SECONDARY MALIGNANT NEOPLASM OF BRAIN AND SPINAL CORD (HCC): ICD-10-CM

## 2023-09-18 PROCEDURE — 99214 OFFICE O/P EST MOD 30 MIN: CPT | Performed by: INTERNAL MEDICINE

## 2023-09-18 NOTE — PROGRESS NOTES
Hematology/Oncology Outpatient Follow-up  Genaro Thomson 72 y.o. male 1950 [id number]    Date:  9/18/2023        Assessment and Plan:  1. 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 He was told that we will pursue the next imaging somewhere between 6 to 12 months from now. He continues to have Port-A-Cath which needs to be maintained with frequent flushing.    - CT chest abdomen pelvis w contrast; Future  - CBC and differential; Future  - Comprehensive metabolic panel; Future  - Magnesium; Future  - C-reactive protein; Future  - Sedimentation rate, automated; Future  - LD,Blood; Future  - PSA Total, Diagnostic; Future    2. Renal cell carcinoma of left kidney [facility])  He had a cryoablation of the left kidney in 2016. Recent imaging did not show any obvious recurrence. He continues to have 1.1 cm left renal lesion which needs to be monitored closely. - CT chest abdomen pelvis w contrast; Future    3. Prostate CA [facility])  He was diagnosed with a low risk, grade 1 Chirag 6 prostate cancer. He is under the supervision of urology team on regular basis  - PSA Total, Diagnostic; Future    4. 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 He denies any neurological symptoms at this time. His most recent MRI of the brain September 2021 did not show any evidence of recurrence. Will pursue further brain imaging on an as-needed basis. 5. Infrarenal abdominal aortic aneurysm (AAA) without rupture [facility])  He is to follow-up with the vascular surgical team as ordered. - CT chest abdomen pelvis w contrast; Future        HPI:  The patient came to for follow-up visit. He denied any changes of his overall condition.   He stated that he was seen by the vascular surgical team regarding of her renal abdominal aortic aneurysm with the penetrating atherosclerotic ulcer. He is in the process of getting a CTA of the abdomen and pelvis. He had a CT scan of the chest abdomen pelvis on 9/13/2023 which was read as:  IMPRESSION:     No new findings in the chest, abdomen or pelvis.     Unchanged appearance of the left renal cryoablation site.     Unchanged indeterminate 11 mm left upper pole renal lesion. Blood work on 9/8/2023 showed white second of 10.6 with hemoglobin of 14.9 and normal platelets. Creatinine 0.7 with normal calcium. ALT elevated 72 PSA 4.6 LDH normal.  C-reactive protein and sed rate were within normal range. Oncology History Overview Note   Patient has a history of tobacco use for more than 40 years and history of 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 which was diagnosed in May of 2015. At that time the patient was found to have multiple brain lesions status post whole brain radiation. He then received 6 cycles of palliative chemotherapy with Alimta and carboplatin. After 5 cycles of treatment he had significant GI bleeding which required ICU hospitalization. After completion of palliative chemotherapy he was maintained on Alimta since October 2015. A brain MRI January 2016 showed multiple bilateral hemorrhagic lesions felt not to be due to malignant process. PET scan January 2016 showed a 1.6 cm metabolically active left lung apical lesion. The patient received radiation treatment palliatively under the care of Dr. Ankit Herrera to the left apical lung lesion and completed it in April of 2016. He was then restarted on maintenance Alimta. He had an MRI of the abdomen on June 23, 2016 which showed a 3 cm cystic mass with a solid component on the posterior medial aspect of the left kidney, compatible with malignant process. 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 The patient was then treated with cryotherapy of the left kidney lesion in September of 2016. Patient had a PET-CT scan on March 3, 2017 which was compared to the prior PET scan from November 2016. At this time there is no evidence of residual or recurrent neoplastic disease anywhere in his body. Patient had a CT scan of the chest abdomen and pelvis on August 24, 2017 which showed a 1.3 cm left upper lobe mass versus scarring with adjacent radiation fibrosis. Patient had another MRI of the brain January 8, 2018 which was compared to the previous MRI of the brain in January 2016. This revealed multiple areas of artifact in the supra and infratentorial spaces consistent with hemorrhagic metastasis. The largest lesion was in the frontal lobe and is significantly smaller with only a tiny enhancing component remaining. No new intra-axial lesions were reported. Patient had another CT scan of the chest abdomen and pelvis for close monitoring on June 5th 2018 that was compared with prior study from January 2018. He continues to have a 1.3 cm left lung apex pulmonary nodule with surrounding fibrosis which is stable in appearance. He also has a stable appearance of the treated medial left renal cortex lesion. There is no evidence of metastatic disease within the chest abdomen or pelvis. After lengthy discussion June 2018 patient stated that he is not interested in continuing his maintenance Alimta treatment beyond today's treatment since his CT scan showed stable disease for a long time he has been on Alimta for 2 years. He was told that he seems to be in complete remission but there is a chance that the cancer may reoccur. Patient was interested in the watch and wait type of approach instead of continuing active maintenance Alimta treatment.      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 ([address])   5/2015 Initial Diagnosis    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 ([address])  Stage IV     5/5/2015 - 5/20/2015 Radiation    3150 cGy to large right frontoparietal mass; 3000 cGy to other tumor volumes in brainstem, and bilateral cerebral hemispheres  Radiation oncologist:  Dr. Zane Staley     3/17/2016 - 4/12/2016 Radiation    Left upper lobe lesion, 4675 cGy  Radiation oncologist: Dr. Zane Staley      Chemotherapy    1. Alimta and carboplatin:   05/20/2015 through 08/12/2015 (5 cycles total)  2. Maintenance Alimta:   10/21/2015 through 06/12/2018 (43 total Treatment doses)     Renal cell carcinoma of left kidney ([address])   7/2016 Initial Diagnosis    Renal cell carcinoma of left kidney [facility])  S/p cryoablation of the left kidney lesion in September of 2016 @ [facility] IR-Dr. Shade Gonzalez           Interval history:    ROS: Review of Systems   Constitutional: Negative for chills and fever. HENT: Negative for ear pain and sore throat. Eyes: Negative for pain and visual disturbance. Respiratory: Negative for cough and shortness of breath. Cardiovascular: Negative for chest pain and palpitations. Gastrointestinal: Negative for abdominal pain and vomiting. Genitourinary: Negative for dysuria and hematuria. Musculoskeletal: Negative for arthralgias and back pain. Skin: Negative for color change and rash. Neurological: Negative for seizures and syncope. Hematological: Bruises/bleeds easily. All other systems reviewed and are negative.       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"   • Full dentures    • Hemiparesis ([address]) 04/27/2015    left weakness,"due to tumor on brain"   • History of brain tumor "had radiation for it"   • History of cancer chemotherapy     last tx  5/22/18   • History of GI bleed 2015   • History of kidney cancer     left   • History of kidney stones    • History of radiation therapy    • History of transfusion     5 units of blood 2015   • Hydrocele 11/30/2010   • Inguinal hernia, bilateral    • Joint stiffness of multiple sites    • Kidney stone    • Paresthesia 05/01/2015   • Portacath in place     right chest   • Primary malignant neoplasm of lung ([address]) 05/26/2015   • Renal carcinoma, left ([address]) 10/17/2016   • Risk for falls    • 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([address]) 05/26/2015   • Skin cancer    • Syncopal episodes 09/01/2015    2   • 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   • Wears glasses        Past Surgical History:   Procedure Laterality Date   • BLADDER STONE REMOVAL     • BLADDER STONE REMOVAL N/A 2/9/2021    Procedure: Adeline Sanders;  Surgeon: Susu Carlson MD;  [facility]: [facility];  Service: Urology   • COLONOSCOPY     • DENTAL SURGERY  2011    DENTAL EXTACTIONS    • ESOPHAGOGASTRODUODENOSCOPY     • HERNIA REPAIR     • HYDROCELE EXCISION / REPAIR Left 01/01/2011   • KIDNEY STONE SURGERY     • KIDNEY SURGERY      cancer of kidney/cryo of cancer   • PORTACATH PLACEMENT     • WY COLONOSCOPY FLX DX W/COLLJ SPEC WHEN PFRMD N/A 8/7/2018    Procedure: COLONOSCOPY with polypectomy ;  Surgeon: Maryse Viveros MD;  Location: [facility];   Service: General   • WY CYSTO/URETERO W/LITHOTRIPSY &INDWELL STENT INSRT Left 4/27/2022    Procedure: CYSTO URETEROSCOPY WITH LITHO HOLMIUM LASER, RETROGRADE PYELOGRAM AND INSERTION STENT URETERAL;  Surgeon: Susu Carlson MD;  [facility];  Service: Urology   • WY CYSTOURETHROSCOPY W/DEST &/RMVL MED BLADDER JACOB N/A 2/9/2021    Procedure: ANT SalomonR.B.TPortia;  Surgeon: Susu Carlson MD;  [facility];  Service: Urology   • WY LAPS RPR RECURRENT INCAL HRNA NCRC8/STRANGULATED Bilateral 8/8/2018 Procedure: REPAIR HERNIA INGUINAL LAPAROSCOPIC W/ ROBOTICS W/ MESH;  Surgeon: Domingo Randall MD;  [facility];  Service: General   • DC LITHOLAPAXY SMPL/SM <2.5 CM N/A 11/27/2018    Procedure: Elizabeth Puri HOLMIUM LASER,BLADDE BIOPSY;  Surgeon: Khushboo Mckeon MD;  [facility];  Service: Urology   • DC LITHOLAPAXY SMPL/SM <2.5 CM N/A 4/27/2022    Procedure: Belen Canary for bladder stones;  Surgeon: Khushboo Mckeon MD;  [facility];  Service: Urology   • SKIN BIOPSY     • SKIN CANCER EXCISION      17 surgeries, basal cell   • TONSILLECTOMY         Social History     Socioeconomic History   • Marital status:      Spouse name: None   • Number of children: None   • Years of education: None   • Highest education level: None   Occupational History   • None   Tobacco Use   • Smoking status: Every Day     Packs/day: 0.50     Years: 48.00     Total pack years: 24.00     Types: Cigarettes     Passive exposure: Current   • Smokeless tobacco: Never   Vaping Use   • Vaping Use: Never used   Substance and Sexual Activity   • Alcohol use: Yes     Comment: 3-4 times a year. • Drug use: No   • Sexual activity: Not Currently     Partners: Female     Birth control/protection: Abstinence, Condom Male   Other Topics Concern   • None   Social History Narrative   • None     Social Determinants of Health     Financial Resource Strain: Low Risk  (1/26/2023)    Overall Financial Resource Strain (CARDIA)    • Difficulty of Paying Living Expenses: Not very hard   Food Insecurity: Not on file   Transportation Needs: No Transportation Needs (1/26/2023)    PRAPARE - Transportation    • Lack of Transportation (Medical): No    • Lack of Transportation (Non-Medical):  No   Physical Activity: Not on file   Stress: Not on file   Social Connections: Not on file   Intimate Partner Violence: Not on file   Housing Stability: Not on file       Family History   Problem Relation Age of Onset   • Cancer Mother         EYE   • Cancer Father    • Colon cancer Father    • Cancer Brother        Allergies   Allergen Reactions   • Pollen Extract Cough, Nasal Congestion and Sneezing         Current Outpatient Medications:   •  Ascorbic Acid (VITAMIN C) 500 MG CAPS, Take 500 mg by mouth daily  , Disp: , Rfl:   •  aspirin (ECOTRIN LOW STRENGTH) 81 mg EC tablet, Take 1 tablet (81 mg total) by mouth daily, Disp: 90 tablet, Rfl: 3  •  atorvastatin (LIPITOR) 40 mg tablet, Take 1 tablet (40 mg total) by mouth daily, Disp: 90 tablet, Rfl: 3  •  Multiple Vitamins-Minerals (MULTIVITAMIN ADULT PO), Take 1 tablet by mouth daily  , Disp: , Rfl:   •  polyethylene glycol-propylene glycol (SYSTANE) 0.4-0.3 %, Apply to eye Daily, Disp: , Rfl:       Physical Exam:  /68 (BP Location: Left arm, Patient Position: Sitting, Cuff Size: Adult)   Pulse 79   Temp 97.6 °F (36.4 °C) (Temporal)   Resp 18   Ht 5' 10" (1.778 m)   Wt 73.9 kg (163 lb)   SpO2 96%   BMI 23.39 kg/m²     Physical Exam  Constitutional:       Appearance: He is well-developed. HENT:      Head: Normocephalic and atraumatic. Nose: Nose normal.   Eyes:      General: No scleral icterus. Right eye: No discharge. Left eye: No discharge. Conjunctiva/sclera: Conjunctivae normal.      Pupils: Pupils are equal, round, and reactive to light. Neck:      Thyroid: No thyromegaly. Trachea: No tracheal deviation. Cardiovascular:      Rate and Rhythm: Normal rate and regular rhythm. Heart sounds: Normal heart sounds. No murmur heard. No friction rub. Pulmonary:      Effort: Pulmonary effort is normal. No respiratory distress. Breath sounds: Normal breath sounds. No wheezing or rales. Chest:      Chest wall: No tenderness. Abdominal:      General: There is no distension. Palpations: Abdomen is soft. There is no hepatomegaly or splenomegaly. Tenderness: There is no abdominal tenderness.  There is no guarding or rebound. Comments: Abdominal hernia noted   Musculoskeletal:         General: No tenderness or deformity. Normal range of motion. Cervical back: Normal range of motion and neck supple. Lymphadenopathy:      Cervical: No cervical adenopathy. Skin:     General: Skin is warm and dry. Coloration: Skin is not pale. Findings: No erythema or rash. Neurological:      Mental Status: He is alert and oriented to person, place, and time. Cranial Nerves: No cranial nerve deficit. Coordination: Coordination normal.      Deep Tendon Reflexes: Reflexes are normal and symmetric. Psychiatric:         Behavior: Behavior normal.         Thought Content: Thought content normal.         Judgment: Judgment normal.           Labs:  Lab Results   Component Value Date    WBC 10.64 (H) 09/08/2023    HGB 14.9 09/08/2023    HCT 45.2 09/08/2023    MCV 94 09/08/2023     09/08/2023     Lab Results   Component Value Date     06/11/2018    K 4.1 09/08/2023     09/08/2023    CO2 25 09/08/2023    ANIONGAP 11 06/11/2018    BUN 16 09/08/2023    CREATININE 0.78 09/08/2023    GLUCOSE 163 (H) 06/11/2018    GLUF 124 (H) 10/11/2022    CALCIUM 9.3 09/08/2023    AST 31 09/08/2023    ALT 72 (H) 09/08/2023    ALKPHOS 114 (H) 09/08/2023    PROT 6.8 06/11/2018    BILITOT 0.9 06/11/2018    EGFR 90 09/08/2023     No results found for: "TSH"    Patient voiced understanding and agreement in the above discussion. Aware to contact our office with questions/symptoms in the interim.

## 2023-10-27 ENCOUNTER — HOSPITAL ENCOUNTER (OUTPATIENT)
Dept: INFUSION CENTER | Facility: HOSPITAL | Age: 73
Discharge: HOME | End: 2023-10-27
Payer: MEDICARE

## 2023-10-27 DIAGNOSIS — Z45.2 ENCOUNTER FOR CENTRAL LINE CARE: ICD-10-CM

## 2023-10-27 DIAGNOSIS — C34.12 PRIMARY MALIGNANT NEOPLASM OF BRONCHUS OF LEFT UPPER LOBE (HCC): Primary | ICD-10-CM

## 2023-10-27 PROCEDURE — 96523 IRRIG DRUG DELIVERY DEVICE: CPT

## 2023-10-27 NOTE — PROGRESS NOTES
Phone Number Called: [phone] (home)      Call outcome: Spoke to patient regarding message below.    Message: MOTHER NOTIFIED OF NO SHOW POLICY     Pt's port flushed per protocol. Flushes freely with a brisk blood return. Next apt confirmed.

## 2023-11-13 ENCOUNTER — HOSPITAL ENCOUNTER (OUTPATIENT)
Facility: MEDICAL CENTER | Age: 73
Discharge: HOME | End: 2023-11-13
Attending: UROLOGY
Payer: MEDICARE

## 2023-11-13 DIAGNOSIS — C61 PROSTATE CANCER (HCC): ICD-10-CM

## 2023-11-13 PROCEDURE — A9585 GADOBUTROL INJECTION: HCPCS | Performed by: UROLOGY

## 2023-11-13 PROCEDURE — G1004 CDSM NDSC: HCPCS

## 2023-11-13 PROCEDURE — 76377 3D RENDER W/INTRP POSTPROCES: CPT

## 2023-11-13 PROCEDURE — 72197 MRI PELVIS W/O & W/DYE: CPT

## 2023-11-13 RX ORDER — GADOBUTROL 604.72 MG/ML
7 INJECTION INTRAVENOUS
Status: COMPLETED | OUTPATIENT
Start: 2023-11-13 | End: 2023-11-13

## 2023-11-13 RX ADMIN — GADOBUTROL 7 ML: 604.72 INJECTION INTRAVENOUS at 14:47

## 2023-11-20 ENCOUNTER — TELEPHONE (OUTPATIENT)
Dept: UROLOGY | Facility: MEDICAL CENTER | Age: 73
End: 2023-11-20

## 2023-11-20 NOTE — TELEPHONE ENCOUNTER
----- Message from Christiano Narayanan MD sent at 11/20/2023  1:24 PM EST -----  Inform pt that the MRI was low risk for significant cancer. Johny Coleman Keep usual follow up appt. we can discuss the need for further evaluation at his December visit.  for pt regarding MRI and keep his appt in Dec with Dr Suzie Parmar.

## 2023-11-20 NOTE — RESULT ENCOUNTER NOTE
Inform pt that the MRI was low risk for significant cancer. Barron Disla Keep usual follow up appt. we can discuss the need for further evaluation at his December visit.

## 2023-12-21 ENCOUNTER — HOSPITAL ENCOUNTER (OUTPATIENT)
Dept: INFUSION CENTER | Facility: HOSPITAL | Age: 73
Discharge: HOME | End: 2023-12-21
Payer: MEDICARE

## 2023-12-21 DIAGNOSIS — C34.12 PRIMARY MALIGNANT NEOPLASM OF BRONCHUS OF LEFT UPPER LOBE (HCC): Primary | ICD-10-CM

## 2023-12-21 DIAGNOSIS — Z45.2 ENCOUNTER FOR CENTRAL LINE CARE: ICD-10-CM

## 2023-12-21 PROCEDURE — 96523 IRRIG DRUG DELIVERY DEVICE: CPT

## 2023-12-21 NOTE — PROGRESS NOTES
Patient arrived for Port Flush. Port accessed and de-accessed without complication.   Patient declined AVS at this time and confirmed next appointment- 2/15/24. Patient discharged in stable condition.

## 2024-01-02 ENCOUNTER — OFFICE VISIT (OUTPATIENT)
Dept: UROLOGY | Facility: MEDICAL CENTER | Age: 74
End: 2024-01-02
Payer: MEDICARE

## 2024-01-02 VITALS
WEIGHT: 167 LBS | OXYGEN SATURATION: 94 % | DIASTOLIC BLOOD PRESSURE: 60 MMHG | SYSTOLIC BLOOD PRESSURE: 120 MMHG | BODY MASS INDEX: 23.91 KG/M2 | HEART RATE: 91 BPM | HEIGHT: 70 IN

## 2024-01-02 DIAGNOSIS — N20.0 NEPHROLITHIASIS: ICD-10-CM

## 2024-01-02 DIAGNOSIS — C64.2 RENAL CELL CARCINOMA OF LEFT KIDNEY (HCC): ICD-10-CM

## 2024-01-02 DIAGNOSIS — Z85.51 HISTORY OF BLADDER CANCER: ICD-10-CM

## 2024-01-02 DIAGNOSIS — C61 PROSTATE CANCER (HCC): Primary | ICD-10-CM

## 2024-01-02 PROCEDURE — 99215 OFFICE O/P EST HI 40 MIN: CPT | Performed by: STUDENT IN AN ORGANIZED HEALTH CARE EDUCATION/TRAINING PROGRAM

## 2024-01-02 NOTE — PROGRESS NOTES
Urology Ambulatory Progress Note  1/2/2024    Angelo Redd  1950  [id number]      Assessment/Plan:  Problem List Items Addressed This Visit          Genitourinary    Renal cell carcinoma of left kidney (HCC)    Prostate cancer (HCC)    Relevant Orders    PSA Total, Diagnostic     Other Visit Diagnoses       History of bladder cancer    -  Primary    Nephrolithiasis                Prostate cancer-patient has low risk prostate cancer diagnosed December 2022 with no suspicious lesion identified on MRI and stable PSA.  Highest PSA was 6.8, current PSA is 4.7.  The patient has additional history of metastatic lung cancer, stage I kidney cancer, and nonmuscle invasive bladder cancer.  Given his concurrent diagnoses I think it is far less likely that his low risk prostate cancer will cause a major problem over his lifetime.  Given his stable PSA and no suspicious lesion on MRI I think we can avoid the discomforts of a confirmatory biopsy at this time and monitor with serial PSA testing.  Low risk nonmuscle invasive bladder cancer-diagnosed with TA low-grade urothelial carcinoma in 2018.  Patient overdue for cystoscopy but has not had any recurrences to date.  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  No evidence of recurrence on CT from September 2023  Left renal stone-the stone is in the calyx and not currently causing any issues can continue to monitor on serial imaging which is already being obtained for cancer monitoring  Bladder stone-has been present on several prior images and is not bothersome to the patient at this time.  Will continue to monitor on serial imaging that is already being obtained for cancer monitoring.  obtain PSA in 2 months (6 months from prior).  Return to clinic for cystoscopy    Chief Complaint: Follow-up for multiple urologic problems as noted above    History of Present Illness  Angelo Redd is a 73 y.o. male presenting for review of his MRI  "and discussion of confirmatory biopsy.  He has a history of low risk prostate cancer diagnosed 12/2022.  He had multiparametric MRI of the prostate performed November 2023 that demonstrated PI-RADS 2 anatomy and no dominant lesion.  Patient's PSA in September was 4.65 which is stable from 4.562 months prior.  History is also significant for bladder cancer, kidney cancer, nephrolithiasis and metastatic lung cancer.  Bladder cancer diagnosed 11/2018. Ta LG UC at the left trigone. Overdue to cysto.   Patient found to have papillary type 1 RCC diagnosis on core biopsy July 2016.  He underwent cryotherapy September 2016 and has had no evidence of recurrence on surveillance imaging.  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  Stage IV due to metastatic lesions in the brain.  He received radiation to brain mets and the primary lesion as well as chemotherapy.  He is currently in remission.  Hx of nephrolithiasis. Last URS/LL 4/2022. Also had litholapaxy at that time.  I independently reviewed the images of his CT abdomen pelvis from September 2023 which demonstrate a left renal stone as well as recurrent bladder stone.  He has a stable ablation site and an indeterminate 11 mm hyperdense lesion in the left upper pole.  This is notably unchanged since last imaging.     Previous PSA values:  Lab Results   Component Value Date    PSA 4.65 (H) 09/08/2023    PSA 4.56 (H) 07/12/2023    PSA 6.8 (H) 10/11/2022    PSA 5.8 (H) 09/06/2022    PSA 2.6 11/06/2018       Past Medical History  Past Medical History:   Diagnosis Date    Arthritis     Balance problems     and\" coordination issues/best to walk slow\"    Bladder stones     Cancer (HCC)     lung, brain, kidney, spinal    Colon polyp     Dysphagia 02/16/2017    \"not right now but in the past\"    Environmental and seasonal allergies     Exercise involving walking     \"approx 1 mile 3x/week\"    Eye injury     " "right,  \"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\"    Full dentures     Hemiparesis (HCC) 04/27/2015    left weakness,\"due to tumor on brain\"    History of brain tumor     \"had radiation for it\"    History of cancer chemotherapy     last tx  5/22/18    History of GI bleed 2015    History of kidney cancer     left    History of kidney stones     History of radiation therapy     History of transfusion     5 units of blood 2015    Hydrocele 11/30/2010    Inguinal hernia, bilateral     Joint stiffness of multiple sites     Kidney stone     Paresthesia 05/01/2015    Portacath in place     right chest    Primary malignant neoplasm of lung (HCC) 05/26/2015    Renal carcinoma, left (HCC) 10/17/2016    Risk for falls     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/26/2015    Skin cancer     Syncopal episodes 09/01/2015    2    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    Wears glasses        Past Surgical History  Past Surgical History:   Procedure Laterality Date    BLADDER STONE REMOVAL      BLADDER STONE REMOVAL N/A 2/9/2021    Procedure: LASER LITHOLOPAXY;  Surgeon: Cm Lester MD;  Location: [facility];  Service: Urology    COLONOSCOPY      DENTAL SURGERY  2011    DENTAL EXTACTIONS     ESOPHAGOGASTRODUODENOSCOPY      HERNIA REPAIR      HYDROCELE EXCISION / REPAIR Left 01/01/2011    KIDNEY STONE SURGERY      KIDNEY SURGERY      cancer of kidney/cryo of cancer    PORTACATH PLACEMENT      OH COLONOSCOPY FLX DX W/COLLJ SPEC WHEN PFRMD N/A 8/7/2018    Procedure: COLONOSCOPY with polypectomy ;  Surgeon: Jaclyn Cardenas MD;  Location: [facility];  Service: General    OH CYSTO/URETERO W/LITHOTRIPSY &INDWELL STENT INSRT Left 4/27/2022    Procedure: CYSTO URETEROSCOPY WITH LITHO HOLMIUM LASER, RETROGRADE PYELOGRAM AND INSERTION STENT URETERAL;  Surgeon: Cm Lester MD;  Location[facility];  Service: Urology    OH CYSTOURETHROSCOPY W/DEST &/RMVL MED BLADDER JACOB N/A 2/9/2021    Procedure: CYSTO, T.U.R.B.T.;  " "Surgeon: Cm Lester MD;  [facility];  Service: Urology    MD LAPS RPR RECURRENT INCAL HRNA NCRC8/STRANGULATED Bilateral 8/8/2018    Procedure: REPAIR HERNIA INGUINAL LAPAROSCOPIC W/ ROBOTICS W/ MESH;  Surgeon: Jaclyn Cardenas MD;  [facility];  Service: General    MD LITHOLAPAXY SMPL/SM <2.5 CM N/A 11/27/2018    Procedure: CYSTOSCOPY, CYSTOLITHOLOPAXY HOLMIUM LASER,BLADDE BIOPSY;  Surgeon: Cm Lester MD;  [facility];  Service: Urology    MD LITHOLAPAXY SMPL/SM <2.5 CM N/A 4/27/2022    Procedure: LITHOLOPAXY HOLMIUM LASER for bladder stones;  Surgeon: Cm Lester MD;  [facility];  Service: Urology    SKIN BIOPSY      SKIN CANCER EXCISION      17 surgeries, basal cell    TONSILLECTOMY         Physical Exam  /60 (BP Location: Left arm, Patient Position: Sitting, Cuff Size: Standard)   Pulse 91   Ht 5' 10\" (1.778 m)   Wt 75.8 kg (167 lb)   SpO2 94%   BMI 23.96 kg/m²     General:  Healthy appearing male in no acute distress.   Head:  Normocephalic, atraumatic.   Cardiovascular:  Regular rate  Respiratory:  Patient has unlabored respirations.     PATHOLOGY:  Final Diagnosis   A. Prostate, L Lat [place]:  - Prostatic adenocarcinoma, [place]ir[place] score 3 + 3 = 6, Prognostic Grade Group 1,  involving 35% of 1 needle core  and measuring  6 mm in length.      Comment: Immunohistochemistry for a prostate multiplex stain (p63, K903, and P504S) demonstrates the presence of prostatic adenocarcinoma.     B. Prostate, L Base x 2:  - Benign prostate tissue in two cores.     C. Prostate, L Lat Mid:  - Prostatic adenocarcinoma, Chirag score 3 + 3 = 6, Prognostic Grade Group 1,  involving ~33% of 1 needle core  and measuring  5 mm in length.  Additional findings: Perineural invasion is present.     Comment: Immunohistochemistry for a prostate multiplex stain (p63, K903, and P504S) demonstrates the presence of prostatic adenocarcinoma.     D. Prostate, L Mid x 2:  - Prostatic " adenocarcinoma, Chirag score 3 + 3 = 6, Prognostic Grade Group 1,  involving ~20% of 1 of 2 needle core  and measuring  ~3 mm in length.      Comment: Immunohistochemistry for a prostate multiplex stain (p63, K903, and P504S) demonstrates the presence of prostatic adenocarcinoma.     E. Prostate, L Lat [place]:  - Prostatic adenocarcinoma, Chirag score 3 + 3 = 6, Prognostic Grade Group 1,  discontinuously involving ~40% of 1 needle core  and measuring  7 mm in length.      Comment: Immunohistochemistry for a prostate multiplex stain (p63, K903, and P504S) demonstrates the presence of prostatic adenocarcinoma.     F. Prostate, L [place]:  - Prostatic adenocarcinoma, Chirag score 3 + 3 = 6, Prognostic Grade Group 1,  involving 35% of 1 needle core  and measuring  6 mm in length.   Additional findings: Focus suspicious for perineural invasion.      Comment: Immunohistochemistry for a prostate multiplex stain (p63, K903, and P504S) demonstrates the presence of prostatic adenocarcinoma.     G. Prostate, R Lat Base:  - Benign prostate tissue.      H. Prostate, R Base:  - Prostatic adenocarcinoma, [place] score 3 + 3 = 6, Prognostic Grade Group 1,  involving ~5% of 1 needle core  and measuring  ~1 mm in length.      I. Prostate, R Lat Mid:  - Benign prostate tissue.      J. Prostate, R Mid:  - Benign prostate tissue.     K. Prostate, R Lat [place]:  - Benign prostate tissue.      L. Prostate, R [place]:  - Benign prostate tissue.     Results  MULTIPARAMETRIC MRI OF THE PROSTATE WITH AND WITHOUT CONTRAST-WITH 3-D POSTPROCESSING.     INDICATION:   C61: Malignant neoplasm of prostate.     COMPARISON: None      TECHNIQUE: The following pulse sequences were obtained:  Small field-of-view axial T1-weighted and multiplanar T2-weighted images; DWI axial and ADC map; large field of view axial T2 weighted images; T1w in-phase and opposed-phase axials of entire pelvis   and dynamic 3D T1w axial before and during IV contrast injection.       "  CONTRAST:  Gadobutrol (Gadavist) 7 mL of Gadobutrol injection (SINGLE-DOSE)     TECHNICAL LIMITATIONS: None.     FINDINGS:     PROSTATE:  Size: 5.7 x 4.6 x 4.6 cm = 56 cc.  Post-biopsy hemorrhage:  None.  Central gland enlargement (BPH): Moderate, including a rounded 1 cm posterior BPH nodule at midline (series 4, image 20     Focal lesions - No dominant lesion.  Findings of BPH with a mostly encapsulated OR a homogenous circumscribed nodule without encapsulation OR a homogenous hypointense area between nodules. PI-RADSv2.1 Category 2 - Low (clinically significant cancer   unlikely).        SEMINAL VESICLES: Unremarkable     Note: Clinically significant cancer is defined on pathology/histology as [place] score greater than or equal to 7, and/or volume of greater than or equal to 0.5 mL, and/or extraprostatic extension.     URINARY BLADDER: 1.6 cm bladder calculus.     LYMPH NODES: No pelvic lymphadenopathy.     BONES: No suspicious osseous lesion.           IMPRESSION:     1. PI-RADSv2.1 Category 2 - Low (clinically significant cancer is unlikely to be present).     2. No extraprostatic tumor, seminal vesicle invasion, pelvic lymphadenopathy, or pelvic osseous metastatic disease.     3. Calculated prostate volume of   56  cc.      Hazel Bullard MD  [facility]    Portions of the above record have been created with voice recognition software. Occasional wrong word or \"sound alike\" substitution may have occurred due to the inherent limitations of voice recognition software.  Please read the chart carefully and recognize, using context, where substitution may have occurred.  For further clarification, please contact me directly.   "

## 2024-01-02 NOTE — Clinical Note
Hey, I saw this patient in follow-up recently who originally followed with you.  He was diagnosed with prostate cancer December 2022 and is due for confirmatory biopsy.  Given his concurrent cancer diagnoses I did not think that proceeding with confirmatory biopsy was necessary at this point especially with stable PSA and no suspicious lesions on MRI.  I have the office set up return visit in 2 months for cystoscopy with a PSA prior.  Just want to make sure you agreed with the plan. Happy new Year!

## 2024-01-29 ENCOUNTER — OFFICE VISIT (OUTPATIENT)
Dept: FAMILY MEDICINE CLINIC | Facility: CLINIC | Age: 74
End: 2024-01-29
Payer: MEDICARE

## 2024-01-29 VITALS
DIASTOLIC BLOOD PRESSURE: 62 MMHG | OXYGEN SATURATION: 95 % | TEMPERATURE: 97.3 F | WEIGHT: 172 LBS | BODY MASS INDEX: 24.62 KG/M2 | HEIGHT: 70 IN | SYSTOLIC BLOOD PRESSURE: 104 MMHG | HEART RATE: 77 BPM

## 2024-01-29 DIAGNOSIS — C79.49 SECONDARY MALIGNANT NEOPLASM OF BRAIN AND SPINAL CORD (HCC): ICD-10-CM

## 2024-01-29 DIAGNOSIS — H35.3221 EXUDATIVE AGE-RELATED MACULAR DEGENERATION, LEFT EYE, WITH ACTIVE CHOROIDAL NEOVASCULARIZATION (HCC): ICD-10-CM

## 2024-01-29 DIAGNOSIS — C61 PROSTATE CANCER (HCC): ICD-10-CM

## 2024-01-29 DIAGNOSIS — I71.43 INFRARENAL ABDOMINAL AORTIC ANEURYSM (AAA) WITHOUT RUPTURE (HCC): ICD-10-CM

## 2024-01-29 DIAGNOSIS — N13.8 BPH WITH URINARY OBSTRUCTION: ICD-10-CM

## 2024-01-29 DIAGNOSIS — Z11.59 NEED FOR HEPATITIS C SCREENING TEST: Primary | ICD-10-CM

## 2024-01-29 DIAGNOSIS — N40.1 BPH WITH URINARY OBSTRUCTION: ICD-10-CM

## 2024-01-29 DIAGNOSIS — C34.12 PRIMARY MALIGNANT NEOPLASM OF BRONCHUS OF LEFT UPPER LOBE (HCC): ICD-10-CM

## 2024-01-29 DIAGNOSIS — C79.31 SECONDARY MALIGNANT NEOPLASM OF BRAIN AND SPINAL CORD (HCC): ICD-10-CM

## 2024-01-29 DIAGNOSIS — R73.9 ELEVATED BLOOD SUGAR: ICD-10-CM

## 2024-01-29 DIAGNOSIS — K63.5 POLYP OF COLON, UNSPECIFIED PART OF COLON, UNSPECIFIED TYPE: Chronic | ICD-10-CM

## 2024-01-29 PROBLEM — J40 BRONCHITIS: Status: RESOLVED | Noted: 2022-01-25 | Resolved: 2024-01-29

## 2024-01-29 PROCEDURE — G0439 PPPS, SUBSEQ VISIT: HCPCS | Performed by: FAMILY MEDICINE

## 2024-01-29 PROCEDURE — 99214 OFFICE O/P EST MOD 30 MIN: CPT | Performed by: FAMILY MEDICINE

## 2024-01-29 RX ORDER — ATORVASTATIN CALCIUM 40 MG/1
40 TABLET, FILM COATED ORAL DAILY
Qty: 90 TABLET | Refills: 3 | Status: SHIPPED | OUTPATIENT
Start: 2024-01-29

## 2024-01-29 NOTE — PROGRESS NOTES
"Assessment/Plan:    Colon polyps  Following w/ surgery             Subjective:      Patient ID: Angelo Redd is a 74 y.o. male.      PATIENT RETURNS FOR FOLLOW-UP OF CHRONIC MEDICAL CONDITIONS.  ANY HOSPITAL VISITS, EMERGENCY VISITS AND OTHER PROVIDER VISITS SINCE LAST TIME WERE REVIEWED.  MEDS WERE REVIEWED AND NO SIDE EFFECTS.  NO NEW ISSUES  UNLESS NOTED BELOW. NO NEW MEDICAL PROVIDER REPORTED. THE CHRONIC DISEASES LISTED ABOVE ARE STABLE AND UNCHANGED/ THE PLAN OF CARE FOR THOSE WILL REMAIN UNCHANGED UNLESS NOTED BELOW.      Cancer surveillance so far = no recurrence of lung/ renal/bladder lesions. Prostate ca: see todays summary        The following portions of the patient's history were reviewed and updated as appropriate: allergies, current medications, past family history, past medical history, past social history, past surgical history and problem list.    Review of Systems   Constitutional:  Negative for activity change and appetite change.   HENT:  Negative for trouble swallowing.    Eyes:  Negative for visual disturbance.   Respiratory:  Negative for cough and shortness of breath.    Cardiovascular:  Negative for chest pain, palpitations and leg swelling.   Gastrointestinal:  Negative for abdominal pain and blood in stool.   Endocrine: Negative for polyuria.   Genitourinary:  Negative for difficulty urinating and hematuria.   Skin:  Negative for rash.   Neurological:  Negative for dizziness.   Psychiatric/Behavioral:  Negative for behavioral problems.          Objective:  Vitals:    01/29/24 0925   BP: 104/62   BP Location: Left arm   Patient Position: Sitting   Cuff Size: Large   Pulse: 77   Temp: (!) 97.3 °F (36.3 °C)   SpO2: 95%   Weight: 78 kg (172 lb)   Height: 5' 9.5\" (1.765 m)      Physical Exam  Constitutional:       Appearance: Normal appearance.   HENT:      Head: Normocephalic and atraumatic.      Right Ear: Tympanic membrane and ear canal normal.      Left Ear: Tympanic membrane and ear " canal normal.      Mouth/Throat:      Mouth: Mucous membranes are moist.      Pharynx: Oropharynx is clear.   Eyes:      Conjunctiva/sclera: Conjunctivae normal.   Cardiovascular:      Rate and Rhythm: Normal rate and regular rhythm.      Pulses: Normal pulses.      Heart sounds: Normal heart sounds. No murmur heard.  Pulmonary:      Effort: Pulmonary effort is normal. No respiratory distress.      Breath sounds: Normal breath sounds.   Musculoskeletal:      Cervical back: Neck supple.   Lymphadenopathy:      Cervical: No cervical adenopathy.   Neurological:      Mental Status: He is alert. Mental status is at baseline.   Psychiatric:         Mood and Affect: Mood normal.         Thought Content: Thought content normal.           Patient's chronic problems that were reviewed today are stable. Recent hospital stays reviewed. Recent labs and imaging reviewed. Recent visits to other providers reviewed. Meds reviewed and no changes made. Appropriate labs and imaging were ordered. Preventive measures appropriate for age and sex were reviewed with patient. Immunizations were updated as appropriate.     Answers submitted by the patient for this visit:  Medicare Annual Wellness Visit (Submitted on 1/22/2024)  How would you rate your overall health?: good  Compared to last year, how is your physical health?: same  In general, how satisfied are you with your life?: satisfied  Compared to last year, how is your eyesight?: slightly worse  Compared to last year, how is your hearing?: same  Compared to last year, how is your emotional/mental health?: same  How often is anger a problem for you?: never, rarely  How often do you feel unusually tired/fatigued?: sometimes  In the past 7 days, how much pain have you experienced?: none  In the past 6 months, have you lost or gained 10 pounds without trying?: No  One or more falls in the last year: No  Do you have trouble with the stairs inside or outside your home?: No  Does your home  have working smoke alarms?: Yes  Does your home have a carbon monoxide monitor?: Yes  Which safety hazards (if any) have you experienced in your home? Please select all that apply.: none, household clutter  How would you describe your current diet? Please select all that apply.: Regular  In addition to prescription medications, are you taking any over-the-counter supplements?: Yes  If yes, what supplements are you taking?: systane eye drops/ special eye vitamins  Can you manage your medications?: Yes  Are you currently taking any opioid medications?: No  Can you walk and transfer into and out of your bed and chair?: Yes  Can you dress and groom yourself?: Yes  Can you bathe or shower yourself?: Yes  Can you feed yourself?: Yes  Can you do your laundry/ housekeeping?: Yes  Can you manage your money, pay your bills, and track your expenses?: Yes  Can you make your own meals?: Yes  Can you do your own shopping?: Yes  Within the last 12 months, have you had any hospitalizations or Emergency Department visits?: No  Do you have a living will?: Yes  Do you have a Durable POA (Power of ) for healthcare decisions?: Yes  Do you have an Advanced Directive for end of life decisions?: Yes  How often have you used an illegal drug (including marijuana) or a prescription medication for non-medical reasons in the past year?: never  What is the typical number of drinks you consume in a day?: 0  What is the typical number of drinks you consume in a week?: 0  How often did you have a drink containing alcohol in the past year?: monthly or less  How many drinks did you have on a typical day  when you were drinking in the past year?: 1 to 2  How often did you have 6 or more drinks on one occasion in the past year?: never

## 2024-01-29 NOTE — PROGRESS NOTES
" Assessment and Plan:     Problem List Items Addressed This Visit    None  Visit Diagnoses       Need for hepatitis C screening test    -  Primary             Preventive health issues were discussed with patient, and age appropriate screening tests were ordered as noted in patient's After Visit Summary.  Personalized health advice and appropriate referrals for health education or preventive services given if needed, as noted in patient's After Visit Summary.     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)    Bladder stone    Urge incontinence    BPH with urinary obstruction    Renal cell carcinoma of left kidney (HCC)    Encounter for central line care    Elevated blood sugar    Bladder mass    Smoker    Colon polyps    Bronchitis    High prostate specific antigen (PSA)    Prostate cancer (HCC)    Infrarenal abdominal aortic aneurysm (AAA) without rupture (HCC)      Past Medical and Surgical History:     Past Medical History:   Diagnosis Date    Arthritis     Balance problems     and\" coordination issues/best to walk slow\"    Bladder stones     Cancer (HCC)     lung, brain, kidney, spinal    Colon polyp     Dysphagia 02/16/2017    \"not right now but in the past\"    Environmental and seasonal allergies     Exercise involving walking     \"approx 1 mile 3x/week\"    Eye injury     right,  \"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\"    Full dentures     Hemiparesis (HCC) 04/27/2015    left weakness,\"due to tumor on brain\"    History of brain tumor     \"had radiation for it\"    History of cancer chemotherapy     last tx  " 5/22/18    History of GI bleed 2015    History of kidney cancer     left    History of kidney stones     History of radiation therapy     History of transfusion     5 units of blood 2015    Hydrocele 11/30/2010    Inguinal hernia, bilateral     Joint stiffness of multiple sites     Kidney stone     Paresthesia 05/01/2015    Portacath in place     right chest    Primary malignant neoplasm of lung (HCC) 05/26/2015    Renal carcinoma, left (HCC) 10/17/2016    Risk for falls     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/26/2015    Skin cancer     Syncopal episodes 09/01/2015    2    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    Wears glasses      Past Surgical History:   Procedure Laterality Date    BLADDER STONE REMOVAL      BLADDER STONE REMOVAL N/A 2/9/2021    Procedure: LASER LITHOLOPAXY;  Surgeon: Cm Lester MD;  Location: [facility];  Service: Urology    COLONOSCOPY      DENTAL SURGERY  2011    DENTAL EXTACTIONS     ESOPHAGOGASTRODUODENOSCOPY      HERNIA REPAIR      HYDROCELE EXCISION / REPAIR Left 01/01/2011    KIDNEY STONE SURGERY      KIDNEY SURGERY      cancer of kidney/cryo of cancer    PORTACATH PLACEMENT      SC COLONOSCOPY FLX DX W/COLLJ SPEC WHEN PFRMD N/A 8/7/2018    Procedure: COLONOSCOPY with polypectomy ;  Surgeon: Jaclyn Cardenas MD;  Location: [facility];  Service: General    SC CYSTO/URETERO W/LITHOTRIPSY &INDWELL STENT INSRT Left 4/27/2022    Procedure: CYSTO URETEROSCOPY WITH LITHO HOLMIUM LASER, RETROGRADE PYELOGRAM AND INSERTION STENT URETERAL;  Surgeon: Cm Lester MD;  [facility];  Service: Urology    SC CYSTOURETHROSCOPY W/DEST &/RMVL MED BLADDER JACOB N/A 2/9/2021    Procedure: CYSTO, T.U.R.B.T.;  Surgeon: Cm Lester MD;  [facility];  Service: Urology    SC LAPS RPR RECURRENT INCAL HRNA NCRC8/STRANGULATED Bilateral 8/8/2018    Procedure: REPAIR HERNIA INGUINAL LAPAROSCOPIC W/ ROBOTICS W/ MESH;  Surgeon: Jaclyn Cardenas MD;  [facility];  Service: General    SC LITHOLAPAXY SMPL/SM <2.5 CM N/A 11/27/2018    Procedure: CYSTOSCOPY, CYSTOLITHOLOPAXY HOLMIUM LASER,BLADDE BIOPSY;  Surgeon: Cm Lester MD;  [facility];  Service: Urology    SC LITHOLAPAXY SMPL/SM <2.5 CM N/A 4/27/2022    Procedure: LITHOLOPAXY HOLMIUM LASER for bladder stones;  Surgeon: Cm Lester MD;  [facility];  Service: Urology    SKIN BIOPSY      SKIN CANCER EXCISION      17 surgeries, basal cell    TONSILLECTOMY        Family History:     Family History   Problem Relation Age of Onset    Cancer Mother         EYE    Cancer Father     Colon cancer Father     Cancer Brother       Social History:     Social History     Socioeconomic History    Marital status:      Spouse name: Not on file    Number of children: Not on file    Years of education: Not on file    Highest education level: Not on file   Occupational History    Not on file   Tobacco Use    Smoking status: Every Day     Current packs/day: 0.50     Average packs/day: 0.5 packs/day for 48.0 years (24.0 ttl pk-yrs)     Types: Cigarettes     Passive exposure: Current    Smokeless tobacco: Never   Vaping Use    Vaping status: Never Used   Substance and Sexual Activity    Alcohol use: Yes     Comment: 3-4 times a year.    Drug use: No    Sexual activity: Not Currently     Partners: Female     Birth control/protection: Abstinence, Condom Male   Other Topics Concern    Not on file   Social History Narrative    Not on file     Social Determinants of Health     Financial Resource Strain: Low Risk  (1/22/2024)    Overall Financial Resource Strain (CARDIA)     Difficulty of Paying Living Expenses: Not hard at all   Food Insecurity: Not on file   Transportation Needs: No Transportation Needs (1/22/2024)    PRAPARE - Transportation     Lack of Transportation (Medical): No     Lack of Transportation (Non-Medical): No   Physical Activity: Not on file   Stress: Not on file   Social Connections: Not on  file   Intimate Partner Violence: Not on file   Housing Stability: Not on file      Medications and Allergies:     Current Outpatient Medications   Medication Sig Dispense Refill    Ascorbic Acid (VITAMIN C) 500 MG CAPS Take 500 mg by mouth daily        aspirin (ECOTRIN LOW STRENGTH) 81 mg EC tablet Take 1 tablet (81 mg total) by mouth daily 90 tablet 3    atorvastatin (LIPITOR) 40 mg tablet Take 1 tablet (40 mg total) by mouth daily 90 tablet 3    Multiple Vitamins-Minerals (MULTIVITAMIN ADULT PO) Take 1 tablet by mouth daily        polyethylene glycol-propylene glycol (SYSTANE) 0.4-0.3 % Apply to eye Daily       No current facility-administered medications for this visit.     Allergies   Allergen Reactions    Pollen Extract Cough, Nasal Congestion and Sneezing      Immunizations:     Immunization History   Administered Date(s) Administered    COVID-19 MODERNA VACC 0.25 ML IM BOOSTER 12/15/2021    COVID-19 MODERNA VACC 0.5 ML IM 03/22/2021, 04/21/2021, 12/15/2021    COVID-19 Moderna Vac BIVALENT 12 Yr+ IM 0.5 ML 10/03/2022    COVID-19 Moderna mRNA Vaccine 12 Yr+ 50 mcg/0.5 mL (Spikevax) 10/25/2023    INFLUENZA 11/03/2017, 10/09/2018, 10/05/2020, 10/11/2021, 10/03/2022, 10/25/2023    Influenza Split High Dose Preservative Free IM 10/09/2018, 09/09/2019    Pneumococcal Conjugate 13-Valent 01/25/2019    Pneumococcal Polysaccharide PPV23 12/31/2020      Health Maintenance:         Topic Date Due    Hepatitis C Screening  Never done    Colorectal Cancer Screening  08/07/2028         Topic Date Due    COVID-19 Vaccine (4 - 2023-24 season) 12/20/2023      Medicare Screening Tests and Risk Assessments:     Angelo is here for his Subsequent Wellness visit.     Health Risk Assessment:   Patient rates overall health as good. Patient feels that their physical health rating is same. Patient is satisfied with their life. Eyesight was rated as slightly worse. Hearing was rated as same. Patient feels that their emotional and  mental health rating is same. Patients states they are never, rarely angry. Patient states they are sometimes unusually tired/fatigued. Pain experienced in the last 7 days has been none. Patient states that he has experienced no weight loss or gain in last 6 months.     Fall Risk Screening:   In the past year, patient has experienced: no history of falling in past year      Home Safety:  Patient does not have trouble with stairs inside or outside of their home. Patient has working smoke alarms and has working carbon monoxide detector. Home safety hazards include: none and household clutter. Home safety hazards include: none and household clutter.     Nutrition:   Current diet is Regular.     Medications:   Patient is currently taking over-the-counter supplements. OTC medications include: see medication list. Patient is able to manage medications.     Activities of Daily Living (ADLs)/Instrumental Activities of Daily Living (IADLs):   Walk and transfer into and out of bed and chair?: Yes  Dress and groom yourself?: Yes    Bathe or shower yourself?: Yes    Feed yourself? Yes  Do your laundry/housekeeping?: Yes  Manage your money, pay your bills and track your expenses?: Yes  Make your own meals?: Yes    Do your own shopping?: Yes    Previous Hospitalizations:   Any hospitalizations or ED visits within the last 12 months?: No      Advance Care Planning:   Living will: Yes    Durable POA for healthcare: Yes    Advanced directive: Yes      PREVENTIVE SCREENINGS      Cardiovascular Screening:    General: Screening Current      Diabetes Screening:     General: Screening Current      Colorectal Cancer Screening:     General: Screening Current      Prostate Cancer Screening:    General: History Prostate Cancer      Abdominal Aortic Aneurysm (AAA) Screening:    Risk factors include: age between 65-76 yo and tobacco use        General: Screening Not Indicated and History AAA      Lung Cancer Screening:     General: Screening  Not Indicated and History Lung Cancer    Screening, Brief Intervention, and Referral to Treatment (SBIRT)    Screening  Typical number of drinks in a day: 0  Typical number of drinks in a week: 0  Interpretation: Low risk drinking behavior.    AUDIT-C Screenin) How often did you have a drink containing alcohol in the past year? monthly or less  2) How many drinks did you have on a typical day when you were drinking in the past year? 1 to 2  3) How often did you have 6 or more drinks on one occasion in the past year? never    AUDIT-C Score: 1  Interpretation: Score 0-3 (male): Negative screen for alcohol misuse    Single Item Drug Screening:  How often have you used an illegal drug (including marijuana) or a prescription medication for non-medical reasons in the past year? never    Single Item Drug Screen Score: 0  Interpretation: Negative screen for possible drug use disorder    No results found.     Physical Exam:     There were no vitals taken for this visit.    Physical Exam     Luis Enrique Ghosh MD

## 2024-01-29 NOTE — PROGRESS NOTES
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  Personalized health advice and appropriate referrals for health education or preventive services given if needed, as noted in patient's After Visit Summary.     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)    Bladder stone    Urge incontinence    BPH with urinary obstruction    Renal cell carcinoma of left kidney (HCC)    Encounter for central line care    Elevated blood sugar    Bladder mass    Smoker    Colon polyps    High prostate specific antigen (PSA)    Prostate cancer (HCC)    Infrarenal abdominal aortic aneurysm (AAA) without rupture (HCC)      Past Medical and Surgical History:     Past Medical History:   Diagnosis Date    Arthritis     Balance problems     and\" coordination issues/best to walk slow\"    Bladder stones     Cancer (HCC)     lung, brain, kidney, spinal    Colon polyp     Dysphagia 02/16/2017    \"not right now but in the past\"    Environmental and seasonal allergies     Exercise involving walking     " "\"approx 1 mile 3x/week\"    Eye injury     right,  \"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\"    Full dentures     Hemiparesis (HCC) 04/27/2015    left weakness,\"due to tumor on brain\"    History of brain tumor     \"had radiation for it\"    History of cancer chemotherapy     last tx  5/22/18    History of GI bleed 2015    History of kidney cancer     left    History of kidney stones     History of radiation therapy     History of transfusion     5 units of blood 2015    Hydrocele 11/30/2010    Inguinal hernia, bilateral     Joint stiffness of multiple sites     Kidney stone     Paresthesia 05/01/2015    Portacath in place     right chest    Primary malignant neoplasm of lung (HCC) 05/26/2015    Renal carcinoma, left (HCC) 10/17/2016    Risk for falls     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/26/2015    Skin cancer     Syncopal episodes 09/01/2015    2    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    Wears glasses      Past Surgical History:   Procedure Laterality Date    BLADDER STONE REMOVAL      BLADDER STONE REMOVAL N/A 2/9/2021    Procedure: LASER LITHOLOPAXY;  Surgeon: Cm Lester MD;  Location[facility];  Service: Urology    COLONOSCOPY      DENTAL SURGERY  2011    DENTAL EXTACTIONS     ESOPHAGOGASTRODUODENOSCOPY      HERNIA REPAIR      HYDROCELE EXCISION / REPAIR Left 01/01/2011    KIDNEY STONE SURGERY      KIDNEY SURGERY      cancer of kidney/cryo of cancer    PORTACATH PLACEMENT      MI COLONOSCOPY FLX DX W/COLLJ SPEC WHEN PFRMD N/A 8/7/2018    Procedure: COLONOSCOPY with polypectomy ;  Surgeon: Jaclyn Cardenas MD;  Location: [facility];  Service: General    MI CYSTO/URETERO W/LITHOTRIPSY &INDWELL STENT INSRT Left 4/27/2022    Procedure: CYSTO URETEROSCOPY WITH LITHO HOLMIUM LASER, RETROGRADE PYELOGRAM AND INSERTION STENT URETERAL;  Surgeon: Cm Lester MD;  [facility];  Service: Urology    MI CYSTOURETHROSCOPY W/DEST &/RMVL MED BLADDER JACOB N/A 2/9/2021    Procedure: CYSTO, " MONA;  Surgeon: Cm Lester MD;  [facility];  Service: Urology    TN LAPS RPR RECURRENT INCAL HRNA NCRC8/STRANGULATED Bilateral 8/8/2018    Procedure: REPAIR HERNIA INGUINAL LAPAROSCOPIC W/ ROBOTICS W/ MESH;  Surgeon: Jaclyn Cardenas MD;  [facility];  Service: General    TN LITHOLAPAXY SMPL/SM <2.5 CM N/A 11/27/2018    Procedure: CYSTOSCOPY, CYSTOLITHOLOPAXY HOLMIUM LASER,BLADDE BIOPSY;  Surgeon: Cm Lester MD;  [facility];  Service: Urology    TN LITHOLAPAXY SMPL/SM <2.5 CM N/A 4/27/2022    Procedure: LITHOLOPAXY HOLMIUM LASER for bladder stones;  Surgeon: Cm Lester MD;  [facility];  Service: Urology    SKIN BIOPSY      SKIN CANCER EXCISION      17 surgeries, basal cell    TONSILLECTOMY        Family History:     Family History   Problem Relation Age of Onset    Cancer Mother         EYE    Cancer Father     Colon cancer Father     Cancer Brother       Social History:     Social History     Socioeconomic History    Marital status:      Spouse name: None    Number of children: None    Years of education: None    Highest education level: None   Occupational History    None   Tobacco Use    Smoking status: Every Day     Current packs/day: 0.50     Average packs/day: 0.5 packs/day for 48.0 years (24.0 ttl pk-yrs)     Types: Cigarettes     Passive exposure: Current    Smokeless tobacco: Never   Vaping Use    Vaping status: Never Used   Substance and Sexual Activity    Alcohol use: Yes     Comment: 3-4 times a year.    Drug use: No    Sexual activity: Not Currently     Partners: Female     Birth control/protection: Abstinence, Condom Male   Other Topics Concern    None   Social History Narrative    None     Social Determinants of Health     Financial Resource Strain: Low Risk  (1/22/2024)    Overall Financial Resource Strain (CARDIA)     Difficulty of Paying Living Expenses: Not hard at all   Food Insecurity: Not on file   Transportation Needs: No  Transportation Needs (1/22/2024)    PRAPARE - Transportation     Lack of Transportation (Medical): No     Lack of Transportation (Non-Medical): No   Physical Activity: Not on file   Stress: Not on file   Social Connections: Not on file   Intimate Partner Violence: Not on file   Housing Stability: Not on file      Medications and Allergies:     Current Outpatient Medications   Medication Sig Dispense Refill    Ascorbic Acid (VITAMIN C) 500 MG CAPS Take 500 mg by mouth daily        aspirin (ECOTRIN LOW STRENGTH) 81 mg EC tablet Take 1 tablet (81 mg total) by mouth daily 90 tablet 3    atorvastatin (LIPITOR) 40 mg tablet Take 1 tablet (40 mg total) by mouth daily 90 tablet 3    Multiple Vitamins-Minerals (MULTIVITAMIN ADULT PO) Take 1 tablet by mouth daily        polyethylene glycol-propylene glycol (SYSTANE) 0.4-0.3 % Apply to eye Daily       No current facility-administered medications for this visit.     Allergies   Allergen Reactions    Pollen Extract Cough, Nasal Congestion and Sneezing      Immunizations:     Immunization History   Administered Date(s) Administered    COVID-19 MODERNA VACC 0.25 ML IM BOOSTER 12/15/2021    COVID-19 MODERNA VACC 0.5 ML IM 03/22/2021, 04/21/2021, 12/15/2021    COVID-19 Moderna Vac BIVALENT 12 Yr+ IM 0.5 ML 10/03/2022    COVID-19 Moderna mRNA Vaccine 12 Yr+ 50 mcg/0.5 mL (Spikevax) 10/25/2023    INFLUENZA 11/03/2017, 10/09/2018, 10/05/2020, 10/11/2021, 10/03/2022, 10/25/2023    Influenza Split High Dose Preservative Free IM 10/09/2018, 09/09/2019    Pneumococcal Conjugate 13-Valent 01/25/2019    Pneumococcal Polysaccharide PPV23 12/31/2020      Health Maintenance:         Topic Date Due    Hepatitis C Screening  Never done    Colorectal Cancer Screening  08/07/2028         Topic Date Due    COVID-19 Vaccine (4 - 2023-24 season) 12/20/2023      Medicare Screening Tests and Risk Assessments:     Angelo is here for his Subsequent Wellness visit. Last Medicare Wellness visit information  reviewed, patient interviewed, no change since last AWV.     Health Risk Assessment:   Patient rates overall health as good. Patient feels that their physical health rating is same. Patient is satisfied with their life. Eyesight was rated as slightly worse. Hearing was rated as same. Patient feels that their emotional and mental health rating is same. Patients states they are never, rarely angry. Patient states they are sometimes unusually tired/fatigued. Pain experienced in the last 7 days has been none. Patient states that he has experienced no weight loss or gain in last 6 months.     Depression Screening:   PHQ-2 Score: 1      Fall Risk Screening:   In the past year, patient has experienced: no history of falling in past year      Home Safety:  Patient does not have trouble with stairs inside or outside of their home. Patient has working smoke alarms and has working carbon monoxide detector. Home safety hazards include: none and household clutter. Home safety hazards include: none and household clutter.     Nutrition:   Current diet is Regular.     Medications:   Patient is currently taking over-the-counter supplements. OTC medications include: see medication list. Patient is able to manage medications.     Activities of Daily Living (ADLs)/Instrumental Activities of Daily Living (IADLs):   Walk and transfer into and out of bed and chair?: Yes  Dress and groom yourself?: Yes    Bathe or shower yourself?: Yes    Feed yourself? Yes  Do your laundry/housekeeping?: Yes  Manage your money, pay your bills and track your expenses?: Yes  Make your own meals?: Yes    Do your own shopping?: Yes    ADL comments: Seeing eye docs     Previous Hospitalizations:   Any hospitalizations or ED visits within the last 12 months?: No      Advance Care Planning:   Living will: Yes    Durable POA for healthcare: Yes    Advanced directive: Yes      Cognitive Screening:   Provider or family/friend/caregiver concerned regarding  "cognition?: No    PREVENTIVE SCREENINGS      Cardiovascular Screening:    General: Screening Current      Diabetes Screening:     General: Screening Current      Colorectal Cancer Screening:     General: Screening Current      Prostate Cancer Screening:    General: History Prostate Cancer      Osteoporosis Screening:    General: Screening Not Indicated      Abdominal Aortic Aneurysm (AAA) Screening:    Risk factors include: age between 65-74 yo and tobacco use        General: Screening Not Indicated and History AAA      Lung Cancer Screening:     General: Screening Not Indicated, History Lung Cancer and Screening Current      Hepatitis C Screening:    General: Risks and Benefits Discussed    Screening, Brief Intervention, and Referral to Treatment (SBIRT)    Screening  Typical number of drinks in a day: 0  Typical number of drinks in a week: 0  Interpretation: Low risk drinking behavior.    AUDIT-C Screenin) How often did you have a drink containing alcohol in the past year? monthly or less  2) How many drinks did you have on a typical day when you were drinking in the past year? 1 to 2  3) How often did you have 6 or more drinks on one occasion in the past year? never    AUDIT-C Score: 1  Interpretation: Score 0-3 (male): Negative screen for alcohol misuse    Single Item Drug Screening:  How often have you used an illegal drug (including marijuana) or a prescription medication for non-medical reasons in the past year? never    Single Item Drug Screen Score: 0  Interpretation: Negative screen for possible drug use disorder    Brief Intervention  Alcohol & drug use screenings were reviewed. No concerns regarding substance use disorder identified.     Tobacco cessation reviewed     No results found.     Physical Exam:     /62 (BP Location: Left arm, Patient Position: Sitting, Cuff Size: Large)   Pulse 77   Temp (!) 97.3 °F (36.3 °C)   Ht 5' 9.5\" (1.765 m)   Wt 78 kg (172 lb)   SpO2 95%   BMI 25.04 " kg/m²     Physical Exam     Luis Enrique Ghosh MD

## 2024-01-29 NOTE — PATIENT INSTRUCTIONS
Medicare Preventive Visit Patient Instructions  Thank you for completing your Welcome to Medicare Visit or Medicare Annual Wellness Visit today. Your next wellness visit will be due in one year (1/29/2025).  The screening/preventive services that you may require over the next 5-10 years are detailed below. Some tests may not apply to you based off risk factors and/or age. Screening tests ordered at today's visit but not completed yet may show as past due. Also, please note that scanned in results may not display below.  Preventive Screenings:  Service Recommendations Previous Testing/Comments   Colorectal Cancer Screening  Colonoscopy    Fecal Occult Blood Test (FOBT)/Fecal Immunochemical Test (FIT)  Fecal DNA/Cologuard Test  Flexible Sigmoidoscopy Age: 45-75 years old   Colonoscopy: every 10 years (May be performed more frequently if at higher risk)  OR  FOBT/FIT: every 1 year  OR  Cologuard: every 3 years  OR  Sigmoidoscopy: every 5 years  Screening may be recommended earlier than age 45 if at higher risk for colorectal cancer. Also, an individualized decision between you and your healthcare provider will decide whether screening between the ages of 76-85 would be appropriate. Colonoscopy: 08/07/2018  FOBT/FIT: Not on file  Cologuard: Not on file  Sigmoidoscopy: Not on file    Screening Current  Screening Current     Prostate Cancer Screening Individualized decision between patient and health care provider in men between ages of 55-69   Medicare will cover every 12 months beginning on the day after your 50th birthday PSA: 4.65 ng/mL     History Prostate Cancer  History Prostate Cancer     Hepatitis C Screening Once for adults born between 1945 and 1965  More frequently in patients at high risk for Hepatitis C Hep C Antibody: Not on file    Risks and Benefits Discussed   Diabetes Screening 1-2 times per year if you're at risk for diabetes or have pre-diabetes Fasting glucose: 124 mg/dL (10/11/2022)  A1C: No results  in last 5 years (No results in last 5 years)  Screening Current  Screening Current   Cholesterol Screening Once every 5 years if you don't have a lipid disorder. May order more often based on risk factors. Lipid panel: 09/06/2022  Screening Current  Screening Current      Other Preventive Screenings Covered by Medicare:  Abdominal Aortic Aneurysm (AAA) Screening: covered once if your at risk. You're considered to be at risk if you have a family history of AAA or a male between the age of 65-75 who smoking at least 100 cigarettes in your lifetime.  Lung Cancer Screening: covers low dose CT scan once per year if you meet all of the following conditions: (1) Age 55-77; (2) No signs or symptoms of lung cancer; (3) Current smoker or have quit smoking within the last 15 years; (4) You have a tobacco smoking history of at least 20 pack years (packs per day x number of years you smoked); (5) You get a written order from a healthcare provider.  Glaucoma Screening: covered annually if you're considered high risk: (1) You have diabetes OR (2) Family history of glaucoma OR (3)  aged 50 and older OR (4)  American aged 65 and older  Osteoporosis Screening: covered every 2 years if you meet one of the following conditions: (1) Have a vertebral abnormality; (2) On glucocorticoid therapy for more than 3 months; (3) Have primary hyperparathyroidism; (4) On osteoporosis medications and need to assess response to drug therapy.  HIV Screening: covered annually if you're between the age of 15-65. Also covered annually if you are younger than 15 and older than 65 with risk factors for HIV infection. For pregnant patients, it is covered up to 3 times per pregnancy.    Immunizations:  Immunization Recommendations   Influenza Vaccine Annual influenza vaccination during flu season is recommended for all persons aged >= 6 months who do not have contraindications   Pneumococcal Vaccine   * Pneumococcal conjugate vaccine  = PCV13 (Prevnar 13), PCV15 (Vaxneuvance), PCV20 (Prevnar 20)  * Pneumococcal polysaccharide vaccine = PPSV23 (Pneumovax) Adults 19-65 yo with certain risk factors or if 65+ yo  If never received any pneumonia vaccine: recommend Prevnar 20 (PCV20)  Give PCV20 if previously received 1 dose of PCV13 or PPSV23   Hepatitis B Vaccine 3 dose series if at intermediate or high risk (ex: diabetes, end stage renal disease, liver disease)   Respiratory syncytial virus (RSV) Vaccine - COVERED BY MEDICARE PART D  * RSVPreF3 (Arexvy) CDC recommends that adults 60 years of age and older may receive a single dose of RSV vaccine using shared clinical decision-making (SCDM)   Tetanus (Td) Vaccine - COST NOT COVERED BY MEDICARE PART B Following completion of primary series, a booster dose should be given every 10 years to maintain immunity against tetanus. Td may also be given as tetanus wound prophylaxis.   Tdap Vaccine - COST NOT COVERED BY MEDICARE PART B Recommended at least once for all adults. For pregnant patients, recommended with each pregnancy.   Shingles Vaccine (Shingrix) - COST NOT COVERED BY MEDICARE PART B  2 shot series recommended in those 19 years and older who have or will have weakened immune systems or those 50 years and older     Health Maintenance Due:      Topic Date Due   • Hepatitis C Screening  Never done   • Colorectal Cancer Screening  08/07/2028     Immunizations Due:      Topic Date Due   • COVID-19 Vaccine (4 - 2023-24 season) 12/20/2023     Advance Directives   What are advance directives?  Advance directives are legal documents that state your wishes and plans for medical care. These plans are made ahead of time in case you lose your ability to make decisions for yourself. Advance directives can apply to any medical decision, such as the treatments you want, and if you want to donate organs.   What are the types of advance directives?  There are many types of advance directives, and each state has  rules about how to use them. You may choose a combination of any of the following:  Living will:  This is a written record of the treatment you want. You can also choose which treatments you do not want, which to limit, and which to stop at a certain time. This includes surgery, medicine, IV fluid, and tube feedings.   Durable power of  for healthcare (DPAHC):  This is a written record that states who you want to make healthcare choices for you when you are unable to make them for yourself. This person, called a proxy, is usually a family member or a friend. You may choose more than 1 proxy.  Do not resuscitate (DNR) order:  A DNR order is used in case your heart stops beating or you stop breathing. It is a request not to have certain forms of treatment, such as CPR. A DNR order may be included in other types of advance directives.  Medical directive:  This covers the care that you want if you are in a coma, near death, or unable to make decisions for yourself. You can list the treatments you want for each condition. Treatment may include pain medicine, surgery, blood transfusions, dialysis, IV or tube feedings, and a ventilator (breathing machine).  Values history:  This document has questions about your views, beliefs, and how you feel and think about life. This information can help others choose the care that you would choose.  Why are advance directives important?  An advance directive helps you control your care. Although spoken wishes may be used, it is better to have your wishes written down. Spoken wishes can be misunderstood, or not followed. Treatments may be given even if you do not want them. An advance directive may make it easier for your family to make difficult choices about your care.   Cigarette Smoking and Your Health   Risks to your health if you smoke:  Nicotine and other chemicals found in tobacco damage every cell in your body. Even if you are a light smoker, you have an increased risk  for cancer, heart disease, and lung disease. If you are pregnant or have diabetes, smoking increases your risk for complications.   Benefits to your health if you stop smoking:   You decrease respiratory symptoms such as coughing, wheezing, and shortness of breath.   You reduce your risk for cancers of the lung, mouth, throat, kidney, bladder, pancreas, stomach, and cervix. If you already have cancer, you increase the benefits of chemotherapy. You also reduce your risk for cancer returning or a second cancer from developing.   You reduce your risk for heart disease, blood clots, heart attack, and stroke.   You reduce your risk for lung infections, and diseases such as pneumonia, asthma, chronic bronchitis, and emphysema.  Your circulation improves. More oxygen can be delivered to your body. If you have diabetes, you lower your risk for complications, such as kidney, artery, and eye diseases. You also lower your risk for nerve damage. Nerve damage can lead to amputations, poor vision, and blindness.  You improve your body's ability to heal and to fight infections.  For more information and support to stop smoking:   [email or web address]  Phone: 5- 697 - [phone]  Web Address: [email or web address]  Weight Management   Why it is important to manage your weight:  Being overweight increases your risk of health conditions such as heart disease, high blood pressure, type 2 diabetes, and certain types of cancer. It can also increase your risk for osteoarthritis, sleep apnea, and other respiratory problems. Aim for a slow, steady weight loss. Even a small amount of weight loss can lower your risk of health problems.  How to lose weight safely:  A safe and healthy way to lose weight is to eat fewer calories and get regular exercise. You can lose up about 1 pound a week by decreasing the number of calories you eat by 500 calories each day.   Healthy meal plan for weight management:  A healthy meal plan includes a variety of foods,  contains fewer calories, and helps you stay healthy. A healthy meal plan includes the following:  Eat whole-grain foods more often.  A healthy meal plan should contain fiber. Fiber is the part of grains, fruits, and vegetables that is not broken down by your body. Whole-grain foods are healthy and provide extra fiber in your diet. Some examples of whole-grain foods are whole-wheat breads and pastas, oatmeal, brown rice, and bulgur.  Eat a variety of vegetables every day.  Include dark, leafy greens such as spinach, kale, evelia greens, and mustard greens. Eat yellow and orange vegetables such as carrots, sweet potatoes, and winter squash.   Eat a variety of fruits every day.  Choose fresh or canned fruit (canned in its own juice or light syrup) instead of juice. Fruit juice has very little or no fiber.  Eat low-fat dairy foods.  Drink fat-free (skim) milk or 1% milk. Eat fat-free yogurt and low-fat cottage cheese. Try low-fat cheeses such as mozzarella and other reduced-fat cheeses.  Choose meat and other protein foods that are low in fat.  Choose beans or other legumes such as split peas or lentils. Choose fish, skinless poultry (chicken or turkey), or lean cuts of red meat (beef or pork). Before you cook meat or poultry, cut off any visible fat.   Use less fat and oil.  Try baking foods instead of frying them. Add less fat, such as margarine, sour cream, regular salad dressing and mayonnaise to foods. Eat fewer high-fat foods. Some examples of high-fat foods include french fries, doughnuts, ice cream, and cakes.  Eat fewer sweets.  Limit foods and drinks that are high in sugar. This includes candy, cookies, regular soda, and sweetened drinks.  Exercise:  Exercise at least 30 minutes per day on most days of the week. Some examples of exercise include walking, biking, dancing, and swimming. You can also fit in more physical activity by taking the stairs instead of the elevator or parking farther away from stores.  Ask your healthcare provider about the best exercise plan for you.      © Copyright [employer] 2018 Information is for End User's use only and may not be sold, redistributed or otherwise used for commercial purposes. All illustrations and images included in CareNotes® are the copyrighted property of [employer] or [employer]    Medicare Preventive Visit Patient Instructions  Thank you for completing your Welcome to Medicare Visit or Medicare Annual Wellness Visit today. Your next wellness visit will be due in one year (1/29/2025).  The screening/preventive services that you may require over the next 5-10 years are detailed below. Some tests may not apply to you based off risk factors and/or age. Screening tests ordered at today's visit but not completed yet may show as past due. Also, please note that scanned in results may not display below.  Preventive Screenings:  Service Recommendations Previous Testing/Comments   Colorectal Cancer Screening  Colonoscopy    Fecal Occult Blood Test (FOBT)/Fecal Immunochemical Test (FIT)  Fecal DNA/Cologuard Test  Flexible Sigmoidoscopy Age: 45-75 years old   Colonoscopy: every 10 years (May be performed more frequently if at higher risk)  OR  FOBT/FIT: every 1 year  OR  Cologuard: every 3 years  OR  Sigmoidoscopy: every 5 years  Screening may be recommended earlier than age 45 if at higher risk for colorectal cancer. Also, an individualized decision between you and your healthcare provider will decide whether screening between the ages of 76-85 would be appropriate. Colonoscopy: 08/07/2018  FOBT/FIT: Not on file  Cologuard: Not on file  Sigmoidoscopy: Not on file    Screening Current  Screening Current     Prostate Cancer Screening Individualized decision between patient and health care provider in men between ages of 55-69   Medicare will cover every 12 months beginning on the day after your 50th birthday PSA: 4.65 ng/mL     History Prostate Cancer  History  Prostate Cancer     Hepatitis C Screening Once for adults born between 1945 and 1965  More frequently in patients at high risk for Hepatitis C Hep C Antibody: Not on file    Risks and Benefits Discussed   Diabetes Screening 1-2 times per year if you're at risk for diabetes or have pre-diabetes Fasting glucose: 124 mg/dL (10/11/2022)  A1C: No results in last 5 years (No results in last 5 years)  Screening Current  Screening Current   Cholesterol Screening Once every 5 years if you don't have a lipid disorder. May order more often based on risk factors. Lipid panel: 09/06/2022  Screening Current  Screening Current      Other Preventive Screenings Covered by Medicare:  Abdominal Aortic Aneurysm (AAA) Screening: covered once if your at risk. You're considered to be at risk if you have a family history of AAA or a male between the age of 65-75 who smoking at least 100 cigarettes in your lifetime.  Lung Cancer Screening: covers low dose CT scan once per year if you meet all of the following conditions: (1) Age 55-77; (2) No signs or symptoms of lung cancer; (3) Current smoker or have quit smoking within the last 15 years; (4) You have a tobacco smoking history of at least 20 pack years (packs per day x number of years you smoked); (5) You get a written order from a healthcare provider.  Glaucoma Screening: covered annually if you're considered high risk: (1) You have diabetes OR (2) Family history of glaucoma OR (3)  aged 50 and older OR (4)  American aged 65 and older  Osteoporosis Screening: covered every 2 years if you meet one of the following conditions: (1) Have a vertebral abnormality; (2) On glucocorticoid therapy for more than 3 months; (3) Have primary hyperparathyroidism; (4) On osteoporosis medications and need to assess response to drug therapy.  HIV Screening: covered annually if you're between the age of 15-65. Also covered annually if you are younger than 15 and older than 65 with  risk factors for HIV infection. For pregnant patients, it is covered up to 3 times per pregnancy.    Immunizations:  Immunization Recommendations   Influenza Vaccine Annual influenza vaccination during flu season is recommended for all persons aged >= 6 months who do not have contraindications   Pneumococcal Vaccine   * Pneumococcal conjugate vaccine = PCV13 (Prevnar 13), PCV15 (Vaxneuvance), PCV20 (Prevnar 20)  * Pneumococcal polysaccharide vaccine = PPSV23 (Pneumovax) Adults 19-65 yo with certain risk factors or if 65+ yo  If never received any pneumonia vaccine: recommend Prevnar 20 (PCV20)  Give PCV20 if previously received 1 dose of PCV13 or PPSV23   Hepatitis B Vaccine 3 dose series if at intermediate or high risk (ex: diabetes, end stage renal disease, liver disease)   Respiratory syncytial virus (RSV) Vaccine - COVERED BY MEDICARE PART D  * RSVPreF3 (Arexvy) CDC recommends that adults 60 years of age and older may receive a single dose of RSV vaccine using shared clinical decision-making (SCDM)   Tetanus (Td) Vaccine - COST NOT COVERED BY MEDICARE PART B Following completion of primary series, a booster dose should be given every 10 years to maintain immunity against tetanus. Td may also be given as tetanus wound prophylaxis.   Tdap Vaccine - COST NOT COVERED BY MEDICARE PART B Recommended at least once for all adults. For pregnant patients, recommended with each pregnancy.   Shingles Vaccine (Shingrix) - COST NOT COVERED BY MEDICARE PART B  2 shot series recommended in those 19 years and older who have or will have weakened immune systems or those 50 years and older     Health Maintenance Due:      Topic Date Due   • Hepatitis C Screening  Never done   • Colorectal Cancer Screening  08/07/2028     Immunizations Due:      Topic Date Due   • COVID-19 Vaccine (4 - 2023-24 season) 12/20/2023     Advance Directives   What are advance directives?  Advance directives are legal documents that state your wishes and  plans for medical care. These plans are made ahead of time in case you lose your ability to make decisions for yourself. Advance directives can apply to any medical decision, such as the treatments you want, and if you want to donate organs.   What are the types of advance directives?  There are many types of advance directives, and each state has rules about how to use them. You may choose a combination of any of the following:  Living will:  This is a written record of the treatment you want. You can also choose which treatments you do not want, which to limit, and which to stop at a certain time. This includes surgery, medicine, IV fluid, and tube feedings.   Durable power of  for healthcare (DPAHC):  This is a written record that states who you want to make healthcare choices for you when you are unable to make them for yourself. This person, called a proxy, is usually a family member or a friend. You may choose more than 1 proxy.  Do not resuscitate (DNR) order:  A DNR order is used in case your heart stops beating or you stop breathing. It is a request not to have certain forms of treatment, such as CPR. A DNR order may be included in other types of advance directives.  Medical directive:  This covers the care that you want if you are in a coma, near death, or unable to make decisions for yourself. You can list the treatments you want for each condition. Treatment may include pain medicine, surgery, blood transfusions, dialysis, IV or tube feedings, and a ventilator (breathing machine).  Values history:  This document has questions about your views, beliefs, and how you feel and think about life. This information can help others choose the care that you would choose.  Why are advance directives important?  An advance directive helps you control your care. Although spoken wishes may be used, it is better to have your wishes written down. Spoken wishes can be misunderstood, or not followed. Treatments  may be given even if you do not want them. An advance directive may make it easier for your family to make difficult choices about your care.   Cigarette Smoking and Your Health   Risks to your health if you smoke:  Nicotine and other chemicals found in tobacco damage every cell in your body. Even if you are a light smoker, you have an increased risk for cancer, heart disease, and lung disease. If you are pregnant or have diabetes, smoking increases your risk for complications.   Benefits to your health if you stop smoking:   You decrease respiratory symptoms such as coughing, wheezing, and shortness of breath.   You reduce your risk for cancers of the lung, mouth, throat, kidney, bladder, pancreas, stomach, and cervix. If you already have cancer, you increase the benefits of chemotherapy. You also reduce your risk for cancer returning or a second cancer from developing.   You reduce your risk for heart disease, blood clots, heart attack, and stroke.   You reduce your risk for lung infections, and diseases such as pneumonia, asthma, chronic bronchitis, and emphysema.  Your circulation improves. More oxygen can be delivered to your body. If you have diabetes, you lower your risk for complications, such as kidney, artery, and eye diseases. You also lower your risk for nerve damage. Nerve damage can lead to amputations, poor vision, and blindness.  You improve your body's ability to heal and to fight infections.  For more information and support to stop smoking:   [email or web address]  Phone: 6- 275 - [phone]  Web Address: www.[employer]  Weight Management   Why it is important to manage your weight:  Being overweight increases your risk of health conditions such as heart disease, high blood pressure, type 2 diabetes, and certain types of cancer. It can also increase your risk for osteoarthritis, sleep apnea, and other respiratory problems. Aim for a slow, steady weight loss. Even a small amount of weight loss can lower  your risk of health problems.  How to lose weight safely:  A safe and healthy way to lose weight is to eat fewer calories and get regular exercise. You can lose up about 1 pound a week by decreasing the number of calories you eat by 500 calories each day.   Healthy meal plan for weight management:  A healthy meal plan includes a variety of foods, contains fewer calories, and helps you stay healthy. A healthy meal plan includes the following:  Eat whole-grain foods more often.  A healthy meal plan should contain fiber. Fiber is the part of grains, fruits, and vegetables that is not broken down by your body. Whole-grain foods are healthy and provide extra fiber in your diet. Some examples of whole-grain foods are whole-wheat breads and pastas, oatmeal, brown rice, and bulgur.  Eat a variety of vegetables every day.  Include dark, leafy greens such as spinach, kale, evelia greens, and mustard greens. Eat yellow and orange vegetables such as carrots, sweet potatoes, and winter squash.   Eat a variety of fruits every day.  Choose fresh or canned fruit (canned in its own juice or light syrup) instead of juice. Fruit juice has very little or no fiber.  Eat low-fat dairy foods.  Drink fat-free (skim) milk or 1% milk. Eat fat-free yogurt and low-fat cottage cheese. Try low-fat cheeses such as mozzarella and other reduced-fat cheeses.  Choose meat and other protein foods that are low in fat.  Choose beans or other legumes such as split peas or lentils. Choose fish, skinless poultry (chicken or turkey), or lean cuts of red meat (beef or pork). Before you cook meat or poultry, cut off any visible fat.   Use less fat and oil.  Try baking foods instead of frying them. Add less fat, such as margarine, sour cream, regular salad dressing and mayonnaise to foods. Eat fewer high-fat foods. Some examples of high-fat foods include french fries, doughnuts, ice cream, and cakes.  Eat fewer sweets.  Limit foods and drinks that are high in  sugar. This includes candy, cookies, regular soda, and sweetened drinks.  Exercise:  Exercise at least 30 minutes per day on most days of the week. Some examples of exercise include walking, biking, dancing, and swimming. You can also fit in more physical activity by taking the stairs instead of the elevator or parking farther away from stores. Ask your healthcare provider about the best exercise plan for you.      © Copyright [employer] 2018 Information is for End User's use only and may not be sold, redistributed or otherwise used for commercial purposes. All illustrations and images included in CareNotes® are the copyrighted property of [employer] or [employer]

## 2024-02-15 ENCOUNTER — HOSPITAL ENCOUNTER (OUTPATIENT)
Dept: INFUSION CENTER | Facility: HOSPITAL | Age: 74
End: 2024-02-15
Attending: INTERNAL MEDICINE
Payer: MEDICARE

## 2024-02-15 DIAGNOSIS — Z11.59 NEED FOR HEPATITIS C SCREENING TEST: ICD-10-CM

## 2024-02-15 DIAGNOSIS — Z45.2 ENCOUNTER FOR CENTRAL LINE CARE: ICD-10-CM

## 2024-02-15 DIAGNOSIS — C61 PROSTATE CANCER (HCC): ICD-10-CM

## 2024-02-15 DIAGNOSIS — C34.12 PRIMARY MALIGNANT NEOPLASM OF BRONCHUS OF LEFT UPPER LOBE (HCC): Primary | ICD-10-CM

## 2024-02-15 LAB
HCV AB SER QL: NORMAL
PSA SERPL-MCNC: 5.17 NG/ML (ref 0–4)

## 2024-02-15 PROCEDURE — 84153 ASSAY OF PSA TOTAL: CPT

## 2024-02-15 PROCEDURE — 86803 HEPATITIS C AB TEST: CPT

## 2024-04-10 ENCOUNTER — PROCEDURE VISIT (OUTPATIENT)
Dept: UROLOGY | Facility: MEDICAL CENTER | Age: 74
End: 2024-04-10
Payer: MEDICARE

## 2024-04-10 VITALS
WEIGHT: 173 LBS | OXYGEN SATURATION: 94 % | DIASTOLIC BLOOD PRESSURE: 64 MMHG | BODY MASS INDEX: 24.77 KG/M2 | HEIGHT: 70 IN | SYSTOLIC BLOOD PRESSURE: 110 MMHG | HEART RATE: 90 BPM

## 2024-04-10 DIAGNOSIS — C61 PROSTATE CANCER (HCC): ICD-10-CM

## 2024-04-10 DIAGNOSIS — N21.0 BLADDER STONE: ICD-10-CM

## 2024-04-10 DIAGNOSIS — N20.0 CALCULUS OF KIDNEY: ICD-10-CM

## 2024-04-10 DIAGNOSIS — Z85.51 HISTORY OF BLADDER CANCER: Primary | ICD-10-CM

## 2024-04-10 PROCEDURE — 52000 CYSTOURETHROSCOPY: CPT | Performed by: UROLOGY

## 2024-04-10 PROCEDURE — 99213 OFFICE O/P EST LOW 20 MIN: CPT | Performed by: UROLOGY

## 2024-04-10 NOTE — PROGRESS NOTES
HISTORY:    1.  Prostate cancer Chirag score 6 on active surveillance.  Diagnosed in December 2022.  Prostate 56 mL on MRI in 2022    2.  Status post cryoablation for small lesion stage I kidney cancer in 2016.    3.  History of noninvasive low-grade bladder cancer in 2018..    4.  Has completed treatment for metastatic lung cancer.    5.  Small left renal stone calyceal nonobstructing    6.  Bladder stone chronic, this is related to a stone surgery 15 years ago at [facility].     Cystoscopy     Date/Time  4/10/2024 1:30 PM     Performed by  Cm Lester MD   Authorized by  Cm Lester MD         Procedure Details:  Procedure type: cystoscopy    Patient tolerance: Patient tolerated the procedure well with no immediate complications    Additional Procedure Details:      Patient presents for cystoscopy.  I have discussed the reasons for doing the exam, and the potential risks and complications.  Patient expressed understanding, and signed informed consent document.    The patient was carefully  positioned supine on the examining table.  Sterile preparation was performed on the urethra.  Xylocaine jelly was instilled and left  Indwelling for the procedure.  The 15 K[place] flexible cystoscope was passed with the following findings:      Urethra: No stricture    Prostate:  lateral lobes mildly enlarged                  Bladder: Severe trabeculation, stone about 1 cm or so adherent to posterior wall just behind right ureter, no lesions, tumor     Residual urine: 100 mL    Patient tolerated the procedure well and was escorted from the examining table.             ASSESSMENT / PLAN:    1.  No recurrence of bladder cancer    2.  He tolerates the bladder stone well, prior treatments have not been able to eradicate it completely, and appears to be embedded in the bladder wall.    3.  Check PSA    4.  Cystoscopy 1 year    The following portions of the patient's history were reviewed and updated as appropriate:  "allergies, current medications, past family history, past medical history, past social history, past surgical history, and problem list.    Review of Systems      Objective:     Physical Exam      0   Lab Value Date/Time    PSA 5.17 (H) 02/15/2024 0949    PSA 4.65 (H) 09/08/2023 1355    PSA 4.56 (H) 07/12/2023 0952   ]  BUN   Date Value Ref Range Status   09/08/2023 16 5 - 25 mg/dL Final   12/28/2020 18 7 - 28 mg/dL Final     Creatinine   Date Value Ref Range Status   09/08/2023 0.78 0.60 - 1.30 mg/dL Final     Comment:     Standardized to IDMS reference method   12/28/2020 0.88 0.53 - 1.30 mg/dL Final     No components found for: \"CBC\"      Patient Active Problem List   Diagnosis    Family history of colon cancer    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)    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)    Bladder stone    Urge incontinence    BPH with urinary obstruction    Renal cell carcinoma of left kidney (HCC)    Encounter for central line care    Elevated blood sugar    Bladder mass    Smoker    Colon polyps    High prostate specific antigen (PSA)    Prostate cancer (HCC)    Infrarenal abdominal aortic aneurysm (AAA) without rupture (HCC)    Exudative age-related macular degeneration, left eye, with active choroidal neovascularization (HCC)        Diagnoses and all orders for this visit:    History of bladder cancer    Bladder stone    Prostate cancer (HCC)  -     PSA Total, Diagnostic; Future    Calculus of kidney    Other orders  -     Cystoscopy           Patient ID: Angelo Redd is a 74 y.o. male.      Current Outpatient Medications:     Ascorbic Acid (VITAMIN C) 500 MG CAPS, Take 500 mg by mouth daily  , Disp: , Rfl:     aspirin (ECOTRIN LOW STRENGTH) 81 mg EC tablet, Take 1 tablet (81 mg total) by mouth daily, Disp: 90 tablet, Rfl: 3    atorvastatin (LIPITOR) 40 mg tablet, Take 1 tablet (40 mg total) by mouth daily, Disp: 90 tablet, Rfl: 3    Multiple " "Vitamins-Minerals (MULTIVITAMIN ADULT PO), Take 1 tablet by mouth daily  , Disp: , Rfl:     polyethylene glycol-propylene glycol (SYSTANE) 0.4-0.3 %, Apply to eye Daily, Disp: , Rfl:     Past Medical History:   Diagnosis Date    Arthritis     Balance problems     and\" coordination issues/best to walk slow\"    Bladder stones     Cancer (HCC)     lung, brain, kidney, spinal    Colon polyp     Dysphagia 02/16/2017    \"not right now but in the past\"    Environmental and seasonal allergies     Exercise involving walking     \"approx 1 mile 3x/week\"    Eye injury     right,  \"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\"    Full dentures     Hemiparesis (HCC) 04/27/2015    left weakness,\"due to tumor on brain\"    History of brain tumor     \"had radiation for it\"    History of cancer chemotherapy     last tx  5/22/18    History of GI bleed 2015    History of kidney cancer     left    History of kidney stones     History of radiation therapy     History of transfusion     5 units of blood 2015    Hydrocele 11/30/2010    Inguinal hernia, bilateral     Joint stiffness of multiple sites     Kidney stone     Paresthesia 05/01/2015    Portacath in place     right chest    Primary malignant neoplasm of lung (HCC) 05/26/2015    Renal carcinoma, left (HCC) 10/17/2016    Risk for falls     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/26/2015    Skin cancer     Syncopal episodes 09/01/2015    2    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    Wears glasses        Past Surgical History:   Procedure Laterality Date    BLADDER STONE REMOVAL      BLADDER STONE REMOVAL N/A 2/9/2021    Procedure: LASER LITHOLOPAXY;  Surgeon: Cm Lester MD;  Location: [facility];  Service: Urology    COLONOSCOPY      DENTAL SURGERY  2011    DENTAL EXTACTIONS     ESOPHAGOGASTRODUODENOSCOPY      HERNIA REPAIR      HYDROCELE EXCISION / REPAIR Left 01/01/2011    KIDNEY STONE SURGERY      KIDNEY SURGERY      cancer of kidney/cryo of cancer    PORTACATH " PLACEMENT      TN COLONOSCOPY FLX DX W/COLLJ SPEC WHEN PFRMD N/A 8/7/2018    Procedure: COLONOSCOPY with polypectomy ;  Surgeon: Jaclyn Cardenas MD;  Location: [facility];  Service: General    TN CYSTO/URETERO W/LITHOTRIPSY &INDWELL STENT INSRT Left 4/27/2022    Procedure: CYSTO URETEROSCOPY WITH LITHO HOLMIUM LASER, RETROGRADE PYELOGRAM AND INSERTION STENT URETERAL;  Surgeon: Cm Lester MD;  [facility];  Service: Urology    TN CYSTOURETHROSCOPY W/DEST &/RMVL MED BLADDER JACOB N/A 2/9/2021    Procedure: CYSTO, T.U.R.B.T.;  Surgeon: Cm Lester MD;  [facility]: [facility];  Service: Urology    TN LAPS RPR RECURRENT INCAL HRNA NCRC8/STRANGULATED Bilateral 8/8/2018    Procedure: REPAIR HERNIA INGUINAL LAPAROSCOPIC W/ ROBOTICS W/ MESH;  Surgeon: Jaclyn Cardenas MD;  Location[facility];  Service: General    TN LITHOLAPAXY SMPL/SM <2.5 CM N/A 11/27/2018    Procedure: CYSTOSCOPY, CYSTOLITHOLOPAXY HOLMIUM LASER,BLADDE BIOPSY;  Surgeon: Cm Lester MD;  [facility];  Service: Urology    TN LITHOLAPAXY SMPL/SM <2.5 CM N/A 4/27/2022    Procedure: LITHOLOPAXY HOLMIUM LASER for bladder stones;  Surgeon: Cm Lester MD;  Location[facility];  Service: Urology    SKIN BIOPSY      SKIN CANCER EXCISION      17 surgeries, basal cell    TONSILLECTOMY         Social History

## 2024-04-11 ENCOUNTER — HOSPITAL ENCOUNTER (OUTPATIENT)
Dept: INFUSION CENTER | Facility: HOSPITAL | Age: 74
Discharge: HOME | End: 2024-04-11
Payer: MEDICARE

## 2024-04-11 DIAGNOSIS — C34.12 PRIMARY MALIGNANT NEOPLASM OF BRONCHUS OF LEFT UPPER LOBE (HCC): Primary | ICD-10-CM

## 2024-04-11 DIAGNOSIS — Z45.2 ENCOUNTER FOR CENTRAL LINE CARE: ICD-10-CM

## 2024-04-11 PROCEDURE — 96523 IRRIG DRUG DELIVERY DEVICE: CPT

## 2024-04-11 NOTE — PROGRESS NOTES
Pt tolerated routine port flush without difficulty.  Port flushed and deaccessed per protocol.  Scheduled next appt for port flush and labs on 5/13 at 0900 prior to CT scan.  AVS provided.  Left ambulatory in stable condition.

## 2024-05-13 ENCOUNTER — HOSPITAL ENCOUNTER (OUTPATIENT)
Dept: INFUSION CENTER | Facility: HOSPITAL | Age: 74
Discharge: HOME | End: 2024-05-13
Attending: INTERNAL MEDICINE
Payer: MEDICARE

## 2024-05-13 ENCOUNTER — HOSPITAL ENCOUNTER (OUTPATIENT)
Dept: CT IMAGING | Facility: HOSPITAL | Age: 74
Discharge: HOME | End: 2024-05-13
Attending: INTERNAL MEDICINE
Payer: MEDICARE

## 2024-05-13 DIAGNOSIS — Z45.2 ENCOUNTER FOR CENTRAL LINE CARE: ICD-10-CM

## 2024-05-13 DIAGNOSIS — I71.43 INFRARENAL ABDOMINAL AORTIC ANEURYSM (AAA) WITHOUT RUPTURE (HCC): ICD-10-CM

## 2024-05-13 DIAGNOSIS — C34.12 PRIMARY MALIGNANT NEOPLASM OF BRONCHUS OF LEFT UPPER LOBE (HCC): ICD-10-CM

## 2024-05-13 DIAGNOSIS — C64.2 RENAL CELL CARCINOMA OF LEFT KIDNEY (HCC): ICD-10-CM

## 2024-05-13 DIAGNOSIS — C34.12 PRIMARY MALIGNANT NEOPLASM OF BRONCHUS OF LEFT UPPER LOBE (HCC): Primary | ICD-10-CM

## 2024-05-13 DIAGNOSIS — C61 PROSTATE CA (HCC): ICD-10-CM

## 2024-05-13 LAB
ALBUMIN SERPL BCP-MCNC: 4.5 G/DL (ref 3.5–5)
ALP SERPL-CCNC: 152 U/L (ref 34–104)
ALT SERPL W P-5'-P-CCNC: 104 U/L (ref 7–52)
ANION GAP SERPL CALCULATED.3IONS-SCNC: 7 MMOL/L (ref 4–13)
AST SERPL W P-5'-P-CCNC: 47 U/L (ref 13–39)
BASOPHILS # BLD AUTO: 0.1 THOUSANDS/ÂΜL (ref 0–0.1)
BASOPHILS NFR BLD AUTO: 1 % (ref 0–1)
BILIRUB SERPL-MCNC: 1.1 MG/DL (ref 0.2–1)
BUN SERPL-MCNC: 14 MG/DL (ref 5–25)
CALCIUM SERPL-MCNC: 9.4 MG/DL (ref 8.4–10.2)
CHLORIDE SERPL-SCNC: 104 MMOL/L (ref 96–108)
CO2 SERPL-SCNC: 23 MMOL/L (ref 21–32)
CREAT SERPL-MCNC: 0.79 MG/DL (ref 0.6–1.3)
CRP SERPL QL: 1.7 MG/L
EOSINOPHIL # BLD AUTO: 0.31 THOUSAND/ÂΜL (ref 0–0.61)
EOSINOPHIL NFR BLD AUTO: 2 % (ref 0–6)
ERYTHROCYTE [DISTWIDTH] IN BLOOD BY AUTOMATED COUNT: 12.3 % (ref 11.6–15.1)
ERYTHROCYTE [SEDIMENTATION RATE] IN BLOOD: 21 MM/HOUR (ref 0–19)
GFR SERPL CREATININE-BSD FRML MDRD: 89 ML/MIN/1.73SQ M
GLUCOSE SERPL-MCNC: 100 MG/DL (ref 65–140)
HCT VFR BLD AUTO: 47.8 % (ref 36.5–49.3)
HGB BLD-MCNC: 15.4 G/DL (ref 12–17)
IMM GRANULOCYTES # BLD AUTO: 0.06 THOUSAND/UL (ref 0–0.2)
IMM GRANULOCYTES NFR BLD AUTO: 1 % (ref 0–2)
LDH SERPL-CCNC: 159 U/L (ref 140–271)
LYMPHOCYTES # BLD AUTO: 3.71 THOUSANDS/ÂΜL (ref 0.6–4.47)
LYMPHOCYTES NFR BLD AUTO: 28 % (ref 14–44)
MAGNESIUM SERPL-MCNC: 1.9 MG/DL (ref 1.9–2.7)
MCH RBC QN AUTO: 31 PG (ref 26.8–34.3)
MCHC RBC AUTO-ENTMCNC: 32.2 G/DL (ref 31.4–37.4)
MCV RBC AUTO: 96 FL (ref 82–98)
MONOCYTES # BLD AUTO: 0.68 THOUSAND/ÂΜL (ref 0.17–1.22)
MONOCYTES NFR BLD AUTO: 5 % (ref 4–12)
NEUTROPHILS # BLD AUTO: 8.25 THOUSANDS/ÂΜL (ref 1.85–7.62)
NEUTS SEG NFR BLD AUTO: 63 % (ref 43–75)
NRBC BLD AUTO-RTO: 0 /100 WBCS
PLATELET # BLD AUTO: 191 THOUSANDS/UL (ref 149–390)
PMV BLD AUTO: 9.8 FL (ref 8.9–12.7)
POTASSIUM SERPL-SCNC: 4.4 MMOL/L (ref 3.5–5.3)
PROT SERPL-MCNC: 7.2 G/DL (ref 6.4–8.4)
PSA SERPL-MCNC: 5.91 NG/ML (ref 0–4)
RBC # BLD AUTO: 4.97 MILLION/UL (ref 3.88–5.62)
SODIUM SERPL-SCNC: 134 MMOL/L (ref 135–147)
WBC # BLD AUTO: 13.11 THOUSAND/UL (ref 4.31–10.16)

## 2024-05-13 PROCEDURE — 84153 ASSAY OF PSA TOTAL: CPT

## 2024-05-13 PROCEDURE — 80053 COMPREHEN METABOLIC PANEL: CPT

## 2024-05-13 PROCEDURE — 83615 LACTATE (LD) (LDH) ENZYME: CPT

## 2024-05-13 PROCEDURE — 85025 COMPLETE CBC W/AUTO DIFF WBC: CPT

## 2024-05-13 PROCEDURE — 71260 CT THORAX DX C+: CPT

## 2024-05-13 PROCEDURE — 86140 C-REACTIVE PROTEIN: CPT

## 2024-05-13 PROCEDURE — G1004 CDSM NDSC: HCPCS

## 2024-05-13 PROCEDURE — 74177 CT ABD & PELVIS W/CONTRAST: CPT

## 2024-05-13 PROCEDURE — 85652 RBC SED RATE AUTOMATED: CPT

## 2024-05-13 PROCEDURE — 83735 ASSAY OF MAGNESIUM: CPT

## 2024-05-13 RX ADMIN — IOHEXOL 85 ML: 350 INJECTION, SOLUTION INTRAVENOUS at 10:36

## 2024-05-13 NOTE — PROGRESS NOTES
Central labs drawn and sent, port flushed per protocol. Port remains accessed for scheduled CT scan, radiology department made aware.     Patient returned from CT and port flushed with brisk blood return noted. Port de-accessed, patient discharged from infusion center without incident. Next appointment confirmed for 6/24 at 9:30am-  infusion center. AVS declined.

## 2024-05-20 ENCOUNTER — OFFICE VISIT (OUTPATIENT)
Dept: HEMATOLOGY ONCOLOGY | Facility: CLINIC | Age: 74
End: 2024-05-20
Payer: MEDICARE

## 2024-05-20 ENCOUNTER — TELEPHONE (OUTPATIENT)
Dept: HEMATOLOGY ONCOLOGY | Facility: CLINIC | Age: 74
End: 2024-05-20

## 2024-05-20 VITALS
SYSTOLIC BLOOD PRESSURE: 110 MMHG | OXYGEN SATURATION: 97 % | TEMPERATURE: 97.6 F | DIASTOLIC BLOOD PRESSURE: 70 MMHG | HEART RATE: 90 BPM | BODY MASS INDEX: 24.62 KG/M2 | WEIGHT: 172 LBS | HEIGHT: 70 IN | RESPIRATION RATE: 18 BRPM

## 2024-05-20 DIAGNOSIS — C79.31 METASTASIS TO BRAIN (HCC): ICD-10-CM

## 2024-05-20 DIAGNOSIS — C61 PROSTATE CANCER (HCC): ICD-10-CM

## 2024-05-20 DIAGNOSIS — C64.2 RENAL CELL CARCINOMA OF LEFT KIDNEY (HCC): ICD-10-CM

## 2024-05-20 DIAGNOSIS — C34.12 PRIMARY MALIGNANT NEOPLASM OF BRONCHUS OF LEFT UPPER LOBE (HCC): Primary | ICD-10-CM

## 2024-05-20 PROCEDURE — G2211 COMPLEX E/M VISIT ADD ON: HCPCS | Performed by: INTERNAL MEDICINE

## 2024-05-20 PROCEDURE — 99214 OFFICE O/P EST MOD 30 MIN: CPT | Performed by: INTERNAL MEDICINE

## 2024-05-20 NOTE — PROGRESS NOTES
Hematology/Oncology Outpatient Follow-up  Angelo Redd 74 y.o. male 1950 [id number]    Date:  5/20/2024        Assessment and Plan:  1. 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)  Stage IVb non-small cell lung cancer, diagnosed in May 2015.  No hint of recurrence of his lung cancer according to the most recent imaging.  I did ask him to come back in a year for close follow-up.  We did discuss removing the Port-A-Cath.  The patient stated that he would rather wait until next visit.  I did ask him to maintain the Port-A-Cath with flushing on every 6 to 8-week basis.    She seems to have elevated liver enzymes.  This is most likely related to the Lipitor treatment.  I did ask him to take himself off of the Lipitor and get in touch with his PCP regarding regulating the statins and monitoring the liver enzymes.  The patient agreed with the plan.  - CBC and differential; Future  - Comprehensive metabolic panel; Future  - Magnesium; Future  - CT chest abdomen pelvis w contrast; Future    2. Renal cell carcinoma of left kidney (HCC)  He had the cryoablation in 2016 at [facility].  There is no obvious hint of recurrence of his left kidney cancer according to the recent imaging.  - CBC and differential; Future  - Comprehensive metabolic panel; Future  - Magnesium; Future  - CT chest abdomen pelvis w contrast; Future    3. Prostate cancer (HCC)  [place] score 6 prostate cancer.  He is under the supervision of the urology team who is monitoring him closely without any treatment.  His PSA level is borderline above normal and seems to be stable around 5.  - PSA Total, Diagnostic; Future    4. Metastasis to brain (HCC)  Status post resection/radiation to the right frontoparietal brain metastasis 2015. He denies any neurological symptoms at this time. His most recent MRI of the brain September 2021 did not show any evidence of recurrence. Will pursue further brain imaging on an as-needed  basis.-        HPI:  The patient came there for a follow-up visit.  He did complain about issues with his vision due to macular degeneration and cataract in both eyes.  Blood work on 5/13/2024 showed white cell count of 13.1.  Hemoglobin hematocrit and platelets were within normal range.  Creatinine 0.7 with elevated liver enzymes.  C-reactive protein normal with sed rate of 21.  PSA 5.9  He did have a CT scan of the chest abdomen pelvis with contrast on 5/13/2024 which showed:  IMPRESSION:  1. Stable emphysematous change, pulmonary scarring and mild bibasilar dependent changes. No pulmonary lesion.  2. Stable post intervention change left kidney interpolar region after ablation. Left greater than right nonobstructive nephrolithiasis, largest left lower renal pole measuring 6 mm. Prominent dependent intravesicular calculi also noted. No obstructive   phenomenon.  3. Stable left upper renal pole 11 mm mildly hyperdense indeterminate lesion, possibly a hyper proteinaceous cyst.  Oncology History Overview Note   Patient has a history of tobacco use for more than 40 years and history of 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 which was diagnosed in May of 2015.  At that time the patient was found to have multiple brain lesions status post whole brain radiation.  He then received 6 cycles of palliative chemotherapy with Alimta and carboplatin.  After 5 cycles of treatment he had significant GI bleeding which required ICU hospitalization.  After completion of palliative chemotherapy he was maintained on Alimta since October 2015.    A brain MRI January 2016 showed multiple bilateral hemorrhagic lesions felt not to be due to malignant process.  PET scan January 2016 showed a 1.6 cm metabolically active left lung apical lesion.  The patient received radiation treatment palliatively under the care of Dr. Llanes to the left apical lung lesion and completed it in April of 2016.  He was then restarted on  maintenance Alimta.    He had an MRI of the abdomen on June 23, 2016 which showed a 3 cm cystic mass with a solid component on the posterior medial aspect of the left kidney, compatible with malignant process.  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  The patient was then treated with cryotherapy of the left kidney lesion in September of 2016.    Patient had a PET-CT scan on March 3, 2017 which was compared to the prior PET scan from November 2016.  At this time there is no evidence of residual or recurrent neoplastic disease anywhere in his body.    Patient had a CT scan of the chest abdomen and pelvis on August 24, 2017 which showed a 1.3 cm left upper lobe mass versus scarring with adjacent radiation fibrosis.    Patient had another MRI of the brain January 8, 2018 which was compared to the previous MRI of the brain in January 2016.  This revealed multiple areas of artifact in the supra and infratentorial spaces consistent with hemorrhagic metastasis.  The largest lesion was in the frontal lobe and is significantly smaller with only a tiny enhancing component remaining.  No new intra-axial lesions were reported.    Patient had another CT scan of the chest abdomen and pelvis for close monitoring on June 5th 2018 that was compared with prior study from January 2018.  He continues to have a 1.3 cm left lung apex pulmonary nodule with surrounding fibrosis which is stable in appearance.  He also has a stable appearance of the treated medial left renal cortex lesion.  There is no evidence of metastatic disease within the chest abdomen or pelvis.    After lengthy discussion June 2018 patient stated that he is not interested in continuing his maintenance Alimta treatment beyond today's treatment since his CT scan showed stable disease for a long time he has been on Alimta for 2 years.  He was told that he seems to be in  complete remission but there is a chance that the cancer may reoccur.  Patient was interested in the watch and wait type of approach instead of continuing active maintenance Alimta treatment.     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 Amita Llanes     3/17/2016 - 4/12/2016 Radiation    Left upper lobe lesion, 4675 cGy  Radiation oncologist: Dr. Amita Llanes      Chemotherapy    1.   Alimta and carboplatin:   05/20/2015 through 08/12/2015 (5 cycles total)  2.   Maintenance Alimta:   10/21/2015 through 06/12/2018 (43 total Treatment doses)     Metastasis to brain (HCC) (Resolved)   5/26/2015 Initial Diagnosis    Metastasis to brain (HCC)     5/20/2024 -  Cancer Staged    Staging form: Central Nervous System Tumors, Pediatric Staging  - Pathologic: No stage assigned - Signed by Jyoti Canas MD on 5/20/2024  Primary tumor (T): TX  Regional lymph nodes (N): NX  Distant metastasis (M): M1  Source of metastatic specimen: Lung       Renal cell carcinoma of left kidney (HCC)   7/2016 Initial Diagnosis    Renal cell carcinoma of left kidney (HCC)  S/p cryoablation of the left kidney lesion in September of 2016 @ [facility]-Dr. Pavon           Interval history:    ROS: Review of Systems   Constitutional:  Positive for fatigue. Negative for chills and fever.   HENT:  Positive for trouble swallowing. Negative for ear pain and sore throat.    Eyes:  " Negative for pain and visual disturbance.   Respiratory:  Positive for cough. Negative for shortness of breath.    Cardiovascular:  Negative for chest pain and palpitations.   Gastrointestinal:  Negative for abdominal pain and vomiting.   Genitourinary:  Negative for dysuria and hematuria.   Musculoskeletal:  Negative for arthralgias and back pain.   Skin:  Negative for color change and rash.   Neurological:  Negative for seizures and syncope.   All other systems reviewed and are negative.      Past Medical History:   Diagnosis Date    Arthritis     Balance problems     and\" coordination issues/best to walk slow\"    Bladder stones     Cancer (HCC)     lung, brain, kidney, spinal    Colon polyp     Dysphagia 02/16/2017    \"not right now but in the past\"    Environmental and seasonal allergies     Exercise involving walking     \"approx 1 mile 3x/week\"    Eye injury     right,  \"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\"    Full dentures     Hemiparesis (HCC) 04/27/2015    left weakness,\"due to tumor on brain\"    History of brain tumor     \"had radiation for it\"    History of cancer chemotherapy     last tx  5/22/18    History of GI bleed 2015    History of kidney cancer     left    History of kidney stones     History of radiation therapy     History of transfusion     5 units of blood 2015    Hydrocele 11/30/2010    Inguinal hernia, bilateral     Joint stiffness of multiple sites     Kidney stone     Paresthesia 05/01/2015    Portacath in place     right chest    Primary malignant neoplasm of lung (HCC) 05/26/2015    Renal carcinoma, left (HCC) 10/17/2016    Risk for falls     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/26/2015    Skin cancer     Syncopal episodes 09/01/2015    2    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    Wears glasses        Past Surgical History:   Procedure Laterality Date    BLADDER STONE REMOVAL      BLADDER STONE REMOVAL N/A 2/9/2021    Procedure: LASER LITHOLOPAXY;  Surgeon: " Cm Lester MD;  Location: [facility];  Service: Urology    COLONOSCOPY      DENTAL SURGERY  2011    DENTAL EXTACTIONS     ESOPHAGOGASTRODUODENOSCOPY      HERNIA REPAIR      HYDROCELE EXCISION / REPAIR Left 01/01/2011    KIDNEY STONE SURGERY      KIDNEY SURGERY      cancer of kidney/cryo of cancer    PORTACATH PLACEMENT      MD COLONOSCOPY FLX DX W/COLLJ SPEC WHEN PFRMD N/A 8/7/2018    Procedure: COLONOSCOPY with polypectomy ;  Surgeon: Jaclyn Cardenas MD;  Location: [facility];  Service: General    MD CYSTO/URETERO W/LITHOTRIPSY &INDWELL STENT INSRT Left 4/27/2022    Procedure: CYSTO URETEROSCOPY WITH LITHO HOLMIUM LASER, RETROGRADE PYELOGRAM AND INSERTION STENT URETERAL;  Surgeon: Cm Lester MD;  Location: [facility];  Service: Urology    MD CYSTOURETHROSCOPY W/DEST &/RMVL MED BLADDER JACOB N/A 2/9/2021    Procedure: CYSTO, T.U.R.B.T.;  Surgeon: Cm Lester MD;  Location: [facility];  Service: Urology    MD LAPS RPR RECURRENT INCAL HRNA NCRC8/STRANGULATED Bilateral 8/8/2018    Procedure: REPAIR HERNIA INGUINAL LAPAROSCOPIC W/ ROBOTICS W/ MESH;  Surgeon: Jaclyn Cardenas MD;  Location[facility];  Service: General    MD LITHOLAPAXY SMPL/SM <2.5 CM N/A 11/27/2018    Procedure: CYSTOSCOPY, CYSTOLITHOLOPAXY HOLMIUM LASER,BLADDE BIOPSY;  Surgeon: Cm Lester MD;  [facility];  Service: Urology    MD LITHOLAPAXY SMPL/SM <2.5 CM N/A 4/27/2022    Procedure: LITHOLOPAXY HOLMIUM LASER for bladder stones;  Surgeon: Cm Lester MD;  [facility];  Service: Urology    SKIN BIOPSY      SKIN CANCER EXCISION      17 surgeries, basal cell    TONSILLECTOMY         Social History     Socioeconomic History    Marital status:      Spouse name: None    Number of children: None    Years of education: None    Highest education level: None   Occupational History    None   Tobacco Use    Smoking status: Every Day     Current packs/day: 0.50     Average packs/day: 0.5 packs/day for 48.0 years (24.0 ttl  pk-yrs)     Types: Cigarettes     Passive exposure: Current    Smokeless tobacco: Never   Vaping Use    Vaping status: Never Used   Substance and Sexual Activity    Alcohol use: Yes     Comment: 3-4 times a year.    Drug use: No    Sexual activity: Not Currently     Partners: Female     Birth control/protection: Abstinence, Condom Male   Other Topics Concern    None   Social History Narrative    None     Social Determinants of Health     Financial Resource Strain: Low Risk  (1/22/2024)    Overall Financial Resource Strain (CARDIA)     Difficulty of Paying Living Expenses: Not hard at all   Food Insecurity: Not on file   Transportation Needs: No Transportation Needs (1/22/2024)    PRAPARE - Transportation     Lack of Transportation (Medical): No     Lack of Transportation (Non-Medical): No   Physical Activity: Not on file   Stress: Not on file   Social Connections: Not on file   Intimate Partner Violence: Not on file   Housing Stability: Not on file       Family History   Problem Relation Age of Onset    Cancer Mother         EYE    Cancer Father     Colon cancer Father     Cancer Brother        Allergies   Allergen Reactions    Pollen Extract Cough, Nasal Congestion and Sneezing         Current Outpatient Medications:     Ascorbic Acid (VITAMIN C) 500 MG CAPS, Take 500 mg by mouth daily  , Disp: , Rfl:     Multiple Vitamins-Minerals (MULTIVITAMIN ADULT PO), Take 1 tablet by mouth daily  , Disp: , Rfl:     polyethylene glycol-propylene glycol (SYSTANE) 0.4-0.3 %, Apply to eye Daily, Disp: , Rfl:     aspirin (ECOTRIN LOW STRENGTH) 81 mg EC tablet, Take 1 tablet (81 mg total) by mouth daily (Patient not taking: Reported on 5/20/2024), Disp: 90 tablet, Rfl: 3    atorvastatin (LIPITOR) 40 mg tablet, Take 1 tablet (40 mg total) by mouth daily (Patient not taking: Reported on 4/10/2024), Disp: 90 tablet, Rfl: 3      Physical Exam:  /70 (BP Location: Left arm, Patient Position: Sitting, Cuff Size: Adult)   Pulse 90   " Temp 97.6 °F (36.4 °C)   Resp 18   Ht 5' 10\" (1.778 m)   Wt 78 kg (172 lb)   SpO2 97%   BMI 24.68 kg/m²     Physical Exam  Constitutional:       Appearance: He is well-developed.   HENT:      Head: Normocephalic and atraumatic.      Nose: Nose normal.   Eyes:      General: No scleral icterus.        Right eye: No discharge.         Left eye: No discharge.      Conjunctiva/sclera: Conjunctivae normal.      Pupils: Pupils are equal, round, and reactive to light.   Neck:      Thyroid: No thyromegaly.      Trachea: No tracheal deviation.   Cardiovascular:      Rate and Rhythm: Normal rate and regular rhythm.      Heart sounds: Normal heart sounds. No murmur heard.     No friction rub.   Pulmonary:      Effort: Pulmonary effort is normal. No respiratory distress.      Breath sounds: Normal breath sounds. No wheezing or rales.   Chest:      Chest wall: No tenderness.   Abdominal:      General: There is no distension.      Palpations: Abdomen is soft. There is no hepatomegaly or splenomegaly.      Tenderness: There is no abdominal tenderness. There is no guarding or rebound.   Musculoskeletal:         General: No tenderness or deformity. Normal range of motion.      Cervical back: Normal range of motion and neck supple.   Lymphadenopathy:      Cervical: No cervical adenopathy.   Skin:     General: Skin is warm and dry.      Coloration: Skin is not pale.      Findings: No erythema or rash.   Neurological:      Mental Status: He is alert and oriented to person, place, and time.      Cranial Nerves: No cranial nerve deficit.      Coordination: Coordination normal.      Deep Tendon Reflexes: Reflexes are normal and symmetric.   Psychiatric:         Behavior: Behavior normal.         Thought Content: Thought content normal.         Judgment: Judgment normal.           Labs:  Lab Results   Component Value Date    WBC 13.11 (H) 05/13/2024    HGB 15.4 05/13/2024    HCT 47.8 05/13/2024    MCV 96 05/13/2024     " "05/13/2024     Lab Results   Component Value Date     06/11/2018    K 4.4 05/13/2024     05/13/2024    CO2 23 05/13/2024    ANIONGAP 11 06/11/2018    BUN 14 05/13/2024    CREATININE 0.79 05/13/2024    GLUCOSE 163 (H) 06/11/2018    GLUF 124 (H) 10/11/2022    CALCIUM 9.4 05/13/2024    AST 47 (H) 05/13/2024     (H) 05/13/2024    ALKPHOS 152 (H) 05/13/2024    PROT 6.8 06/11/2018    BILITOT 0.9 06/11/2018    EGFR 89 05/13/2024     No results found for: \"TSH\"    Patient voiced understanding and agreement in the above discussion. Aware to contact our office with questions/symptoms in the interim.   "

## 2024-06-24 ENCOUNTER — HOSPITAL ENCOUNTER (OUTPATIENT)
Dept: INFUSION CENTER | Facility: HOSPITAL | Age: 74
Discharge: HOME | End: 2024-06-24

## 2024-06-24 DIAGNOSIS — Z45.2 ENCOUNTER FOR CENTRAL LINE CARE: ICD-10-CM

## 2024-06-24 DIAGNOSIS — C34.12 PRIMARY MALIGNANT NEOPLASM OF BRONCHUS OF LEFT UPPER LOBE (HCC): Primary | ICD-10-CM

## 2024-06-24 NOTE — PROGRESS NOTES
Port flushed per protocol. Brisk blood returned, flushed freely. Next appointment confirmed, AVS given.

## 2024-06-27 ENCOUNTER — TELEPHONE (OUTPATIENT)
Age: 74
End: 2024-06-27

## 2024-06-27 DIAGNOSIS — C34.12 PRIMARY MALIGNANT NEOPLASM OF BRONCHUS OF LEFT UPPER LOBE (HCC): Primary | ICD-10-CM

## 2024-06-27 NOTE — TELEPHONE ENCOUNTER
Connor called and wanted to let Dr. Canas know that he is ready to move forward with having his port removed.    Would like the doctor to place orders so that he can get it scheduled.

## 2024-07-25 ENCOUNTER — TELEPHONE (OUTPATIENT)
Dept: HEMATOLOGY ONCOLOGY | Facility: CLINIC | Age: 74
End: 2024-07-25

## 2024-07-25 NOTE — TELEPHONE ENCOUNTER
----- Message from Jyoti Canas MD sent at 7/25/2024  2:36 PM EDT -----  Regarding: RE: Unable to contact  Will try to get in touch with him to see if he is interested in getting his port removed.  Thank you      Attempted to phone patient to assist in scheduling port removal.  Left voice message with call back number to schedule port removal

## 2024-07-29 ENCOUNTER — OFFICE VISIT (OUTPATIENT)
Dept: FAMILY MEDICINE CLINIC | Facility: CLINIC | Age: 74
End: 2024-07-29
Payer: MEDICARE

## 2024-07-29 VITALS
TEMPERATURE: 97.3 F | SYSTOLIC BLOOD PRESSURE: 108 MMHG | WEIGHT: 167.4 LBS | BODY MASS INDEX: 23.96 KG/M2 | DIASTOLIC BLOOD PRESSURE: 52 MMHG | HEIGHT: 70 IN | OXYGEN SATURATION: 94 % | HEART RATE: 95 BPM

## 2024-07-29 DIAGNOSIS — E78.5 HYPERLIPIDEMIA, UNSPECIFIED HYPERLIPIDEMIA TYPE: ICD-10-CM

## 2024-07-29 DIAGNOSIS — C61 PROSTATE CANCER (HCC): Primary | ICD-10-CM

## 2024-07-29 DIAGNOSIS — F17.200 SMOKER: ICD-10-CM

## 2024-07-29 DIAGNOSIS — H35.3221 EXUDATIVE AGE-RELATED MACULAR DEGENERATION, LEFT EYE, WITH ACTIVE CHOROIDAL NEOVASCULARIZATION (HCC): ICD-10-CM

## 2024-07-29 DIAGNOSIS — C34.12 PRIMARY MALIGNANT NEOPLASM OF BRONCHUS OF LEFT UPPER LOBE (HCC): ICD-10-CM

## 2024-07-29 DIAGNOSIS — R73.9 ELEVATED BLOOD SUGAR: ICD-10-CM

## 2024-07-29 DIAGNOSIS — I71.43 INFRARENAL ABDOMINAL AORTIC ANEURYSM (AAA) WITHOUT RUPTURE (HCC): ICD-10-CM

## 2024-07-29 DIAGNOSIS — R74.8 ELEVATED LIVER ENZYMES: ICD-10-CM

## 2024-07-29 PROCEDURE — G2211 COMPLEX E/M VISIT ADD ON: HCPCS | Performed by: FAMILY MEDICINE

## 2024-07-29 PROCEDURE — 99214 OFFICE O/P EST MOD 30 MIN: CPT | Performed by: FAMILY MEDICINE

## 2024-07-29 NOTE — ASSESSMENT & PLAN NOTE
Stable  Follows with neurology, Dr. Leggett  Had multiparametric MRI of the prostate which showed low risk for active cancer  Continue routine PSA surveillance and annual follow-up

## 2024-07-29 NOTE — ASSESSMENT & PLAN NOTE
Under the care of hematology oncology, Dr. Canas  Diagnosed in 2015 with mets to brain  Patient is doing well and appears to be cancer free  Plan for removal of port  Continue to monitor

## 2024-07-29 NOTE — PROGRESS NOTES
Assessment/Plan:      1. Prostate cancer (HCC)  Assessment & Plan:  Stable  Follows with neurology, Dr. Leggett  Had multiparametric MRI of the prostate which showed low risk for active cancer  Continue routine PSA surveillance and annual follow-up  2. Infrarenal abdominal aortic aneurysm (AAA) without rupture (HCC)  Assessment & Plan:  Stable  Continues to follow with CT surgery and has routine surveillance CTA chest and abdomen  3. 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 Canas  Diagnosed in 2015 with mets to brain  Patient is doing well and appears to be cancer free  Plan for removal of port  Continue to monitor  4. Elevated blood sugar  5. Exudative age-related macular degeneration, left eye, with active choroidal neovascularization (HCC)  Assessment & Plan:  New diagnosis  [facility]    6. Smoker  Assessment & Plan:  Continues to smoke a few cigarettes  Encouraged complete smoking cessation  7. Hyperlipidemia, unspecified hyperlipidemia type  -     Lipid panel; Future; Expected date: 01/29/2025  -     Comprehensive metabolic panel; Future; Expected date: 01/29/2025  8. Elevated liver enzymes  Assessment & Plan:  Lab Results   Component Value Date     (H) 05/13/2024    AST 47 (H) 05/13/2024    ALKPHOS 152 (H) 05/13/2024    BILITOT 0.9 06/11/2018     Patient's hematologist feel that her elevations in liver enzymes are secondary to statin therapy.  Is recommended he discontinue Lipitor but he has not yet done that.  Will stop today and repeat liver enzymes in 3 months          Subjective:      Patient ID: Angelo Redd is a 74 y.o. male presents today for routine follow up. Has no complaints. Recently diagnosed with macular degeneration. [facility]    Has skin cancers on his left lower leg that will need to be removed.    HPI    The following portions of the  "patient's history were reviewed and updated as appropriate: allergies, current medications, past family history, past medical history, past social history, past surgical history, and problem list.    Review of Systems   Constitutional:  Negative for activity change, chills, diaphoresis and fever.   HENT:  Negative for ear pain, hearing loss, postnasal drip, rhinorrhea, sinus pressure, sinus pain, sneezing and sore throat.    Respiratory:  Negative for cough, chest tightness, shortness of breath and wheezing.    Cardiovascular:  Negative for chest pain, palpitations and leg swelling.   Gastrointestinal:  Negative for abdominal pain, blood in stool, constipation, diarrhea, nausea and vomiting.   Genitourinary:  Negative for dysuria, frequency, hematuria and urgency.   Musculoskeletal:  Negative for arthralgias and myalgias.   Neurological:  Negative for dizziness, syncope, weakness, light-headedness, numbness and headaches.   Psychiatric/Behavioral:  Negative for decreased concentration and dysphoric mood. The patient is not nervous/anxious and is not hyperactive.          Objective:      /52 (BP Location: Left arm, Patient Position: Sitting, Cuff Size: Large)   Pulse 95   Temp (!) 97.3 °F (36.3 °C)   Ht 5' 10\" (1.778 m)   Wt 75.9 kg (167 lb 6.4 oz)   SpO2 94%   BMI 24.02 kg/m²          Physical Exam  Vitals reviewed.   Constitutional:       General: He is not in acute distress.     Appearance: He is well-developed. He is not diaphoretic.   HENT:      Head: Normocephalic and atraumatic.      Right Ear: External ear normal.      Left Ear: External ear normal.      Nose: Nose normal. No congestion.      Mouth/Throat:      Mouth: Mucous membranes are moist.      Pharynx: Oropharynx is clear. No oropharyngeal exudate.   Eyes:      General: No scleral icterus.     Pupils: Pupils are equal, round, and reactive to light.   Neck:      Thyroid: No thyromegaly.      Vascular: No JVD.      Trachea: No tracheal " deviation.   Cardiovascular:      Rate and Rhythm: Normal rate and regular rhythm.      Pulses: Normal pulses.      Heart sounds: Normal heart sounds. No murmur heard.     No friction rub.   Pulmonary:      Effort: Pulmonary effort is normal. No respiratory distress.      Breath sounds: Normal breath sounds. No wheezing or rales.   Chest:      Chest wall: No tenderness.   Abdominal:      General: Bowel sounds are normal. There is no distension.      Palpations: Abdomen is soft.      Tenderness: There is no abdominal tenderness. There is no right CVA tenderness, left CVA tenderness, guarding or rebound.   Musculoskeletal:         General: No tenderness. Normal range of motion.      Cervical back: Normal range of motion and neck supple. No tenderness.      Right lower leg: No edema.      Left lower leg: No edema.   Lymphadenopathy:      Cervical: No cervical adenopathy.   Skin:     General: Skin is warm and dry.   Neurological:      Mental Status: He is alert and oriented to person, place, and time. Mental status is at baseline.      Deep Tendon Reflexes: Reflexes are normal and symmetric.   Psychiatric:         Mood and Affect: Mood normal.         Behavior: Behavior normal.         Thought Content: Thought content normal.         Judgment: Judgment normal.

## 2024-07-29 NOTE — ASSESSMENT & PLAN NOTE
Lab Results   Component Value Date     (H) 05/13/2024    AST 47 (H) 05/13/2024    ALKPHOS 152 (H) 05/13/2024    BILITOT 0.9 06/11/2018     Patient's hematologist feel that her elevations in liver enzymes are secondary to statin therapy.  Is recommended he discontinue Lipitor but he has not yet done that.  Will stop today and repeat liver enzymes in 3 months

## 2024-08-05 ENCOUNTER — HOSPITAL ENCOUNTER (OUTPATIENT)
Dept: INFUSION CENTER | Facility: HOSPITAL | Age: 74
Discharge: HOME | End: 2024-08-05
Payer: MEDICARE

## 2024-08-05 DIAGNOSIS — C34.12 PRIMARY MALIGNANT NEOPLASM OF BRONCHUS OF LEFT UPPER LOBE (HCC): Primary | ICD-10-CM

## 2024-08-05 DIAGNOSIS — Z45.2 ENCOUNTER FOR CENTRAL LINE CARE: ICD-10-CM

## 2024-08-05 PROCEDURE — 96523 IRRIG DRUG DELIVERY DEVICE: CPT

## 2024-08-05 NOTE — PROGRESS NOTES
Pt tolerated routine port flush without difficulty.  Port flushed and deaccessed per protocol.  Confirmed next appt on 9/30 at 0930.  AVS declined.  Left ambulatory in stable condition.

## 2024-09-30 ENCOUNTER — HOSPITAL ENCOUNTER (OUTPATIENT)
Dept: INFUSION CENTER | Facility: HOSPITAL | Age: 74
Discharge: HOME | End: 2024-09-30
Payer: MEDICARE

## 2024-09-30 DIAGNOSIS — Z45.2 ENCOUNTER FOR CENTRAL LINE CARE: ICD-10-CM

## 2024-09-30 DIAGNOSIS — C34.12 PRIMARY MALIGNANT NEOPLASM OF BRONCHUS OF LEFT UPPER LOBE (HCC): Primary | ICD-10-CM

## 2024-09-30 PROCEDURE — 96523 IRRIG DRUG DELIVERY DEVICE: CPT

## 2024-09-30 NOTE — PROGRESS NOTES
Angelo Redd  tolerated treatment well with no complications. Patient's port accessed and blood return noted without incident. Port flushed and locked with :NSS and de-accessed.     Angelo Redd is aware of future appt on 11/25/24 at 0930.     AVS printed and given to Angelo Redd:   No (Declined by Angelo Redd)     \

## 2024-10-18 ENCOUNTER — HOSPITAL ENCOUNTER (OUTPATIENT)
Dept: RADIOLOGY | Facility: HOSPITAL | Age: 74
End: 2024-10-18
Payer: MEDICARE

## 2024-10-18 DIAGNOSIS — M25.531 RIGHT WRIST PAIN: ICD-10-CM

## 2024-10-18 PROCEDURE — 73110 X-RAY EXAM OF WRIST: CPT

## 2024-11-25 ENCOUNTER — HOSPITAL ENCOUNTER (OUTPATIENT)
Dept: INFUSION CENTER | Facility: HOSPITAL | Age: 74
Discharge: HOME | End: 2024-11-25
Payer: MEDICARE

## 2024-11-25 DIAGNOSIS — C34.12 PRIMARY MALIGNANT NEOPLASM OF BRONCHUS OF LEFT UPPER LOBE (HCC): Primary | ICD-10-CM

## 2024-11-25 DIAGNOSIS — Z45.2 ENCOUNTER FOR CENTRAL LINE CARE: ICD-10-CM

## 2024-11-25 PROCEDURE — 96523 IRRIG DRUG DELIVERY DEVICE: CPT

## 2024-11-25 NOTE — PROGRESS NOTES
Port flushed per protocol. Brisk blood return noted. Next appointment confirmed 1/20 at [address] [place] [address] infusion center. Appointment reminder given.

## 2025-01-09 ENCOUNTER — PATIENT OUTREACH (OUTPATIENT)
Dept: HEMATOLOGY ONCOLOGY | Facility: CLINIC | Age: 75
End: 2025-01-09

## 2025-01-09 ENCOUNTER — DOCUMENTATION (OUTPATIENT)
Dept: HEMATOLOGY ONCOLOGY | Facility: CLINIC | Age: 75
End: 2025-01-09

## 2025-01-09 NOTE — PROGRESS NOTES
Outreach to patient, left voicemail introducing myself and role as Oncology Nurse Navigator to assist with coordination of cancer care, preparation for upcoming appointment, be a point of contact prior to oncology consult,  provide support and connect with available resources.  Provided contact information. Call back requested.      Future Appointments   Date Time Provider Department Center   1/20/2025  9:30 AM  INF CHAIR 9  Infusion [facility]   5/19/2025  9:40 AM Joyti Canas MD  [facility]

## 2025-01-09 NOTE — PROGRESS NOTES
PN please retrieve outside records.     Pt referred to surgical oncology and should be scheduled within 7-14 days.  Please notify me if not scheduled within time frame.    Referral received/ Chart reviewed for work up completed referral for basal cell carcinoma of the left ankle and right lower extremity. Records reviewed with LOBITO Fisher for guidance with scheduling.      Imaging completed:    [] PET/CT   [] MRI   [] CT   [] US   [] Mammo   [] Bone scan   [x] N/A    Pathology completed:    Date:12/5/24   Location:[facility], Accession # [id number]   []N/A    Additional records needed:    [] Genomic report   [] Genetic testing results   [] Office Note   [] Procedure/ Operative note   [] Lab results   [] N/A      [] Radiation Oncology records retrieval needed (PN to route to rad/onc clerical pool once scheduled)  Date:  Location:

## 2025-01-10 ENCOUNTER — PATIENT OUTREACH (OUTPATIENT)
Dept: HEMATOLOGY ONCOLOGY | Facility: CLINIC | Age: 75
End: 2025-01-10

## 2025-01-10 NOTE — PROGRESS NOTES
2nd attempt to outreach patient. Left voicemail introducing myself and role as Oncology Nurse Navigator to assist with coordination of cancer care, discuss referral to surgical oncology for basal cell skin cancer on lower extremities, to be a point of contact prior to oncology consult,  provide support and connect with available resources.  Provided contact information. Call back requested.      Future Appointments   Date Time Provider Department Center   1/20/2025  9:30 AM  INF CHAIR 9  Infusion [facility]   5/19/2025  9:40 AM Jyoti Canas MD  [facility]

## 2025-01-21 ENCOUNTER — PATIENT OUTREACH (OUTPATIENT)
Dept: HEMATOLOGY ONCOLOGY | Facility: CLINIC | Age: 75
End: 2025-01-21

## 2025-01-21 NOTE — PROGRESS NOTES
Call to patient's brother, Azar (he is listed on medical communication consent). Call answered by Azar's spouse, uAra. She states Azar is sleeping at present time. My direct contact information and [employer] information provided. She will inform Azar of call.

## 2025-01-21 NOTE — PROGRESS NOTES
3rd attempt to outreach patient. Left voicemail introducing myself and role as Oncology Nurse Navigator to assist with coordination of cancer care, discuss referral to surgical oncology for basal cell skin cancer on lower extremities, to be a point of contact prior to oncology consult,  provide support and connect with available resources.  Provided contact information. [facility] number provided for scheduling. Patient informed that referral will remain open for 1 year if he decides to decline scheduling at this time. Call back requested.

## 2025-01-22 ENCOUNTER — HOSPITAL ENCOUNTER (OUTPATIENT)
Dept: INFUSION CENTER | Facility: HOSPITAL | Age: 75
Discharge: HOME | End: 2025-01-22
Payer: MEDICARE

## 2025-01-22 DIAGNOSIS — C34.12 PRIMARY MALIGNANT NEOPLASM OF BRONCHUS OF LEFT UPPER LOBE (HCC): Primary | ICD-10-CM

## 2025-01-22 DIAGNOSIS — E78.5 HYPERLIPIDEMIA, UNSPECIFIED HYPERLIPIDEMIA TYPE: ICD-10-CM

## 2025-01-22 DIAGNOSIS — Z45.2 ENCOUNTER FOR CENTRAL LINE CARE: ICD-10-CM

## 2025-01-22 LAB
ALBUMIN SERPL BCG-MCNC: 4.3 G/DL (ref 3.5–5)
ALP SERPL-CCNC: 129 U/L (ref 34–104)
ALT SERPL W P-5'-P-CCNC: 27 U/L (ref 7–52)
ANION GAP SERPL CALCULATED.3IONS-SCNC: 9 MMOL/L (ref 4–13)
AST SERPL W P-5'-P-CCNC: 18 U/L (ref 13–39)
BILIRUB SERPL-MCNC: 0.81 MG/DL (ref 0.2–1)
BUN SERPL-MCNC: 16 MG/DL (ref 5–25)
CALCIUM SERPL-MCNC: 8.9 MG/DL (ref 8.4–10.2)
CHLORIDE SERPL-SCNC: 109 MMOL/L (ref 96–108)
CHOLEST SERPL-MCNC: 184 MG/DL (ref ?–200)
CO2 SERPL-SCNC: 24 MMOL/L (ref 21–32)
CREAT SERPL-MCNC: 0.83 MG/DL (ref 0.6–1.3)
GFR SERPL CREATININE-BSD FRML MDRD: 87 ML/MIN/1.73SQ M
GLUCOSE P FAST SERPL-MCNC: 105 MG/DL (ref 65–99)
GLUCOSE SERPL-MCNC: 105 MG/DL (ref 65–140)
HDLC SERPL-MCNC: 35 MG/DL
LDLC SERPL CALC-MCNC: 110 MG/DL (ref 0–100)
NONHDLC SERPL-MCNC: 149 MG/DL
POTASSIUM SERPL-SCNC: 4.1 MMOL/L (ref 3.5–5.3)
PROT SERPL-MCNC: 7.1 G/DL (ref 6.4–8.4)
SODIUM SERPL-SCNC: 142 MMOL/L (ref 135–147)
TRIGL SERPL-MCNC: 196 MG/DL (ref ?–150)

## 2025-01-22 PROCEDURE — 80053 COMPREHEN METABOLIC PANEL: CPT

## 2025-01-22 PROCEDURE — 80061 LIPID PANEL: CPT

## 2025-01-22 NOTE — PROGRESS NOTES
Angelo Redd  tolerated treatment well with no complications.      Angelo Redd is aware of future appt on 3/19/25 at 930.     AVS printed and given to Angelo Redd:  Yes

## 2025-01-23 ENCOUNTER — OFFICE VISIT (OUTPATIENT)
Dept: FAMILY MEDICINE CLINIC | Facility: CLINIC | Age: 75
End: 2025-01-23
Payer: MEDICARE

## 2025-01-23 ENCOUNTER — PATIENT OUTREACH (OUTPATIENT)
Dept: HEMATOLOGY ONCOLOGY | Facility: CLINIC | Age: 75
End: 2025-01-23

## 2025-01-23 VITALS
TEMPERATURE: 97.2 F | WEIGHT: 168.2 LBS | SYSTOLIC BLOOD PRESSURE: 110 MMHG | DIASTOLIC BLOOD PRESSURE: 60 MMHG | OXYGEN SATURATION: 91 % | HEIGHT: 70 IN | HEART RATE: 96 BPM | BODY MASS INDEX: 24.08 KG/M2

## 2025-01-23 DIAGNOSIS — Z00.00 MEDICARE ANNUAL WELLNESS VISIT, SUBSEQUENT: Primary | ICD-10-CM

## 2025-01-23 DIAGNOSIS — I71.43 INFRARENAL ABDOMINAL AORTIC ANEURYSM (AAA) WITHOUT RUPTURE (HCC): ICD-10-CM

## 2025-01-23 DIAGNOSIS — T16.2XXA FOREIGN BODY OF LEFT EAR, INITIAL ENCOUNTER: ICD-10-CM

## 2025-01-23 DIAGNOSIS — H35.3221 EXUDATIVE AGE-RELATED MACULAR DEGENERATION, LEFT EYE, WITH ACTIVE CHOROIDAL NEOVASCULARIZATION (HCC): ICD-10-CM

## 2025-01-23 DIAGNOSIS — R74.8 ELEVATED LIVER ENZYMES: ICD-10-CM

## 2025-01-23 PROBLEM — R73.9 ELEVATED BLOOD SUGAR: Status: RESOLVED | Noted: 2020-07-24 | Resolved: 2025-01-23

## 2025-01-23 PROCEDURE — 99214 OFFICE O/P EST MOD 30 MIN: CPT

## 2025-01-23 PROCEDURE — G0439 PPPS, SUBSEQ VISIT: HCPCS

## 2025-01-23 PROCEDURE — 69200 CLEAR OUTER EAR CANAL: CPT

## 2025-01-23 PROCEDURE — G0444 DEPRESSION SCREEN ANNUAL: HCPCS

## 2025-01-23 NOTE — PATIENT INSTRUCTIONS
Medicare Preventive Visit Patient Instructions  Thank you for completing your Welcome to Medicare Visit or Medicare Annual Wellness Visit today. Your next wellness visit will be due in one year (1/24/2026).  The screening/preventive services that you may require over the next 5-10 years are detailed below. Some tests may not apply to you based off risk factors and/or age. Screening tests ordered at today's visit but not completed yet may show as past due. Also, please note that scanned in results may not display below.  Preventive Screenings:  Service Recommendations Previous Testing/Comments   Colorectal Cancer Screening  Colonoscopy    Fecal Occult Blood Test (FOBT)/Fecal Immunochemical Test (FIT)  Fecal DNA/Cologuard Test  Flexible Sigmoidoscopy Age: 45-75 years old   Colonoscopy: every 10 years (May be performed more frequently if at higher risk)  OR  FOBT/FIT: every 1 year  OR  Cologuard: every 3 years  OR  Sigmoidoscopy: every 5 years  Screening may be recommended earlier than age 45 if at higher risk for colorectal cancer. Also, an individualized decision between you and your healthcare provider will decide whether screening between the ages of 76-85 would be appropriate. Colonoscopy: 08/07/2018  FOBT/FIT: Not on file  Cologuard: Not on file  Sigmoidoscopy: Not on file    Screening Current     Prostate Cancer Screening Individualized decision between patient and health care provider in men between ages of 55-69   Medicare will cover every 12 months beginning on the day after your 50th birthday PSA: 5.91 ng/mL     History Prostate Cancer     Hepatitis C Screening Once for adults born between 1945 and 1965  More frequently in patients at high risk for Hepatitis C Hep C Antibody: 02/15/2024    Screening Current   Diabetes Screening 1-2 times per year if you're at risk for diabetes or have pre-diabetes Fasting glucose: 105 mg/dL (1/22/2025)  A1C: No results in last 5 years (No results in last 5 years)  Screening  Current   Cholesterol Screening Once every 5 years if you don't have a lipid disorder. May order more often based on risk factors. Lipid panel: 01/22/2025  Screening Not Indicated  History Lipid Disorder      Other Preventive Screenings Covered by Medicare:  Abdominal Aortic Aneurysm (AAA) Screening: covered once if your at risk. You're considered to be at risk if you have a family history of AAA or a male between the age of 65-75 who smoking at least 100 cigarettes in your lifetime.  Lung Cancer Screening: covers low dose CT scan once per year if you meet all of the following conditions: (1) Age 55-77; (2) No signs or symptoms of lung cancer; (3) Current smoker or have quit smoking within the last 15 years; (4) You have a tobacco smoking history of at least 20 pack years (packs per day x number of years you smoked); (5) You get a written order from a healthcare provider.  Glaucoma Screening: covered annually if you're considered high risk: (1) You have diabetes OR (2) Family history of glaucoma OR (3)  aged 50 and older OR (4)  American aged 65 and older  Osteoporosis Screening: covered every 2 years if you meet one of the following conditions: (1) Have a vertebral abnormality; (2) On glucocorticoid therapy for more than 3 months; (3) Have primary hyperparathyroidism; (4) On osteoporosis medications and need to assess response to drug therapy.  HIV Screening: covered annually if you're between the age of 15-65. Also covered annually if you are younger than 15 and older than 65 with risk factors for HIV infection. For pregnant patients, it is covered up to 3 times per pregnancy.    Immunizations:  Immunization Recommendations   Influenza Vaccine Annual influenza vaccination during flu season is recommended for all persons aged >= 6 months who do not have contraindications   Pneumococcal Vaccine   * Pneumococcal conjugate vaccine = PCV13 (Prevnar 13), PCV15 (Vaxneuvance), PCV20 (Prevnar 20)  *  Pneumococcal polysaccharide vaccine = PPSV23 (Pneumovax) Adults 19-63 yo with certain risk factors or if 65+ yo  If never received any pneumonia vaccine: recommend Prevnar 20 (PCV20)  Give PCV20 if previously received 1 dose of PCV13 or PPSV23   Hepatitis B Vaccine 3 dose series if at intermediate or high risk (ex: diabetes, end stage renal disease, liver disease)   Respiratory syncytial virus (RSV) Vaccine - COVERED BY MEDICARE PART D  * RSVPreF3 (Arexvy) CDC recommends that adults 60 years of age and older may receive a single dose of RSV vaccine using shared clinical decision-making (SCDM)   Tetanus (Td) Vaccine - COST NOT COVERED BY MEDICARE PART B Following completion of primary series, a booster dose should be given every 10 years to maintain immunity against tetanus. Td may also be given as tetanus wound prophylaxis.   Tdap Vaccine - COST NOT COVERED BY MEDICARE PART B Recommended at least once for all adults. For pregnant patients, recommended with each pregnancy.   Shingles Vaccine (Shingrix) - COST NOT COVERED BY MEDICARE PART B  2 shot series recommended in those 19 years and older who have or will have weakened immune systems or those 50 years and older     Health Maintenance Due:      Topic Date Due   • Colorectal Cancer Screening  08/07/2028   • Hepatitis C Screening  Completed     Immunizations Due:      Topic Date Due   • COVID-19 Vaccine (6 - 2024-25 season) 11/25/2024     Advance Directives   What are advance directives?  Advance directives are legal documents that state your wishes and plans for medical care. These plans are made ahead of time in case you lose your ability to make decisions for yourself. Advance directives can apply to any medical decision, such as the treatments you want, and if you want to donate organs.   What are the types of advance directives?  There are many types of advance directives, and each state has rules about how to use them. You may choose a combination of any of  the following:  Living will:  This is a written record of the treatment you want. You can also choose which treatments you do not want, which to limit, and which to stop at a certain time. This includes surgery, medicine, IV fluid, and tube feedings.   Durable power of  for healthcare (DPAHC):  This is a written record that states who you want to make healthcare choices for you when you are unable to make them for yourself. This person, called a proxy, is usually a family member or a friend. You may choose more than 1 proxy.  Do not resuscitate (DNR) order:  A DNR order is used in case your heart stops beating or you stop breathing. It is a request not to have certain forms of treatment, such as CPR. A DNR order may be included in other types of advance directives.  Medical directive:  This covers the care that you want if you are in a coma, near death, or unable to make decisions for yourself. You can list the treatments you want for each condition. Treatment may include pain medicine, surgery, blood transfusions, dialysis, IV or tube feedings, and a ventilator (breathing machine).  Values history:  This document has questions about your views, beliefs, and how you feel and think about life. This information can help others choose the care that you would choose.  Why are advance directives important?  An advance directive helps you control your care. Although spoken wishes may be used, it is better to have your wishes written down. Spoken wishes can be misunderstood, or not followed. Treatments may be given even if you do not want them. An advance directive may make it easier for your family to make difficult choices about your care.   Fall Prevention    Fall prevention  includes ways to make your home and other areas safer. It also includes ways you can move more carefully to prevent a fall. Health conditions that cause changes in your blood pressure, vision, or muscle strength and coordination may  increase your risk for falls. Medicines may also increase your risk for falls if they make you dizzy, weak, or sleepy.   Fall prevention tips:   Stand or sit up slowly.    Use assistive devices as directed.    Wear shoes that fit well and have soles that .    Wear a personal alarm.    Stay active.    Manage your medical conditions.    Home Safety Tips:  Add items to prevent falls in the bathroom.    Keep paths clear.    Install bright lights in your home.    Keep items you use often on shelves within reach.    Paint or place reflective tape on the edges of your stairs.    Cigarette Smoking and Your Health   Risks to your health if you smoke:  Nicotine and other chemicals found in tobacco damage every cell in your body. Even if you are a light smoker, you have an increased risk for cancer, heart disease, and lung disease. If you are pregnant or have diabetes, smoking increases your risk for complications.   Benefits to your health if you stop smoking:   You decrease respiratory symptoms such as coughing, wheezing, and shortness of breath.   You reduce your risk for cancers of the lung, mouth, throat, kidney, bladder, pancreas, stomach, and cervix. If you already have cancer, you increase the benefits of chemotherapy. You also reduce your risk for cancer returning or a second cancer from developing.   You reduce your risk for heart disease, blood clots, heart attack, and stroke.   You reduce your risk for lung infections, and diseases such as pneumonia, asthma, chronic bronchitis, and emphysema.  Your circulation improves. More oxygen can be delivered to your body. If you have diabetes, you lower your risk for complications, such as kidney, artery, and eye diseases. You also lower your risk for nerve damage. Nerve damage can lead to amputations, poor vision, and blindness.  You improve your body's ability to heal and to fight infections.  For more information and support to stop smoking:   Smokefree.gov  Phone: 1-  162 - 470-7303  Web Address: www.smokefree.gov     © Copyright [employer] 2018 Information is for End User's use only and may not be sold, redistributed or otherwise used for commercial purposes. All illustrations and images included in CareNotes® are the copyrighted property of [employer] or [employer]

## 2025-01-23 NOTE — PROGRESS NOTES
Name: Angelo Redd      : 1950      MRN: [id number]  Encounter Provider: LOBITO Humphries  Encounter Date: 2025   Encounter department: [facility]    Assessment & Plan  Medicare annual wellness visit, subsequent         Exudative age-related macular degeneration, left eye, with active choroidal neovascularization (HCC)  Under the management of [facility]         Elevated liver enzymes         Lab Units 25  0848   AST U/L 18   ALT U/L 27     Patient's hematologist feel that the elevations in liver enzymes are secondary to statin therapy.  Statin therapy has since been discontinued  Liver enzymes stable         Infrarenal abdominal aortic aneurysm (AAA) without rupture (HCC)  Under the management of CT surgery  Routine surveillance CTA chest and abdomen   CTA chest/abdomen/pelvis  Vascular:  At least 2-year imaging stability of the infrarenal abdominal aorta which remains top limits of normal in caliber. Associated eccentric mural thrombus and a possible small penetrating atheromatous ulcer are also unchanged.  Nonvascular:  Chest:No new concerning pulmonary nodules. Emphysema.  Abdomen/pelvis:   No metastases. Unchanged findings related to left renal cryoablation.   Unchanged small exophytic indeterminate hyperdense lesion in the left upper kidney. Direct attention on follow-up.         Foreign body of left ear, initial encounter  Ear bud visualized in left ear canal   Denies pain  Cannot recall how long it has been present   Removed without complication          Depression Screening and Follow-up Plan: Patient was screened for depression during today's encounter. They screened negative with a PHQ-2 score of 2.      Preventive health issues were discussed with patient, and age appropriate screening tests were ordered as noted in patient's After Visit Summary. Personalized health advice and appropriate referrals for health education or preventive  services given if needed, as noted in patient's After Visit Summary.    History of Present Illness     AWV no concerns     Diarrhea   This is a recurrent problem. The current episode started more than 1 month ago. The problem occurs less than 2 times per day. Diarrhea characteristics: denies blood or stool color changes. The patient states that diarrhea awakens him from sleep. Associated symptoms include increased flatus. Pertinent negatives include no abdominal pain, arthralgias, bloating, chills, coughing, fever, headaches, myalgias, sweats, URI, vomiting or weight loss. Nothing aggravates the symptoms. There are no known risk factors. He has tried anti-motility drug for the symptoms. The treatment provided no relief.      Patient Care Team:  Jatinder Shepard DO as PCP - General (Family Medicine)  Jaclyn Cardenas MD as Endoscopist  Jyoti Canas MD as Medical Oncologist (Hematology and Oncology)  Lexis Silva RN as Nurse Navigator (Oncology)  Jillian Bear MA as Care Coordinator (Oncology)    Review of Systems   Constitutional:  Negative for activity change, chills, fatigue, fever and weight loss.   HENT:  Negative for congestion, ear pain, rhinorrhea and sore throat.    Eyes:  Negative for pain and visual disturbance.   Respiratory:  Negative for cough, chest tightness and shortness of breath.    Cardiovascular:  Negative for chest pain, palpitations and leg swelling.   Gastrointestinal:  Positive for diarrhea and flatus. Negative for abdominal pain, bloating, constipation, nausea and vomiting.   Genitourinary:  Negative for decreased urine volume, dysuria, frequency, hematuria and urgency.   Musculoskeletal:  Negative for arthralgias, back pain and myalgias.   Skin:  Negative for color change and rash.   Neurological:  Negative for seizures, syncope and headaches.   Psychiatric/Behavioral:  Negative for dysphoric mood. The patient is not nervous/anxious.    All other systems reviewed and are  negative.    Medical History Reviewed by provider this encounter:       Annual Wellness Visit Questionnaire   Angelo is here for his Subsequent Wellness visit. Last Medicare Wellness visit information reviewed, patient interviewed and updates made to the record today.      Health Risk Assessment:   Patient rates overall health as good. Patient feels that their physical health rating is slightly worse. Patient is satisfied with their life. Eyesight was rated as slightly worse. Hearing was rated as slightly worse. Patient feels that their emotional and mental health rating is same. Patients states they are never, rarely angry. Patient states they are often unusually tired/fatigued. Pain experienced in the last 7 days has been some. Patient's pain rating has been 4/10. Patient states that he has experienced no weight loss or gain in last 6 months.     Depression Screening:   PHQ-2 Score: 2      Fall Risk Screening:   In the past year, patient has experienced: history of falling in past year    Number of falls: 2 or more  Injured during fall?: No    Feels unsteady when standing or walking?: No    Worried about falling?: Yes      Home Safety:  Patient does not have trouble with stairs inside or outside of their home. Patient has working smoke alarms and has working carbon monoxide detector. Home safety hazards include: none.     Nutrition:   Current diet is Regular.     Medications:   Patient is currently taking over-the-counter supplements. OTC medications include: see medication list. Patient is able to manage medications.     Activities of Daily Living (ADLs)/Instrumental Activities of Daily Living (IADLs):   Walk and transfer into and out of bed and chair?: Yes  Dress and groom yourself?: Yes    Bathe or shower yourself?: Yes    Feed yourself? Yes  Do your laundry/housekeeping?: Yes  Manage your money, pay your bills and track your expenses?: Yes  Make your own meals?: Yes    Do your own shopping?: Yes    Previous  Hospitalizations:   Any hospitalizations or ED visits within the last 12 months?: Yes    How many hospitalizations have you had in the last year?: 1-2    Advance Care Planning:   Living will: Yes    Durable POA for healthcare: Yes    Advanced directive: Yes      PREVENTIVE SCREENINGS      Cardiovascular Screening:    General: Screening Not Indicated and History Lipid Disorder      Diabetes Screening:     General: Screening Current      Colorectal Cancer Screening:     General: Screening Current      Prostate Cancer Screening:    General: History Prostate Cancer      Abdominal Aortic Aneurysm (AAA) Screening:    Risk factors include: age between 65-74 yo and tobacco use        General: Screening Not Indicated and History AAA      Lung Cancer Screening:     General: Screening Not Indicated and History Lung Cancer      Hepatitis C Screening:    General: Screening Current    Screening, Brief Intervention, and Referral to Treatment (SBIRT)    Screening  Typical number of drinks in a day: 0  Typical number of drinks in a week: 0  Interpretation: Low risk drinking behavior.    AUDIT-C Screenin) How often did you have a drink containing alcohol in the past year? monthly or less  2) How many drinks did you have on a typical day when you were drinking in the past year? 1 to 2  3) How often did you have 6 or more drinks on one occasion in the past year? never    AUDIT-C Score: 1  Interpretation: Score 0-3 (male): Negative screen for alcohol misuse    Single Item Drug Screening:  How often have you used an illegal drug (including marijuana) or a prescription medication for non-medical reasons in the past year? never    Single Item Drug Screen Score: 0  Interpretation: Negative screen for possible drug use disorder    Brief Intervention  Alcohol & drug use screenings were reviewed. No concerns regarding substance use disorder identified.     Time Spent  Time spent screening/evaluating the patient for alcohol misuse: 0  minutes. Time spent providing alcohol/substance abuse assessment and intervention services: 0 minutes.    Annual Depression Screening  Time spent screening and evaluating the patient for depression during today's encounter was 5 minutes.    Other Counseling Topics:   Car/seat belt/driving safety, skin self-exam, sunscreen and calcium and vitamin D intake and regular weightbearing exercise.     Social Drivers of Health     Financial Resource Strain: Low Risk  (1/22/2024)    Overall Financial Resource Strain (CARDIA)     Difficulty of Paying Living Expenses: Not hard at all   Food Insecurity: No Food Insecurity (1/20/2025)    Hunger Vital Sign     Worried About Running Out of Food in the Last Year: Never true     Ran Out of Food in the Last Year: Never true   Transportation Needs: No Transportation Needs (1/20/2025)    PRAPARE - Transportation     Lack of Transportation (Medical): No     Lack of Transportation (Non-Medical): No   Housing Stability: Unknown (1/20/2025)    Housing Stability Vital Sign     Unable to Pay for Housing in the Last Year: No     Homeless in the Last Year: No     No results found.    Objective   There were no vitals taken for this visit.    Physical Exam  Vitals and nursing note reviewed.   Constitutional:       Appearance: Normal appearance. He is well-developed.   HENT:      Head: Normocephalic and atraumatic.      Right Ear: Tympanic membrane, ear canal and external ear normal. There is no impacted cerumen.      Left Ear: Tympanic membrane, ear canal and external ear normal. There is no impacted cerumen.      Nose: Nose normal.      Mouth/Throat:      Mouth: Mucous membranes are moist.      Pharynx: Oropharynx is clear.   Eyes:      Extraocular Movements: Extraocular movements intact.      Conjunctiva/sclera: Conjunctivae normal.      Pupils: Pupils are equal, round, and reactive to light.   Cardiovascular:      Rate and Rhythm: Normal rate and regular rhythm.      Pulses: Normal pulses.       Heart sounds: Normal heart sounds. No murmur heard.  Pulmonary:      Effort: Pulmonary effort is normal. No respiratory distress.      Breath sounds: Normal breath sounds.   Abdominal:      General: Bowel sounds are normal.      Palpations: Abdomen is soft.      Tenderness: There is no abdominal tenderness.   Musculoskeletal:         General: No swelling. Normal range of motion.      Cervical back: Normal range of motion and neck supple.      Right lower leg: No edema.      Left lower leg: No edema.   Skin:     General: Skin is warm and dry.      Capillary Refill: Capillary refill takes less than 2 seconds.   Neurological:      General: No focal deficit present.      Mental Status: He is alert and oriented to person, place, and time. Mental status is at baseline.   Psychiatric:         Mood and Affect: Mood normal.         Behavior: Behavior normal.         Thought Content: Thought content normal.         Judgment: Judgment normal.       Universal Protocol:  procedure performed by consultantConsent: Verbal consent obtained.  Risks and benefits: risks, benefits and alternatives were discussed  Consent given by: patient  Timeout called at: 1/23/2025 11:15 AM.  Patient understanding: patient states understanding of the procedure being performed  Required items: required blood products, implants, devices, and special equipment available  Patient identity confirmed: verbally with patient  Foreign body removal    Date/Time: 1/23/2025 11:00 AM    Performed by: LOBITO Humphries  Authorized by: LOBITO Humphries  Body area: ear  Location details: left ear    Sedation:  Patient sedated: no  Patient restrained: no  Patient cooperative: yes  Localization method: visualized  Removal mechanism: forceps  Complexity: simple  1 objects recovered.  Objects recovered: Ear bud from headphone  Post-procedure assessment: foreign body removed  Patient tolerance: patient tolerated the procedure well with no immediate  complications        Administrative Statements   I have spent a total time of 40 minutes in caring for this patient on the day of the visit/encounter including Diagnostic results, Instructions for management, Patient and family education, Importance of tx compliance, Risk factor reductions, Impressions, Counseling / Coordination of care, Documenting in the medical record, Reviewing / ordering tests, medicine, procedures  , and Obtaining or reviewing history  .     Patient Instructions   Medicare Preventive Visit Patient Instructions  Thank you for completing your Welcome to Medicare Visit or Medicare Annual Wellness Visit today. Your next wellness visit will be due in one year (1/24/2026).  The screening/preventive services that you may require over the next 5-10 years are detailed below. Some tests may not apply to you based off risk factors and/or age. Screening tests ordered at today's visit but not completed yet may show as past due. Also, please note that scanned in results may not display below.  Preventive Screenings:  Service Recommendations Previous Testing/Comments   Colorectal Cancer Screening  Colonoscopy    Fecal Occult Blood Test (FOBT)/Fecal Immunochemical Test (FIT)  Fecal DNA/Cologuard Test  Flexible Sigmoidoscopy Age: 45-75 years old   Colonoscopy: every 10 years (May be performed more frequently if at higher risk)  OR  FOBT/FIT: every 1 year  OR  Cologuard: every 3 years  OR  Sigmoidoscopy: every 5 years  Screening may be recommended earlier than age 45 if at higher risk for colorectal cancer. Also, an individualized decision between you and your healthcare provider will decide whether screening between the ages of 76-85 would be appropriate. Colonoscopy: 08/07/2018  FOBT/FIT: Not on file  Cologuard: Not on file  Sigmoidoscopy: Not on file    Screening Current     Prostate Cancer Screening Individualized decision between patient and health care provider in men between ages of 55-69   Medicare will  cover every 12 months beginning on the day after your 50th birthday PSA: 5.91 ng/mL     History Prostate Cancer     Hepatitis C Screening Once for adults born between 1945 and 1965  More frequently in patients at high risk for Hepatitis C Hep C Antibody: 02/15/2024    Screening Current   Diabetes Screening 1-2 times per year if you're at risk for diabetes or have pre-diabetes Fasting glucose: 105 mg/dL (1/22/2025)  A1C: No results in last 5 years (No results in last 5 years)  Screening Current   Cholesterol Screening Once every 5 years if you don't have a lipid disorder. May order more often based on risk factors. Lipid panel: 01/22/2025  Screening Not Indicated  History Lipid Disorder      Other Preventive Screenings Covered by Medicare:  Abdominal Aortic Aneurysm (AAA) Screening: covered once if your at risk. You're considered to be at risk if you have a family history of AAA or a male between the age of 65-75 who smoking at least 100 cigarettes in your lifetime.  Lung Cancer Screening: covers low dose CT scan once per year if you meet all of the following conditions: (1) Age 55-77; (2) No signs or symptoms of lung cancer; (3) Current smoker or have quit smoking within the last 15 years; (4) You have a tobacco smoking history of at least 20 pack years (packs per day x number of years you smoked); (5) You get a written order from a healthcare provider.  Glaucoma Screening: covered annually if you're considered high risk: (1) You have diabetes OR (2) Family history of glaucoma OR (3)  aged 50 and older OR (4)  American aged 65 and older  Osteoporosis Screening: covered every 2 years if you meet one of the following conditions: (1) Have a vertebral abnormality; (2) On glucocorticoid therapy for more than 3 months; (3) Have primary hyperparathyroidism; (4) On osteoporosis medications and need to assess response to drug therapy.  HIV Screening: covered annually if you're between the age of  15-65. Also covered annually if you are younger than 15 and older than 65 with risk factors for HIV infection. For pregnant patients, it is covered up to 3 times per pregnancy.    Immunizations:  Immunization Recommendations   Influenza Vaccine Annual influenza vaccination during flu season is recommended for all persons aged >= 6 months who do not have contraindications   Pneumococcal Vaccine   * Pneumococcal conjugate vaccine = PCV13 (Prevnar 13), PCV15 (Vaxneuvance), PCV20 (Prevnar 20)  * Pneumococcal polysaccharide vaccine = PPSV23 (Pneumovax) Adults 19-63 yo with certain risk factors or if 65+ yo  If never received any pneumonia vaccine: recommend Prevnar 20 (PCV20)  Give PCV20 if previously received 1 dose of PCV13 or PPSV23   Hepatitis B Vaccine 3 dose series if at intermediate or high risk (ex: diabetes, end stage renal disease, liver disease)   Respiratory syncytial virus (RSV) Vaccine - COVERED BY MEDICARE PART D  * RSVPreF3 (Arexvy) CDC recommends that adults 60 years of age and older may receive a single dose of RSV vaccine using shared clinical decision-making (SCDM)   Tetanus (Td) Vaccine - COST NOT COVERED BY MEDICARE PART B Following completion of primary series, a booster dose should be given every 10 years to maintain immunity against tetanus. Td may also be given as tetanus wound prophylaxis.   Tdap Vaccine - COST NOT COVERED BY MEDICARE PART B Recommended at least once for all adults. For pregnant patients, recommended with each pregnancy.   Shingles Vaccine (Shingrix) - COST NOT COVERED BY MEDICARE PART B  2 shot series recommended in those 19 years and older who have or will have weakened immune systems or those 50 years and older     Health Maintenance Due:      Topic Date Due    Colorectal Cancer Screening  08/07/2028    Hepatitis C Screening  Completed     Immunizations Due:      Topic Date Due    COVID-19 Vaccine (6 - 2024-25 season) 11/25/2024     Advance Directives   What are advance  directives?  Advance directives are legal documents that state your wishes and plans for medical care. These plans are made ahead of time in case you lose your ability to make decisions for yourself. Advance directives can apply to any medical decision, such as the treatments you want, and if you want to donate organs.   What are the types of advance directives?  There are many types of advance directives, and each state has rules about how to use them. You may choose a combination of any of the following:  Living will:  This is a written record of the treatment you want. You can also choose which treatments you do not want, which to limit, and which to stop at a certain time. This includes surgery, medicine, IV fluid, and tube feedings.   Durable power of  for healthcare (DPAHC):  This is a written record that states who you want to make healthcare choices for you when you are unable to make them for yourself. This person, called a proxy, is usually a family member or a friend. You may choose more than 1 proxy.  Do not resuscitate (DNR) order:  A DNR order is used in case your heart stops beating or you stop breathing. It is a request not to have certain forms of treatment, such as CPR. A DNR order may be included in other types of advance directives.  Medical directive:  This covers the care that you want if you are in a coma, near death, or unable to make decisions for yourself. You can list the treatments you want for each condition. Treatment may include pain medicine, surgery, blood transfusions, dialysis, IV or tube feedings, and a ventilator (breathing machine).  Values history:  This document has questions about your views, beliefs, and how you feel and think about life. This information can help others choose the care that you would choose.  Why are advance directives important?  An advance directive helps you control your care. Although spoken wishes may be used, it is better to have your wishes  written down. Spoken wishes can be misunderstood, or not followed. Treatments may be given even if you do not want them. An advance directive may make it easier for your family to make difficult choices about your care.   Fall Prevention    Fall prevention  includes ways to make your home and other areas safer. It also includes ways you can move more carefully to prevent a fall. Health conditions that cause changes in your blood pressure, vision, or muscle strength and coordination may increase your risk for falls. Medicines may also increase your risk for falls if they make you dizzy, weak, or sleepy.   Fall prevention tips:   Stand or sit up slowly.    Use assistive devices as directed.    Wear shoes that fit well and have soles that .    Wear a personal alarm.    Stay active.    Manage your medical conditions.    Home Safety Tips:  Add items to prevent falls in the bathroom.    Keep paths clear.    Install bright lights in your home.    Keep items you use often on shelves within reach.    Paint or place reflective tape on the edges of your stairs.    Cigarette Smoking and Your Health   Risks to your health if you smoke:  Nicotine and other chemicals found in tobacco damage every cell in your body. Even if you are a light smoker, you have an increased risk for cancer, heart disease, and lung disease. If you are pregnant or have diabetes, smoking increases your risk for complications.   Benefits to your health if you stop smoking:   You decrease respiratory symptoms such as coughing, wheezing, and shortness of breath.   You reduce your risk for cancers of the lung, mouth, throat, kidney, bladder, pancreas, stomach, and cervix. If you already have cancer, you increase the benefits of chemotherapy. You also reduce your risk for cancer returning or a second cancer from developing.   You reduce your risk for heart disease, blood clots, heart attack, and stroke.   You reduce your risk for lung infections, and  diseases such as pneumonia, asthma, chronic bronchitis, and emphysema.  Your circulation improves. More oxygen can be delivered to your body. If you have diabetes, you lower your risk for complications, such as kidney, artery, and eye diseases. You also lower your risk for nerve damage. Nerve damage can lead to amputations, poor vision, and blindness.  You improve your body's ability to heal and to fight infections.  For more information and support to stop smoking:   Smokefree.gov  Phone: 9- 553 - 113-2053  Web Address: www.[employer]     © Copyright [employer] 2018 Information is for End User's use only and may not be sold, redistributed or otherwise used for commercial purposes. All illustrations and images included in CareNotes® are the copyrighted property of [employer] or [employer]

## 2025-01-23 NOTE — PROGRESS NOTES
Initial outreach. Spoke to patient. Introduced myself and explained the role of an Oncology Nurse Navigator to assist with coordination of cancer care, preparation for upcoming appointment, be a point of contact prior to oncology consult, provide support and connect him with available resources. Discussed surgical oncology referral placed by Dr Albright for squamous cell skin cancer. Explained I will be his main contact until they are established with his team and a treatment plan is established.     History of stage IV NSCLC, diagnosed in 2015, s/p chemotherapy and radiation to brain. He is followed by Dr Canas. He was diagnosed with RCC of the left kidney in 2016, treated with cryoablation at [facility] and diagnosed with Freeland 6 prostate cancer and is on active surveillance, followed by Dr Leggett.     Introduced Jillian, and explained she will be his patient navigator, who will reach out after the first consult to introduce themselves. The PN will provide support, make sure he has a good understanding of the plan and schedule and to connect with additional resources if/when needed.     Reviewed upcoming appointments including date, time and location.  Assessed for barriers of care. My direct contact information provided. Patient is aware that he can call me should he need any further assistance. Patient was appreciative of assistance.     Cutaneous Symptoms:   Wounds Yes, describe: Left ankle - applying antibiotic ointment, changing dressing every 2 days  Fatigue Yes - secondary to breathing and cold weather    Good appetite.   No weight loss.    Prior history of skin cancer yes, states he's had 17 skin cancer surgeries, mostly on his legs  - BCC, SCC    Dermatology Provider: Dr Albright, Aesthetic Surgery   PCP: [facility]  Insurance:  Medicare A&B, [employer] supplement

## 2025-01-24 PROBLEM — T16.2XXA FOREIGN BODY OF LEFT EAR: Status: ACTIVE | Noted: 2025-01-24

## 2025-01-24 NOTE — ASSESSMENT & PLAN NOTE
Lab Units 01/22/25  0848   AST U/L 18   ALT U/L 27     Patient's hematologist feel that the elevations in liver enzymes are secondary to statin therapy.  Statin therapy has since been discontinued  Liver enzymes stable

## 2025-01-24 NOTE — ASSESSMENT & PLAN NOTE
Under the management of CT surgery  Routine surveillance CTA chest and abdomen  05/23 CTA chest/abdomen/pelvis  Vascular:  At least 2-year imaging stability of the infrarenal abdominal aorta which remains top limits of normal in caliber. Associated eccentric mural thrombus and a possible small penetrating atheromatous ulcer are also unchanged.  Nonvascular:  Chest:No new concerning pulmonary nodules. Emphysema.  Abdomen/pelvis:   No metastases. Unchanged findings related to left renal cryoablation.   Unchanged small exophytic indeterminate hyperdense lesion in the left upper kidney. Direct attention on follow-up.

## 2025-01-24 NOTE — ASSESSMENT & PLAN NOTE
Ear bud visualized in left ear canal   Denies pain  Cannot recall how long it has been present   Removed without complication

## 2025-01-30 PROBLEM — C44.711 BASAL CELL CARCINOMA (BCC) OF SKIN OF LOWER EXTREMITY INCLUDING HIP: Status: ACTIVE | Noted: 2025-01-30

## 2025-02-03 ENCOUNTER — RESULTS FOLLOW-UP (OUTPATIENT)
Dept: FAMILY MEDICINE CLINIC | Facility: CLINIC | Age: 75
End: 2025-02-03

## 2025-02-04 ENCOUNTER — LAB REQUISITION (OUTPATIENT)
Dept: LAB | Facility: HOSPITAL | Age: 75
End: 2025-02-04
Payer: MEDICARE

## 2025-02-04 DIAGNOSIS — C44.712 BASAL CELL CARCINOMA OF SKIN OF RIGHT LOWER LIMB, INCLUDING HIP: ICD-10-CM

## 2025-02-04 DIAGNOSIS — B35.8 OTHER DERMATOPHYTOSES: ICD-10-CM

## 2025-02-04 DIAGNOSIS — C44.719 BASAL CELL CARCINOMA OF SKIN OF LEFT LOWER LIMB, INCLUDING HIP: ICD-10-CM

## 2025-02-04 DIAGNOSIS — Z00.6 ENCOUNTER FOR EXAMINATION FOR NORMAL COMPARISON OR CONTROL IN CLINICAL RESEARCH PROGRAM: ICD-10-CM

## 2025-02-04 DIAGNOSIS — C44.711 BASAL CELL CARCINOMA OF SKIN OF UNSPECIFIED LOWER LIMB, INCLUDING HIP: ICD-10-CM

## 2025-02-04 PROCEDURE — 88321 CONSLTJ&REPRT SLD PREP ELSWR: CPT | Performed by: STUDENT IN AN ORGANIZED HEALTH CARE EDUCATION/TRAINING PROGRAM

## 2025-02-07 ENCOUNTER — CONSULT (OUTPATIENT)
Dept: SURGICAL ONCOLOGY | Facility: CLINIC | Age: 75
End: 2025-02-07
Payer: MEDICARE

## 2025-02-07 VITALS
BODY MASS INDEX: 24.48 KG/M2 | WEIGHT: 171 LBS | DIASTOLIC BLOOD PRESSURE: 82 MMHG | OXYGEN SATURATION: 94 % | HEART RATE: 102 BPM | RESPIRATION RATE: 15 BRPM | HEIGHT: 70 IN | TEMPERATURE: 98.4 F | SYSTOLIC BLOOD PRESSURE: 141 MMHG

## 2025-02-07 DIAGNOSIS — C44.711 BASAL CELL CARCINOMA (BCC) OF SKIN OF LOWER EXTREMITY INCLUDING HIP, UNSPECIFIED LATERALITY: Primary | ICD-10-CM

## 2025-02-07 PROCEDURE — 99204 OFFICE O/P NEW MOD 45 MIN: CPT | Performed by: SURGERY

## 2025-02-07 RX ORDER — TRAMADOL HYDROCHLORIDE 50 MG/1
50 TABLET ORAL EVERY 6 HOURS PRN
Qty: 10 TABLET | Refills: 0 | Status: SHIPPED | OUTPATIENT
Start: 2025-02-07

## 2025-02-07 NOTE — PROGRESS NOTES
Surgical Oncology Consult       [address]   C[facility] SURGICAL ONCOLOGY [facility]  [address] [facility] [address]    Angelo Redd  1950  [phone]  [address] [facility]GICAL [facility]COLOG[facility]  [address] [place] [address]    Chief Complaint   Patient presents with    New Patient Visit       Assessment/Plan:    No problem-specific Assessment & Plan notes found for this encounter.       Diagnoses and all orders for this visit:    Basal cell carcinoma (BCC) of skin of lower extremity including hip, unspecified laterality      Advance Care Planning/Advance Directives:  Discussed disease status, cancer treatment plans and/or cancer treatment goals with the patient.     Oncology History Overview Note   Patient has a history of tobacco use for more than 40 years and history of 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 which was diagnosed in May of 2015.  At that time the patient was found to have multiple brain lesions status post whole brain radiation.  He then received 6 cycles of palliative chemotherapy with Alimta and carboplatin.  After 5 cycles of treatment he had significant GI bleeding which required ICU hospitalization.  After completion of palliative chemotherapy he was maintained on Alimta since October 2015.    A brain MRI January 2016 showed multiple bilateral hemorrhagic lesions felt not to be due to malignant process.  PET scan January 2016 showed a 1.6 cm metabolically active left lung apical lesion.  The patient received radiation treatment palliatively under the care of Dr. Llanes to the left apical lung lesion and completed it in April of 2016.  He was then restarted on maintenance Alimta.    He had an MRI of the abdomen on June 23, 2016 which showed a 3 cm cystic mass with a solid component on the posterior medial aspect of the left kidney, compatible with malignant process.  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  The patient was then treated with cryotherapy of the left kidney lesion in September of 2016.    Patient had a PET-CT scan on March 3, 2017 which was compared to the prior PET scan from November 2016.  At this time there is no evidence of residual or recurrent neoplastic disease anywhere in his body.    Patient had a CT scan of the chest abdomen and pelvis on August 24, 2017 which showed a 1.3 cm left upper lobe mass versus scarring with adjacent radiation fibrosis.    Patient had another MRI of the brain January 8, 2018 which was compared to the previous MRI of the brain in January 2016.  This revealed multiple areas of artifact in the supra and infratentorial spaces consistent with hemorrhagic metastasis.  The largest lesion was in the frontal lobe and is significantly smaller with only a tiny enhancing component remaining.  No new intra-axial lesions were reported.    Patient had another CT scan of the chest abdomen and pelvis for close monitoring on June 5th 2018 that was compared with prior study from January 2018.  He continues to have a 1.3 cm left lung apex pulmonary nodule with surrounding fibrosis which is stable in appearance.  He also has a stable appearance of the treated medial left renal cortex lesion.  There is no evidence of metastatic disease within the chest abdomen or pelvis.    After lengthy discussion June 2018 patient stated that he is not interested in continuing his maintenance Alimta treatment beyond today's treatment since his CT scan showed stable disease for a long time he has been on Alimta for 2 years.  He was told that he seems to be in complete remission but there is a chance that the cancer may reoccur.  Patient was interested in the watch and wait type of approach instead of continuing active maintenance Alimta treatment.     Primary malignant neoplasm of bronchus of left  upper lobe (HCC)   5/2015 Initial Diagnosis    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)  Stage IV     5/4/2015 -  Cancer Staged    Staging form: Lung, AJCC 8th Edition  - Clinical stage from 5/4/2015: Stage IVB (cTX, cNX, cM1c) - Signed by Jyoti Canas MD on 5/20/2024  Stage prefix: Initial diagnosis  Histologic grade (G): G2  Histologic grading system: 4 grade system       5/5/2015 - 5/20/2015 Radiation    3150 cGy to large right frontoparietal mass; 3000 cGy to other tumor volumes in brainstem, and bilateral cerebral hemispheres  Radiation oncologist:  Dr. Amita Llanes     3/17/2016 - 4/12/2016 Radiation    Left upper lobe lesion, 4675 cGy  Radiation oncologist: Dr. Amita Llanes      Chemotherapy    1.   Alimta and carboplatin:   05/20/2015 through 08/12/2015 (5 cycles total)  2.   Maintenance Alimta:   10/21/2015 through 06/12/2018 (43 total Treatment doses)     Metastasis to brain (HCC) (Resolved)   5/26/2015 Initial Diagnosis    Metastasis to brain (HCC)     5/20/2024 -  Cancer Staged    Staging form: Central Nervous System Tumors, Pediatric Staging  - Pathologic: No stage assigned - Signed by Jyoti Canas MD on 5/20/2024  Primary tumor (T): TX  Regional lymph nodes (N): NX  Distant metastasis (M): M1  Source of metastatic specimen: Lung       Renal cell carcinoma of left kidney (HCC)   7/2016 Initial Diagnosis    Renal cell carcinoma of left kidney (HCC)  [facility]       Basal cell carcinoma (BCC) of skin of lower extremity including hip   2/3/2025 Biopsy    Consult case (4 slides [id number] [facility], collected 12/05/2024):     Skin, left medial posterior ankle, shave biopsy:     BASAL CELL CARCINOMA (SUPERFICIAL TYPE); extends to the tissue edges.     Skin, right inferior medial posterior tibial, shave biopsy:     BASAL CELL CARCINOMA (SUPERFICIAL AND NODULAR TYPE); transected    Skin, right medial  "posterior ankle, shave biopsy:     BASAL CELL CARCINOMA (SUPERFICIAL AND NODULAR TYPE); transected             History of Present Illness: Patient is a 75-year-old man with history of squamous and basal cell cancers, now with lesion on left lower extremity lower gastroc area as well as left shin area.  The lower lesion appears to be ulcerated.  He has been referred for evaluation and treatment.    Review of Systems   Constitutional: Negative.    HENT: Negative.     Eyes: Negative.    Respiratory: Negative.     Cardiovascular: Negative.    Gastrointestinal: Negative.    Endocrine: Negative.    Genitourinary: Negative.    Musculoskeletal: Negative.    Skin: Negative.    Allergic/Immunologic: Negative.    Neurological: Negative.    Hematological: Negative.    Psychiatric/Behavioral: Negative.     All other systems reviewed and are negative.        Patient Active Problem List   Diagnosis    Family history of colon cancer    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)    Bladder stone    Urge incontinence    BPH with urinary obstruction    Renal cell carcinoma of left kidney (HCC)    Encounter for central line care    Bladder mass    Smoker    Colon polyps    High prostate specific antigen (PSA)    Prostate cancer (HCC)    Infrarenal abdominal aortic aneurysm (AAA) without rupture (HCC)    Exudative age-related macular degeneration, left eye, with active choroidal neovascularization (HCC)    Elevated liver enzymes    Foreign body of left ear    Basal cell carcinoma (BCC) of skin of lower extremity including hip     Past Medical History:   Diagnosis Date    Arthritis     Balance problems     and\" coordination issues/best to walk slow\"    Bladder stones     Cancer (HCC)     lung, brain, kidney, spinal    Colon polyp     Dysphagia 02/16/2017    \"not right now but in the past\"    Elevated blood sugar 07/24/2020    Environmental and seasonal allergies     Exercise involving walking     \"approx 1 mile " "3x/week\"    Eye injury     right,  \"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\"    Full dentures     Hemiparesis (HCC) 04/27/2015    left weakness,\"due to tumor on brain\"    History of brain tumor     \"had radiation for it\"    History of cancer chemotherapy     last tx  5/22/18    History of GI bleed 2015    History of kidney cancer     left    History of kidney stones     History of radiation therapy     History of transfusion     5 units of blood 2015    Hydrocele 11/30/2010    Inguinal hernia, bilateral     Joint stiffness of multiple sites     Kidney stone     Paresthesia 05/01/2015    Portacath in place     right chest    Primary malignant neoplasm of lung (HCC) 05/26/2015    Renal carcinoma, left (HCC) 10/17/2016    Risk for falls     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/26/2015    Skin cancer     Syncopal episodes 09/01/2015    2    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    Wears glasses      Past Surgical History:   Procedure Laterality Date    BLADDER STONE REMOVAL      BLADDER STONE REMOVAL N/A 2/9/2021    Procedure: LASER LITHOLOPAXY;  Surgeon: Cm Lester MD;  [facility];  Service: Urology    COLONOSCOPY      DENTAL SURGERY  2011    DENTAL EXTACTIONS     ESOPHAGOGASTRODUODENOSCOPY      HERNIA REPAIR      HYDROCELE EXCISION / REPAIR Left 01/01/2011    KIDNEY STONE SURGERY      KIDNEY SURGERY      cancer of kidney/cryo of cancer    PORTACATH PLACEMENT      GA COLONOSCOPY FLX DX W/COLLJ SPEC WHEN PFRMD N/A 8/7/2018    Procedure: COLONOSCOPY with polypectomy ;  Surgeon: Jaclyn Cardenas MD;  Location: [facility];  Service: General    GA CYSTO/URETERO W/LITHOTRIPSY &INDWELL STENT INSRT Left 4/27/2022    Procedure: CYSTO URETEROSCOPY WITH LITHO HOLMIUM LASER, RETROGRADE PYELOGRAM AND INSERTION STENT URETERAL;  Surgeon: Cm Lester MD;  [facility];  Service: Urology    GA CYSTOURETHROSCOPY W/DEST &/RMVL MED BLADDER JACOB N/A 2/9/2021    Procedure: CYSTO, T.U.R.B.T.;  " Surgeon: Cm Lester MD;  [facility];  Service: Urology    FL LAPS RPR RECURRENT INCAL HRNA NCRC8/STRANGULATED Bilateral 8/8/2018    Procedure: REPAIR HERNIA INGUINAL LAPAROSCOPIC W/ ROBOTICS W/ MESH;  Surgeon: Jaclyn Cardensa MD;  [facility];  Service: General    FL LITHOLAPAXY SMPL/SM <2.5 CM N/A 11/27/2018    Procedure: CYSTOSCOPY, CYSTOLITHOLOPAXY HOLMIUM LASER,BLADDE BIOPSY;  Surgeon: Cm Lester MD;  [facility];  Service: Urology    FL LITHOLAPAXY SMPL/SM <2.5 CM N/A 4/27/2022    Procedure: LITHOLOPAXY HOLMIUM LASER for bladder stones;  Surgeon: Cm Lester MD;  [facility];  Service: Urology    SKIN BIOPSY      SKIN CANCER EXCISION      17 surgeries, basal cell    TONSILLECTOMY       Family History   Problem Relation Age of Onset    Cancer Mother         EYE    Cancer Father     Colon cancer Father     Cancer Brother      Social History     Socioeconomic History    Marital status:      Spouse name: Not on file    Number of children: Not on file    Years of education: Not on file    Highest education level: Not on file   Occupational History    Not on file   Tobacco Use    Smoking status: Every Day     Current packs/day: 0.50     Average packs/day: 0.5 packs/day for 48.0 years (24.0 ttl pk-yrs)     Types: Cigarettes     Passive exposure: Current    Smokeless tobacco: Never   Vaping Use    Vaping status: Never Used   Substance and Sexual Activity    Alcohol use: Yes     Comment: 3-4 times a year.    Drug use: No    Sexual activity: Not Currently     Partners: Female     Birth control/protection: Abstinence, Condom Male   Other Topics Concern    Not on file   Social History Narrative    Not on file     Social Drivers of Health     Financial Resource Strain: Low Risk  (1/22/2024)    Overall Financial Resource Strain (CARDIA)     Difficulty of Paying Living Expenses: Not hard at all   Food Insecurity: No Food Insecurity (1/20/2025)    Hunger Vital Sign      Worried About Running Out of Food in the Last Year: Never true     Ran Out of Food in the Last Year: Never true   Transportation Needs: No Transportation Needs (1/20/2025)    PRAPARE - Transportation     Lack of Transportation (Medical): No     Lack of Transportation (Non-Medical): No   Physical Activity: Not on file   Stress: Not on file   Social Connections: Not on file   Intimate Partner Violence: Not on file   Housing Stability: Unknown (1/20/2025)    Housing Stability Vital Sign     Unable to Pay for Housing in the Last Year: No     Number of Times Moved in the Last Year: Not on file     Homeless in the Last Year: No       Current Outpatient Medications:     Ascorbic Acid (VITAMIN C) 500 MG CAPS, Take 500 mg by mouth daily  , Disp: , Rfl:     Multiple Vitamins-Minerals (MULTIVITAMIN ADULT PO), Take 1 tablet by mouth daily  , Disp: , Rfl:     polyethylene glycol-propylene glycol (SYSTANE) 0.4-0.3 %, Apply to eye Daily, Disp: , Rfl:   Allergies   Allergen Reactions    Pollen Extract Cough, Nasal Congestion and Sneezing     Vitals:    02/07/25 1002   BP: 141/82   Pulse: 102   Resp: 15   Temp: 98.4 °F (36.9 °C)   SpO2: 94%       Physical Exam  Vitals reviewed.   HENT:      Head: Normocephalic and atraumatic.      Nose: Nose normal.   Eyes:      Extraocular Movements: Extraocular movements intact.      Pupils: Pupils are equal, round, and reactive to light.   Cardiovascular:      Rate and Rhythm: Normal rate and regular rhythm.   Pulmonary:      Effort: Pulmonary effort is normal.      Breath sounds: Normal breath sounds.   Abdominal:      General: Abdomen is flat.      Palpations: Abdomen is soft.   Musculoskeletal:         General: Normal range of motion.      Cervical back: Normal range of motion and neck supple.      Right lower leg: No edema.      Left lower leg: No edema.   Skin:     General: Skin is warm and dry.      Comments: Ulcerated basal cell cancer, left lower extremity lower gastrocnemius area.  2  nodular lesions smaller, approximately 8 mm in diameter, left anteromedial shin.    Smaller 1 cm nodular basal cancer right lower leg, posterior.   Neurological:      Mental Status: He is alert and oriented to person, place, and time.         Pathology:  Component  Ref Range & Units (hover)    Case Report   Surgical Pathology Report                         Case: [id number]                                   Authorizing Provider:  James Masterson MD        Collected:           02/04/2025 [facility]               Ordering Location:     [facility]      Received:            02/04/2025 [facility]                                      Hospital Specialty                                                                                  Laboratory                                                                   Pathologist:           Jacki Lin MD                                                           Specimen:    Skin, Other, Left inferior medial anterior tibial, Left medial posterior ankle, Right                inferior medial posterior tibial,  Right medial posterior ankle, shave biopsy (4                    slides [id number] [facility], collected 12/05/2024)                            Final Diagnosis   Consult case (4 slides [id number] [facility], collected 12/05/2024):     B. Skin, left medial posterior ankle, shave biopsy:     BASAL CELL CARCINOMA (SUPERFICIAL TYPE); extends to the tissue edges.           C. Skin, right inferior medial posterior tibial, shave biopsy:     BASAL CELL CARCINOMA (SUPERFICIAL AND NODULAR TYPE); transected (see note).           D. Skin, right medial posterior ankle, shave biopsy:     BASAL CELL CARCINOMA (SUPERFICIAL AND NODULAR TYPE); transected (see note).     Note: Per outside institution report, PAS stain (not available for review) revealed fungal hyphae.         Electronically signed by Jacki Lin MD on 2/4/2025 at 1107 EST       Labs:  CBC, Coags, BMP,  Tavo Lee @[facility]@    Imaging  No results found.  I reviewed the above laboratory and imaging data.    Discussion/Summary: Nodular basal cancer, left lower extremity.  He also has smaller not ulcerated lesion on the right lower extremity.  Will address left side at this time.  Plan for resection, to be done in conjunction with plastic surgery team given anticipated need for skin graft or reconstruction.  Risk and benefits of surgically infection, bleeding, wound issues, discussed.  All questions answered and consent signed at this visit.

## 2025-02-10 ENCOUNTER — PATIENT OUTREACH (OUTPATIENT)
Dept: HEMATOLOGY ONCOLOGY | Facility: CLINIC | Age: 75
End: 2025-02-10

## 2025-02-10 ENCOUNTER — TELEPHONE (OUTPATIENT)
Dept: ANESTHESIOLOGY | Facility: CLINIC | Age: 75
End: 2025-02-10

## 2025-02-10 NOTE — PROGRESS NOTES
I reached out to Connor to complete the Distress Thermometer, review for any barriers to care and to offer any supportive services that may be needed. I left  with the reason for my call including my direct phone number [phone].

## 2025-02-11 ENCOUNTER — TELEPHONE (OUTPATIENT)
Age: 75
End: 2025-02-11

## 2025-02-11 ENCOUNTER — APPOINTMENT (OUTPATIENT)
Dept: LAB | Facility: HOSPITAL | Age: 75
End: 2025-02-11
Payer: MEDICARE

## 2025-02-11 DIAGNOSIS — C61 PROSTATE CANCER (HCC): ICD-10-CM

## 2025-02-11 DIAGNOSIS — Z00.6 ENCOUNTER FOR EXAMINATION FOR NORMAL COMPARISON OR CONTROL IN CLINICAL RESEARCH PROGRAM: ICD-10-CM

## 2025-02-11 LAB — PSA SERPL-MCNC: 6.79 NG/ML (ref 0–4)

## 2025-02-11 PROCEDURE — 84153 ASSAY OF PSA TOTAL: CPT

## 2025-02-11 PROCEDURE — 36415 COLL VENOUS BLD VENIPUNCTURE: CPT

## 2025-02-11 NOTE — TELEPHONE ENCOUNTER
Patient is calling stating he has a referral to get a surgery done for cancer removal and then have a plastic surgeon finish. It looks like oncology is doing the initial surgery? If someone could please take a look and call patient to help better assist. Thank you!!

## 2025-02-12 ENCOUNTER — PATIENT OUTREACH (OUTPATIENT)
Dept: HEMATOLOGY ONCOLOGY | Facility: CLINIC | Age: 75
End: 2025-02-12

## 2025-02-12 ENCOUNTER — TELEPHONE (OUTPATIENT)
Dept: ANESTHESIOLOGY | Facility: CLINIC | Age: 75
End: 2025-02-12

## 2025-02-12 NOTE — PROGRESS NOTES
I reached out to MATTHEW to complete the Distress Thermometer, review for any barriers to care and to offer any supportive services that may be needed. I left  with the reason for my call including my direct phone number [phone]  2ND ATTEMPT.

## 2025-02-17 ENCOUNTER — TELEPHONE (OUTPATIENT)
Dept: ANESTHESIOLOGY | Facility: CLINIC | Age: 75
End: 2025-02-17

## 2025-02-17 ENCOUNTER — PATIENT OUTREACH (OUTPATIENT)
Dept: HEMATOLOGY ONCOLOGY | Facility: CLINIC | Age: 75
End: 2025-02-17

## 2025-02-17 NOTE — PROGRESS NOTES
I reached out and spoke with Connor, now that consults have been completed with the oncology teams. I introduced myself and explained my role as their Patient Navigator. I reviewed for any barriers to care and offered supportive services as needed. I reviewed and updated the members assigned to the care team in [facility]. He knows the members of the care team as well as how and when to contact them with any needs.     Distress Thermometer completed at this time. Patient scored 1/10. Based on responses to DT, no indication for referral to SW needed at this time. .     He is currently able to drive and denies any transportation needs.      He denies any uncontrolled symptoms. Discussed role of Palliative Care in symptom and side effect management. Declined referral at this time.    He states that he is eating and drinking as per usual with no unintentional weight loss.       He does smoke. He is not interested in our smoking cessation program     He states he is well supported by family and friends.  Community support groups discussed including the Cancer Support Community of the [facility]. Patient declined information at this time.     He feels he has adequate insurance coverage and denies any financial concerns at this time.     He verbalizes managing the schedules well.   Future Appointments   Date Time Provider Department Center   2/27/2025 10:00 AM [facility]   3/19/2025  9:30 AM  INF CHAIR 8  Infusion [facility]   5/19/2025  9:40 AM Jyoti Canas MD  CCA PG [facility]   1/22/2026 10:40 AM DO BROCK Santamaria PCP [place]        Based on individual needs I will follow up in about 3-4 weeks. I have provided my direct contact information and welcome them to contact me if needs as discussed above change. He was appreciative for the call.

## 2025-02-18 ENCOUNTER — TELEPHONE (OUTPATIENT)
Dept: SURGICAL ONCOLOGY | Facility: CLINIC | Age: 75
End: 2025-02-18

## 2025-02-18 NOTE — TELEPHONE ENCOUNTER
LM asking patient to call back to arrange post op appointment after his surgery on 3/11.  [employer] number given.

## 2025-02-19 ENCOUNTER — TELEPHONE (OUTPATIENT)
Dept: SURGICAL ONCOLOGY | Facility: CLINIC | Age: 75
End: 2025-02-19

## 2025-02-21 ENCOUNTER — TELEPHONE (OUTPATIENT)
Dept: SURGICAL ONCOLOGY | Facility: CLINIC | Age: 75
End: 2025-02-21

## 2025-02-21 NOTE — TELEPHONE ENCOUNTER
Third message left to schedule patient's Post Op appointment with Dr. Masterson. I made Connor aware that I have scheduled him a Post Op appointment for 4/1/25 at 11 AM in the [place] office, where he saw Dr. Masterson previously. Requested a call back to either confirm or reschedule this appointment.

## 2025-02-22 LAB
APOB+LDLR+PCSK9 GENE MUT ANL BLD/T: NOT DETECTED
BRCA1+BRCA2 DEL+DUP + FULL MUT ANL BLD/T: NOT DETECTED
MLH1+MSH2+MSH6+PMS2 GN DEL+DUP+FUL M: NOT DETECTED

## 2025-02-27 ENCOUNTER — CONSULT (OUTPATIENT)
Dept: ANESTHESIOLOGY | Facility: CLINIC | Age: 75
End: 2025-02-27
Payer: MEDICARE

## 2025-02-27 VITALS
OXYGEN SATURATION: 92 % | HEART RATE: 105 BPM | DIASTOLIC BLOOD PRESSURE: 70 MMHG | BODY MASS INDEX: 24.57 KG/M2 | WEIGHT: 171.6 LBS | SYSTOLIC BLOOD PRESSURE: 110 MMHG | HEIGHT: 70 IN

## 2025-02-27 DIAGNOSIS — Z01.89 ENCOUNTER FOR GERIATRIC ASSESSMENT: ICD-10-CM

## 2025-02-27 DIAGNOSIS — C64.2 RENAL CELL CARCINOMA OF LEFT KIDNEY (HCC): ICD-10-CM

## 2025-02-27 DIAGNOSIS — C34.12 PRIMARY MALIGNANT NEOPLASM OF BRONCHUS OF LEFT UPPER LOBE (HCC): ICD-10-CM

## 2025-02-27 DIAGNOSIS — C71.8 MALIGNANT NEOPLASM OF OVERLAPPING SITES OF BRAIN (HCC): ICD-10-CM

## 2025-02-27 DIAGNOSIS — F17.200 SMOKER: Primary | ICD-10-CM

## 2025-02-27 DIAGNOSIS — C44.711 BASAL CELL CARCINOMA (BCC) OF SKIN OF LOWER EXTREMITY INCLUDING HIP, UNSPECIFIED LATERALITY: ICD-10-CM

## 2025-02-27 DIAGNOSIS — C61 PROSTATE CANCER (HCC): ICD-10-CM

## 2025-02-27 DIAGNOSIS — I71.43 INFRARENAL ABDOMINAL AORTIC ANEURYSM (AAA) WITHOUT RUPTURE (HCC): ICD-10-CM

## 2025-02-27 PROCEDURE — 99212 OFFICE O/P EST SF 10 MIN: CPT | Performed by: NURSE PRACTITIONER

## 2025-02-27 RX ORDER — DICLOFENAC SODIUM 1 MG/ML
1 SOLUTION/ DROPS OPHTHALMIC
COMMUNITY
Start: 2025-02-13

## 2025-02-27 RX ORDER — KETOROLAC TROMETHAMINE 5 MG/ML
SOLUTION OPHTHALMIC
COMMUNITY
Start: 2025-02-13

## 2025-02-27 NOTE — ASSESSMENT & PLAN NOTE
DENIES PER PATIENT   LAST SCAN FROM MAY 2024:  HEART/GREAT VESSELS: Heart is unremarkable for patient's age. No thoracic aortic aneurysm.

## 2025-02-27 NOTE — ASSESSMENT & PLAN NOTE
SEEN FOR SO   SEEN FOR GERIATRIC   STARTED BEST   NEEDS TO COMPLETE PAT -will go for blood work next week  At risk for post-op HEMALATHA - PENDING   At risk for post-op SSI YES 2.2 SMOKING   BEST   Breathing- instructed to exercise lungs prior to surgery via IS  Eat- discussed increasing protein intake prior to surgery   Sleep/stress- encouraged 8-10 sleep @ night, stress reduction, avoid sick contacts and handwashing   Train- encouraged to remain active    Orders:    Ambulatory referral to surgical optimization

## 2025-02-27 NOTE — ASSESSMENT & PLAN NOTE
offered tobacco therapy - declined at the time   States it is his only vice  Not ready to quit  Education provided on the benefits of quitting smoking prior to your surgery and for your recovery

## 2025-02-27 NOTE — ASSESSMENT & PLAN NOTE
No immediate geriatric concerns   No immediate cognitive concerns   If admitted to the hospital recommend inpatient geriatric consult post-op -        POST-OP DIAGNOSIS:  Lung nodules 17-Jun-2022 16:36:22  Alivia Dangelo

## 2025-02-27 NOTE — PROGRESS NOTES
Name: Angelo Redd      : 1950      MRN: [id number]  Encounter Provider: LOBITO Ferreira  Encounter Date: 2025   Encounter department: [facility]  :    ASSESSMENT     75 year old male referred to SOC for pre-surgery geriatric screening 2.2 age   Other consult concerns include:  surgical optimization & BEST program   He is scheduled on   Case: [id number] Date/Time: 25 0730   Procedures:      EXCISION BCC OF LEFT SHIN AND ANKLE (Left: Leg Lower)      RECONSTRUCTION OF LEFT LOWER EXTREMITY WITH SPLIT THICKNESS GRAFT, (Left: Leg Lower)      POSSIBLE FULL THICKNESS SKIN GRAFT, POSSIBLE ADJACENT TISSUE TRANSFER, ANY OTHER INDICATED PROCEDURE (Leg Lower)   Anesthesia type: General   Diagnosis: Basal cell carcinoma (BCC) of skin of lower extremity including hip, unspecified laterality [C44.711]   Pre-op diagnosis: Basal cell carcinoma (BCC) of skin of lower extremity including hip, unspecified laterality [C44.711]   Location: [facility] OR ROOM 03 / [facility]   Surgeons: James Masterson MD; Darren Eagle MD     LAST ANESTHESIA   NO CONCERNS    Assessment & Plan  Basal cell carcinoma (BCC) of skin of lower extremity including hip, unspecified laterality  SEEN FOR SO   SEEN FOR GERIATRIC   STARTED BEST   NEEDS TO COMPLETE PAT -will go for blood work next week  At risk for post-op HEMALATHA - PENDING   At risk for post-op SSI YES 2.2 SMOKING   BEST   Breathing- instructed to exercise lungs prior to surgery via IS  Eat- discussed increasing protein intake prior to surgery   Sleep/stress- encouraged 8-10 sleep @ night, stress reduction, avoid sick contacts and handwashing   Train- encouraged to remain active    Orders:    Ambulatory referral to surgical optimization    Smoker  offered tobacco therapy - declined at the time   States it is his only vice  Not ready to quit  Education provided on the benefits of quitting smoking prior to your surgery  and for your recovery  Renal cell carcinoma of left kidney (HCC)  Diagnosis 2015  S/p cyrotherapy   Stable no concerns today       Prostate cancer (HCC)  DENIES   Infrarenal abdominal aortic aneurysm (AAA) without rupture (HCC)  DENIES PER PATIENT   LAST SCAN FROM MAY 2024:  HEART/GREAT VESSELS: Heart is unremarkable for patient's age. No thoracic aortic aneurysm.       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 male who presents to SOC for pre-surgery geriatric screening.  I met patient in the SOC.  He arrived alone.  Walks independently.  No walker and no cane.     Live at home.  Drives.  No cognitive concerns on exam today.  No immediate geriatric concerns on exam.  His son lives with him.  He has support from his son.  He is independent with all ADLs.    States he has been managing his abnormal area on his skin for about 3 years.  Now electing to have it removed.  He is an active smoker.  He is not interested in quitting today.  States smoking is his only vice.  States he is not ready.  Education was provided on the benefits of quitting smoking prior to your surgery and recovery    He needs to complete his surgery blood work.  He will do so next week.  Await results for any further needs.    As always we discussed having your BEST surgery, and BEST recovery.  Surgery goals reviewed today.      Breathing exercises   Patient was encouraged to begin lung exercises today.  This could be accomplished through deep breathing and cough exercises.  Patient  "was taught how to use an incentive spirometer.  Return demonstration provided.    Eating/nutrition   Encouraged patient to increase oral protein intake prior to surgery.   This can be accomplished by consuming chicken, fish, tuna fish, cottage cheese, cheese, eggs, Greek yogurt, and protein shakes as needed.  I encouraged use of protein shakes such ENLIVE.  I also recommended making your own protein shakes with protein powder.   Sleep/Stress management  Patient was encouraged to rest their body prior to surgery.  Encouraged attempting to get 8 hours of sleep at night.  Avoid stress.  Avoid sick contacts.  Encouraged to find a relaxing hobby such as reading, meditation, listening to music.  Training exercises  Patient was encouraged to remain active as possible.  Today bilateral lower extremity generic exercises were taught for muscle strengthening and balance.  All exercises to be done sitting down.         Review of Systems   Constitutional:  Negative for chills and fever.   HENT:  Negative for postnasal drip and trouble swallowing.    Respiratory:  Negative for cough, choking and shortness of breath.    Cardiovascular:  Negative for chest pain, palpitations and leg swelling.   Gastrointestinal:  Negative for diarrhea, nausea, rectal pain and vomiting.   Genitourinary:  Negative for difficulty urinating.   Skin:  Negative for color change, pallor, rash and wound.   Neurological:  Negative for dizziness, seizures, facial asymmetry, light-headedness, numbness and headaches.   Psychiatric/Behavioral:  Negative for agitation, behavioral problems, confusion, decreased concentration, dysphoric mood and hallucinations.    All other systems reviewed and are negative.         Objective   /70 (BP Location: Right arm, Patient Position: Sitting)   Pulse 105   Ht 5' 10\" (1.778 m)   Wt 77.8 kg (171 lb 9.6 oz)   SpO2 92%   BMI 24.62 kg/m²      Physical Exam  HENT:      Head: Normocephalic.      Nose: Nose normal. "   Cardiovascular:      Rate and Rhythm: Normal rate.   Pulmonary:      Effort: Pulmonary effort is normal.   Abdominal:      General: Abdomen is flat.   Musculoskeletal:         General: No swelling. Normal range of motion.   Skin:     General: Skin is warm and dry.   Neurological:      General: No focal deficit present.      Mental Status: He is alert and oriented to person, place, and time. Mental status is at baseline.   Psychiatric:         Mood and Affect: Mood normal.         Behavior: Behavior normal.         Thought Content: Thought content normal.         Judgment: Judgment normal.         [facility] ([facility])  CONSULT     Allergies reviewed today   PMH reviewed today   Medications reviewed today     See Geriatric Assessment below...  Cognitive Assessment:   CAM:   TUG <15 sec:  Falls (last 6 months): yes, fractured right wrist  Hand  score:31.33  -Attempt 1:30  -Attempt 2:34  -Attempt 3:30  Michael Total Score: 21  PHQ- 9 Depression Scale: 2  Nutrition Assessment Score:14  METS: 8.27   DX SLEEP APNEA; NO:SCORE 2    Health goals:  -What are your overall health goals? Live another year    -What brings you strength? son    -What activities are important to you? His classic car     As always we discussed having your BEST surgery, and BEST recovery.  Surgery goals reviewed today.      Breathing exercises   Patient was encouraged to begin lung exercises today.  This could be accomplished through deep breathing and cough exercises.  Patient was taught how to use an incentive spirometer.  Return demonstration provided.    Eating/nutrition   Encouraged patient to increase oral protein intake prior to surgery.  This can be accomplished by consuming chicken, fish, tuna fish, cottage cheese, cheese, eggs, Greek yogurt, and protein shakes as needed.  I encouraged use of protein shakes such ENLIVE.  I also recommended making your own protein shakes with protein powder.   Sleep/Stress  management  Patient was encouraged to rest their body prior to surgery.  Encouraged attempting to get 8 hours of sleep at night.  Avoid stress.  Avoid sick contacts.  Encouraged to find a relaxing hobby such as reading, meditation, listening to music.  Training exercises  Patient was encouraged to remain active as possible.  Today bilateral lower extremity generic exercises were taught for muscle strengthening and balance.  All exercises to be done sitting down.

## 2025-03-03 ENCOUNTER — APPOINTMENT (OUTPATIENT)
Dept: LAB | Facility: HOSPITAL | Age: 75
End: 2025-03-03
Payer: MEDICARE

## 2025-03-03 ENCOUNTER — TELEPHONE (OUTPATIENT)
Age: 75
End: 2025-03-03

## 2025-03-03 DIAGNOSIS — C44.711 BASAL CELL CARCINOMA (BCC) OF SKIN OF LOWER EXTREMITY INCLUDING HIP, UNSPECIFIED LATERALITY: ICD-10-CM

## 2025-03-03 LAB
ALBUMIN SERPL BCG-MCNC: 4.4 G/DL (ref 3.5–5)
ALP SERPL-CCNC: 150 U/L (ref 34–104)
ALT SERPL W P-5'-P-CCNC: 35 U/L (ref 7–52)
ANION GAP SERPL CALCULATED.3IONS-SCNC: 7 MMOL/L (ref 4–13)
AST SERPL W P-5'-P-CCNC: 24 U/L (ref 13–39)
BASOPHILS # BLD AUTO: 0.06 THOUSANDS/ÂΜL (ref 0–0.1)
BASOPHILS NFR BLD AUTO: 1 % (ref 0–1)
BILIRUB SERPL-MCNC: 0.95 MG/DL (ref 0.2–1)
BUN SERPL-MCNC: 17 MG/DL (ref 5–25)
CALCIUM SERPL-MCNC: 9.6 MG/DL (ref 8.4–10.2)
CHLORIDE SERPL-SCNC: 104 MMOL/L (ref 96–108)
CO2 SERPL-SCNC: 27 MMOL/L (ref 21–32)
CREAT SERPL-MCNC: 0.93 MG/DL (ref 0.6–1.3)
EOSINOPHIL # BLD AUTO: 0.2 THOUSAND/ÂΜL (ref 0–0.61)
EOSINOPHIL NFR BLD AUTO: 2 % (ref 0–6)
ERYTHROCYTE [DISTWIDTH] IN BLOOD BY AUTOMATED COUNT: 12.5 % (ref 11.6–15.1)
GFR SERPL CREATININE-BSD FRML MDRD: 81 ML/MIN/1.73SQ M
GLUCOSE P FAST SERPL-MCNC: 100 MG/DL (ref 65–99)
HCT VFR BLD AUTO: 49.4 % (ref 36.5–49.3)
HGB BLD-MCNC: 16 G/DL (ref 12–17)
IMM GRANULOCYTES # BLD AUTO: 0.04 THOUSAND/UL (ref 0–0.2)
IMM GRANULOCYTES NFR BLD AUTO: 0 % (ref 0–2)
LYMPHOCYTES # BLD AUTO: 3.21 THOUSANDS/ÂΜL (ref 0.6–4.47)
LYMPHOCYTES NFR BLD AUTO: 30 % (ref 14–44)
MCH RBC QN AUTO: 30.5 PG (ref 26.8–34.3)
MCHC RBC AUTO-ENTMCNC: 32.4 G/DL (ref 31.4–37.4)
MCV RBC AUTO: 94 FL (ref 82–98)
MONOCYTES # BLD AUTO: 0.65 THOUSAND/ÂΜL (ref 0.17–1.22)
MONOCYTES NFR BLD AUTO: 6 % (ref 4–12)
NEUTROPHILS # BLD AUTO: 6.47 THOUSANDS/ÂΜL (ref 1.85–7.62)
NEUTS SEG NFR BLD AUTO: 61 % (ref 43–75)
NRBC BLD AUTO-RTO: 0 /100 WBCS
PLATELET # BLD AUTO: 203 THOUSANDS/UL (ref 149–390)
PMV BLD AUTO: 9.6 FL (ref 8.9–12.7)
POTASSIUM SERPL-SCNC: 4.9 MMOL/L (ref 3.5–5.3)
PROT SERPL-MCNC: 7.5 G/DL (ref 6.4–8.4)
RBC # BLD AUTO: 5.24 MILLION/UL (ref 3.88–5.62)
SODIUM SERPL-SCNC: 138 MMOL/L (ref 135–147)
WBC # BLD AUTO: 10.63 THOUSAND/UL (ref 4.31–10.16)

## 2025-03-03 PROCEDURE — 85025 COMPLETE CBC W/AUTO DIFF WBC: CPT

## 2025-03-03 PROCEDURE — 36415 COLL VENOUS BLD VENIPUNCTURE: CPT

## 2025-03-03 PROCEDURE — 80053 COMPREHEN METABOLIC PANEL: CPT

## 2025-03-03 NOTE — TELEPHONE ENCOUNTER
"Pt under care of:    Office Location:     Last Seen (include Follow Up recommendations of last visit- see Office Visit - Instructions): 04/10/2024  Return in about 1 year 4/41/2025 for Cysto    Pt calling due to:My Chart message to schedule     Active Symptoms?  Explain:    Pt can be reached at:[phone]    Appointment Details  Date:  06/04/25  Time:    1:30  [facility]  Provider:      Does the appointment need further review? (Reason)  "

## 2025-03-07 ENCOUNTER — APPOINTMENT (OUTPATIENT)
Dept: LAB | Facility: HOSPITAL | Age: 75
End: 2025-03-07
Payer: MEDICARE

## 2025-03-07 ENCOUNTER — HOSPITAL ENCOUNTER (OUTPATIENT)
Dept: RADIOLOGY | Facility: HOSPITAL | Age: 75
End: 2025-03-07
Payer: MEDICARE

## 2025-03-07 DIAGNOSIS — C44.711 BASAL CELL CARCINOMA (BCC) OF SKIN OF LOWER EXTREMITY INCLUDING HIP, UNSPECIFIED LATERALITY: ICD-10-CM

## 2025-03-07 LAB
ATRIAL RATE: 92 BPM
P AXIS: 70 DEGREES
PR INTERVAL: 144 MS
QRS AXIS: 70 DEGREES
QRSD INTERVAL: 90 MS
QT INTERVAL: 346 MS
QTC INTERVAL: 428 MS
T WAVE AXIS: 73 DEGREES
VENTRICULAR RATE: 92 BPM

## 2025-03-07 PROCEDURE — 93010 ELECTROCARDIOGRAM REPORT: CPT | Performed by: INTERNAL MEDICINE

## 2025-03-07 PROCEDURE — 93005 ELECTROCARDIOGRAM TRACING: CPT

## 2025-03-07 PROCEDURE — 71046 X-RAY EXAM CHEST 2 VIEWS: CPT

## 2025-03-07 RX ORDER — LORATADINE 10 MG/1
10 TABLET ORAL DAILY
COMMUNITY

## 2025-03-07 NOTE — PRE-PROCEDURE INSTRUCTIONS
Pre-Surgery Instructions:   Medication Instructions    Ascorbic Acid (VITAMIN C) 500 MG CAPS Hold starting 3/7/25    diclofenac (VOLTAREN) 0.1 % ophthalmic solution Take day of surgery.    ketorolac (ACULAR) 0.5 % ophthalmic solution Take day of surgery.    loratadine (CLARITIN) 10 mg tablet Take day of surgery.    Multiple Vitamins-Minerals (MULTIVITAMIN ADULT PO) Hold starting 3/7/25    polyethylene glycol-propylene glycol (SYSTANE) 0.4-0.3 % Uses PRN- OK to take day of surgery      Medication instructions for day of surgery reviewed. Please take all instructed medications with only a sip of water.       You will receive a call one business day prior to surgery with an arrival time and hospital directions. If your surgery is scheduled on a Monday, the hospital will be calling you on the Friday prior to your surgery. If you have not heard from anyone by 8pm, please call the hospital supervisor through the hospital  at [phone]. ([place] [phone] or [place] [phone]).    Do not eat or drink anything after midnight the night before your surgery, including candy, mints, lifesavers, or chewing gum. Do not drink alcohol 24hrs before your surgery. Try not to smoke at least 24hrs before your surgery.       Follow the pre surgery showering instructions as listed in the “My Surgical Experience Booklet” or otherwise provided by your surgeon's office. Do not use a blade to shave the surgical area 1 week before surgery. It is okay to use a clean electric clippers up to 24 hours before surgery. Do not apply any lotions, creams, including makeup, cologne, deodorant, or perfumes after showering on the day of your surgery. Do not use dry shampoo, hair spray, hair gel, or any type of hair products.     No contact lenses, eye make-up, or artificial eyelashes. Remove nail polish, including gel polish, and any artificial, gel, or acrylic nails if possible. Remove all jewelry including rings and body piercing  jewelry.     Wear causal clothing that is easy to take on and off. Consider your type of surgery.    Keep any valuables, jewelry, piercings at home. Please bring any specially ordered equipment (sling, braces) if indicated.    Arrange for a responsible person to drive you to and from the hospital on the day of your surgery. Please confirm the visitor policy for the day of your procedure when you receive your phone call with an arrival time.     Call the surgeon's office with any new illnesses, exposures, or additional questions prior to surgery.    Please reference your “My Surgical Experience Booklet” for additional information to prepare for your upcoming surgery.

## 2025-03-10 ENCOUNTER — ANESTHESIA EVENT (OUTPATIENT)
Dept: PERIOP | Facility: HOSPITAL | Age: 75
End: 2025-03-10
Payer: MEDICARE

## 2025-03-11 ENCOUNTER — HOSPITAL ENCOUNTER (OUTPATIENT)
Facility: HOSPITAL | Age: 75
Setting detail: OUTPATIENT SURGERY
Discharge: HOME | End: 2025-03-11
Attending: SURGERY | Admitting: SURGERY
Payer: MEDICARE

## 2025-03-11 ENCOUNTER — ANESTHESIA (OUTPATIENT)
Dept: PERIOP | Facility: HOSPITAL | Age: 75
End: 2025-03-11
Payer: MEDICARE

## 2025-03-11 VITALS
HEIGHT: 70 IN | DIASTOLIC BLOOD PRESSURE: 58 MMHG | OXYGEN SATURATION: 96 % | BODY MASS INDEX: 24.08 KG/M2 | HEART RATE: 72 BPM | RESPIRATION RATE: 12 BRPM | SYSTOLIC BLOOD PRESSURE: 106 MMHG | WEIGHT: 168.21 LBS | TEMPERATURE: 97.2 F

## 2025-03-11 DIAGNOSIS — C44.711 BASAL CELL CARCINOMA (BCC) OF SKIN OF LOWER EXTREMITY INCLUDING HIP, UNSPECIFIED LATERALITY: ICD-10-CM

## 2025-03-11 PROBLEM — J44.9 COPD (CHRONIC OBSTRUCTIVE PULMONARY DISEASE) (HCC): Status: ACTIVE | Noted: 2025-03-11

## 2025-03-11 PROCEDURE — 11603 EXC TR-EXT MAL+MARG 2.1-3 CM: CPT | Performed by: PHYSICIAN ASSISTANT

## 2025-03-11 PROCEDURE — 11603 EXC TR-EXT MAL+MARG 2.1-3 CM: CPT | Performed by: SURGERY

## 2025-03-11 PROCEDURE — 88305 TISSUE EXAM BY PATHOLOGIST: CPT | Performed by: PATHOLOGY

## 2025-03-11 PROCEDURE — 11606 EXC TR-EXT MAL+MARG >4 CM: CPT | Performed by: SURGERY

## 2025-03-11 PROCEDURE — 11606 EXC TR-EXT MAL+MARG >4 CM: CPT | Performed by: PHYSICIAN ASSISTANT

## 2025-03-11 PROCEDURE — NC001 PR NO CHARGE: Performed by: SURGERY

## 2025-03-11 RX ORDER — ONDANSETRON 2 MG/ML
4 INJECTION INTRAMUSCULAR; INTRAVENOUS ONCE AS NEEDED
Status: DISCONTINUED | OUTPATIENT
Start: 2025-03-11 | End: 2025-03-11 | Stop reason: HOSPADM

## 2025-03-11 RX ORDER — FENTANYL CITRATE 50 UG/ML
INJECTION, SOLUTION INTRAMUSCULAR; INTRAVENOUS AS NEEDED
Status: DISCONTINUED | OUTPATIENT
Start: 2025-03-11 | End: 2025-03-11

## 2025-03-11 RX ORDER — ROCURONIUM BROMIDE 10 MG/ML
INJECTION, SOLUTION INTRAVENOUS AS NEEDED
Status: DISCONTINUED | OUTPATIENT
Start: 2025-03-11 | End: 2025-03-11

## 2025-03-11 RX ORDER — MEPERIDINE HYDROCHLORIDE 25 MG/ML
12.5 INJECTION INTRAMUSCULAR; INTRAVENOUS; SUBCUTANEOUS ONCE AS NEEDED
Status: DISCONTINUED | OUTPATIENT
Start: 2025-03-11 | End: 2025-03-11 | Stop reason: HOSPADM

## 2025-03-11 RX ORDER — ACETAMINOPHEN 325 MG/1
650 TABLET ORAL EVERY 6 HOURS PRN
Status: DISCONTINUED | OUTPATIENT
Start: 2025-03-11 | End: 2025-03-11 | Stop reason: HOSPADM

## 2025-03-11 RX ORDER — HYDROMORPHONE HCL/PF 1 MG/ML
SYRINGE (ML) INJECTION AS NEEDED
Status: DISCONTINUED | OUTPATIENT
Start: 2025-03-11 | End: 2025-03-11

## 2025-03-11 RX ORDER — BUPIVACAINE HYDROCHLORIDE AND EPINEPHRINE 2.5; 5 MG/ML; UG/ML
INJECTION, SOLUTION EPIDURAL; INFILTRATION; INTRACAUDAL; PERINEURAL AS NEEDED
Status: DISCONTINUED | OUTPATIENT
Start: 2025-03-11 | End: 2025-03-11 | Stop reason: HOSPADM

## 2025-03-11 RX ORDER — FENTANYL CITRATE/PF 50 MCG/ML
50 SYRINGE (ML) INJECTION
Status: DISCONTINUED | OUTPATIENT
Start: 2025-03-11 | End: 2025-03-11 | Stop reason: HOSPADM

## 2025-03-11 RX ORDER — OXYCODONE AND ACETAMINOPHEN 5; 325 MG/1; MG/1
1 TABLET ORAL EVERY 4 HOURS PRN
Refills: 0 | Status: DISCONTINUED | OUTPATIENT
Start: 2025-03-11 | End: 2025-03-11 | Stop reason: HOSPADM

## 2025-03-11 RX ORDER — DEXAMETHASONE SODIUM PHOSPHATE 10 MG/ML
INJECTION, SOLUTION INTRAMUSCULAR; INTRAVENOUS AS NEEDED
Status: DISCONTINUED | OUTPATIENT
Start: 2025-03-11 | End: 2025-03-11

## 2025-03-11 RX ORDER — EPHEDRINE SULFATE 50 MG/ML
INJECTION INTRAVENOUS AS NEEDED
Status: DISCONTINUED | OUTPATIENT
Start: 2025-03-11 | End: 2025-03-11

## 2025-03-11 RX ORDER — CEFAZOLIN SODIUM 2 G/50ML
2000 SOLUTION INTRAVENOUS ONCE
Status: COMPLETED | OUTPATIENT
Start: 2025-03-11 | End: 2025-03-11

## 2025-03-11 RX ORDER — PROPOFOL 10 MG/ML
INJECTION, EMULSION INTRAVENOUS AS NEEDED
Status: DISCONTINUED | OUTPATIENT
Start: 2025-03-11 | End: 2025-03-11

## 2025-03-11 RX ORDER — LIDOCAINE HYDROCHLORIDE 20 MG/ML
INJECTION, SOLUTION EPIDURAL; INFILTRATION; INTRACAUDAL; PERINEURAL AS NEEDED
Status: DISCONTINUED | OUTPATIENT
Start: 2025-03-11 | End: 2025-03-11

## 2025-03-11 RX ORDER — HYDROMORPHONE HCL/PF 1 MG/ML
0.5 SYRINGE (ML) INJECTION
Status: DISCONTINUED | OUTPATIENT
Start: 2025-03-11 | End: 2025-03-11 | Stop reason: HOSPADM

## 2025-03-11 RX ORDER — SODIUM CHLORIDE, SODIUM LACTATE, POTASSIUM CHLORIDE, CALCIUM CHLORIDE 600; 310; 30; 20 MG/100ML; MG/100ML; MG/100ML; MG/100ML
125 INJECTION, SOLUTION INTRAVENOUS CONTINUOUS
Status: DISCONTINUED | OUTPATIENT
Start: 2025-03-11 | End: 2025-03-11 | Stop reason: HOSPADM

## 2025-03-11 RX ORDER — PROMETHAZINE HYDROCHLORIDE 25 MG/ML
6.25 INJECTION, SOLUTION INTRAMUSCULAR; INTRAVENOUS ONCE AS NEEDED
Status: DISCONTINUED | OUTPATIENT
Start: 2025-03-11 | End: 2025-03-11 | Stop reason: HOSPADM

## 2025-03-11 RX ORDER — ONDANSETRON 2 MG/ML
INJECTION INTRAMUSCULAR; INTRAVENOUS AS NEEDED
Status: DISCONTINUED | OUTPATIENT
Start: 2025-03-11 | End: 2025-03-11

## 2025-03-11 RX ORDER — MIDAZOLAM HYDROCHLORIDE 2 MG/2ML
INJECTION, SOLUTION INTRAMUSCULAR; INTRAVENOUS AS NEEDED
Status: DISCONTINUED | OUTPATIENT
Start: 2025-03-11 | End: 2025-03-11

## 2025-03-11 RX ADMIN — SODIUM CHLORIDE, SODIUM LACTATE, POTASSIUM CHLORIDE, AND CALCIUM CHLORIDE 125 ML/HR: .6; .31; .03; .02 INJECTION, SOLUTION INTRAVENOUS at 06:17

## 2025-03-11 RX ADMIN — EPHEDRINE SULFATE 5 MG: 50 INJECTION INTRAVENOUS at 08:33

## 2025-03-11 RX ADMIN — EPHEDRINE SULFATE 5 MG: 50 INJECTION INTRAVENOUS at 07:57

## 2025-03-11 RX ADMIN — ONDANSETRON 4 MG: 2 INJECTION INTRAMUSCULAR; INTRAVENOUS at 07:38

## 2025-03-11 RX ADMIN — FENTANYL CITRATE 50 MCG: 50 INJECTION, SOLUTION INTRAMUSCULAR; INTRAVENOUS at 07:38

## 2025-03-11 RX ADMIN — FENTANYL CITRATE 50 MCG: 50 INJECTION, SOLUTION INTRAMUSCULAR; INTRAVENOUS at 08:01

## 2025-03-11 RX ADMIN — HYDROMORPHONE HYDROCHLORIDE 0.5 MG: 1 INJECTION, SOLUTION INTRAMUSCULAR; INTRAVENOUS; SUBCUTANEOUS at 08:54

## 2025-03-11 RX ADMIN — ROCURONIUM 40 MG: 50 INJECTION, SOLUTION INTRAVENOUS at 07:38

## 2025-03-11 RX ADMIN — PHENYLEPHRINE HYDROCHLORIDE 40 MCG/MIN: 10 INJECTION INTRAVENOUS at 07:51

## 2025-03-11 RX ADMIN — EPHEDRINE SULFATE 5 MG: 50 INJECTION INTRAVENOUS at 08:54

## 2025-03-11 RX ADMIN — DEXAMETHASONE SODIUM PHOSPHATE 10 MG: 10 INJECTION INTRAMUSCULAR; INTRAVENOUS at 07:38

## 2025-03-11 RX ADMIN — SODIUM CHLORIDE, SODIUM LACTATE, POTASSIUM CHLORIDE, AND CALCIUM CHLORIDE: .6; .31; .03; .02 INJECTION, SOLUTION INTRAVENOUS at 09:05

## 2025-03-11 RX ADMIN — MIDAZOLAM 2 MG: 1 INJECTION INTRAMUSCULAR; INTRAVENOUS at 07:33

## 2025-03-11 RX ADMIN — CEFAZOLIN SODIUM 2000 MG: 2 SOLUTION INTRAVENOUS at 07:33

## 2025-03-11 RX ADMIN — PROPOFOL 150 MG: 10 INJECTION, EMULSION INTRAVENOUS at 07:38

## 2025-03-11 RX ADMIN — LIDOCAINE HYDROCHLORIDE 100 MG: 20 INJECTION, SOLUTION EPIDURAL; INFILTRATION; INTRACAUDAL at 07:38

## 2025-03-11 NOTE — ANESTHESIA POSTPROCEDURE EVALUATION
Post-Op Assessment Note    CV Status:  Stable  Pain Score: 0    Pain management: adequate       Mental Status:  Alert and awake   Hydration Status:  Euvolemic   PONV Controlled:  Controlled   Airway Patency:  Patent     Post Op Vitals Reviewed: Yes    No anethesia notable event occurred.    Staff: CRNA           Last Filed PACU Vitals:  Vitals Value Taken Time   Temp 98.7 °F (37.1 °C) 03/11/25 1005   Pulse 74 03/11/25 1010   /56 03/11/25 1006   Resp 14 03/11/25 1010   SpO2 95 % 03/11/25 1010   Vitals shown include unfiled device data.

## 2025-03-11 NOTE — OP NOTE
OPERATIVE REPORT  PATIENT NAME: Angelo Redd    :  1950  MRN: [id number]  Pt Location: [facility] OR ROOM 03    SURGERY DATE: 3/11/2025    Surgeons and Role:  Panel 1:     * James Masterson MD - Primary      Fauzia Monae PA-C assisting in the absence of a qualified resident for suturing, retracting, exposure  Panel 2:     * Darren Eagle MD - Primary     * Dustin Kiran CST - Assisting    Preop Diagnosis:  Basal cell carcinoma (BCC) of skin of lower extremity including hip, unspecified laterality [C44.711]    Post-Op Diagnosis Codes:     * Basal cell carcinoma (BCC) of skin of lower extremity including hip, unspecified laterality [C44.711]    Procedure(s):  Left - EXCISION BCC OF LEFT SHIN & ANKLE  Left - RECONSTRUCTION OF LEFT LOWER EXTREMITY WITH SPLIT THICKNESS GRAFT.  PSB FULL THICKNESS SKIN GRAFT. PSB ADJACENT TISSUE TRANSFER.    Specimen(s):  ID Type Source Tests Collected by Time Destination   1 : left lower extremity basal cell cancer 12 o'clock 1 clip, 3o'clock 2 clip, 6 o'clock, 9 o'clock 4 clips Tissue Soft Tissue, Other TISSUE EXAM James Masterson MD 3/11/2025 0813    2 : left skin lesion proximal stitch marks 12 o'clock Tissue Soft Tissue, Other TISSUE EXAM James Masterson MD 3/11/2025 0818    3 : left skin lesion distal stitch marks 12 o'clock Tissue Soft Tissue, Other TISSUE EXAM James Masterson MD 3/11/2025 0819        Estimated Blood Loss:   Minimal    Drains:  Ureteral Internal Stent Left ureter 6 Fr. (Active)   Number of days: 1049       Anesthesia Type:   General    Operative Indications:  Basal cell carcinoma (BCC) of skin of lower extremity including hip, unspecified laterality [C44.711]      Operative Findings:  Large basal cell cancer left lower extremity, medial aspect of the level of possible Achilles tendon measuring 3 cm in diameter;    Lesion 1.5 cm diameter suspicious for basal cancer, proximal medial shin    Lesion 2.0 cm diameter suspicious for basal cancer, distal  medial shin      Complications:   None      Procedure and Technique:  Patient is a 75-year-old man recently diagnosed with basal cell cancer of the left lower extremity.  This was along the medial aspect of the distal lower extremity, at the level of the proximal portion Achilles tendon.  He comes in for excision of this with plans for plastic surgery reconstruction given anticipated size of defect and likely need for skin graft.  He also has 2 additional lesions that are highly suspicious for basal cancer involving the more proximal aspect of the left lower leg, both medially situated.  He comes in for excision of these lesions under presumption that they are also basal cell cancer.    Patient was brought to the operating room and identified by proper timeout.  Jono he was prepped and draped in usual fashion.  The left basal cell cancer was identified, and 5 mm margins drawn completely around.  The skin was then anesthetized, then incised sharply.  Cautery was used obtain a full-thickness excision down to subcutaneous fat.  Specimen was then removed in hemostatic fashion with cautery and oriented with sutures and clips in the usual fashion.  Resulting defect measured 4.5 cm x 4.5 cm x 0.5 cm.    We then turned our attention to the proximal shin lesion.  This measured approximately 1.5 cm in size.  5 Juares margins were drawn around it.  The skin was anesthetized, then incised sharply.  Cautery was used to obtain a full-thickness excision down to subcutaneous fat.  Resulting defect measured 2.5 x 2.5 x 0.5 cm.    We then turned attention to the more distal shin lesion.  This measured approximately 2 cm in size.  5 mm margins were drawn around it, then the skin was anesthetized and incised sharply.  Cautery was used obtain a full-thickness excision down to subcutaneous fat.  Resulting defect measured 3.0 x 3.0 x 0.5 cm in size.    This point, the plastic surgery team came in for the reconstructive portion of the  operation.  Sponge and instrument counts were correct at the end of the case.     I was present for the entire procedure involving the resections    Patient Disposition:  PACU     This procedure was not performed to treat primary cutaneous melanoma through wide local excision           SIGNATURE: James Masterson MD  DATE: March 11, 2025  TIME: 8:32 AM

## 2025-03-11 NOTE — H&P
Surgical Oncology Consult                                        [address]   CANCER [facility]  [address] [facility] [id number]     Angelo Redd  1950  [id number]  [address]  [facility]  [address] [place] [address]         Chief Complaint   Patient presents with    New Patient Visit         Assessment/Plan:     No problem-specific Assessment & Plan notes found for this encounter.        Assessment  Diagnoses and all orders for this visit:     Basal cell carcinoma (BCC) of skin of lower extremity including hip, unspecified laterality        Advance Care Planning/Advance Directives:  Discussed disease status, cancer treatment plans and/or cancer treatment goals with the patient.           Oncology History Overview Note    Patient has a history of tobacco use for more than 40 years and history of 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 which was diagnosed in May of 2015.  At that time the patient was found to have multiple brain lesions status post whole brain radiation.  He then received 6 cycles of palliative chemotherapy with Alimta and carboplatin.  After 5 cycles of treatment he had significant GI bleeding which required ICU hospitalization.  After completion of palliative chemotherapy he was maintained on Alimta since October 2015.     A brain MRI January 2016 showed multiple bilateral hemorrhagic lesions felt not to be due to malignant process.  PET scan January 2016 showed a 1.6 cm metabolically active left lung apical lesion.  The patient received radiation treatment palliatively under the care of Dr. Llanes to the left apical lung lesion and completed it in April of 2016.  He was then restarted on maintenance Alimta.    He had an MRI of the abdomen on June 23, 2016 which showed a 3 cm cystic mass with a solid component on the posterior medial aspect of the left kidney,  compatible with malignant process.  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  The patient was then treated with cryotherapy of the left kidney lesion in September of 2016.     Patient had a PET-CT scan on March 3, 2017 which was compared to the prior PET scan from November 2016.  At this time there is no evidence of residual or recurrent neoplastic disease anywhere in his body.     Patient had a CT scan of the chest abdomen and pelvis on August 24, 2017 which showed a 1.3 cm left upper lobe mass versus scarring with adjacent radiation fibrosis.     Patient had another MRI of the brain January 8, 2018 which was compared to the previous MRI of the brain in January 2016.  This revealed multiple areas of artifact in the supra and infratentorial spaces consistent with hemorrhagic metastasis.  The largest lesion was in the frontal lobe and is significantly smaller with only a tiny enhancing component remaining.  No new intra-axial lesions were reported.     Patient had another CT scan of the chest abdomen and pelvis for close monitoring on June 5th 2018 that was compared with prior study from January 2018.  He continues to have a 1.3 cm left lung apex pulmonary nodule with surrounding fibrosis which is stable in appearance.  He also has a stable appearance of the treated medial left renal cortex lesion.  There is no evidence of metastatic disease within the chest abdomen or pelvis.     After lengthy discussion June 2018 patient stated that he is not interested in continuing his maintenance Alimta treatment beyond today's treatment since his CT scan showed stable disease for a long time he has been on Alimta for 2 years.  He was told that he seems to be in complete remission but there is a chance that the cancer may reoccur.  Patient was interested in the watch and wait type of approach instead of continuing active maintenance  Alimta treatment.      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)    5/2015 Initial Diagnosis      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)  Stage IV       5/4/2015 -  Cancer Staged      Staging form: Lung, AJCC 8th Edition  - Clinical stage from 5/4/2015: Stage IVB (cTX, cNX, cM1c) - Signed by Jyoti Canas MD on 5/20/2024  Stage prefix: Initial diagnosis  Histologic grade (G): G2  Histologic grading system: 4 grade system          5/5/2015 - 5/20/2015 Radiation      3150 cGy to large right frontoparietal mass; 3000 cGy to other tumor volumes in brainstem, and bilateral cerebral hemispheres  Radiation oncologist:  Dr. Amita Llanes       3/17/2016 - 4/12/2016 Radiation      Left upper lobe lesion, 4675 cGy  Radiation oncologist: Dr. Amita Llanes         Chemotherapy      1.   Alimta and carboplatin:   05/20/2015 through 08/12/2015 (5 cycles total)  2.   Maintenance Alimta:   10/21/2015 through 06/12/2018 (43 total Treatment doses)       Metastasis to brain (HCC) (Resolved)    5/26/2015 Initial Diagnosis      Metastasis to brain (HCC)       5/20/2024 -  Cancer Staged      Staging form: Central Nervous System Tumors, Pediatric Staging  - Pathologic: No stage assigned - Signed by Jyoti Canas MD on 5/20/2024  Primary tumor (T): TX  Regional lymph nodes (N): NX  Distant metastasis (M): M1  Source of metastatic specimen: Lung          Renal cell carcinoma of left kidney (HCC)    7/2016 Initial Diagnosis      Renal cell carcinoma of left kidney (HCC)  [facility]          Basal cell carcinoma (BCC) of skin of lower extremity including hip    2/3/2025 Biopsy      Consult case (4 slides [id number] [facility], collected 12/05/2024):     Skin, left medial posterior ankle, shave biopsy:     BASAL CELL CARCINOMA (SUPERFICIAL TYPE); extends to the tissue edges.     Skin, right inferior medial posterior  "tibial, shave biopsy:     BASAL CELL CARCINOMA (SUPERFICIAL AND NODULAR TYPE); transected     Skin, right medial posterior ankle, shave biopsy:     BASAL CELL CARCINOMA (SUPERFICIAL AND NODULAR TYPE); transected                 History of Present Illness: Patient is a 75-year-old man with history of squamous and basal cell cancers, now with lesion on left lower extremity lower gastroc area as well as left shin area.  The lower lesion appears to be ulcerated.  He has been referred for evaluation and treatment.     Review of Systems   Constitutional: Negative.    HENT: Negative.     Eyes: Negative.    Respiratory: Negative.     Cardiovascular: Negative.    Gastrointestinal: Negative.    Endocrine: Negative.    Genitourinary: Negative.    Musculoskeletal: Negative.    Skin: Negative.    Allergic/Immunologic: Negative.    Neurological: Negative.    Hematological: Negative.    Psychiatric/Behavioral: Negative.     All other systems reviewed and are negative.           Problem List       Patient Active Problem List   Diagnosis    Family history of colon cancer    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)    Bladder stone    Urge incontinence    BPH with urinary obstruction    Renal cell carcinoma of left kidney (HCC)    Encounter for central line care    Bladder mass    Smoker    Colon polyps    High prostate specific antigen (PSA)    Prostate cancer (HCC)    Infrarenal abdominal aortic aneurysm (AAA) without rupture (HCC)    Exudative age-related macular degeneration, left eye, with active choroidal neovascularization (HCC)    Elevated liver enzymes    Foreign body of left ear    Basal cell carcinoma (BCC) of skin of lower extremity including hip         Medical History        Past Medical History:   Diagnosis Date    Arthritis      Balance problems       and\" coordination issues/best to walk slow\"    Bladder stones      Cancer (HCC)       lung, brain, kidney, spinal    Colon polyp      Dysphagia " "02/16/2017     \"not right now but in the past\"    Elevated blood sugar 07/24/2020    Environmental and seasonal allergies      Exercise involving walking       \"approx 1 mile 3x/week\"    Eye injury       right,  \"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\"    Full dentures      Hemiparesis (HCC) 04/27/2015     left weakness,\"due to tumor on brain\"    History of brain tumor       \"had radiation for it\"    History of cancer chemotherapy       last tx  5/22/18    History of GI bleed 2015    History of kidney cancer       left    History of kidney stones      History of radiation therapy      History of transfusion       5 units of blood 2015    Hydrocele 11/30/2010    Inguinal hernia, bilateral      Joint stiffness of multiple sites      Kidney stone      Paresthesia 05/01/2015    Portacath in place       right chest    Primary malignant neoplasm of lung (HCC) 05/26/2015    Renal carcinoma, left (HCC) 10/17/2016    Risk for falls      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/26/2015    Skin cancer      Syncopal episodes 09/01/2015     2    Visual impairment 10/22     micky degeneration and    Wears glasses           Surgical History         Past Surgical History:   Procedure Laterality Date    BLADDER STONE REMOVAL        BLADDER STONE REMOVAL N/A 2/9/2021     Procedure: LASER LITHOLOPAXY;  Surgeon: Cm Lester MD;  Location: [facility];  Service: Urology    COLONOSCOPY        DENTAL SURGERY   2011     DENTAL EXTACTIONS     ESOPHAGOGASTRODUODENOSCOPY        HERNIA REPAIR        HYDROCELE EXCISION / REPAIR Left 01/01/2011    KIDNEY STONE SURGERY        KIDNEY SURGERY         cancer of kidney/cryo of cancer    PORTACATH PLACEMENT        MN COLONOSCOPY FLX DX W/COLLJ SPEC WHEN PFRMD N/A 8/7/2018     Procedure: COLONOSCOPY with polypectomy ;  Surgeon: Jaclyn Cardenas MD;  Location: [facility];  Service: General    MN CYSTO/URETERO W/LITHOTRIPSY &INDWELL STENT INSRT Left 4/27/2022     Procedure: CYSTO URETEROSCOPY " WITH LITHO HOLMIUM LASER, RETROGRADE PYELOGRAM AND INSERTION STENT URETERAL;  Surgeon: Cm Lester MD;  [facility];  Service: Urology    OK CYSTOURETHROSCOPY W/DEST &/RMVL MED BLADDER JACOB N/A 2/9/2021     Procedure: CYSTO, T.U.R.B.T.;  Surgeon: Cm Lester MD;  [facility];  Service: Urology    OK LAPS RPR RECURRENT INCAL HRNA NCRC8/STRANGULATED Bilateral 8/8/2018     Procedure: REPAIR HERNIA INGUINAL LAPAROSCOPIC W/ ROBOTICS W/ MESH;  Surgeon: Jaclyn Cardenas MD;  [facility];  Service: General    OK LITHOLAPAXY SMPL/SM <2.5 CM N/A 11/27/2018     Procedure: CYSTOSCOPY, CYSTOLITHOLOPAXY HOLMIUM LASER,BLADDE BIOPSY;  Surgeon: Cm Lester MD;  [facility];  Service: Urology    OK LITHOLAPAXY SMPL/SM <2.5 CM N/A 4/27/2022     Procedure: LITHOLOPAXY HOLMIUM LASER for bladder stones;  Surgeon: Cm Lester MD;  [facility];  Service: Urology    SKIN BIOPSY        SKIN CANCER EXCISION         17 surgeries, basal cell    TONSILLECTOMY             Family History         Family History   Problem Relation Age of Onset    Cancer Mother           EYE    Cancer Father      Colon cancer Father      Cancer Brother           Social History               Socioeconomic History    Marital status:        Spouse name: Not on file    Number of children: Not on file    Years of education: Not on file    Highest education level: Not on file   Occupational History    Not on file   Tobacco Use    Smoking status: Every Day       Current packs/day: 0.50       Average packs/day: 0.5 packs/day for 48.0 years (24.0 ttl pk-yrs)       Types: Cigarettes       Passive exposure: Current    Smokeless tobacco: Never   Vaping Use    Vaping status: Never Used   Substance and Sexual Activity    Alcohol use: Yes       Comment: 3-4 times a year.    Drug use: No    Sexual activity: Not Currently       Partners: Female       Birth control/protection: Abstinence, Condom Male   Other Topics Concern     "Not on file   Social History Narrative    Not on file      Social Drivers of Health           Financial Resource Strain: Low Risk  (1/22/2024)     Overall Financial Resource Strain (CARDIA)      Difficulty of Paying Living Expenses: Not hard at all   Food Insecurity: No Food Insecurity (1/20/2025)     Hunger Vital Sign      Worried About Running Out of Food in the Last Year: Never true      Ran Out of Food in the Last Year: Never true   Transportation Needs: No Transportation Needs (1/20/2025)     PRAPARE - Transportation      Lack of Transportation (Medical): No      Lack of Transportation (Non-Medical): No   Physical Activity: Not on file   Stress: Not on file   Social Connections: Not on file   Intimate Partner Violence: Not on file   Housing Stability: Unknown (1/20/2025)     Housing Stability Vital Sign      Unable to Pay for Housing in the Last Year: No      Number of Times Moved in the Last Year: Not on file      Homeless in the Last Year: No           Current Medications      Current Outpatient Medications:     Ascorbic Acid (VITAMIN C) 500 MG CAPS, Take 500 mg by mouth daily  , Disp: , Rfl:     Multiple Vitamins-Minerals (MULTIVITAMIN ADULT PO), Take 1 tablet by mouth daily  , Disp: , Rfl:     polyethylene glycol-propylene glycol (SYSTANE) 0.4-0.3 %, Apply to eye Daily, Disp: , Rfl:           Allergies   Allergen Reactions    Pollen Extract Cough, Nasal Congestion and Sneezing          Vitals:   /60   Pulse 75   Temp 97.5 °F (36.4 °C) (Temporal)   Resp 18   Ht 5' 10\" (1.778 m)   Wt 76.3 kg (168 lb 3.4 oz)   SpO2 94%   BMI 24.14 kg/m²     Physical Exam  Vitals reviewed.   HENT:      Head: Normocephalic and atraumatic.      Nose: Nose normal.   Eyes:      Extraocular Movements: Extraocular movements intact.      Pupils: Pupils are equal, round, and reactive to light.   Cardiovascular:      Rate and Rhythm: Normal rate and regular rhythm.   Pulmonary:      Effort: Pulmonary effort is normal.      " Breath sounds: Normal breath sounds.   Abdominal:      General: Abdomen is flat.      Palpations: Abdomen is soft.   Musculoskeletal:         General: Normal range of motion.      Cervical back: Normal range of motion and neck supple.      Right lower leg: No edema.      Left lower leg: No edema.   Skin:     General: Skin is warm and dry.      Comments: Ulcerated basal cell cancer, left lower extremity lower gastrocnemius area.  2 nodular lesions smaller, approximately 8 mm in diameter, left anteromedial shin.     Smaller 1 cm nodular basal cancer right lower leg, posterior.   Neurological:      Mental Status: He is alert and oriented to person, place, and time.            Pathology:  Component  Ref Range & Units (hover)     Case Report   Surgical Pathology Report                         Case: [id number]                                   Authorizing Provider:  James Masterson MD        Collected:           02/04/2025 0726               Ordering Location:     [facility]      Received:            02/04/2025 [facility]                                      Hospital Specialty                                                                                  Laboratory                                                                   Pathologist:           Jacki Lin MD                                                           Specimen:    Skin, Other, Left inferior medial anterior tibial, Left medial posterior ankle, Right                inferior medial posterior tibial,  Right medial posterior ankle, shave biopsy (4                    slides [id number] [facility], collected 12/05/2024)                             Final Diagnosis   Consult case (4 slides [id number] [facility], collected 12/05/2024):     B. Skin, left medial posterior ankle, shave biopsy:     BASAL CELL CARCINOMA (SUPERFICIAL TYPE); extends to the tissue edges.           C. Skin, right inferior medial posterior tibial, shave  biopsy:     BASAL CELL CARCINOMA (SUPERFICIAL AND NODULAR TYPE); transected (see note).           D. Skin, right medial posterior ankle, shave biopsy:     BASAL CELL CARCINOMA (SUPERFICIAL AND NODULAR TYPE); transected (see note).     Note: Per outside institution report, PAS stain (not available for review) revealed fungal hyphae.         Electronically signed by Jacki Lin MD on 2/4/2025 at 1107 EST         Labs:  CBC, Coags, BMP, Mg, Phos @[facility]@     Imaging  No results found.  I reviewed the above laboratory and imaging data.     Discussion/Summary: Nodular basal cancer, left lower extremity.  He also has smaller not ulcerated lesion on the right lower extremity.  Will address left side at this time.  Plan for resection, to be done in conjunction with plastic surgery team given anticipated need for skin graft or reconstruction.  Risk and benefits of surgically infection, bleeding, wound issues, discussed.  All questions answered and consent signed at this visit.

## 2025-03-11 NOTE — ANESTHESIA POSTPROCEDURE EVALUATION
Post-Op Assessment Note    CV Status:  Stable    Pain management: adequate       Mental Status:  Alert and awake   Hydration Status:  Euvolemic   PONV Controlled:  Controlled   Airway Patency:  Patent     Post Op Vitals Reviewed: Yes    No anethesia notable event occurred.    Staff: Anesthesiologist           Last Filed PACU Vitals:  Vitals Value Taken Time   Temp 98.1 °F (36.7 °C) 03/11/25 1108   Pulse 76 03/11/25 1115   /56 03/11/25 1108   Resp 13 03/11/25 1115   SpO2 90 % 03/11/25 1115   Vitals shown include unfiled device data.    Modified Edgard:     Vitals Value Taken Time   Activity 2 03/11/25 1108   Respiration 2 03/11/25 1108   Circulation 2 03/11/25 1108   Consciousness 2 03/11/25 1108   Oxygen Saturation 2 03/11/25 1108     Modified Edgard Score: 10

## 2025-03-11 NOTE — ANESTHESIA PREPROCEDURE EVALUATION
Procedure:  EXCISION BCC OF LEFT SHIN & ANKLE (Left: Leg Lower)  RECONSTRUCTION OF LEFT LOWER EXTREMITY WITH SPLIT THICKNESS GRAFT, (Left: Leg Lower)  PSB FULL THICKNESS SKIN GRAFT, PSB ADJACENT TISSUE TRANSFER, (Leg Lower)    Relevant Problems   CARDIO   (+) Infrarenal abdominal aortic aneurysm (AAA) without rupture (HCC)      /RENAL   (+) BPH with urinary obstruction   (+) Prostate cancer (HCC)   (+) Renal cell carcinoma of left kidney (HCC)      PULMONARY   (+) COPD (chronic obstructive pulmonary disease) (HCC)   (+) Smoker        Physical Exam    Airway    Mallampati score: III         Dental       Cardiovascular  Rhythm: regular, Rate: normal    Pulmonary   Breath sounds clear to auscultation, Rhonchi, Decreased breath sounds    Other Findings        Anesthesia Plan  ASA Score- 3     Anesthesia Type- general with ASA Monitors.         Additional Monitors:     Airway Plan:            Plan Factors-Exercise tolerance (METS): >4 METS.    Chart reviewed.   Existing labs reviewed. Patient summary reviewed.    Patient is a current smoker.  Patient instructed to abstain from smoking on day of procedure. Patient did not smoke on day of surgery.    There is medical exclusion for perioperative obstructive sleep apnea risk education.        Induction- intravenous.    Postoperative Plan-     Perioperative Resuscitation Plan - Level 1 - Full Code.       Informed Consent- Anesthetic plan and risks discussed with patient.        NPO Status:  No vitals data found for the desired time range.

## 2025-03-11 NOTE — INTERVAL H&P NOTE
H&P reviewed. After examining the patient I find no changes in the patients condition since the H&P had been written.    Vitals:    03/11/25 0607   BP: 108/60   Pulse: 75   Resp: 18   Temp: 97.5 °F (36.4 °C)   SpO2: 94%     To OR for left lower extremity reconstruction, possible full thickness skin graft, possible split thickness skin graft, any other indicated procedure

## 2025-03-11 NOTE — DISCHARGE INSTR - AVS FIRST PAGE
Keep the left leg dressing on and intact until your follow up visit. There is a wound vac device in place over the skin grafts. This is an electronic device with a battery that must be charged when you are at home and not moving around. Keep the device plugged in if you are stationary, and overnight while sleeping. Rest with your leg elevated. If the device is off, I.e. no lights are on, plug in the device and hold the power button until the ring of lights around the button turns on. The ring lights will change each day (it is a 7-day countdown and therefore not all lights will be illuminated each day). If the device alarms, please call the office during the day. If it is after hours, the office answering service can route the call to me to discuss.    Keep groin incision clean, it is covered with skin glue. OK to sponge bathe starting tomorrow, do not scrub the incisions directly, let the water gently run over the area. Please avoid strenuous activity and heavy lifting for the first 2 weeks after surgery.    Take Tylenol and ibuprofen as needed for pain. Ensure that total Tylenol dose does not exceeed 4,000mg in a day.    Please take antibiotics as prescribed.    Follow up in the office on 3/17/25.

## 2025-03-11 NOTE — OP NOTE
OPERATIVE REPORT  PATIENT NAME: Angelo Redd    :  1950  MRN: [id number]  Pt Location: [facility] 03    SURGERY DATE: 3/11/2025    Surgeons and Role:  Panel 1:     * James Masterson MD - Primary     * CHRIS ShahC - Assisting  Panel 2:     * Darren Ealge MD - Primary     * Dustin Kiran CST - Assisting    Preop Diagnosis:  Basal cell carcinoma (BCC) of skin of lower extremity including hip, unspecified laterality [C44.711]    Post-Op Diagnosis Codes:     * Basal cell carcinoma (BCC) of skin of lower extremity including hip, unspecified laterality [C44.711]    Procedure(s):  Left - EXCISION BCC OF LEFT SHIN & ANKLE  Left - RECONSTRUCTION OF LEFT LOWER EXTREMITY WITH FULL THICKNESS GRAFT.      Specimen(s):  ID Type Source Tests Collected by Time Destination   1 : left lower extremity basal cell cancer 12 o'clock 1 clip, 3o'clock 2 clip, 6 o'clock, 9 o'clock 4 clips Tissue Soft Tissue, Other TISSUE EXAM James Masterson MD 3/11/2025 0813    2 : left skin lesion proximal stitch marks 12 o'clock Tissue Soft Tissue, Other TISSUE EXAM James Masterson MD 3/11/2025 0818    3 : left skin lesion distal stitch marks 12 o'clock Tissue Soft Tissue, Other TISSUE EXAM James Masterson MD 3/11/2025 0819        Estimated Blood Loss:   Minimal    Drains:  Ureteral Internal Stent Left ureter 6 Fr. (Active)   Number of days: 1049       Anesthesia Type:   General    Operative Indications:  Basal cell carcinoma (BCC) of skin of lower extremity including hip, unspecified laterality [C44.711]  This is a 75 year old male who presents with multiple lesions of BCC of the left lower extremity. He now presents for excision and reconstruction. Risks, benefits and alternatives were discussed with the patient.    Operative Findings:  Left medial ankle defect to fascia - 4x4cm  Left anterior tibial defects x2 - 2.5x2.5cm and 2.5x2.5cm  Total area of FTS.5 sq cm      Complications:   None    Procedure and  Technique:  The patient was met in the preoperative holding area and marked according to hospital policy. The patient was transported to the operating room and positioned supine on the operating table, all pressure points were padded and SCDs were on and functioning. General anesthesia was induced without difficulty. The left lower extremity was prepped and draped in the usual sterile fashion. A surgical timeout was conducted to verify the correct patient, procedure and fire risk according to hospital protocol.    The lesions were first excised by Dr. Masterson. Please see a separate dictation for that portion of the case.    A separate timeout was performed for my portion of the case. The defects were examined. On the left medial ankle, there was a 4x4cm full thickness skin defect down to underlying fascia. On the mid-anterior tibial portion of the leg, there were two full thickness defects, each measuring 2.5x2.5cm. Hemostasis was achieved with electrocautery. A full thickness skin graft was designed in the left groin crease. The site was injected with local anesthetic consisting of 0.25% marcine with epinephrine. The graft was harvested in a full thickness fashion. Hemostasis of the donor site was achieved, and the donor site was closed with 3-0 PDS in a deep dermal fashion followed by 3-0 stratifix in a running subcuticular fashion. Skin glue and steri strips were applied to the donor site.    The graft was prepared by sharply excising the subcutaneous fat. The graft was then inset into the medial ankle defect using interrupted 4-0 chromic sutures. The graft was fenestrated. The additional skin from the graft harvest was used to cover the two small defects on the tibial portion of the leg. This skin was also inset with 4-0 chromic sutures. A wound vac bolster dressing was fashioned using Adaptic nonadherent dressing, followed by black sponge. Skin edges were protected using barrier film. The wound vac was applied  and set to -125mmHg continuous suction. The total area of full thickness skin graft was 28.5 sq cm. The left leg was then dressed with cast padding and an ACE wrap from the toes to the knee.    All sponge, needle and instrument counts were correct at the end of the case. The patient tolerated the procedure without complications and was transferred to the recovery room in stable condition. I was the attending plastic surgeon for the entire procedure.      I was present for the entire procedure.    Patient Disposition:  PACU     This procedure was not performed to treat primary cutaneous melanoma through wide local excision           SIGNATURE: Darren Eagle MD  DATE: March 11, 2025  TIME: 10:23 AM

## 2025-03-14 ENCOUNTER — TELEPHONE (OUTPATIENT)
Age: 75
End: 2025-03-14

## 2025-03-14 NOTE — TELEPHONE ENCOUNTER
Attempted to call patient for post op call but the voicemail box was full, unable to leave message.

## 2025-03-17 PROCEDURE — 88305 TISSUE EXAM BY PATHOLOGIST: CPT | Performed by: PATHOLOGY

## 2025-03-19 ENCOUNTER — HOSPITAL ENCOUNTER (OUTPATIENT)
Dept: INFUSION CENTER | Facility: HOSPITAL | Age: 75
Discharge: HOME | End: 2025-03-19
Payer: MEDICARE

## 2025-03-19 DIAGNOSIS — C34.12 PRIMARY MALIGNANT NEOPLASM OF BRONCHUS OF LEFT UPPER LOBE (HCC): Primary | ICD-10-CM

## 2025-03-19 DIAGNOSIS — Z45.2 ENCOUNTER FOR CENTRAL LINE CARE: ICD-10-CM

## 2025-03-19 PROCEDURE — 96523 IRRIG DRUG DELIVERY DEVICE: CPT

## 2025-03-29 PROBLEM — Z01.89 ENCOUNTER FOR GERIATRIC ASSESSMENT: Status: RESOLVED | Noted: 2025-02-27 | Resolved: 2025-03-29

## 2025-04-01 ENCOUNTER — OFFICE VISIT (OUTPATIENT)
Dept: SURGICAL ONCOLOGY | Facility: CLINIC | Age: 75
End: 2025-04-01

## 2025-04-01 ENCOUNTER — TELEPHONE (OUTPATIENT)
Dept: HEMATOLOGY ONCOLOGY | Facility: CLINIC | Age: 75
End: 2025-04-01

## 2025-04-01 VITALS
OXYGEN SATURATION: 96 % | TEMPERATURE: 98 F | SYSTOLIC BLOOD PRESSURE: 122 MMHG | DIASTOLIC BLOOD PRESSURE: 78 MMHG | HEART RATE: 93 BPM | HEIGHT: 70 IN | RESPIRATION RATE: 15 BRPM | BODY MASS INDEX: 24.2 KG/M2 | WEIGHT: 169 LBS

## 2025-04-01 DIAGNOSIS — C44.719 BASAL CELL CARCINOMA (BCC) OF SKIN OF LEFT LOWER EXTREMITY INCLUDING HIP: Primary | ICD-10-CM

## 2025-04-01 PROCEDURE — 99024 POSTOP FOLLOW-UP VISIT: CPT | Performed by: SURGERY

## 2025-04-01 RX ORDER — MUPIROCIN 20 MG/G
OINTMENT TOPICAL
COMMUNITY
Start: 2025-03-18

## 2025-04-01 NOTE — PROGRESS NOTES
Surgical Oncology Consult       [address]   [facility]  [address] [facility] [address]    Angelo Redd  1950  [id number]  [address] [facility]  [address] [place] [address]    Chief Complaint   Patient presents with    Post-op       Assessment/Plan:    No problem-specific Assessment & Plan notes found for this encounter.       Diagnoses and all orders for this visit:    Basal cell carcinoma (BCC) of skin of left lower extremity including hip    Other orders  -     mupirocin (BACTROBAN) 2 % ointment; APPLY A SMALL AMOUNT TO SURGICAL SITE 2-3 TIMES DAILY UNTIL WELL HEALED.      Advance Care Planning/Advance Directives:  Discussed disease status, cancer treatment plans and/or cancer treatment goals with the patient.     Oncology History Overview Note   Patient has a history of tobacco use for more than 40 years and history of 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 which was diagnosed in May of 2015.  At that time the patient was found to have multiple brain lesions status post whole brain radiation.  He then received 6 cycles of palliative chemotherapy with Alimta and carboplatin.  After 5 cycles of treatment he had significant GI bleeding which required ICU hospitalization.  After completion of palliative chemotherapy he was maintained on Alimta since October 2015.    A brain MRI January 2016 showed multiple bilateral hemorrhagic lesions felt not to be due to malignant process.  PET scan January 2016 showed a 1.6 cm metabolically active left lung apical lesion.  The patient received radiation treatment palliatively under the care of Dr. Llanes to the left apical lung lesion and completed it in April of 2016.  He was then restarted on maintenance Alimta.    He had an MRI of the abdomen on June 23, 2016 which showed a 3 cm cystic mass with a solid component on the  posterior medial aspect of the left kidney, compatible with malignant process.  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  The patient was then treated with cryotherapy of the left kidney lesion in September of 2016.    Patient had a PET-CT scan on March 3, 2017 which was compared to the prior PET scan from November 2016.  At this time there is no evidence of residual or recurrent neoplastic disease anywhere in his body.    Patient had a CT scan of the chest abdomen and pelvis on August 24, 2017 which showed a 1.3 cm left upper lobe mass versus scarring with adjacent radiation fibrosis.    Patient had another MRI of the brain January 8, 2018 which was compared to the previous MRI of the brain in January 2016.  This revealed multiple areas of artifact in the supra and infratentorial spaces consistent with hemorrhagic metastasis.  The largest lesion was in the frontal lobe and is significantly smaller with only a tiny enhancing component remaining.  No new intra-axial lesions were reported.    Patient had another CT scan of the chest abdomen and pelvis for close monitoring on June 5th 2018 that was compared with prior study from January 2018.  He continues to have a 1.3 cm left lung apex pulmonary nodule with surrounding fibrosis which is stable in appearance.  He also has a stable appearance of the treated medial left renal cortex lesion.  There is no evidence of metastatic disease within the chest abdomen or pelvis.    After lengthy discussion June 2018 patient stated that he is not interested in continuing his maintenance Alimta treatment beyond today's treatment since his CT scan showed stable disease for a long time he has been on Alimta for 2 years.  He was told that he seems to be in complete remission but there is a chance that the cancer may reoccur.  Patient was interested in the watch and wait type of approach  instead of continuing active maintenance Alimta treatment.     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 Amita Llanes     3/17/2016 - 4/12/2016 Radiation    Left upper lobe lesion, 4675 cGy  Radiation oncologist: Dr. Amita Llanes      Chemotherapy    1.   Alimta and carboplatin:   05/20/2015 through 08/12/2015 (5 cycles total)  2.   Maintenance Alimta:   10/21/2015 through 06/12/2018 (43 total Treatment doses)     Metastasis to brain (HCC) (Resolved)   5/26/2015 Initial Diagnosis    Metastasis to brain (HCC)     5/20/2024 -  Cancer Staged    Staging form: Central Nervous System Tumors, Pediatric Staging  - Pathologic: No stage assigned - Signed by Jyoti Canas MD on 5/20/2024  Primary tumor (T): TX  Regional lymph nodes (N): NX  Distant metastasis (M): M1  Source of metastatic specimen: Lung       Renal cell carcinoma of left kidney (HCC)   7/2016 Initial Diagnosis    Renal cell carcinoma of left kidney (HCC)  [facility]       Basal cell carcinoma (BCC) of skin of lower extremity including hip   2/3/2025 Biopsy    Consult case (4 slides [id number] [facility], collected 12/05/2024):     Skin, left medial posterior ankle, shave biopsy:     BASAL CELL CARCINOMA (SUPERFICIAL TYPE); extends to the tissue edges.     Skin, right inferior medial posterior  tibial, shave biopsy:     BASAL CELL CARCINOMA (SUPERFICIAL AND NODULAR TYPE); transected    Skin, right medial posterior ankle, shave biopsy:     BASAL CELL CARCINOMA (SUPERFICIAL AND NODULAR TYPE); transected         3/11/2025 Surgery    Skin, left lower extremity:   RESIDUAL BASAL CELL CARCINOMA, NODULAR, AND SCAR  There is a zone of uninvolved tissue on all margins, and the lesion appears completely excised.       Skin, left proximal medial shin:   BASAL CELL CARCINOMA, NODULAR  There is a zone of uninvolved tissue on all margins, and the lesion appears completely excised.        C. Skin, left distal medial shin:   BASAL CELL CARCINOMA, SUPERFICIAL AND NODULAR   The lesion (superficial pattern) extends very close to the margin at 3:00 to 4:00.  Although basal cell carcinoma itself is not present at margins, associated stromal changes extend to this margin.  This finding indicates that residual basal cell carcinoma could be present beyond the extent of this specimen.         History of Present Illness: 75-year-old man here for postop check status post excision of basal cell cancers from the left lower extremity.  No major issues or complaints to report.  He follows closely with plastic surgery team given skin grafts.    Review of Systems   Skin:  Positive for wound.   All other systems reviewed and are negative.        Patient Active Problem List   Diagnosis    Family history of colon cancer    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)    Bladder stone    Urge incontinence    BPH with urinary obstruction    Renal cell carcinoma of left kidney (HCC)    Encounter for central line care    Bladder mass    Smoker    Colon polyps    High prostate specific antigen (PSA)    Prostate cancer (HCC)    Infrarenal abdominal aortic aneurysm (AAA) without rupture (HCC)    Exudative age-related macular degeneration, left eye, with active choroidal neovascularization (HCC)    Elevated liver enzymes     "Foreign body of left ear    Basal cell carcinoma (BCC) of skin of lower extremity including hip    Malignant neoplasm of overlapping sites of brain (HCC)    COPD (chronic obstructive pulmonary disease) (HCC)     Past Medical History:   Diagnosis Date    Anxiety     Arthritis     Balance problems     and\" coordination issues/best to walk slow\"    Bladder stones     Cancer (HCC)     lung, brain, kidney, spinal    Colon polyp     Dysphagia 02/16/2017    \"not right now but in the past\"    Elevated blood sugar 07/24/2020    Environmental and seasonal allergies     Exercise involving walking     \"approx 1 mile 3x/week\"    Eye injury     right,  \"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\"    Full dentures     Hemiparesis (HCC) 04/27/2015    left weakness,\"due to tumor on brain\"    History of brain tumor     \"had radiation for it\"    History of cancer chemotherapy     last tx  5/22/18    History of GI bleed 2015    History of kidney cancer     left    History of kidney stones     History of radiation therapy     History of transfusion     5 units of blood 2015    Hydrocele 11/30/2010    Inguinal hernia, bilateral     Joint stiffness of multiple sites     Kidney stone     Paresthesia 05/01/2015    Portacath in place     right chest    Primary malignant neoplasm of lung (HCC) 05/26/2015    Prostate cancer (HCC)     Renal carcinoma, left (HCC) 10/17/2016    Risk for falls     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/26/2015    Skin cancer     Syncopal episodes 09/01/2015    2    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    Wears glasses      Past Surgical History:   Procedure Laterality Date    BLADDER STONE REMOVAL      BLADDER STONE REMOVAL N/A 02/09/2021    Procedure: LASER LITHOLOPAXY;  Surgeon: Cm Lester MD;  [facility];  Service: Urology    COLONOSCOPY      DENTAL SURGERY  2011    DENTAL EXTACTIONS     ESOPHAGOGASTRODUODENOSCOPY      FULL THICKNESS SKIN GRAFT N/A 3/11/2025    Procedure: FULL " THICKNESS SKIN GRAFT;  Surgeon: Darren Eagle MD;  [facility]: [facility];  Service: Plastics    HERNIA REPAIR      HYDROCELE EXCISION / REPAIR Left 01/01/2011    KIDNEY STONE SURGERY      KIDNEY SURGERY      cancer of kidney/cryo of cancer    PORTACATH PLACEMENT      KY COLONOSCOPY FLX DX W/COLLJ SPEC WHEN PFRMD N/A 08/07/2018    Procedure: COLONOSCOPY with polypectomy ;  Surgeon: Jaclyn Cardenas MD;  Location: [facility];  Service: General    KY CYSTO/URETERO W/LITHOTRIPSY &INDWELL STENT INSRT Left 04/27/2022    Procedure: CYSTO URETEROSCOPY WITH LITHO HOLMIUM LASER, RETROGRADE PYELOGRAM AND INSERTION STENT URETERAL;  Surgeon: Cm Lester MD;  [facility];  Service: Urology    KY CYSTOURETHROSCOPY W/DEST &/RMVL MED BLADDER JACOB N/A 02/09/2021    Procedure: CYSTO, T.U.R.B.T.;  Surgeon: Cm Lester MD;  [facility];  Service: Urology    KY EXCISION MAL LESION TRUNK/ARM/LEG 0.5 CM/< Left 3/11/2025    Procedure: EXCISION BCC OF LEFT SHIN & ANKLE;  Surgeon: James Masterson MD;  [facility];  Service: Surgical Oncology    KY LAPS RPR RECURRENT INCAL HRNA NCRC8/STRANGULATED Bilateral 08/08/2018    Procedure: REPAIR HERNIA INGUINAL LAPAROSCOPIC W/ ROBOTICS W/ MESH;  Surgeon: Jaclyn Cardenas MD;  [facility];  Service: General    KY LITHOLAPAXY SMPL/SM <2.5 CM N/A 11/27/2018    Procedure: CYSTOSCOPY, CYSTOLITHOLOPAXY HOLMIUM LASER,BLADDE BIOPSY;  Surgeon: Cm Lester MD;  [facility];  Service: Urology    KY LITHOLAPAXY SMPL/SM <2.5 CM N/A 04/27/2022    Procedure: LITHOLOPAXY HOLMIUM LASER for bladder stones;  Surgeon: Cm Lester MD;  [facility];  Service: Urology    KY SPLT AGRFT T/A/L 1ST 100 SQCM/</1% BDY INFT/CHLD Left 3/11/2025    Procedure: RECONSTRUCTION OF LEFT LOWER EXTREMITY WITH FULL THICKNESS GRAFT,;  Surgeon: Darren Eagle MD;  [facility];  Service: Plastics    SKIN BIOPSY      SKIN CANCER EXCISION      17  surgeries, basal cell    TONSILLECTOMY      UPPER GASTROINTESTINAL ENDOSCOPY       Family History   Problem Relation Age of Onset    Cancer Mother         EYE    Cancer Father     Colon cancer Father     Cancer Brother      Social History     Socioeconomic History    Marital status:      Spouse name: Not on file    Number of children: Not on file    Years of education: Not on file    Highest education level: Not on file   Occupational History    Not on file   Tobacco Use    Smoking status: Every Day     Current packs/day: 0.75     Average packs/day: 0.8 packs/day for 48.0 years (36.0 ttl pk-yrs)     Types: Cigarettes     Passive exposure: Current    Smokeless tobacco: Never    Tobacco comments:     1/2 pack cigarettes daily   Vaping Use    Vaping status: Never Used   Substance and Sexual Activity    Alcohol use: Yes     Comment: 3 times a year.    Drug use: Not Currently    Sexual activity: Not Currently     Partners: Female     Birth control/protection: Abstinence, Condom Male   Other Topics Concern    Not on file   Social History Narrative    Not on file     Social Drivers of Health     Financial Resource Strain: Low Risk  (1/22/2024)    Overall Financial Resource Strain (CARDIA)     Difficulty of Paying Living Expenses: Not hard at all   Food Insecurity: No Food Insecurity (1/20/2025)    Hunger Vital Sign     Worried About Running Out of Food in the Last Year: Never true     Ran Out of Food in the Last Year: Never true   Transportation Needs: No Transportation Needs (1/20/2025)    PRAPARE - Transportation     Lack of Transportation (Medical): No     Lack of Transportation (Non-Medical): No   Physical Activity: Not on file   Stress: Not on file   Social Connections: Not on file   Intimate Partner Violence: Not on file   Housing Stability: Unknown (1/20/2025)    Housing Stability Vital Sign     Unable to Pay for Housing in the Last Year: No     Number of Times Moved in the Last Year: Not on file      Homeless in the Last Year: No       Current Outpatient Medications:     Ascorbic Acid (VITAMIN C) 500 MG CAPS, Take 500 mg by mouth daily  , Disp: , Rfl:     diclofenac (VOLTAREN) 0.1 % ophthalmic solution, Administer 1 drop to both eyes, Disp: , Rfl:     ketorolac (ACULAR) 0.5 % ophthalmic solution, instill 1 drop by ophthalmic route 4 times every day into left eye after sx x 28 days., Disp: , Rfl:     loratadine (CLARITIN) 10 mg tablet, Take 10 mg by mouth daily, Disp: , Rfl:     Multiple Vitamins-Minerals (MULTIVITAMIN ADULT PO), Take 1 tablet by mouth daily  , Disp: , Rfl:     mupirocin (BACTROBAN) 2 % ointment, APPLY A SMALL AMOUNT TO SURGICAL SITE 2-3 TIMES DAILY UNTIL WELL HEALED., Disp: , Rfl:     polyethylene glycol-propylene glycol (SYSTANE) 0.4-0.3 %, Apply to eye Daily, Disp: , Rfl:     traMADol (Ultram) 50 mg tablet, Take 1 tablet (50 mg total) by mouth every 6 (six) hours as needed for moderate pain (Patient not taking: Reported on 4/1/2025), Disp: 10 tablet, Rfl: 0  Allergies   Allergen Reactions    Pollen Extract Cough, Nasal Congestion and Sneezing     Vitals:    04/01/25 1041   BP: 122/78   Pulse: 93   Resp: 15   Temp: 98 °F (36.7 °C)   SpO2: 96%       Physical Exam  Vitals reviewed.   Constitutional:       Appearance: Normal appearance.   Skin:     General: Skin is warm and dry.      Comments: Skin grafts x 3 left lower extremity, no evidence of wound compromise or breakdown or infection.   Neurological:      Mental Status: He is alert.         Pathology:  Case Report   Surgical Pathology Report                         Case: [id number]                                   Authorizing Provider:  James Masterson MD        Collected:           03/11/2025 0813               Ordering Location:     [facility]        Received:            03/11/2025 [address] [place] [address] Operating Room                                                     Pathologist:           Zachariah  Wes Winn MD                                                       Specimens:   A) - Soft Tissue, Other, left lower extremity basal cell cancer 12 o'clock 1 clip,                  3o'clock 2 clip, 6 o'clock, 9 o'clock 4 clips                                                        B) - Soft Tissue, Other, left skin lesion proximal stitch marks 12 o'clock                          C) - Soft Tissue, Other, left skin lesion distal stitch marks 12 o'clock                  Final Diagnosis   A. Skin, left lower extremity:   RESIDUAL BASAL CELL CARCINOMA, NODULAR, AND SCAR     Note: In some areas, this tumor has an infiltrating growth pattern.  There is a zone of uninvolved tissue on all margins, and the lesion appears completely excised.  There is also adjacent pseudocarcinomatous hyperplasia and background features of stasis change.     B. Skin, left proximal medial shin:   BASAL CELL CARCINOMA, NODULAR     Note: There is a zone of uninvolved tissue on all margins, and the lesion appears completely excised.        C. Skin, left distal medial shin:   BASAL CELL CARCINOMA, SUPERFICIAL AND NODULAR     Note: In some areas, this tumor has an infiltrating growth pattern.  The lesion (superficial pattern) extends very close to the margin at 3:00 to 4:00.  Although basal cell carcinoma itself is not present at margins, associated stromal changes extend to this margin.  This finding indicates that residual basal cell carcinoma could be present beyond the extent of this specimen.   Electronically signed by Zachariah Winn MD on 3/17/2025 at 1359 EDT   Note    Interpretation performed at [facility] [address] [place] [address] Way, [place] [address]          Labs:  none    Imaging  XR chest pa and lateral  Result Date: 3/8/2025  Narrative: CHEST INDICATION:   Basal cell carcinoma of skin of unspecified lower limb, including hip. COMPARISON:  None. EXAM PERFORMED/VIEWS:  XR CHEST PA AND LATERAL FINDINGS: Portacatheter is  adequately positioned. Cardiomediastinal silhouette appears unremarkable. There is extensive hyperinflation with parenchymal attenuation in the upper lobes. Large scar in the left apical region. No airspace infiltrates. No pneumothorax or pleural effusion. Osseous structures appear within normal limits for patient age.     Impression: No acute cardiopulmonary disease. Severe emphysema. Portacatheter. Large scar in the left upper lobe extending towards the apex, noted on a prior CT of 5/13/2024. No airspace infiltrates or effusions. Electronically signed: 03/08/2025 08:27 AM Wes Juares MD    I reviewed the above laboratory and imaging data.    Discussion/Summary: 72-year-old man, history of basal cell cancer excised from left lower extremity with skin grafts.  Margins clear.  Will follow-up in 6 months for surveillance, and subsequent to that, on an as needed basis if all is well..  Continue follow-up with dermatology team as well.

## 2025-04-01 NOTE — TELEPHONE ENCOUNTER
Called pt to let him know that due to change in provider schedule we need to cancel appt on Mon 5/19/25 @ 9:40 AM.  Unfortunately the voicemail box is full and unable to leave a message.  Please call [facility] phone# [phone] to reschedule this appt.

## 2025-04-14 ENCOUNTER — CONSULT (OUTPATIENT)
Dept: FAMILY MEDICINE CLINIC | Facility: CLINIC | Age: 75
End: 2025-04-14
Payer: MEDICARE

## 2025-04-14 VITALS
OXYGEN SATURATION: 95 % | TEMPERATURE: 97.2 F | WEIGHT: 167.4 LBS | HEART RATE: 84 BPM | BODY MASS INDEX: 23.96 KG/M2 | DIASTOLIC BLOOD PRESSURE: 68 MMHG | SYSTOLIC BLOOD PRESSURE: 120 MMHG | HEIGHT: 70 IN

## 2025-04-14 DIAGNOSIS — J44.9 CHRONIC OBSTRUCTIVE PULMONARY DISEASE, UNSPECIFIED COPD TYPE (HCC): ICD-10-CM

## 2025-04-14 DIAGNOSIS — Z01.818 PREOPERATIVE EXAMINATION: Primary | ICD-10-CM

## 2025-04-14 PROCEDURE — 99215 OFFICE O/P EST HI 40 MIN: CPT

## 2025-04-14 NOTE — PROGRESS NOTES
[facility] PRE-OPERATIVE EVALUATION  [facility] PHYSICIAN GROUP - [facility]    NAME: Angelo Redd  AGE: 75 y.o. SEX: male  : 1950     DATE: 2025    [facility] Pre-Operative Evaluation      Chief Complaint: Pre-operative Evaluation     Surgery:Left/Right Cataract surgery   Anticipated Date of Surgery: 2025 / 2025  Referring Provider: No ref. provider found       History of Present Illness:     Angelo Redd is a 75 y.o. male who presents to the office today for a preoperative consultation at the request of surgeon, Dr. Chowdary, who plans on performing Left/Right Cataract surgery on  2025 and 2025. Planned anesthesia is local. Patient has a bleeding risk of: no recent abnormal bleeding. Patient does not have objections to receiving blood products if needed.      Assessment of Chronic Conditions:   - COPD: Stable not on any medications     Assessment of Cardiac Risk:  Denies unstable or severe angina or MI in the last 6 weeks or history of stent placement in the last year   Denies decompensated heart failure (e.g. New onset heart failure, NYHA functional class IV heart failure, or worsening existing heart failure)  Denies significant arrhythmias such as high grade AV block, symptomatic ventricular arrhythmia, newly recognized ventricular tachycardia, supraventricular tachycardia with resting heart rate >100, or symptomatic bradycardia  Denies severe heart valve disease including aortic stenosis or symptomatic mitral stenosis     Exercise Capacity:  Able to walk 4 blocks without symptoms?: Yes  Able to walk 2 flights without symptoms?: Yes    Prior Anesthesia Reactions: No     Personal history of venous thromboembolic disease? No    History of steroid use for >2 weeks within last year? No         Review of Systems:     Review of Systems   Constitutional:  Negative for chills and fever.   HENT:  Negative for ear pain and sore throat.    Eyes:   Negative for pain and visual disturbance.   Respiratory:  Negative for cough and shortness of breath.    Cardiovascular:  Negative for chest pain and palpitations.   Gastrointestinal:  Negative for abdominal pain and vomiting.   Genitourinary:  Negative for dysuria and hematuria.   Musculoskeletal:  Negative for arthralgias and back pain.   Skin:  Negative for color change and rash.   Neurological:  Negative for seizures and syncope.   All other systems reviewed and are negative.      Current Problem List:     Patient Active Problem List   Diagnosis   • Family history of colon cancer   • 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)   • Bladder stone   • Urge incontinence   • BPH with urinary obstruction   • Renal cell carcinoma of left kidney (HCC)   • Encounter for central line care   • Bladder mass   • Smoker   • Colon polyps   • High prostate specific antigen (PSA)   • Prostate cancer (HCC)   • Infrarenal abdominal aortic aneurysm (AAA) without rupture (HCC)   • Exudative age-related macular degeneration, left eye, with active choroidal neovascularization (HCC)   • Elevated liver enzymes   • Foreign body of left ear   • Basal cell carcinoma (BCC) of skin of lower extremity including hip   • Malignant neoplasm of overlapping sites of brain (HCC)   • COPD (chronic obstructive pulmonary disease) (HCC)       Allergies:     Allergies   Allergen Reactions   • Pollen Extract Cough, Nasal Congestion and Sneezing       Current Medications:       Current Outpatient Medications:   •  Ascorbic Acid (VITAMIN C) 500 MG CAPS, Take 500 mg by mouth daily  , Disp: , Rfl:   •  diclofenac (VOLTAREN) 0.1 % ophthalmic solution, Administer 1 drop to both eyes, Disp: , Rfl:   •  ketorolac (ACULAR) 0.5 % ophthalmic solution, instill 1 drop by ophthalmic route 4 times every day into left eye after sx x 28 days., Disp: , Rfl:   •  Multiple Vitamins-Minerals (MULTIVITAMIN ADULT PO), Take 1 tablet by mouth daily  , Disp: ,  "Rfl:   •  mupirocin (BACTROBAN) 2 % ointment, APPLY A SMALL AMOUNT TO SURGICAL SITE 2-3 TIMES DAILY UNTIL WELL HEALED., Disp: , Rfl:   •  polyethylene glycol-propylene glycol (SYSTANE) 0.4-0.3 %, Apply to eye Daily, Disp: , Rfl:     Past Medical History:       Past Medical History:   Diagnosis Date   • Anxiety    • Arthritis    • Balance problems     and\" coordination issues/best to walk slow\"   • Bladder stones    • Cancer (HCC)     lung, brain, kidney, spinal   • Colon polyp    • Dysphagia 02/16/2017    \"not right now but in the past\"   • Elevated blood sugar 07/24/2020   • Environmental and seasonal allergies    • Exercise involving walking     \"approx 1 mile 3x/week\"   • Eye injury     right,  \"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\"   • Full dentures    • Hemiparesis (HCC) 04/27/2015    left weakness,\"due to tumor on brain\"   • History of brain tumor     \"had radiation for it\"   • History of cancer chemotherapy     last tx  5/22/18   • History of GI bleed 2015   • History of kidney cancer     left   • History of kidney stones    • History of radiation therapy    • History of transfusion     5 units of blood 2015   • Hydrocele 11/30/2010   • Inguinal hernia, bilateral    • Joint stiffness of multiple sites    • Kidney stone    • Paresthesia 05/01/2015   • Portacath in place     right chest   • Primary malignant neoplasm of lung (HCC) 05/26/2015   • Prostate cancer (HCC)    • Renal carcinoma, left (HCC) 10/17/2016   • Risk for falls    • 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/26/2015   • Skin cancer    • Syncopal episodes 09/01/2015    2   • 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   • Wears glasses         Past Surgical History:   Procedure Laterality Date   • BLADDER STONE REMOVAL     • BLADDER STONE REMOVAL N/A 02/09/2021    Procedure: LASER LITHOLOPAXY;  Surgeon: Cm Lester MD;  [facility];  Service: Urology   • COLONOSCOPY     • DENTAL SURGERY  2011    DENTAL EXTACTIONS    • " ESOPHAGOGASTRODUODENOSCOPY     • FULL THICKNESS SKIN GRAFT N/A 3/11/2025    Procedure: FULL THICKNESS SKIN GRAFT;  Surgeon: Darren Eagle MD;  [facility];  Service: Plastics   • HERNIA REPAIR     • HYDROCELE EXCISION / REPAIR Left 01/01/2011   • KIDNEY STONE SURGERY     • KIDNEY SURGERY      cancer of kidney/cryo of cancer   • PORTACATH PLACEMENT     • SC COLONOSCOPY FLX DX W/COLLJ SPEC WHEN PFRMD N/A 08/07/2018    Procedure: COLONOSCOPY with polypectomy ;  Surgeon: Jaclyn Cardenas MD;  [facility]: [facility];  Service: General   • SC CYSTO/URETERO W/LITHOTRIPSY &INDWELL STENT INSRT Left 04/27/2022    Procedure: CYSTO URETEROSCOPY WITH LITHO HOLMIUM LASER, RETROGRADE PYELOGRAM AND INSERTION STENT URETERAL;  Surgeon: Cm Lester MD;  [facility];  Service: Urology   • SC CYSTOURETHROSCOPY W/DEST &/RMVL MED BLADDER JACOB N/A 02/09/2021    Procedure: CYSTO, T.U.R.B.T.;  Surgeon: Cm Lester MD;  [facility];  Service: Urology   • SC EXCISION MAL LESION TRUNK/ARM/LEG 0.5 CM/< Left 3/11/2025    Procedure: EXCISION BCC OF LEFT SHIN & ANKLE;  Surgeon: James Masterson MD;  [facility];  Service: Surgical Oncology   • SC LAPS RPR RECURRENT INCAL HRNA NCRC8/STRANGULATED Bilateral 08/08/2018    Procedure: REPAIR HERNIA INGUINAL LAPAROSCOPIC W/ ROBOTICS W/ MESH;  Surgeon: Jaclyn Cardenas MD;  [facility];  Service: General   • SC LITHOLAPAXY SMPL/SM <2.5 CM N/A 11/27/2018    Procedure: CYSTOSCOPY, CYSTOLITHOLOPAXY HOLMIUM LASER,BLADDE BIOPSY;  Surgeon: Cm Lester MD;  [facility];  Service: Urology   • SC LITHOLAPAXY SMPL/SM <2.5 CM N/A 04/27/2022    Procedure: LITHOLOPAXY HOLMIUM LASER for bladder stones;  Surgeon: Cm Lester MD;  [facility];  Service: Urology   • SC SPLT AGRFT T/A/L 1ST 100 SQCM/</1% BDY INFT/CHLD Left 3/11/2025    Procedure: RECONSTRUCTION OF LEFT LOWER EXTREMITY WITH FULL THICKNESS GRAFT,;  Surgeon: Darren Nicolas  MD [place];  Location: [facility];  Service: Plastics   • SKIN BIOPSY     • SKIN CANCER EXCISION      17 surgeries, basal cell   • TONSILLECTOMY     • UPPER GASTROINTESTINAL ENDOSCOPY          Family History   Problem Relation Age of Onset   • Cancer Mother         EYE   • Cancer Father    • Colon cancer Father    • Cancer Brother         Social History     Socioeconomic History   • Marital status:      Spouse name: Not on file   • Number of children: Not on file   • Years of education: Not on file   • Highest education level: Not on file   Occupational History   • Not on file   Tobacco Use   • Smoking status: Every Day     Current packs/day: 0.75     Average packs/day: 0.8 packs/day for 48.0 years (36.0 ttl pk-yrs)     Types: Cigarettes     Passive exposure: Current   • Smokeless tobacco: Never   • Tobacco comments:     1/2 pack cigarettes daily   Vaping Use   • Vaping status: Never Used   Substance and Sexual Activity   • Alcohol use: Yes     Comment: 3 times a year.   • Drug use: Not Currently   • Sexual activity: Not Currently     Partners: Female     Birth control/protection: Abstinence, Condom Male   Other Topics Concern   • Not on file   Social History Narrative   • Not on file     Social Drivers of Health     Financial Resource Strain: Low Risk  (1/22/2024)    Overall Financial Resource Strain (CARDIA)    • Difficulty of Paying Living Expenses: Not hard at all   Food Insecurity: No Food Insecurity (1/20/2025)    Hunger Vital Sign    • Worried About Running Out of Food in the Last Year: Never true    • Ran Out of Food in the Last Year: Never true   Transportation Needs: No Transportation Needs (1/20/2025)    PRAPARE - Transportation    • Lack of Transportation (Medical): No    • Lack of Transportation (Non-Medical): No   Physical Activity: Not on file   Stress: Not on file   Social Connections: Not on file   Intimate Partner Violence: Not on file   Housing Stability: Unknown (1/20/2025)    Housing  "Stability Vital Sign    • Unable to Pay for Housing in the Last Year: No    • Number of Times Moved in the Last Year: Not on file    • Homeless in the Last Year: No        Physical Exam:     /68 (BP Location: Left arm, Patient Position: Sitting, Cuff Size: Adult)   Pulse 84   Temp (!) 97.2 °F (36.2 °C)   Ht 5' 10\" (1.778 m)   Wt 75.9 kg (167 lb 6.4 oz)   SpO2 95%   BMI 24.02 kg/m²     Physical Exam  Constitutional:       Appearance: Normal appearance.   HENT:      Head: Normocephalic and atraumatic.      Mouth/Throat:      Mouth: Mucous membranes are moist.      Pharynx: Oropharynx is clear.   Cardiovascular:      Rate and Rhythm: Normal rate and regular rhythm.      Pulses: Normal pulses.      Heart sounds: Normal heart sounds.   Pulmonary:      Effort: Pulmonary effort is normal.      Breath sounds: Rhonchi present.   Abdominal:      General: Bowel sounds are normal.      Palpations: Abdomen is soft.   Skin:     General: Skin is warm.   Neurological:      Mental Status: He is alert and oriented to person, place, and time.   Psychiatric:         Mood and Affect: Mood normal.          Data:     Pre-operative work-up    Laboratory Results:  No pertinent labs     EKG: Results Review Statement: No pertinent imaging studies reviewed.    Chest x-ray: Results Review Statement: No pertinent imaging studies reviewed.      Previous cardiopulmonary studies within the past year:  Echocardiogram: NA  Cardiac Catheterization: NA  Stress Test: NA  Pulmonary Function Testing: NA      Assessment & Recommendations:     1. Preoperative examination        2. Chronic obstructive pulmonary disease, unspecified COPD type (HCC)            Pre-Op Evaluation Assessment  75 y.o. male with planned surgery: Left/Right Cataract surgery     Known risk factors for perioperative complications: None.        Current medications which may produce withdrawal symptoms if withheld perioperatively: none.    Pre-Op Evaluation Plan  1. Further " preoperative workup as follows:   - None; no further preoperative work-up is required    2. Medication Management/Recommendations:   - None, continue medication regimen including morning of surgery, with sip of water  - Patient has been instructed to avoid herbs or non-directed vitamins the week prior to surgery to ensure no drug interactions with perioperative surgical and anesthetic medications.  - Patient has been instructed to avoid aspirin containing medications or non-steroidal anti-inflammatory drugs for the week preceding surgery.    3. Prophylaxis for cardiac events with perioperative beta-blockers: not indicated.    4. Patient requires further consultation with: None    Clearance  Patient is Optimized for surgery without any additional cardiac testing.     Oskar Rosenberg MD  [address] [address]  Phone#  [phone]  Fax#  [phone]

## 2025-05-14 ENCOUNTER — HOSPITAL ENCOUNTER (OUTPATIENT)
Dept: INFUSION CENTER | Facility: HOSPITAL | Age: 75
Discharge: HOME | End: 2025-05-14
Payer: MEDICARE

## 2025-05-14 DIAGNOSIS — Z45.2 ENCOUNTER FOR CENTRAL LINE CARE: Primary | ICD-10-CM

## 2025-05-14 DIAGNOSIS — C61 PROSTATE CANCER (HCC): ICD-10-CM

## 2025-05-14 DIAGNOSIS — C34.12 PRIMARY MALIGNANT NEOPLASM OF BRONCHUS OF LEFT UPPER LOBE (HCC): ICD-10-CM

## 2025-05-14 DIAGNOSIS — C64.2 RENAL CELL CARCINOMA OF LEFT KIDNEY (HCC): ICD-10-CM

## 2025-05-14 LAB
ALBUMIN SERPL BCG-MCNC: 4.4 G/DL (ref 3.5–5)
ALP SERPL-CCNC: 162 U/L (ref 34–104)
ALT SERPL W P-5'-P-CCNC: 31 U/L (ref 7–52)
ANION GAP SERPL CALCULATED.3IONS-SCNC: 8 MMOL/L (ref 4–13)
AST SERPL W P-5'-P-CCNC: 22 U/L (ref 13–39)
BASOPHILS # BLD AUTO: 0.09 THOUSANDS/ÂΜL (ref 0–0.1)
BASOPHILS NFR BLD AUTO: 1 % (ref 0–1)
BILIRUB SERPL-MCNC: 0.91 MG/DL (ref 0.2–1)
BUN SERPL-MCNC: 16 MG/DL (ref 5–25)
CALCIUM SERPL-MCNC: 9.4 MG/DL (ref 8.4–10.2)
CHLORIDE SERPL-SCNC: 105 MMOL/L (ref 96–108)
CO2 SERPL-SCNC: 24 MMOL/L (ref 21–32)
CREAT SERPL-MCNC: 0.84 MG/DL (ref 0.6–1.3)
EOSINOPHIL # BLD AUTO: 0.18 THOUSAND/ÂΜL (ref 0–0.61)
EOSINOPHIL NFR BLD AUTO: 2 % (ref 0–6)
ERYTHROCYTE [DISTWIDTH] IN BLOOD BY AUTOMATED COUNT: 12.2 % (ref 11.6–15.1)
GFR SERPL CREATININE-BSD FRML MDRD: 86 ML/MIN/1.73SQ M
GLUCOSE SERPL-MCNC: 92 MG/DL (ref 65–140)
HCT VFR BLD AUTO: 47.5 % (ref 36.5–49.3)
HGB BLD-MCNC: 15.6 G/DL (ref 12–17)
IMM GRANULOCYTES # BLD AUTO: 0.06 THOUSAND/UL (ref 0–0.2)
IMM GRANULOCYTES NFR BLD AUTO: 1 % (ref 0–2)
LYMPHOCYTES # BLD AUTO: 3.1 THOUSANDS/ÂΜL (ref 0.6–4.47)
LYMPHOCYTES NFR BLD AUTO: 26 % (ref 14–44)
MAGNESIUM SERPL-MCNC: 2.1 MG/DL (ref 1.9–2.7)
MCH RBC QN AUTO: 30.8 PG (ref 26.8–34.3)
MCHC RBC AUTO-ENTMCNC: 32.8 G/DL (ref 31.4–37.4)
MCV RBC AUTO: 94 FL (ref 82–98)
MONOCYTES # BLD AUTO: 0.67 THOUSAND/ÂΜL (ref 0.17–1.22)
MONOCYTES NFR BLD AUTO: 6 % (ref 4–12)
NEUTROPHILS # BLD AUTO: 7.86 THOUSANDS/ÂΜL (ref 1.85–7.62)
NEUTS SEG NFR BLD AUTO: 64 % (ref 43–75)
NRBC BLD AUTO-RTO: 0 /100 WBCS
PLATELET # BLD AUTO: 191 THOUSANDS/UL (ref 149–390)
PMV BLD AUTO: 9.8 FL (ref 8.9–12.7)
POTASSIUM SERPL-SCNC: 4.5 MMOL/L (ref 3.5–5.3)
PROT SERPL-MCNC: 7 G/DL (ref 6.4–8.4)
PSA SERPL-MCNC: 9.1 NG/ML (ref 0–4)
RBC # BLD AUTO: 5.07 MILLION/UL (ref 3.88–5.62)
SODIUM SERPL-SCNC: 137 MMOL/L (ref 135–147)
WBC # BLD AUTO: 11.96 THOUSAND/UL (ref 4.31–10.16)

## 2025-05-14 PROCEDURE — 85025 COMPLETE CBC W/AUTO DIFF WBC: CPT

## 2025-05-14 PROCEDURE — 80053 COMPREHEN METABOLIC PANEL: CPT

## 2025-05-14 PROCEDURE — 84153 ASSAY OF PSA TOTAL: CPT

## 2025-05-14 PROCEDURE — 83735 ASSAY OF MAGNESIUM: CPT

## 2025-05-15 ENCOUNTER — OFFICE VISIT (OUTPATIENT)
Dept: FAMILY MEDICINE CLINIC | Facility: CLINIC | Age: 75
End: 2025-05-15
Payer: MEDICARE

## 2025-05-15 VITALS
DIASTOLIC BLOOD PRESSURE: 56 MMHG | SYSTOLIC BLOOD PRESSURE: 110 MMHG | BODY MASS INDEX: 24.11 KG/M2 | OXYGEN SATURATION: 92 % | HEIGHT: 70 IN | WEIGHT: 168.4 LBS | TEMPERATURE: 97 F | HEART RATE: 93 BPM

## 2025-05-15 DIAGNOSIS — C34.12 PRIMARY MALIGNANT NEOPLASM OF BRONCHUS OF LEFT UPPER LOBE (HCC): ICD-10-CM

## 2025-05-15 DIAGNOSIS — J44.9 CHRONIC OBSTRUCTIVE PULMONARY DISEASE, UNSPECIFIED COPD TYPE (HCC): Primary | ICD-10-CM

## 2025-05-15 PROCEDURE — 99213 OFFICE O/P EST LOW 20 MIN: CPT | Performed by: FAMILY MEDICINE

## 2025-05-15 PROCEDURE — G2211 COMPLEX E/M VISIT ADD ON: HCPCS | Performed by: FAMILY MEDICINE

## 2025-05-15 RX ORDER — ALBUTEROL SULFATE 90 UG/1
2 INHALANT RESPIRATORY (INHALATION) EVERY 6 HOURS PRN
Qty: 6.7 G | Refills: 5 | Status: SHIPPED | OUTPATIENT
Start: 2025-05-15

## 2025-05-15 NOTE — ASSESSMENT & PLAN NOTE
Stable  No new breathing symptoms  Reports morning mucus production  Continues to smoke cigarettes daily  Rarely becomes short of breath unless he is walking up a hill.  Will start nhan as rescue  If symptoms improve with nhan and is using daily, will start LABA/LAMA    Orders:  •  albuterol (Proventil HFA) 90 mcg/act inhaler; Inhale 2 puffs every 6 (six) hours as needed for wheezing

## 2025-05-15 NOTE — PROGRESS NOTES
"Name: Angelo Redd      : 1950      MRN: [id number]  Encounter Provider: Jatinder Shepard DO  Encounter Date: 5/15/2025   Encounter department: [facility] GROUP  :  Assessment & Plan  Chronic obstructive pulmonary disease, unspecified COPD type (HCC)  Stable  No new breathing symptoms  Reports morning mucus production  Continues to smoke cigarettes daily  Rarely becomes short of breath unless he is walking up a hill.  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   HENT:  Negative for ear pain, hearing loss, postnasal drip, rhinorrhea, sinus pressure, sinus pain, sneezing and sore throat.    Respiratory:  Positive for cough. Negative for chest tightness, shortness of breath and wheezing.    Cardiovascular:  Negative for chest pain, palpitations and leg swelling.   Gastrointestinal:  Negative for abdominal pain, blood in stool, constipation, diarrhea, nausea and vomiting.   Genitourinary:  Negative for dysuria, frequency, hematuria and urgency.   Musculoskeletal:  Negative for arthralgias and myalgias.   Neurological:  Negative for dizziness, syncope, weakness, light-headedness, numbness and headaches.       Objective   /56 (BP Location: Left arm, Patient Position: Sitting, Cuff Size: Large)   Pulse 93   Temp (!) 97 °F (36.1 °C)   Ht 5' 10\" (1.778 m)   Wt 76.4 kg (168 lb 6.4 oz)   SpO2 92%   BMI 24.16 kg/m²      Physical Exam  Vitals reviewed.   Constitutional:       General: He is not in acute distress.     " Appearance: He is well-developed. He is not diaphoretic.   HENT:      Head: Normocephalic and atraumatic.      Right Ear: Tympanic membrane, ear canal and external ear normal. There is no impacted cerumen.      Left Ear: Tympanic membrane, ear canal and external ear normal. There is no impacted cerumen.      Nose: Nose normal. No congestion.      Mouth/Throat:      Mouth: Mucous membranes are moist.      Pharynx: Oropharynx is clear.     Eyes:      General: No scleral icterus.        Right eye: No discharge.         Left eye: No discharge.      Conjunctiva/sclera: Conjunctivae normal.      Pupils: Pupils are equal, round, and reactive to light.     Neck:      Vascular: No JVD.     Cardiovascular:      Rate and Rhythm: Normal rate and regular rhythm.      Heart sounds: Normal heart sounds. No murmur heard.     No friction rub.   Pulmonary:      Effort: Pulmonary effort is normal. No respiratory distress.      Breath sounds: Normal breath sounds. No wheezing or rales.   Chest:      Chest wall: No tenderness.   Abdominal:      General: Bowel sounds are normal. There is no distension.      Palpations: Abdomen is soft. There is no mass.      Tenderness: There is no abdominal tenderness. There is no guarding or rebound.     Musculoskeletal:         General: No tenderness or deformity. Normal range of motion.      Cervical back: Normal range of motion and neck supple.     Skin:     General: Skin is warm and dry.      Findings: No erythema or rash.     Neurological:      Mental Status: He is alert and oriented to person, place, and time. Mental status is at baseline.      Cranial Nerves: No cranial nerve deficit.     Psychiatric:         Mood and Affect: Mood normal. Affect is flat.

## 2025-06-04 ENCOUNTER — PROCEDURE VISIT (OUTPATIENT)
Dept: UROLOGY | Facility: MEDICAL CENTER | Age: 75
End: 2025-06-04
Payer: MEDICARE

## 2025-06-04 VITALS
DIASTOLIC BLOOD PRESSURE: 64 MMHG | HEIGHT: 70 IN | SYSTOLIC BLOOD PRESSURE: 112 MMHG | WEIGHT: 167 LBS | BODY MASS INDEX: 23.91 KG/M2

## 2025-06-04 DIAGNOSIS — Z85.528 HISTORY OF KIDNEY CANCER: ICD-10-CM

## 2025-06-04 DIAGNOSIS — C61 PROSTATE CANCER MANAGED WITH ACTIVE SURVEILLANCE (HCC): Primary | ICD-10-CM

## 2025-06-04 DIAGNOSIS — Z85.51 HISTORY OF BLADDER CANCER: ICD-10-CM

## 2025-06-04 DIAGNOSIS — N21.0 BLADDER STONE: ICD-10-CM

## 2025-06-04 PROCEDURE — 52000 CYSTOURETHROSCOPY: CPT | Performed by: UROLOGY

## 2025-06-04 PROCEDURE — 99214 OFFICE O/P EST MOD 30 MIN: CPT | Performed by: UROLOGY

## 2025-06-04 NOTE — PROGRESS NOTES
HISTORY:    1.  Prostate cancer Chirag score 6 on active surveillance.  Diagnosed in December 2022.  Prostate 56 mL on MRI in 2022     2.  Status post cryoablation for small lesion stage I kidney cancer in 2016.     3.  History of noninvasive low-grade bladder cancer in 2018..     4.  Has completed treatment for metastatic lung cancer.     5.  Small left renal stone calyceal nonobstructing     6.  Bladder stone chronic, this is related to a stone surgery 15 years ago at [facility].     Cystoscopy     Date/Time  6/4/2025 1:30 PM     Performed by  Cm Lester MD   Authorized by  Cm Lester MD         Procedure Details:  Procedure type: cystoscopy    Patient tolerance: Patient tolerated the procedure well with no immediate complications    Additional Procedure Details:     Patient tolerated the procedure well and was escorted from the examining table.     Patient presents for cystoscopy.  I have discussed the reasons for doing the exam, and the potential risks and complications.  Patient expressed understanding, and signed informed consent document.    The patient was carefully  positioned supine on the examining table.  Sterile preparation was performed on the urethra.  Xylocaine jelly was instilled and left  Indwelling for the procedure.  The 15 Latvian flexible cystoscope was passed with the following findings:      Urethra: No stricture    Prostate:  lateral lobes mild enlargement, mildly obstructing                  Bladder: Severe trabeculation, no tumors.  Bladder stone visible right posterior wall just inside bladder neck, no change from last year     Residual urine: 100 mL    Patient tolerated the procedure well and was escorted from the examining table.           ASSESSMENT / PLAN:    1.  Bladder stone about the same as before, patient has no real symptoms provide will observe    2.  No recurrence of bladder cancer.    3.  Prostate cancer on active surveillance diagnosed in 2022, PSA risen from  "6.7-9.1 in the past year.    Of note, PSA was 6.8 at time of diagnosis in October 2022    Schedule MRI    Schedule fusion biopsy in the OR    4.  History of kidney cancer treated with cryotherapy many years ago, CT scan films reviewed and shown to the patient, a seroma collection there, nothing that looks like any type of recurrence of cancer.  There are at least 2 other simple or proteinaceous cysts        Review of Systems      Objective:     Physical Exam      0   Lab Value Date/Time    PSA 9.101 (H) 05/14/2025 1034    PSA 6.785 (H) 02/11/2025 1032    PSA 5.91 (H) 05/13/2024 0930    PSA 5.17 (H) 02/15/2024 0949    PSA 4.65 (H) 09/08/2023 1355   ]  BUN   Date Value Ref Range Status   05/14/2025 16 5 - 25 mg/dL Final   12/28/2020 18 7 - 28 mg/dL Final     Creatinine   Date Value Ref Range Status   05/14/2025 0.84 0.60 - 1.30 mg/dL Final     Comment:     Standardized to IDMS reference method   12/28/2020 0.88 0.53 - 1.30 mg/dL Final     No components found for: \"CBC\"    Problem List[1]     Patient ID: Angelo Redd is a 75 y.o. male.    Current Medications[2]                       [1]   Patient Active Problem List  Diagnosis    Family history of colon cancer    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)    Bladder stone    Urge incontinence    BPH with urinary obstruction    Renal cell carcinoma of left kidney (HCC)    Encounter for central line care    Bladder mass    Smoker    Colon polyps    High prostate specific antigen (PSA)    Prostate cancer (HCC)    Infrarenal abdominal aortic aneurysm (AAA) without rupture (HCC)    Exudative age-related macular degeneration, left eye, with active choroidal neovascularization (HCC)    Elevated liver enzymes    Foreign body of left ear    Basal cell carcinoma (BCC) of skin of lower extremity including hip    Malignant neoplasm of overlapping sites of brain (HCC)    COPD (chronic obstructive pulmonary disease) (HCC)   [2]   Current Outpatient " Medications:     albuterol (Proventil HFA) 90 mcg/act inhaler, Inhale 2 puffs every 6 (six) hours as needed for wheezing, Disp: 6.7 g, Rfl: 5    Ascorbic Acid (VITAMIN C) 500 MG CAPS, Take 500 mg by mouth in the morning., Disp: , Rfl:     diclofenac (VOLTAREN) 0.1 % ophthalmic solution, Administer 1 drop to both eyes, Disp: , Rfl:     ketorolac (ACULAR) 0.5 % ophthalmic solution, , Disp: , Rfl:     Multiple Vitamins-Minerals (MULTIVITAMIN ADULT PO), Take 1 tablet by mouth in the morning., Disp: , Rfl:     mupirocin (BACTROBAN) 2 % ointment, , Disp: , Rfl:     polyethylene glycol-propylene glycol (SYSTANE) 0.4-0.3 %, Apply to eye in the morning., Disp: , Rfl:

## 2025-06-17 ENCOUNTER — HOSPITAL ENCOUNTER (OUTPATIENT)
Facility: MEDICAL CENTER | Age: 75
Discharge: HOME | End: 2025-06-17
Attending: UROLOGY
Payer: MEDICARE

## 2025-06-17 DIAGNOSIS — C61 PROSTATE CANCER MANAGED WITH ACTIVE SURVEILLANCE (HCC): ICD-10-CM

## 2025-06-17 PROCEDURE — 76377 3D RENDER W/INTRP POSTPROCES: CPT

## 2025-06-17 PROCEDURE — A9585 GADOBUTROL INJECTION: HCPCS | Performed by: UROLOGY

## 2025-06-17 PROCEDURE — 72197 MRI PELVIS W/O & W/DYE: CPT

## 2025-06-17 RX ORDER — GADOBUTROL 604.72 MG/ML
7 INJECTION INTRAVENOUS
Status: COMPLETED | OUTPATIENT
Start: 2025-06-17 | End: 2025-06-17

## 2025-06-17 RX ADMIN — GADOBUTROL 7 ML: 604.72 INJECTION INTRAVENOUS at 11:40

## 2025-06-25 ENCOUNTER — PREP FOR PROCEDURE (OUTPATIENT)
Dept: UROLOGY | Facility: MEDICAL CENTER | Age: 75
End: 2025-06-25

## 2025-06-25 ENCOUNTER — TELEPHONE (OUTPATIENT)
Dept: UROLOGY | Facility: MEDICAL CENTER | Age: 75
End: 2025-06-25

## 2025-06-25 DIAGNOSIS — R39.89 SUSPECTED UTI: ICD-10-CM

## 2025-06-25 DIAGNOSIS — Z01.812 PRE-OPERATIVE LABORATORY EXAMINATION: ICD-10-CM

## 2025-06-25 DIAGNOSIS — R97.20 ELEVATED PSA: Primary | ICD-10-CM

## 2025-06-25 DIAGNOSIS — Z01.810 PRE-OPERATIVE CARDIOVASCULAR EXAMINATION: ICD-10-CM

## 2025-06-25 NOTE — TELEPHONE ENCOUNTER
Spoke with patient and confirmed surgery date of: 8/20/25  Type of surgery: MRI FUSION BX  Operating physician: DR. TRENT  Location of surgery: [facility]      Verbally went over prep with patient on: 6/25  NPO   Bowel prep? Yes, 1 enema 1 hour prior to leaving the house for the procedure   Hospital calls afternoon prior with arrival time -Calls Friday afternoon for Monday surgeries   Patient needs ride to and from surgery (outpatient)    Pre-op testing to be done 2 weeks prior to surgery    CBC,CMP,U/C, EKG   Blood thinners: NONE    Clearances needed: NONE      Mailed packet on:   Copy of packet scanned into Media on:   Labs in packet   Soap instructions in packet    Post-op in packet   H&P on admit   PO  Encounter sent       Consent: On admit

## 2025-07-09 ENCOUNTER — HOSPITAL ENCOUNTER (OUTPATIENT)
Dept: INFUSION CENTER | Facility: HOSPITAL | Age: 75
Discharge: HOME | End: 2025-07-09
Payer: MEDICARE

## 2025-07-09 DIAGNOSIS — Z45.2 ENCOUNTER FOR CENTRAL LINE CARE: ICD-10-CM

## 2025-07-09 DIAGNOSIS — C34.12 PRIMARY MALIGNANT NEOPLASM OF BRONCHUS OF LEFT UPPER LOBE (HCC): Primary | ICD-10-CM

## 2025-07-09 PROCEDURE — 96523 IRRIG DRUG DELIVERY DEVICE: CPT

## 2025-07-09 NOTE — PROGRESS NOTES
Pt tolerated routine port flush without difficulty.  Port flushed and deaccessed per protocol.  Confirmed next appt on 9/3 at 1030.  AVS declined.  Left ambulatory in stable condition.

## 2025-07-27 PROCEDURE — NC001 PR NO CHARGE: Performed by: UROLOGY

## 2025-07-27 RX ORDER — CEFAZOLIN SODIUM 2 G/50ML
2000 SOLUTION INTRAVENOUS ONCE
Status: CANCELLED | OUTPATIENT
Start: 2025-08-20

## 2025-07-30 ENCOUNTER — OFFICE VISIT (OUTPATIENT)
Dept: LAB | Facility: HOSPITAL | Age: 75
End: 2025-07-30
Payer: MEDICARE

## 2025-07-30 DIAGNOSIS — Z01.810 PRE-OPERATIVE CARDIOVASCULAR EXAMINATION: ICD-10-CM

## 2025-07-30 DIAGNOSIS — R97.20 ELEVATED PSA: ICD-10-CM

## 2025-07-30 PROCEDURE — 93005 ELECTROCARDIOGRAM TRACING: CPT

## 2025-07-31 LAB
ATRIAL RATE: 96 BPM
P AXIS: 64 DEGREES
PR INTERVAL: 138 MS
QRS AXIS: 73 DEGREES
QRSD INTERVAL: 88 MS
QT INTERVAL: 342 MS
QTC INTERVAL: 433 MS
T WAVE AXIS: 67 DEGREES
VENTRICULAR RATE: 96 BPM

## 2025-07-31 PROCEDURE — 93010 ELECTROCARDIOGRAM REPORT: CPT | Performed by: INTERNAL MEDICINE

## 2025-08-01 DIAGNOSIS — C61 PROSTATE CANCER MANAGED WITH ACTIVE SURVEILLANCE (HCC): Primary | ICD-10-CM

## 2025-08-01 RX ORDER — SULFAMETHOXAZOLE AND TRIMETHOPRIM 800; 160 MG/1; MG/1
1 TABLET ORAL EVERY 12 HOURS SCHEDULED
Qty: 14 TABLET | Refills: 0 | Status: SHIPPED | OUTPATIENT
Start: 2025-08-12 | End: 2025-08-19

## 2025-08-04 ENCOUNTER — TELEPHONE (OUTPATIENT)
Dept: UROLOGY | Facility: MEDICAL CENTER | Age: 75
End: 2025-08-04

## 2025-08-15 ENCOUNTER — OFFICE VISIT (OUTPATIENT)
Dept: HEMATOLOGY ONCOLOGY | Facility: CLINIC | Age: 75
End: 2025-08-15
Payer: MEDICARE

## 2025-08-18 ENCOUNTER — ANESTHESIA EVENT (OUTPATIENT)
Dept: PERIOP | Facility: HOSPITAL | Age: 75
End: 2025-08-18
Payer: MEDICARE

## 2025-08-20 ENCOUNTER — HOSPITAL ENCOUNTER (OUTPATIENT)
Facility: HOSPITAL | Age: 75
Setting detail: OUTPATIENT SURGERY
Discharge: HOME | End: 2025-08-20
Attending: UROLOGY | Admitting: UROLOGY
Payer: MEDICARE

## 2025-08-20 ENCOUNTER — ANESTHESIA (OUTPATIENT)
Dept: PERIOP | Facility: HOSPITAL | Age: 75
End: 2025-08-20
Payer: MEDICARE

## 2025-08-20 VITALS
BODY MASS INDEX: 23.07 KG/M2 | TEMPERATURE: 97 F | HEIGHT: 70 IN | HEART RATE: 78 BPM | DIASTOLIC BLOOD PRESSURE: 61 MMHG | WEIGHT: 161.16 LBS | SYSTOLIC BLOOD PRESSURE: 117 MMHG | OXYGEN SATURATION: 94 % | RESPIRATION RATE: 16 BRPM

## 2025-08-20 DIAGNOSIS — C61 PROSTATE CANCER MANAGED WITH ACTIVE SURVEILLANCE (HCC): ICD-10-CM

## 2025-08-20 PROCEDURE — 51798 US URINE CAPACITY MEASURE: CPT | Performed by: UROLOGY

## 2025-08-20 PROCEDURE — 55700 PR PROSTATE NEEDLE BIOPSY ANY APPROACH: CPT | Performed by: UROLOGY

## 2025-08-20 PROCEDURE — 76942 ECHO GUIDE FOR BIOPSY: CPT | Performed by: UROLOGY

## 2025-08-20 PROCEDURE — G0416 PROSTATE BIOPSY, ANY MTHD: HCPCS | Performed by: PATHOLOGY

## 2025-08-20 RX ORDER — ONDANSETRON 2 MG/ML
4 INJECTION INTRAMUSCULAR; INTRAVENOUS ONCE AS NEEDED
Status: DISCONTINUED | OUTPATIENT
Start: 2025-08-20 | End: 2025-08-20 | Stop reason: HOSPADM

## 2025-08-20 RX ORDER — SODIUM CHLORIDE, SODIUM LACTATE, POTASSIUM CHLORIDE, CALCIUM CHLORIDE 600; 310; 30; 20 MG/100ML; MG/100ML; MG/100ML; MG/100ML
125 INJECTION, SOLUTION INTRAVENOUS CONTINUOUS
Status: DISCONTINUED | OUTPATIENT
Start: 2025-08-20 | End: 2025-08-20 | Stop reason: HOSPADM

## 2025-08-20 RX ORDER — MIDAZOLAM HYDROCHLORIDE 2 MG/2ML
INJECTION, SOLUTION INTRAMUSCULAR; INTRAVENOUS AS NEEDED
Status: DISCONTINUED | OUTPATIENT
Start: 2025-08-20 | End: 2025-08-20

## 2025-08-20 RX ORDER — HYDROCODONE BITARTRATE AND ACETAMINOPHEN 5; 325 MG/1; MG/1
1 TABLET ORAL EVERY 6 HOURS PRN
Status: DISCONTINUED | OUTPATIENT
Start: 2025-08-20 | End: 2025-08-20 | Stop reason: HOSPADM

## 2025-08-20 RX ORDER — PROPOFOL 10 MG/ML
INJECTION, EMULSION INTRAVENOUS CONTINUOUS PRN
Status: DISCONTINUED | OUTPATIENT
Start: 2025-08-20 | End: 2025-08-20

## 2025-08-20 RX ORDER — FENTANYL CITRATE/PF 50 MCG/ML
25 SYRINGE (ML) INJECTION
Status: DISCONTINUED | OUTPATIENT
Start: 2025-08-20 | End: 2025-08-20 | Stop reason: HOSPADM

## 2025-08-20 RX ORDER — PHENYLEPHRINE HCL IN 0.9% NACL 1 MG/10 ML
SYRINGE (ML) INTRAVENOUS AS NEEDED
Status: DISCONTINUED | OUTPATIENT
Start: 2025-08-20 | End: 2025-08-20

## 2025-08-20 RX ORDER — FENTANYL CITRATE 50 UG/ML
INJECTION, SOLUTION INTRAMUSCULAR; INTRAVENOUS AS NEEDED
Status: DISCONTINUED | OUTPATIENT
Start: 2025-08-20 | End: 2025-08-20

## 2025-08-20 RX ORDER — LIDOCAINE HYDROCHLORIDE AND EPINEPHRINE 20; 5 MG/ML; UG/ML
INJECTION, SOLUTION EPIDURAL; INFILTRATION; INTRACAUDAL; PERINEURAL AS NEEDED
Status: DISCONTINUED | OUTPATIENT
Start: 2025-08-20 | End: 2025-08-20 | Stop reason: HOSPADM

## 2025-08-20 RX ORDER — ONDANSETRON 2 MG/ML
INJECTION INTRAMUSCULAR; INTRAVENOUS AS NEEDED
Status: DISCONTINUED | OUTPATIENT
Start: 2025-08-20 | End: 2025-08-20

## 2025-08-20 RX ORDER — LIDOCAINE HYDROCHLORIDE 20 MG/ML
INJECTION, SOLUTION EPIDURAL; INFILTRATION; INTRACAUDAL; PERINEURAL AS NEEDED
Status: DISCONTINUED | OUTPATIENT
Start: 2025-08-20 | End: 2025-08-20

## 2025-08-20 RX ADMIN — LIDOCAINE HYDROCHLORIDE 60 MG: 20 INJECTION, SOLUTION EPIDURAL; INFILTRATION; INTRACAUDAL at 11:59

## 2025-08-20 RX ADMIN — CEFTAZIDIME 2000 MG: 1 INJECTION, POWDER, FOR SOLUTION INTRAMUSCULAR; INTRAVENOUS at 12:00

## 2025-08-20 RX ADMIN — SODIUM CHLORIDE, SODIUM LACTATE, POTASSIUM CHLORIDE, AND CALCIUM CHLORIDE 125 ML/HR: .6; .31; .03; .02 INJECTION, SOLUTION INTRAVENOUS at 10:43

## 2025-08-20 RX ADMIN — FENTANYL CITRATE 50 MCG: 50 INJECTION INTRAMUSCULAR; INTRAVENOUS at 11:52

## 2025-08-20 RX ADMIN — MIDAZOLAM 1 MG: 1 INJECTION INTRAMUSCULAR; INTRAVENOUS at 11:52

## 2025-08-20 RX ADMIN — ONDANSETRON 4 MG: 2 INJECTION INTRAMUSCULAR; INTRAVENOUS at 11:52

## 2025-08-20 RX ADMIN — PROPOFOL 100 MCG/KG/MIN: 10 INJECTION, EMULSION INTRAVENOUS at 12:00

## 2025-08-20 RX ADMIN — Medication 100 MCG: at 12:10

## 2025-08-20 RX ADMIN — PROPOFOL 50 MG: 10 INJECTION, EMULSION INTRAVENOUS at 11:59

## 2025-08-21 PROCEDURE — G0416 PROSTATE BIOPSY, ANY MTHD: HCPCS | Performed by: PATHOLOGY

## (undated) DEVICE — EVACUATOR BLADDER ELLIK DISP STRL

## (undated) DEVICE — GLOVE INDICATOR PI UNDERGLOVE SZ 8 BLUE

## (undated) DEVICE — GLOVE INDICATOR PI UNDERGLOVE SZ 7.5 BLUE

## (undated) DEVICE — PACK TUR

## (undated) DEVICE — FIBER STD QUANTA 365 MICRON

## (undated) DEVICE — Device

## (undated) DEVICE — INSULATED NEEDLE ELECTRODE SAFETY SLEEVE(TM): Brand: EDGE

## (undated) DEVICE — LUBRICANT INST ELECTROLUBE ANTISTK WO PAD

## (undated) DEVICE — SINGLE-USE POLYPECTOMY SNARE: Brand: ROTATABLE SNARE

## (undated) DEVICE — SPECIMEN CONTAINER STERILE PEEL PACK

## (undated) DEVICE — GAUZE SPONGES,16 PLY: Brand: CURITY

## (undated) DEVICE — GLOVE INDICATOR PI UNDERGLOVE SZ 7 BLUE

## (undated) DEVICE — SINGLE PORT MANIFOLD: Brand: NEPTUNE 2

## (undated) DEVICE — LUBRICANT SURGILUBE TUBE 4 OZ  FLIP TOP

## (undated) DEVICE — 3M™ STERI-STRIP™ REINFORCED ADHESIVE SKIN CLOSURES, R1547, 1/2 IN X 4 IN (12 MM X 100 MM), 6 STRIPS/ENVELOPE: Brand: 3M™ STERI-STRIP™

## (undated) DEVICE — PMI DISPOSABLE PUNCTURE CLOSURE DEVICE / SUTURE GRASPER: Brand: PMI

## (undated) DEVICE — INVIEW CLEAR LEGGINGS: Brand: CONVERTORS

## (undated) DEVICE — STERILE SURGICAL LUBRICANT,  TUBE: Brand: SURGILUBE

## (undated) DEVICE — GLOVE SRG BIOGEL 6.5

## (undated) DEVICE — SPONGE GAUZE 4 X 4 16 PLY STRL PLASTIC TRAY LF

## (undated) DEVICE — FIBER STD QUANTA 1000 MICRON

## (undated) DEVICE — 2000CC GUARDIAN II: Brand: GUARDIAN

## (undated) DEVICE — PROGRASP FORCEPS: Brand: ENDOWRIST;DAVINCI SI

## (undated) DEVICE — LASER HOLMUIUM FIBER 1000 MIC

## (undated) DEVICE — FRAZIER SUCTION INSTRUMENT 18 FR W/OBTURATOR, NO CONTROL VENT: Brand: FRAZIER

## (undated) DEVICE — GLOVE SRG BIOGEL 7

## (undated) DEVICE — GLOVE SRG BIOGEL 7.5

## (undated) DEVICE — SUT PDS II 1 CT-1 27 IN Z347H

## (undated) DEVICE — MONOPOLAR CURVED SCISSORS: Brand: ENDOWRIST;DAVINCI SI

## (undated) DEVICE — INTENDED FOR TISSUE SEPARATION, AND OTHER PROCEDURES THAT REQUIRE A SHARP SURGICAL BLADE TO PUNCTURE OR CUT.: Brand: BARD-PARKER SAFETY BLADES SIZE 15, STERILE

## (undated) DEVICE — SCD SEQUENTIAL COMPRESSION COMFORT SLEEVE MEDIUM KNEE LENGTH: Brand: KENDALL SCD

## (undated) DEVICE — INTENDED FOR TISSUE SEPARATION, AND OTHER PROCEDURES THAT REQUIRE A SHARP SURGICAL BLADE TO PUNCTURE OR CUT.: Brand: BARD-PARKER ® CARBON RIB-BACK BLADES

## (undated) DEVICE — GUIDEWIRE STRGHT TIP 0.035 IN  SOLO PLUS

## (undated) DEVICE — PENCIL ELECTROSURG E-Z CLEAN -0035H

## (undated) DEVICE — SUT STRATAFIX SPIRAL MONOCRYL PLUS  3-0 PS-2 30 CM SXMP1B106

## (undated) DEVICE — 3000CC GUARDIAN II: Brand: GUARDIAN

## (undated) DEVICE — VAC DRESSING SENSATRAC SMALL

## (undated) DEVICE — TUBING SUCTION 5MM X 12 FT

## (undated) DEVICE — [HIGH FLOW INSUFFLATOR,  DO NOT USE IF PACKAGE IS DAMAGED,  KEEP DRY,  KEEP AWAY FROM SUNLIGHT,  PROTECT FROM HEAT AND RADIOACTIVE SOURCES.]: Brand: PNEUMOSURE

## (undated) DEVICE — SLEEVE SCD KNEE MEDIUM

## (undated) DEVICE — BETHLEHEM UNIVERSAL MINOR GEN: Brand: CARDINAL HEALTH

## (undated) DEVICE — CATH FOLEY 22FR 5ML 2 WAY SILICONE ELASTIMER

## (undated) DEVICE — GLOVE PI ULTRA TOUCH SZ.7.5

## (undated) DEVICE — GLOVE INDICATOR PI UNDERGLOVE SZ 6.5 BLUE

## (undated) DEVICE — 3M™ STERI-STRIP™ REINFORCED ADHESIVE SKIN CLOSURES, R1542, 1/4 IN X 1-1/2 IN (6 MM X 38 MM), 6 STRIPS/ENVELOPE: Brand: 3M™ STERI-STRIP™

## (undated) DEVICE — TIBURON SPLIT SHEET: Brand: CONVERTORS

## (undated) DEVICE — BIPOLAR CORD DISP

## (undated) DEVICE — 3M™ STERI-STRIP™ COMPOUND BENZOIN TINCTURE 40 BAGS/CARTON 4 CARTONS/CASE C1544: Brand: 3M™ STERI-STRIP™

## (undated) DEVICE — MEGASUTURECUT ND: Brand: ENDOWRIST;DAVINCI SI

## (undated) DEVICE — 2963 MEDIPORE SOFT CLOTH TAPE 3 IN X 10 YD 12 RLS/CS: Brand: 3M™ MEDIPORE™

## (undated) DEVICE — OCCLUSIVE GAUZE STRIP,3% BISMUTH TRIBROMOPHENATE IN PETROLATUM BLEND: Brand: XEROFORM

## (undated) DEVICE — ROBOT INST CANNULA 8MM OBTURATOR BLUNT DISP

## (undated) DEVICE — PREMIUM DRY TRAY LF: Brand: MEDLINE INDUSTRIES, INC.

## (undated) DEVICE — UROCATCH BAG

## (undated) DEVICE — ALLENTOWN LAP CHOLE APP PACK: Brand: CARDINAL HEALTH

## (undated) DEVICE — BASIC SINGLE BASIN-LF: Brand: MEDLINE INDUSTRIES, INC.

## (undated) DEVICE — BAG DECANTER

## (undated) DEVICE — ROBOT ACCESSORY KIT 4 ARM

## (undated) DEVICE — SUT VLOC 90 3-0 V-20 9IN VLOCM0644

## (undated) DEVICE — REM POLYHESIVE ADULT PATIENT RETURN ELECTRODE: Brand: VALLEYLAB

## (undated) DEVICE — CAUTERY TIP POLISHER: Brand: DEVON

## (undated) DEVICE — SUT SILK 2-0 SH 30 IN K833H

## (undated) DEVICE — FIBER LASER HOLIUM 1000 MICRON

## (undated) DEVICE — APPLIER SURGICLIP PREMIUM SMALL 9IN

## (undated) DEVICE — SUT VICRYL 2-0 SH 27 IN UNDYED J417H

## (undated) DEVICE — DISPOSABLE OR TOWEL: Brand: CARDINAL HEALTH

## (undated) DEVICE — SUT VICRYL 0 UR-6 27 IN J603H

## (undated) DEVICE — EXOFIN PRECISION PEN HIGH VISCOSITY TOPICAL SKIN ADHESIVE: Brand: EXOFIN PRECISION PEN, 1G

## (undated) DEVICE — SUT CHROMIC 3-0 PS-2 27 1638H

## (undated) DEVICE — SUT MONOCRYL 4-0 PS-2 27 IN Y426H

## (undated) DEVICE — EXIDINE 4 PCT

## (undated) DEVICE — PREVENA PLUS INCISION MANAGEMENT SYSTEM: Brand: PREVENA PLUS™

## (undated) DEVICE — TELFA NON-ADHERENT ABSORBENT DRESSING: Brand: TELFA

## (undated) DEVICE — CHLORAPREP HI-LITE 26ML ORANGE

## (undated) DEVICE — BASKET SPECIMEN RETRIVAL 1.9FR 120CM

## (undated) DEVICE — UNDYED MONOFILAMENT (POLYDIOXANONE), ABSORBABLE SURGICAL SUTURE: Brand: PDS

## (undated) DEVICE — DRAPE FLUID WARMER (BIRD BATH)

## (undated) DEVICE — CURITY NON-ADHERENT STRIPS: Brand: CURITY

## (undated) DEVICE — COTTON BALLS: Brand: DEROYAL

## (undated) DEVICE — COVER STAND W23 IN REINFORCE PLASTIC

## (undated) DEVICE — DRAPE SHEET THREE QUARTER

## (undated) DEVICE — NEEDLE BIOPSY 18G X 25CM MAX-CORE

## (undated) DEVICE — ENDOPATH XCEL BLADELESS TROCARS WITH STABILITY SLEEVES: Brand: ENDOPATH XCEL

## (undated) DEVICE — MARYLAND BIPOLAR FORCEPS: Brand: ENDOWRIST;DAVINCI SI

## (undated) DEVICE — HOLDER PROBE URONAV BK8848

## (undated) DEVICE — WET SKIN PREP TRAY: Brand: MEDLINE INDUSTRIES, INC.

## (undated) DEVICE — LIGHT GLOVE GREEN

## (undated) DEVICE — TIP COVER ACCESSORY

## (undated) DEVICE — FORCEP ELECSURG RADIAL JAW4 2.2 X 240CM  HOT BX

## (undated) DEVICE — PENCILETTE PUSH BUTTON COATED

## (undated) DEVICE — PREP PAD BNS: Brand: CONVERTORS

## (undated) DEVICE — DRAPE SHEET X-LG

## (undated) DEVICE — 10FR FRAZIER SUCTION HANDLE: Brand: CARDINAL HEALTH

## (undated) DEVICE — ASTOUND STANDARD SURGICAL GOWN, XL: Brand: CONVERTORS